# Patient Record
Sex: FEMALE | Race: WHITE | NOT HISPANIC OR LATINO | Employment: OTHER | URBAN - METROPOLITAN AREA
[De-identification: names, ages, dates, MRNs, and addresses within clinical notes are randomized per-mention and may not be internally consistent; named-entity substitution may affect disease eponyms.]

---

## 2017-04-09 PROBLEM — L50.9 URTICARIA: Status: ACTIVE | Noted: 2017-04-09

## 2017-04-11 PROBLEM — I48.92 ATRIAL FLUTTER WITH RAPID VENTRICULAR RESPONSE: Status: ACTIVE | Noted: 2017-04-11

## 2017-04-11 PROBLEM — I48.92 ATRIAL FLUTTER: Status: ACTIVE | Noted: 2017-04-11

## 2017-04-11 PROBLEM — T78.3XXA ANGIOEDEMA: Status: ACTIVE | Noted: 2017-04-11

## 2017-05-08 PROBLEM — I48.92 ATRIAL FLUTTER: Status: ACTIVE | Noted: 2017-05-08

## 2017-07-13 PROBLEM — I48.0 PAF (PAROXYSMAL ATRIAL FIBRILLATION): Status: ACTIVE | Noted: 2017-07-13

## 2017-11-06 PROBLEM — K92.2 ACUTE LOWER GI BLEEDING: Status: ACTIVE | Noted: 2017-11-06

## 2017-11-06 PROBLEM — D72.829 LEUKOCYTOSIS: Status: ACTIVE | Noted: 2017-11-06

## 2017-11-07 PROBLEM — K57.92 ACUTE DIVERTICULITIS: Status: ACTIVE | Noted: 2017-11-07

## 2018-05-06 PROBLEM — C95.90 LEUKEMIA NOT HAVING ACHIEVED REMISSION: Status: RESOLVED | Noted: 2018-05-06 | Resolved: 2018-05-06

## 2018-05-06 PROBLEM — G44.89 HEADACHE SYNDROME: Status: ACTIVE | Noted: 2018-05-06

## 2018-05-06 PROBLEM — C95.90 LEUKEMIA NOT HAVING ACHIEVED REMISSION: Status: ACTIVE | Noted: 2018-05-06

## 2018-05-06 PROBLEM — D72.829 LEUCOCYTOSIS: Status: ACTIVE | Noted: 2018-05-06

## 2018-05-06 PROBLEM — R07.9 CHEST PAIN: Status: ACTIVE | Noted: 2018-05-06

## 2018-05-07 PROBLEM — D63.8 ANEMIA, CHRONIC DISEASE: Status: ACTIVE | Noted: 2018-05-07

## 2018-05-08 PROBLEM — C95.90 LEUKEMIA NOT HAVING ACHIEVED REMISSION: Status: ACTIVE | Noted: 2018-05-08

## 2018-05-23 ENCOUNTER — TELEPHONE (OUTPATIENT)
Dept: PHARMACY | Facility: CLINIC | Age: 73
End: 2018-05-23

## 2018-05-23 ENCOUNTER — LAB VISIT (OUTPATIENT)
Dept: LAB | Facility: HOSPITAL | Age: 73
End: 2018-05-23
Attending: INTERNAL MEDICINE
Payer: MEDICARE

## 2018-05-23 DIAGNOSIS — C92.10 CHRONIC MYELOID LEUKEMIA, BCR/ABL-POSITIVE, NOT HAVING ACHIEVED REMISSION: ICD-10-CM

## 2018-05-23 LAB
ALBUMIN SERPL BCP-MCNC: 4.4 G/DL
ALP SERPL-CCNC: 68 U/L
ALT SERPL W/O P-5'-P-CCNC: 39 U/L
ANION GAP SERPL CALC-SCNC: 10 MMOL/L
ANISOCYTOSIS BLD QL SMEAR: SLIGHT
AST SERPL-CCNC: 44 U/L
BASOPHILS # BLD AUTO: ABNORMAL K/UL
BASOPHILS NFR BLD: 3 %
BILIRUB SERPL-MCNC: 0.4 MG/DL
BUN SERPL-MCNC: 16 MG/DL
CALCIUM SERPL-MCNC: 9.7 MG/DL
CHLORIDE SERPL-SCNC: 100 MMOL/L
CO2 SERPL-SCNC: 33 MMOL/L
CREAT SERPL-MCNC: 0.9 MG/DL
DIFFERENTIAL METHOD: ABNORMAL
EOSINOPHIL # BLD AUTO: ABNORMAL K/UL
EOSINOPHIL NFR BLD: 2 %
ERYTHROCYTE [DISTWIDTH] IN BLOOD BY AUTOMATED COUNT: 21.2 %
EST. GFR  (AFRICAN AMERICAN): >60 ML/MIN/1.73 M^2
EST. GFR  (NON AFRICAN AMERICAN): >60 ML/MIN/1.73 M^2
GIANT PLATELETS BLD QL SMEAR: PRESENT
GLUCOSE SERPL-MCNC: 115 MG/DL
HCT VFR BLD AUTO: 38.7 %
HGB BLD-MCNC: 12.1 G/DL
LYMPHOCYTES # BLD AUTO: ABNORMAL K/UL
LYMPHOCYTES NFR BLD: 11 %
MCH RBC QN AUTO: 26.7 PG
MCHC RBC AUTO-ENTMCNC: 31.3 G/DL
MCV RBC AUTO: 85 FL
METAMYELOCYTES NFR BLD MANUAL: 8 %
MONOCYTES # BLD AUTO: ABNORMAL K/UL
MONOCYTES NFR BLD: 2 %
MYELOCYTES NFR BLD MANUAL: 8 %
NEUTROPHILS # BLD AUTO: 63.2 K/UL
NEUTROPHILS NFR BLD: 51 %
NEUTS BAND NFR BLD MANUAL: 15 %
NRBC BLD-RTO: 1 /100 WBC
OVALOCYTES BLD QL SMEAR: ABNORMAL
PLATELET # BLD AUTO: 226 K/UL
PMV BLD AUTO: 10.4 FL
POIKILOCYTOSIS BLD QL SMEAR: SLIGHT
POLYCHROMASIA BLD QL SMEAR: ABNORMAL
POTASSIUM SERPL-SCNC: 4 MMOL/L
PROT SERPL-MCNC: 7.7 G/DL
RBC # BLD AUTO: 4.54 M/UL
SODIUM SERPL-SCNC: 143 MMOL/L
WBC # BLD AUTO: 95.82 K/UL

## 2018-05-23 PROCEDURE — 80053 COMPREHEN METABOLIC PANEL: CPT

## 2018-05-23 PROCEDURE — 81206 BCR/ABL1 GENE MAJOR BP: CPT

## 2018-05-23 PROCEDURE — 85007 BL SMEAR W/DIFF WBC COUNT: CPT

## 2018-05-23 PROCEDURE — 81207 BCR/ABL1 GENE MINOR BP: CPT

## 2018-05-23 PROCEDURE — 85007 BL SMEAR W/DIFF WBC COUNT: CPT | Mod: PN

## 2018-05-23 PROCEDURE — 85027 COMPLETE CBC AUTOMATED: CPT | Mod: PN

## 2018-05-23 PROCEDURE — 85027 COMPLETE CBC AUTOMATED: CPT

## 2018-05-23 PROCEDURE — 36415 COLL VENOUS BLD VENIPUNCTURE: CPT | Mod: PN

## 2018-05-23 PROCEDURE — 80053 COMPREHEN METABOLIC PANEL: CPT | Mod: PN

## 2018-05-29 LAB
BCR/ABL,P210 RESULT: NORMAL
PATH REPORT.FINAL DX SPEC: NORMAL
SPECIMEN TYPE: NORMAL

## 2018-05-31 LAB
BCR/ABL RESULT, P190, QUANT, BLD: NORMAL
PATH REPORT.FINAL DX SPEC: NORMAL
SPECIMEN TYPE, P190, QUANT, BLD: NORMAL

## 2018-06-05 ENCOUNTER — TELEPHONE (OUTPATIENT)
Dept: PHARMACY | Facility: CLINIC | Age: 73
End: 2018-06-05

## 2018-06-05 NOTE — TELEPHONE ENCOUNTER
Initial Tasigna consult completed on 2018 . Tasigna will be shipped on 2018 to arrive at patient's home on 2018 via [employer]. $7.84 copay. Patient plans to start Tasigna on 2018. Address confirmed, CC on file. Confirmed 2 patient identifiers - name and . Therapy Appropriate.     Patient was counseled on the administration directions:  -Take 2 capsules (300mg) by mouth twice daily on an empty stomach, take either 1 hour before or 2 hours after meals.    -Do not chew, crush, or break the tablets.   If possible, patient was instructed tip the tablets from the RX bottle to the cap, and take directly from the cap to the mouth.  Patient may handle the medication with their hands if they wear with a latex or nitrile glove and wash their hands before and after handling the tablets.    Patient was counseled on the following possible side effects, which include, but are not limited to:  Myelosuppression, rash, pruritus, headache, N/V/D, fatigue, alopecia, abdominal pain, myalgia, edema, dry skin.      DDIs:  Medication list reviewed, Tasigna may also increase the serum concentration of fentanyl and hydrocodone which could increase or prolong adverse events, including severe respiratory depression.  Tasigna may increase the serum concentration of atorvastatin - monitor for increased LFTs and myalgias.  Patient discontinued Tikosyn this past Saturday    Patient was given 2 patient education handouts on how to handle oral chemotherapy and specific recommendations- do's and don'ts. Instructed the patient that if they have any remaining oral chemotherapy, not to flush down the toilet or throw away in the trash; The patient or caregiver should return the unused oral chemotherapy to either the clinic or to myself in the Pharmacy where the oral chemotherapy can be disposed of properly.     Patient confirmed understanding. Patient did not have additional questions. Patient plans to start Tasigna on 2018.  Consultation included: indication; goals of treatment; administration; storage and handling; side effects; how to handle side effects; the importance of compliance; how to handle missed doses; the importance of laboratory monitoring; the importance of keeping all follow up appointments.  Patient understands to report any medication changes to [facility] and provider. All questions answered and addressed to patients satisfaction. I will f/u with patient in 1 week from start, [facility] to contact patient in 3 weeks for refills.

## 2018-06-12 PROBLEM — M67.441 DIGITAL MUCOUS CYST OF FINGER OF RIGHT HAND: Status: ACTIVE | Noted: 2018-06-12

## 2018-06-15 PROBLEM — M25.741: Status: ACTIVE | Noted: 2018-06-15

## 2018-06-15 PROBLEM — R22.31 FINGER MASS, RIGHT: Status: ACTIVE | Noted: 2018-06-15

## 2018-06-20 PROBLEM — R51.9 HEADACHE: Status: ACTIVE | Noted: 2018-06-20

## 2018-06-21 PROBLEM — I44.1 AV BLOCK, MOBITZ 1: Status: ACTIVE | Noted: 2018-06-21

## 2018-06-25 ENCOUNTER — TELEPHONE (OUTPATIENT)
Dept: PHARMACY | Facility: CLINIC | Age: 73
End: 2018-06-25

## 2018-06-25 NOTE — TELEPHONE ENCOUNTER
Patient called back and stated that she actually does have a rash on her upper chest and her face. No itching or pain. Discussed that rash is a common side effect with Tagrisso and patient was instructed to use hydrocortisone cream as needed. Advised patient to keep hydrocortisone cream away from eyes. If rash worsens, starts to spread, or is not able to go away with hydrocortisone cream, she was advised to reach out to provider. Patient also admits to a lot of fatigue. Discussed with patient that this is an unfortunate very common side effect with the medication and to stay hydrated, try to eat smaller meals/snacks throughout the day to keep up energy, and try to do small exercises to keep energy level up as much as possible. Will reach out to patient next week to see how she is doing with rash and fatigue.

## 2018-06-25 NOTE — TELEPHONE ENCOUNTER
LVM Tasigna follow up and to check on patient after recent admissions.  The last time [facility] spoke with her she was planning to start on Tasigna on/around 6/13/18.

## 2018-06-25 NOTE — TELEPHONE ENCOUNTER
Follow-up for Tasigna:  Patient returned call for Tasigna follow up.  Patient confirms a start date of 6/18/18.  Since beginning Tasigna she has been admitted for heart block, severe headache and nausea and vomiting.  She received IV fluids while inpatient and a few doses of toradol and other pain medications.  She confirms having her Tasigna administered to her while in the hospital as she remembered to bring it with her.  After discharge, she found herself back in the ER with a nosebleed that resolved without admission.  Her platelets were determined to be within normal range at this time, however, her WBC count is very high >70k and notes she is to follow up with her PCP soon.  She denies missing any doses, she has continued on the medication after discharge.  She denies any rash, diarrhea, abdominal pain, etc.  She has been using the Zofran prior to each Tasigna dose to keep nausea under control.  She has had a few issues with constipation that she attributes to pain medications given while in the hospital.  She used a few doses of metamucil and is now having regular BM.      Advised patient to reach out to Dr. Marvin's office today and schedule a follow-up appointment as she was due to see the MD on the day she was admitted.  Patient expressed understanding.  Due to her difficult and complicated start with Tasigna, [facility] will follow up with her in 2 weeks at her next refill to ensure she is still tolerating therapy with no further issues.  Patient was advised to call [facility] with any questions or concerns in the meantime.  All of her questions were answered to her satisfaction.

## 2018-06-26 ENCOUNTER — TELEPHONE (OUTPATIENT)
Dept: PHARMACY | Facility: CLINIC | Age: 73
End: 2018-06-26

## 2018-06-26 NOTE — TELEPHONE ENCOUNTER
Patient called to report that she had another nosebleed from left nare.  She reports volume of blood greater than last nosebleed but no clots expelled this time.  Eliquis was discontinued on 6/14 and she has a cystoscopy in the morning to further evaluate her recent hematuria.  She presented to ED for nosebleed on 6/24.  Advised that she present to ED for evaluation again if nosebleed reoccurs.  She ended call that her nose started to trickle again after applying Vaseline and rubbing her nose.  She will reach back out if nosebleed continues and verbalized understanding to present to ED for reevaluation.

## 2018-07-10 ENCOUNTER — TELEPHONE (OUTPATIENT)
Dept: PHARMACY | Facility: CLINIC | Age: 73
End: 2018-07-10

## 2018-07-10 RX ORDER — ZOLPIDEM TARTRATE 12.5 MG/1
12.5 TABLET, FILM COATED, EXTENDED RELEASE ORAL NIGHTLY
Refills: 3 | COMMUNITY
Start: 2018-07-02 | End: 2019-09-12 | Stop reason: SDUPTHER

## 2018-07-10 RX ORDER — LEVOTHYROXINE SODIUM 112 UG/1
75 TABLET ORAL DAILY
COMMUNITY
Start: 2018-07-07 | End: 2018-12-24 | Stop reason: DRUGHIGH

## 2018-08-03 ENCOUNTER — TELEPHONE (OUTPATIENT)
Dept: PHARMACY | Facility: CLINIC | Age: 73
End: 2018-08-03

## 2018-08-03 NOTE — TELEPHONE ENCOUNTER
Patient reports that her Ambien dose was increased.  Advised that no interaction was expected with Ambien and Tasigna so she should be able to continue both medication without any expected issues.  She was told by regular pharmacy that Ambien and Tasigna were moderate.  She states they told her nothing further.  Advised that no interactions are expected and that usually the severity of interaction is accompanied with what the interaction is.  She will call back if she finds out what they meant.      We again spoke about her Xarelto and advised her to report any new bleeding to provider or pharmacy.      She is also concerned about respiratory depression with higher Ambien dose with baclofen and fentanyl.  Advised that her medication were all within normal dosing and that since they can all depress respiration she would be at higher risk and to report any SOB or breathing episodes to her provider or seek urgent care if necessary.  Inquired if Ambien was necessary but she reports that she cant sleep because of her Bipap machine and her episodes are up to 59 from 6.  Advised that she keep track of her episodes and speak with her provider about her breathing machine and increasing episodes.    Patient called back and inquired about  Ambien dose from Tasigna.  Advised that no separation was necessary and she can continue to take her evening Tasigna at 1030p and her Ambien shortly thereafter.  Reminded her to report any muscle pains/weakness to provider as well as changes in bleeding, breathing, or heart rate.

## 2018-08-07 PROBLEM — M25.741: Status: RESOLVED | Noted: 2018-06-15 | Resolved: 2018-08-07

## 2018-08-07 PROBLEM — R22.31 FINGER MASS, RIGHT: Status: RESOLVED | Noted: 2018-06-15 | Resolved: 2018-08-07

## 2018-08-09 ENCOUNTER — DOCUMENTATION ONLY (OUTPATIENT)
Dept: INFUSION THERAPY | Facility: HOSPITAL | Age: 73
End: 2018-08-09

## 2018-08-09 NOTE — PROGRESS NOTES
Social Service: ADILSONW spoke with pt regarding her need for homemaker services. Explored avenues for help with this service. Pt has had trouble getting this service from [employer].  Pt requests that I send a letter to [employer] on her behalf. Pt will send details for this letter and I will send. Also informed pt about [employer] service and made referral for pt. Also encouraged pt to utilize her natural support system including family and friends as possible. Maggi Adkins LCSW

## 2018-08-31 ENCOUNTER — TELEPHONE (OUTPATIENT)
Dept: PHARMACY | Facility: CLINIC | Age: 73
End: 2018-08-31

## 2018-10-03 ENCOUNTER — PATIENT MESSAGE (OUTPATIENT)
Dept: PHARMACY | Facility: CLINIC | Age: 73
End: 2018-10-03

## 2018-10-03 ENCOUNTER — TELEPHONE (OUTPATIENT)
Dept: PHARMACY | Facility: CLINIC | Age: 73
End: 2018-10-03

## 2018-10-03 NOTE — TELEPHONE ENCOUNTER
Patient returned call in regards to Tasigna refill and clinical follow up. Will ship medication 10/4 for patient to receive 10/5. $0.00 copay, verified address. Patient has one-half a pack remaining (~5 day supply). Patient denies missing any doses, new allergies, and new health conditions.    Patient was able to successfully restart Lasix and K-Dur since previous refill encounter and seems to be tolerating medications well. Patient did not start megestrol for cachexia as prescribed since she discovered palpitations were a possible side effect. Informed patient it only has ~1-3% incidence of causing palpitations but patient stated due to her a. Fib that lasts approximately 7 hours (patient reported), she did not want to risk taking the medication. Patient asked about alternative medications to increase appetite but recommended patient contact provider as other commonly used medications have similar incidence (marinol, mirtazapine, etc). Patient stated her weight has been consistently around 165 lbs since she lost 20 pounds.    Patient stated headaches have improved over the past month. She complained of a rash that is on her arms, neck, shoulders, chest, and side of the face. Patient uses HC 1% cream daily to help with rash but the issue persists. Patient started using an unscented soap yesterday which she believes will help subside skin irritation. Patient will discuss with provider at next appt.    Patient's energy levels have been fluctuating. She understands to try to stay as active as possible and drink plenty of fluids.    All notes dictated by Lizbeth Henderson PharmD. Written by Alexandra oMreau PharmD  Directly reviewed by Alexandra Moreau PharmD

## 2018-10-29 ENCOUNTER — TELEPHONE (OUTPATIENT)
Dept: PHARMACY | Facility: CLINIC | Age: 73
End: 2018-10-29

## 2018-11-26 ENCOUNTER — TELEPHONE (OUTPATIENT)
Dept: PHARMACY | Facility: CLINIC | Age: 73
End: 2018-11-26

## 2018-11-26 NOTE — TELEPHONE ENCOUNTER
Called patient in regards to Tasigna refill and f/u. Patient has 1 pack and 1 tablet remaining (7 day supply). Will ship Tasigna 11/28 for patient to receive 11/29. Confirmed address and shipping specifications (3 liter box, 6 gel packs, styrofoam in cardboard box, med diagonal in box). $0.00 copay. No missed doses. No new medications, allergies, or UC/ED encounters. Patient has new dx of visual migraines.    Patient confirmed taking medication correctly - Tasigna 150mg 2 capsules BID on an empty stomach 2 hours before eating or one hour after. She uses proper PO chemo handling precautions (gloves) and stores medication at room temperature.    Patient had several complaints, notably several episodes of a. Fib. She had four episodes of a. Fib in November that has lasted up to 4 hours (up to 167 bpm). Reviewed ED s/sx CV event (sudden chest pain, change in vision, light headedness/dizziness, fast heartbeat, etc). Patient verbalized understanding and stated she has not experienced these so far. Patient monitors BP at home occasionally and the highest reported reading was 146/84mmHg which she stated is much higher than her normal BP reading.    She additionally has a new dx of visual migraines that began within the past month. They have gradually worsened to several per day that occur daily without exception. She described as having sharp pain with visual change in her left eye. Her right eye has only been affected twice. Ophthalmologist was consulted earlier this month. She has severe pain resultant from previous surgery. She currently uses Fentanyl but is requesting [place] through her PCP. DDIs have been reviewed with cardiologist and oncologist who approved patient to proceed with medications. Patient asked what OTC pain medications she can take. Discussed avoidance of NSAIDs due increased bleeding risk with Xarelto. Cautioned to use <3000mg APAP per day d/t increased risk of hepatotoxicity. Patient verbalized  understanding.    Patient has additionally had sweating throughout the day time. Discussed management strategies such as wearing lightweight breathable clothes, having fan around, etc. Patient denied swelling. Patient reported improvement in her energy levels.    Patient requested a message to be sent to Dr. Marvin to relay information regarding a. Fib and visual migraines. Patient aware to contact provider with any changes in condition. She is aware to reach out to [facility] with any questions/concerns.

## 2018-11-30 NOTE — TELEPHONE ENCOUNTER
Patient called [facility] to discuss management of missed Tasigna dose. She typically takes Tasigna prior to shower but forgot to take until after her shower. At this time, she took Ambien and gabapentin as scheduled but was unsure if she could take Tasigna at the same time. Therefore, patient waited about 30 minutes after gabapentin/Ambien then took her Tasigna. Advised patient to attempt to take medication about the same time every day but skip dose and resume at next scheduled dose if forgotten. Additionally informed her she may take Tasigna at the same time as gabapentin/Ambien (no DDIs or interference with absorption). Patient verbalized understanding.

## 2018-12-24 ENCOUNTER — TELEPHONE (OUTPATIENT)
Dept: PHARMACY | Facility: CLINIC | Age: 73
End: 2018-12-24

## 2018-12-24 RX ORDER — LEVOTHYROXINE SODIUM 75 UG/1
75 TABLET ORAL DAILY
COMMUNITY
Start: 2018-11-28 | End: 2020-07-14 | Stop reason: SDUPTHER

## 2018-12-24 RX ORDER — POTASSIUM CHLORIDE 1500 MG/1
20 TABLET, EXTENDED RELEASE ORAL DAILY PRN
COMMUNITY
Start: 2018-12-03 | End: 2019-12-26

## 2018-12-24 NOTE — TELEPHONE ENCOUNTER
Patient called to request refill as she has 1 week on hand and knew with holidays that shipping would be limited.  She reports tolerating without any new side effects.  Her dentist would like to extract a tooth and she was concerned about antibiotics.  Advised her to let us know once dentist prescribes antibiotics so we can confirm if any interactions exist.  Advised that she will need to report any s/sx of infection or fever > 100.5 to provider.  She stated that she will have blood work and possible BMBx after upcoming February appt so she plans to delay tooth extraction until after then.    Patient reports that she has been tolerating Tasigna well with no missed doses.  She takes medication daily without food and does understand long term goals of medication.    She has not sought urgent care in the last 4 weeks and reports no missed daily activities.  Overall her QoL is the same as baseline.  She stores medication at room temperature and is utilizing proper handling precautions.  She does have any additional questions at this time and is aware to call [facility] with any questions/concerns as well as to ask for pharmacist at refill call.    Patient confirms the need of a refill. Will ship on 2018 with patient consent. Copay $0.00 at 004. Patient has 7 days supply remaining. Patient has not started any new medications, 0 missed doses and denies new side effects. Patient taking medication as prescribed, no changes in direction. Patient did not have any concerns or questions at this time.  & address verified.

## 2019-01-17 ENCOUNTER — TELEPHONE (OUTPATIENT)
Dept: PHARMACY | Facility: CLINIC | Age: 74
End: 2019-01-17

## 2019-01-17 NOTE — TELEPHONE ENCOUNTER
Ms. Owen called [facility]. Her doctor would like to prescribe Belsomra for her instead of Ambien CR. Category D DDI with Tasigna and Belsomra - inc serum concentrations of Belsomra d/t CYP interaction, recommend starting at 5 mg dose of Belsomra while taking concomitantly. Advised patient of interactions and lower starting dose. She indicated that Ambien is workign well for her, but she would discuss with her prescriber. She reports having sever sleep apnea and is very senstive to medication ingredients (has to fill a specific brand of Fentanyl patches d/t adverse rxn to adhesive).    Advised Ms. Owen to call [facility] if any medication changes are made. She verbalized understanding.

## 2019-01-21 NOTE — TELEPHONE ENCOUNTER
"Patient contacted [facility] to report one Tasigna capsule that "exploded" in the blister pack after touching it. This occurred at an unknown date last week around 1/14. It is still contained in the blister pack - recommended patient bring capsule contained in blister pack to medication disposal bin at [facility] near her house at [address] [place] [address] in [place]. She stores Tasigna at room temperature in the styrofoam box (without ice packs). She requested we report to the : Lot #[id number], exp 1/2021. Patient had no additional questions. Will file grievance.  "

## 2019-02-13 ENCOUNTER — TELEPHONE (OUTPATIENT)
Dept: PHARMACY | Facility: CLINIC | Age: 74
End: 2019-02-13

## 2019-02-13 NOTE — TELEPHONE ENCOUNTER
Patient returned phone call back regard specialty medication refill for Tasigna 150mg (112/28) $7.84/004- Patient scheduled to have medication ship out on Mon 2/18 to arrive on Tues 2/19 address confirmed & [facility]. Patient informed no new medications, conditions or allergies since last talked to [facility]. Patient have about a week & 2 days remaining & no missed dose. Patient declined questions for the clinical pharmacist. Patient voiced understanding.     Patient informed she did took one morning dose of medication 1.5 hour earlier than her schedule time & her night dose at her regular time.     When over the shipment/delivery package with patient:   -3 liter box  -6 gel packs  -styrofoam in cardboard box  -med diagonal in box.

## 2019-02-16 NOTE — TELEPHONE ENCOUNTER
Patient reports that she has been tolerating Tasigna well with no missed doses.  She denies any new side effects at this time.  Her nosebleeds have started to subside.  She has started Xarelto in place of Eliquis and will report any new incidents of bleeding to provider.  
No

## 2019-03-06 PROBLEM — R07.9 CHEST PAIN: Status: RESOLVED | Noted: 2018-05-06 | Resolved: 2019-03-06

## 2019-03-08 ENCOUNTER — TELEPHONE (OUTPATIENT)
Dept: PHARMACY | Facility: CLINIC | Age: 74
End: 2019-03-08

## 2019-03-08 NOTE — TELEPHONE ENCOUNTER
Patient called to inquire about taking Tasigna on the same day as a MRI, Xray, and CT scan.  Advised that there should be no issue taking her medication like normal for imaging unless her provider tells her otherwise.  She stated she would remove her fentanyl patch that morning and take an alprazolam to calm herself for the MRI.  She was concerned about potential respiratory depression with hydrocodone.  Advised that with the low doses she is taking that it would be unlikely, but to be cautious taking together due to potential additive effect.

## 2019-03-18 ENCOUNTER — TELEPHONE (OUTPATIENT)
Dept: PHARMACY | Facility: CLINIC | Age: 74
End: 2019-03-18

## 2019-03-18 NOTE — TELEPHONE ENCOUNTER
Patient called [facility] and confirmed that she is due for a refill at this time. She states that she has 1 pack remaining (7 days). Will ship out medication on 3/20 for patient to receive on 3/21.  Medication list reviewed; no new allergies or health conditions. Patient will be starting on Valium only when she has MRI/CT scan, but discussed there is no DDI between Valium and Tasigna. Patient confirmed that she is taking Tasigna 150mg - 2 tablets BID. She denies any missed doses. She takes her pills at 9:30am and 9:30pm every day to make sure she is taking it on an empty stomach. Patient has been on medication for some time and is understanding of proper storage, administration and handling. Patient states that she has not experienced any significant side effect from Tasigna at this time. We discussed making sure she is continuing to monitor for any irregular HR or symptoms of atrial fibrillation (she monitors with loop recorder and follows up with cardiology) and she confirmed no s/sx of bleeding.     Her QOL is about the same. She does have problems as she has spinal fusion in her neck and that is now causing pain to radiate down her body to her hands. She will be completing MRI/CT scans to determine next step. This most recent problem has affected her pain level and general activities. Patient was not ready to complete PROMIS -10 (QOL) assessment at this time and will try to complete at next follow up.

## 2019-03-19 ENCOUNTER — TELEPHONE (OUTPATIENT)
Dept: PHARMACY | Facility: CLINIC | Age: 74
End: 2019-03-19

## 2019-03-19 NOTE — TELEPHONE ENCOUNTER
Ms. Owen called [facility] inquiring if she could take a Vicodin prior to her MRI today. She is having significant pain and is planning to take a Valium prior to her MRI. She is having someone drive her to her appt. Advised her ok to take Vicodin, just may make her sleepier with the Valium. She reports taking on 1/4 to 1/2 vicodin tabs - she is concerned about her liver enzymes. Advised her that keeping her max APAP intake at 2000 mg (including vicodin + OTC APAP) is safe for her liver.

## 2019-03-20 NOTE — TELEPHONE ENCOUNTER
The patient contacted [facility] to let us know she was prescribed clindamycin 150mg - 2 tabs q 6h. She asked how to administer doses every six hours if she has to sleep. Advised her to try to separate four doses per day as close to every six hours as possible. She also wanted to know if she needed to administer Tasigna separately - informed her this is not necessary. She verbalized understanding and had no further questions at this time.

## 2019-03-20 NOTE — TELEPHONE ENCOUNTER
The patient contacted [facility] from her dentist's office requesting an interaction check with an abx and Tasigna. She put me on the phone with the dentist who asked if there are any DDIs between clindamycin and Tasigna. Informed her no DDIs between these two medications. No further questions.

## 2019-04-15 ENCOUNTER — TELEPHONE (OUTPATIENT)
Dept: PHARMACY | Facility: CLINIC | Age: 74
End: 2019-04-15

## 2019-04-15 NOTE — TELEPHONE ENCOUNTER
"Spoke with Ms. Owen - transferred from routine refill call. Patient called and we scheduled a shipment of her Tasigna for 4-17. Patient has 8 days of medication on hand and has not missed any doses. Patient denied any new medications, allergies or conditions. Patients address was confirmed, she was informed of her $0 co pay and her shipment was scheduled for 4-17    She was concerned about drug interactions with "ostepenia drugs." Advised her [facility] can review any DDIs once she is prescribed something, but [facility] is unable to recommend what she should be prescribed w/o first being seen by a provider for a physical exam, diagnosis, etc.     She also inquired about having a ultrasound and having physical therapy - advised her both are ok and to keep up with any appts she has already made.     Advised if she is prescribed any new medication, [facility] is happy to review potential DDIs and make any necessary recommendations for adjustments if needed for significant contraindications. She verbalized understanding.  "

## 2019-05-08 ENCOUNTER — TELEPHONE (OUTPATIENT)
Dept: PHARMACY | Facility: CLINIC | Age: 74
End: 2019-05-08

## 2019-05-10 ENCOUNTER — DOCUMENTATION ONLY (OUTPATIENT)
Dept: FAMILY MEDICINE | Facility: CLINIC | Age: 74
End: 2019-05-10

## 2019-06-05 ENCOUNTER — TELEPHONE (OUTPATIENT)
Dept: PHARMACY | Facility: CLINIC | Age: 74
End: 2019-06-05

## 2019-06-05 NOTE — TELEPHONE ENCOUNTER
Refill call regarding Raheema from [facility]. Pt states that she has 2 weeks on hand, she missed 2 doses due to not feeling well for 1 and the other she remembered at the last minute. Pt spoke with MD at [facility] about it. Also notified [facility] and they will follow up. I also informed pt we will give her a call in a week to set up next refill

## 2019-06-13 ENCOUNTER — PATIENT OUTREACH (OUTPATIENT)
Dept: ADMINISTRATIVE | Facility: HOSPITAL | Age: 74
End: 2019-06-13

## 2019-06-13 NOTE — LETTER
June 21, 2019    Adry Owen  [address] [address] [address]             [facility]  [address]  [place] [address]  Phone: [phone] Dear Mrs. Owen:    [facility] is committed to your overall health.  To help you get the most out of each of your visits, we will review your information to make sure you are up to date on all of your recommended tests and/or procedures.       As a new patient to Dr. Nunes  , we may not have your complete medical records.  After reviewing your chart have found that you may be due for the following:     TETANUS VACCINE   Mammogram   DEXA SCAN   Pneumococcal Vaccine   Shingles Immunization     If you have had any of the above done at another facility, please bring the records or information with you so that your record at [facility] will be complete.       If you are currently taking medication, please bring it with you to your appointment for review.     Thank you for letting us care for you,            If you have any questions or concerns, please don't hesitate to call.    Sincerely,        Porsha Daley MA   [phone]

## 2019-06-13 NOTE — TELEPHONE ENCOUNTER
Patient confirms the need of a refill for Tasigna.  [facility] will ship overnight via [employer] on  with patient consent.  Copay $0 at 004. Patient reports 7 days supply remaining.  Next dose needed by: .  Patient has not started any new medications, reports 2 missed doses (previously discussed with richie and MD), and denies new side effects.   Patient is taking medication as prescribed with no changes in direction. Patient did not have any concerns or questions at this time.  & address verified.    Dosing, how takin caps at 10AM and 10PM, without food  Storage: room temp  Handling: wears gloves. Has had issues with the capsules bending a bit, difficult to get out of packaging. 1 cap previously exploded (she keeps in a separate plastic bag - to be disposed of at [facility] LVM for [facility] to discuss packaging issues - has occurred with other patients as weel  Side effects: none new  Recent infections: none  Pain: patient declined  Appetite: not great, but she does eat. She thinks the heat makes it worse - drinks 4 bottles of water/day  Energy, fatigue: stable  Health, mood, QOL: patient declined  ED/UC visits: none  Next clinical follow up: 3 months or sooner if clinically appropriate

## 2019-06-13 NOTE — PROGRESS NOTES
Health Maintenance Due   Topic Date Due    TETANUS VACCINE  01/02/1963    Mammogram  01/02/1985    DEXA SCAN  01/02/1985    Shingles Vaccine (1 of 2) 01/02/1995    Pneumococcal Vaccine (65+ High/Highest Risk) (1 of 2 - PCV13) 01/02/2010     Chart review completed 06/13/2019, Not in Links 06/13/2019   requested cscope  Future Appointments   Date Time Provider Department Center   6/17/2019  3:00 PM [facility] SP DEXA 1 LIMIT 350 LBS [facility] WP MAMM [facility] WP   7/1/2019  2:00 PM Ross Nunes MD [facility]   7/9/2019  2:30 PM Xavier Ahuja III, MD [facility] CARD [facility]   8/20/2019  3:30 PM Dominick Marvin, DO [facility] NOR ONC [facility]

## 2019-07-01 ENCOUNTER — OFFICE VISIT (OUTPATIENT)
Dept: FAMILY MEDICINE | Facility: CLINIC | Age: 74
End: 2019-07-01
Payer: MEDICARE

## 2019-07-01 VITALS
BODY MASS INDEX: 25.93 KG/M2 | WEIGHT: 155.63 LBS | DIASTOLIC BLOOD PRESSURE: 74 MMHG | OXYGEN SATURATION: 95 % | HEIGHT: 65 IN | SYSTOLIC BLOOD PRESSURE: 116 MMHG | HEART RATE: 69 BPM

## 2019-07-01 DIAGNOSIS — E03.8 HYPOTHYROIDISM DUE TO HASHIMOTO'S THYROIDITIS: ICD-10-CM

## 2019-07-01 DIAGNOSIS — I25.10 CORONARY ARTERY DISEASE DUE TO CALCIFIED CORONARY LESION: ICD-10-CM

## 2019-07-01 DIAGNOSIS — I25.84 CORONARY ARTERY DISEASE DUE TO CALCIFIED CORONARY LESION: ICD-10-CM

## 2019-07-01 DIAGNOSIS — M54.2 CHRONIC NECK PAIN: ICD-10-CM

## 2019-07-01 DIAGNOSIS — F32.4 MAJOR DEPRESSIVE DISORDER WITH SINGLE EPISODE, IN PARTIAL REMISSION: ICD-10-CM

## 2019-07-01 DIAGNOSIS — G89.29 CHRONIC NECK PAIN: ICD-10-CM

## 2019-07-01 DIAGNOSIS — I48.0 PAROXYSMAL ATRIAL FIBRILLATION: ICD-10-CM

## 2019-07-01 DIAGNOSIS — E06.3 HYPOTHYROIDISM DUE TO HASHIMOTO'S THYROIDITIS: ICD-10-CM

## 2019-07-01 DIAGNOSIS — M85.852 OSTEOPENIA OF BOTH HIPS: ICD-10-CM

## 2019-07-01 DIAGNOSIS — I10 ESSENTIAL HYPERTENSION: Primary | ICD-10-CM

## 2019-07-01 DIAGNOSIS — C92.10 CHRONIC MYELOID LEUKEMIA, BCR/ABL-POSITIVE, NOT HAVING ACHIEVED REMISSION: ICD-10-CM

## 2019-07-01 DIAGNOSIS — E78.00 HYPERCHOLESTEREMIA: ICD-10-CM

## 2019-07-01 DIAGNOSIS — Z12.39 BREAST CANCER SCREENING: ICD-10-CM

## 2019-07-01 DIAGNOSIS — M85.851 OSTEOPENIA OF BOTH HIPS: ICD-10-CM

## 2019-07-01 PROCEDURE — 1100F PTFALLS ASSESS-DOCD GE2>/YR: CPT | Mod: HCNC,CPTII,S$GLB, | Performed by: INTERNAL MEDICINE

## 2019-07-01 PROCEDURE — 3078F PR MOST RECENT DIASTOLIC BLOOD PRESSURE < 80 MM HG: ICD-10-PCS | Mod: HCNC,CPTII,S$GLB, | Performed by: INTERNAL MEDICINE

## 2019-07-01 PROCEDURE — 3074F SYST BP LT 130 MM HG: CPT | Mod: HCNC,CPTII,S$GLB, | Performed by: INTERNAL MEDICINE

## 2019-07-01 PROCEDURE — 99999 PR PBB SHADOW E&M-EST. PATIENT-LVL III: ICD-10-PCS | Mod: PBBFAC,HCNC,, | Performed by: INTERNAL MEDICINE

## 2019-07-01 PROCEDURE — 3074F PR MOST RECENT SYSTOLIC BLOOD PRESSURE < 130 MM HG: ICD-10-PCS | Mod: HCNC,CPTII,S$GLB, | Performed by: INTERNAL MEDICINE

## 2019-07-01 PROCEDURE — 1100F PR PT FALLS ASSESS DOC 2+ FALLS/FALL W/INJURY/YR: ICD-10-PCS | Mod: HCNC,CPTII,S$GLB, | Performed by: INTERNAL MEDICINE

## 2019-07-01 PROCEDURE — 99214 OFFICE O/P EST MOD 30 MIN: CPT | Mod: HCNC,S$GLB,, | Performed by: INTERNAL MEDICINE

## 2019-07-01 PROCEDURE — 3078F DIAST BP <80 MM HG: CPT | Mod: HCNC,CPTII,S$GLB, | Performed by: INTERNAL MEDICINE

## 2019-07-01 PROCEDURE — 99214 PR OFFICE/OUTPT VISIT, EST, LEVL IV, 30-39 MIN: ICD-10-PCS | Mod: HCNC,S$GLB,, | Performed by: INTERNAL MEDICINE

## 2019-07-01 PROCEDURE — 99999 PR PBB SHADOW E&M-EST. PATIENT-LVL III: CPT | Mod: PBBFAC,HCNC,, | Performed by: INTERNAL MEDICINE

## 2019-07-01 RX ORDER — CYCLOSPORINE 0.5 MG/ML
1 EMULSION OPHTHALMIC 2 TIMES DAILY
Qty: 1 VIAL | Refills: 5 | Status: SHIPPED | OUTPATIENT
Start: 2019-07-01 | End: 2022-03-24 | Stop reason: SDUPTHER

## 2019-07-01 RX ORDER — FENTANYL 25 UG/1
1 PATCH TRANSDERMAL
Qty: 15 PATCH | Refills: 0 | Status: SHIPPED | OUTPATIENT
Start: 2019-10-12 | End: 2019-07-03

## 2019-07-01 RX ORDER — FENTANYL 25 UG/1
1 PATCH TRANSDERMAL
Qty: 15 PATCH | Refills: 0 | Status: SHIPPED | OUTPATIENT
Start: 2019-09-12 | End: 2019-10-23

## 2019-07-01 RX ORDER — ALENDRONATE SODIUM 70 MG/1
70 TABLET ORAL
Qty: 12 TABLET | Refills: 3 | Status: SHIPPED | OUTPATIENT
Start: 2019-07-01 | End: 2024-01-11

## 2019-07-01 RX ORDER — FENTANYL 25 UG/1
1 PATCH TRANSDERMAL
Qty: 15 PATCH | Refills: 0 | Status: SHIPPED | OUTPATIENT
Start: 2019-08-12 | End: 2020-01-10 | Stop reason: SDUPTHER

## 2019-07-01 RX ORDER — FENTANYL 25 UG/1
1 PATCH TRANSDERMAL
Qty: 15 PATCH | Refills: 0 | Status: SHIPPED | OUTPATIENT
Start: 2019-11-12 | End: 2019-07-03

## 2019-07-01 NOTE — PROGRESS NOTES
Patient ID: Adry Owen     Chief Complaint:   Chief Complaint   Patient presents with    Review test results        HPI: Patient was previously an [facility] patient, but known to me.   Labs reviewed: Lipids controlled. No DIABETES MELLITUS. CBC and CMP from a few months ago normal.   DEXA= osteopenia in hips - will start Fosamax again. Vit D slightly elevated- will decrease supplementation.   Has been losing weight over last 5 months that she attributes to working more outside.     Review of Systems   Constitutional: Negative.    HENT: Negative.    Eyes: Negative.    Respiratory: Negative.    Cardiovascular: Negative.    Gastrointestinal: Negative.    Endocrine: Negative.    Genitourinary: Negative.    Musculoskeletal: Negative.    Skin: Negative.    Allergic/Immunologic: Negative.    Neurological: Negative.    Hematological: Negative.    Psychiatric/Behavioral: Negative.    All other systems reviewed and are negative.         Objective:      Physical Exam   Physical Exam   Constitutional: She is oriented to person, place, and time. She appears well-developed and well-nourished.   HENT:   Head: Normocephalic and atraumatic.   Nose: Nose normal.   Mouth/Throat: Oropharynx is clear and moist.   Eyes: Pupils are equal, round, and reactive to light. Conjunctivae and EOM are normal.   Neck: Normal range of motion. Neck supple.   Cardiovascular: Normal rate, regular rhythm, normal heart sounds and intact distal pulses.   Pulmonary/Chest: Effort normal and breath sounds normal.   Abdominal: Soft. Bowel sounds are normal.   Musculoskeletal: Normal range of motion.   Neurological: She is alert and oriented to person, place, and time.   Skin: Skin is warm and dry. Capillary refill takes less than 2 seconds.   Psychiatric: She has a normal mood and affect. Her behavior is normal. Judgment and thought content normal.   Nursing note and vitals reviewed.         Vitals:   Vitals:    07/01/19 1411   BP: 116/74   BP Location:  "Left arm   Patient Position: Sitting   BP Method: Small (Manual)   Pulse: 69   SpO2: 95%   Weight: 70.6 kg (155 lb 10.3 oz)   Height: 5' 5" (1.651 m)      Assessment:       Patient Active Problem List    Diagnosis Date Noted    Hypothyroidism due to Hashimoto's thyroiditis 07/01/2019    AV block, Mobitz 1 06/21/2018    Headache 06/20/2018    Digital mucous cyst of finger of right hand 06/12/2018    Chronic myeloid leukemia, BCR/ABL-positive, not having achieved remission 05/23/2018    Leukemia not having achieved remission 05/08/2018    Anemia, chronic disease 05/07/2018    Leucocytosis 05/06/2018    Headache syndrome 05/06/2018    Acute diverticulitis 11/07/2017    Acute lower GI bleeding 11/06/2017    Leukocytosis 11/06/2017    Atrial flutter 05/08/2017    Angioedema 04/11/2017    Urticaria 04/09/2017    Hashimoto encephalopathy 08/03/2016    GI bleeding 08/26/2013    Syncope 02/22/2013    Nuclear sclerosis 07/23/2012    Posterior vitreous detachment 07/23/2012    Essential hypertension 06/01/2012    Depression 06/01/2012    Coronary artery disease due to calcified coronary lesion 05/11/2012    Hypercholesteremia 05/11/2012    Paroxysmal atrial fibrillation 05/11/2012    Back pain 05/11/2012    Status post coronary artery stent placement 05/11/2012          Plan:        Adry was seen today for review test results.    Diagnoses and all orders for this visit:    Essential hypertension  CONTROLLED      Major depressive disorder with single episode, in partial remission  CONTROLLED      Coronary artery disease due to calcified coronary lesion  Per Leigha    Hypercholesteremia  CONTROLLED      Paroxysmal atrial fibrillation  HR ok and on Xarelto     Chronic myeloid leukemia, BCR/ABL-positive, not having achieved remission  Per Yon     Hypothyroidism due to Hashimoto's thyroiditis  Per Ana Maria    Breast cancer screening  -     Mammo Digital Screening Bilat w/ Ronal; Future    Osteopenia " of both hips  -     alendronate (FOSAMAX) 70 MG tablet; Take 1 tablet (70 mg total) by mouth every 7 days.    Other orders  -     cycloSPORINE (RESTASIS) 0.05 % ophthalmic emulsion; Place 0.4 mLs (1 drop total) into both eyes 2 (two) times daily.

## 2019-07-03 ENCOUNTER — TELEPHONE (OUTPATIENT)
Dept: FAMILY MEDICINE | Facility: CLINIC | Age: 74
End: 2019-07-03

## 2019-07-03 NOTE — TELEPHONE ENCOUNTER
----- Message from Rossana Blancas sent at 7/3/2019 11:43 AM CDT -----  Type:  Pharmacy Calling to Clarify an RX    Name of Caller:  Yamila Solomon  Pharmacy Name:    [facility] - [place] - [address] [facility] [address]  Phone: [phone] Fax: [phone]  Prescription Name: fentaNYL (DURAGESIC) 25 mcg/hr  What do they need to clarify?:  This is a schedule 2 medication per guidelines can not be dated more than 3 months out, she is requesting prescriptions be voided for 10/12/19 and 11/12/19  Best Call Back Number:  [phone]  Additional Information:

## 2019-07-12 ENCOUNTER — TELEPHONE (OUTPATIENT)
Dept: PHARMACY | Facility: CLINIC | Age: 74
End: 2019-07-12

## 2019-08-07 ENCOUNTER — TELEPHONE (OUTPATIENT)
Dept: PHARMACY | Facility: CLINIC | Age: 74
End: 2019-08-07

## 2019-08-07 NOTE — TELEPHONE ENCOUNTER
Patient called to refill Tasigna.  Confirmed patient has 10 doses left, the patient missed 3 doses she was transferred to [facility].  Ship 08/12 for 08/13 delivery.  Verified address.  Copay $0.00 in 004.  Patient confirmed no new medications, health conditions, or allergies. The patient was transferred to [facility] for counseling on missed doeses. - CAZ

## 2019-09-04 RX ORDER — GABAPENTIN 300 MG/1
1200 CAPSULE ORAL NIGHTLY
Qty: 90 CAPSULE | Refills: 1 | Status: SHIPPED | OUTPATIENT
Start: 2019-09-04 | End: 2019-10-22 | Stop reason: SDUPTHER

## 2019-09-04 RX ORDER — ATORVASTATIN CALCIUM 40 MG/1
40 TABLET, FILM COATED ORAL DAILY
Qty: 90 TABLET | Refills: 3 | Status: SHIPPED | OUTPATIENT
Start: 2019-09-04 | End: 2020-01-22 | Stop reason: SDUPTHER

## 2019-09-04 NOTE — TELEPHONE ENCOUNTER
----- Message from Lisa Chris sent at 9/4/2019  7:43 AM CDT -----  Contact: Esme with [facility]  Type:  Pharmacy Calling to Clarify an RX    Name of Caller:  Esme  Pharmacy Name:    [facility] - [facility] [address] [facility]  [address] [facility] [address]    Prescription Name:    1. gabapentin (NEURONTIN) 300 MG capsule  2. atorvastatin (LIPITOR) 40 MG tablet  What do they need to clarify?:  Refill request  Best Call Back Number:  [phone]  Fax number [phone]  Additional Information:

## 2019-09-05 ENCOUNTER — TELEPHONE (OUTPATIENT)
Dept: PHARMACY | Facility: CLINIC | Age: 74
End: 2019-09-05

## 2019-09-05 NOTE — TELEPHONE ENCOUNTER
The patient called [facility] back in regards to her Tasigna refill. She has ~2 weeks on hand. Informed her we need a refill for the Tasigna - she requested we contact her early next week to set up refill.    She also confirmed missing two doses in the past month due to changes in her routine. Discussed different adherence strategies such as alarm reminders, calendars, placing medication in plain sight, etc. She stated she takes so many medications that she cannot use any one technique to help and declined further discussion. She stated she has already addressed with provider.

## 2019-09-10 ENCOUNTER — TELEPHONE (OUTPATIENT)
Dept: PHARMACY | Facility: CLINIC | Age: 74
End: 2019-09-10

## 2019-09-12 ENCOUNTER — PATIENT MESSAGE (OUTPATIENT)
Dept: FAMILY MEDICINE | Facility: CLINIC | Age: 74
End: 2019-09-12

## 2019-09-12 NOTE — TELEPHONE ENCOUNTER
----- Message from Lisa Kaur sent at 9/12/2019 11:15 AM CDT -----  Contact: pt  Type:  RX Refill Request    Who Called:  Pt  Refill or New Rx Refill  RX Name and Strengthzolpidem (AMBIEN CR) 12.5 MG CR tablet  How is the patient currently taking it? (ex. 1XDay):1 xday  Is this a 30 day or 90 day RX: 3  Preferred Pharmacy with phone number [facility] [phone]  Local or Mail Order:  Ordering Provider Dr Mandel  Would the patient rather a call back or a response via [facility]?   Best Call Back [phone]  Additional Information

## 2019-09-13 ENCOUNTER — PATIENT MESSAGE (OUTPATIENT)
Dept: FAMILY MEDICINE | Facility: CLINIC | Age: 74
End: 2019-09-13

## 2019-09-13 RX ORDER — ZOLPIDEM TARTRATE 12.5 MG/1
12.5 TABLET, FILM COATED, EXTENDED RELEASE ORAL NIGHTLY
Qty: 30 TABLET | Refills: 5 | Status: SHIPPED | OUTPATIENT
Start: 2019-09-13 | End: 2020-01-10 | Stop reason: SDUPTHER

## 2019-09-13 NOTE — TELEPHONE ENCOUNTER
----- Message from Cheyenne Han sent at 9/13/2019 12:09 PM CDT -----  Contact: Pt  Pt needs a refill on zolpidem (AMBIEN CR) 12.5 MG CR tablet called into pharmacy at [facility]. Patient stated that it is urgent    Pt can be reached at [phone]

## 2019-09-13 NOTE — TELEPHONE ENCOUNTER
----- Message from Aminata Soares sent at 9/13/2019  4:00 PM CDT -----  Contact: Pt   Pt Calling in to get refill on prescription     zolpidem (AMBIEN CR) 12.5 MG CR tablet    Please contact her when this has been done at [phone]

## 2019-09-13 NOTE — TELEPHONE ENCOUNTER
----- Message from Aminata Soares sent at 9/13/2019  4:00 PM CDT -----  Contact: Pt   Pt Calling in to get refill on prescription     zolpidem (AMBIEN CR) 12.5 MG CR tablet    Please contact her when this has been done at [phone]

## 2019-10-01 ENCOUNTER — TELEPHONE (OUTPATIENT)
Dept: FAMILY MEDICINE | Facility: CLINIC | Age: 74
End: 2019-10-01

## 2019-10-01 NOTE — TELEPHONE ENCOUNTER
----- Message from All Holland sent at 10/1/2019 10:50 AM CDT -----  Contact: pt  Type:  Patient Returning Call    Who Called:  pt  Who Left Message for Patient:  Not sure  Does the patient know what this is regarding?:  Not sure  Best Call Back Number:  [phone]  Additional Information:  Pleas leave detailed message if pt is unable to answer.

## 2019-10-07 ENCOUNTER — OFFICE VISIT (OUTPATIENT)
Dept: FAMILY MEDICINE | Facility: CLINIC | Age: 74
End: 2019-10-07
Payer: MEDICARE

## 2019-10-07 VITALS
WEIGHT: 157.19 LBS | TEMPERATURE: 99 F | OXYGEN SATURATION: 95 % | SYSTOLIC BLOOD PRESSURE: 116 MMHG | HEIGHT: 65 IN | DIASTOLIC BLOOD PRESSURE: 70 MMHG | HEART RATE: 75 BPM | BODY MASS INDEX: 26.19 KG/M2

## 2019-10-07 DIAGNOSIS — M54.2 CHRONIC NECK PAIN: Primary | ICD-10-CM

## 2019-10-07 DIAGNOSIS — G89.29 CHRONIC NECK PAIN: Primary | ICD-10-CM

## 2019-10-07 PROCEDURE — 99213 OFFICE O/P EST LOW 20 MIN: CPT | Mod: HCNC,S$GLB,, | Performed by: INTERNAL MEDICINE

## 2019-10-07 PROCEDURE — 1101F PT FALLS ASSESS-DOCD LE1/YR: CPT | Mod: HCNC,CPTII,S$GLB, | Performed by: INTERNAL MEDICINE

## 2019-10-07 PROCEDURE — 99999 PR PBB SHADOW E&M-EST. PATIENT-LVL III: CPT | Mod: PBBFAC,HCNC,, | Performed by: INTERNAL MEDICINE

## 2019-10-07 PROCEDURE — 99499 RISK ADDL DX/OHS AUDIT: ICD-10-PCS | Mod: HCNC,S$GLB,, | Performed by: INTERNAL MEDICINE

## 2019-10-07 PROCEDURE — 3078F PR MOST RECENT DIASTOLIC BLOOD PRESSURE < 80 MM HG: ICD-10-PCS | Mod: HCNC,CPTII,S$GLB, | Performed by: INTERNAL MEDICINE

## 2019-10-07 PROCEDURE — 3074F SYST BP LT 130 MM HG: CPT | Mod: HCNC,CPTII,S$GLB, | Performed by: INTERNAL MEDICINE

## 2019-10-07 PROCEDURE — 3078F DIAST BP <80 MM HG: CPT | Mod: HCNC,CPTII,S$GLB, | Performed by: INTERNAL MEDICINE

## 2019-10-07 PROCEDURE — 99213 PR OFFICE/OUTPT VISIT, EST, LEVL III, 20-29 MIN: ICD-10-PCS | Mod: HCNC,S$GLB,, | Performed by: INTERNAL MEDICINE

## 2019-10-07 PROCEDURE — 99499 UNLISTED E&M SERVICE: CPT | Mod: HCNC,S$GLB,, | Performed by: INTERNAL MEDICINE

## 2019-10-07 PROCEDURE — 3074F PR MOST RECENT SYSTOLIC BLOOD PRESSURE < 130 MM HG: ICD-10-PCS | Mod: HCNC,CPTII,S$GLB, | Performed by: INTERNAL MEDICINE

## 2019-10-07 PROCEDURE — 1101F PR PT FALLS ASSESS DOC 0-1 FALLS W/OUT INJ PAST YR: ICD-10-PCS | Mod: HCNC,CPTII,S$GLB, | Performed by: INTERNAL MEDICINE

## 2019-10-07 PROCEDURE — 99999 PR PBB SHADOW E&M-EST. PATIENT-LVL III: ICD-10-PCS | Mod: PBBFAC,HCNC,, | Performed by: INTERNAL MEDICINE

## 2019-10-07 RX ORDER — FENTANYL 25 UG/1
1 PATCH TRANSDERMAL
Qty: 15 PATCH | Refills: 0 | Status: SHIPPED | OUTPATIENT
Start: 2019-11-12 | End: 2019-10-23 | Stop reason: SDUPTHER

## 2019-10-07 RX ORDER — FENTANYL 25 UG/1
1 PATCH TRANSDERMAL
Qty: 15 PATCH | Refills: 0 | Status: SHIPPED | OUTPATIENT
Start: 2019-12-12 | End: 2019-10-23

## 2019-10-07 RX ORDER — FENTANYL 25 UG/1
1 PATCH TRANSDERMAL
Qty: 15 PATCH | Refills: 0 | Status: SHIPPED | OUTPATIENT
Start: 2019-10-12 | End: 2019-10-23

## 2019-10-07 NOTE — PROGRESS NOTES
Patient ID: Adry Owen     Chief Complaint:   Chief Complaint   Patient presents with    4 month follow up        HPI: Follow up for chronic neck pain needs refill on Fentanyl 25 mcg patches sent to [facility] only with proper documentation. Weight loss has stabilized. I reviewed notes from Dr. Ahuja and Dr. Marvin. She's still getting episodes of Afib and she's in Molecular Remission for the AML.     Review of Systems   Constitutional: Negative.    HENT: Negative.    Eyes: Negative.    Respiratory: Negative.    Cardiovascular: Negative.    Gastrointestinal: Negative.    Endocrine: Negative.    Genitourinary: Negative.    Musculoskeletal: Positive for neck pain.   Skin: Negative.    Allergic/Immunologic: Negative.    Neurological: Negative.    Hematological: Negative.    Psychiatric/Behavioral: Negative.           Objective:      Physical Exam   Physical Exam   Constitutional: She is oriented to person, place, and time. She appears well-developed and well-nourished.   HENT:   Head: Normocephalic and atraumatic.   Nose: Nose normal.   Mouth/Throat: Oropharynx is clear and moist.   Eyes: Pupils are equal, round, and reactive to light. Conjunctivae and EOM are normal.   Neck: Normal range of motion. Neck supple.   Cardiovascular: Normal rate, regular rhythm, normal heart sounds and intact distal pulses.   Pulmonary/Chest: Effort normal and breath sounds normal.   Abdominal: Soft. Bowel sounds are normal.   Musculoskeletal: Normal range of motion.   Neurological: She is alert and oriented to person, place, and time.   Skin: Skin is warm and dry. Capillary refill takes less than 2 seconds.   Psychiatric: She has a normal mood and affect. Her behavior is normal. Judgment and thought content normal.   Nursing note and vitals reviewed.       Current Outpatient Medications:     albuterol-ipratropium (COMBIVENT)  mcg/actuation inhaler, Inhale 2 drops into the lungs every 4 (four) hours as needed  for Wheezing. , Disp: , Rfl:     alendronate (FOSAMAX) 70 MG tablet, Take 1 tablet (70 mg total) by mouth every 7 days., Disp: 12 tablet, Rfl: 3    ASPIRIN LOW DOSE 81 mg EC tablet, Take 81 mg by mouth once daily. , Disp: , Rfl:     atorvastatin (LIPITOR) 40 MG tablet, Take 1 tablet (40 mg total) by mouth once daily., Disp: 90 tablet, Rfl: 3    baclofen (LIORESAL) 20 MG tablet, Take 40 mg by mouth every evening. , Disp: , Rfl:     cycloSPORINE (RESTASIS) 0.05 % ophthalmic emulsion, Place 0.4 mLs (1 drop total) into both eyes 2 (two) times daily., Disp: 1 vial, Rfl: 5    fentaNYL (DURAGESIC) 25 mcg/hr, Place 1 patch onto the skin every 48 hours., Disp: 15 patch, Rfl: 0    fentaNYL (DURAGESIC) 25 mcg/hr, Place 1 patch onto the skin every 48 hours., Disp: 15 patch, Rfl: 0    fish oil-omega-3 fatty acids 300-1,000 mg capsule, Take 1 capsule by mouth once daily., Disp: , Rfl:     fluticasone (FLONASE) 50 mcg/actuation nasal spray, 1 spray by Nasal route once daily. , Disp: , Rfl:     furosemide (LASIX) 20 MG tablet, Take 20 mg by mouth daily as needed., Disp: , Rfl:     gabapentin (NEURONTIN) 300 MG capsule, Take 4 capsules (1,200 mg total) by mouth every evening. Takes all 4 capsules @ bedtime, Disp: 90 capsule, Rfl: 1    hydrocodone-acetaminophen 10-325mg (NORCO)  mg Tab, Take 1 tablet by mouth every 8 (eight) hours as needed. , Disp: , Rfl:     KLOR-CON M20 20 mEq tablet, Take 20 mEq by mouth once daily., Disp: , Rfl:     levocetirizine (XYZAL) 5 MG tablet, Take 5 mg by mouth every evening., Disp: , Rfl:     levothyroxine (SYNTHROID) 75 MCG tablet, Take 75 mcg by mouth once daily., Disp: , Rfl:     MISCELLANEOUS MEDICAL SUPPLY (COMPRESSION STOCKINGS MISC), , Disp: , Rfl:     nilotinib (TASIGNA) 150 mg capsule, Take 2 capsules (300 mg total) by mouth 2 (two) times daily., Disp: 112 capsule, Rfl: 6    nitroGLYCERIN (NITROSTAT) 0.4 MG SL tablet, Place 1 tablet (0.4 mg total) under the tongue every  "5 (five) minutes as needed., Disp: 20 tablet, Rfl: 3    rivaroxaban (XARELTO) 20 mg Tab, Take 1 tablet (20 mg total) by mouth daily with dinner or evening meal., Disp: 90 tablet, Rfl: 3    zolpidem (AMBIEN CR) 12.5 MG CR tablet, Take 1 tablet (12.5 mg total) by mouth every evening., Disp: 30 tablet, Rfl: 5    [START ON 10/12/2019] fentaNYL (DURAGESIC) 25 mcg/hr, Place 1 patch onto the skin every 48 hours., Disp: 15 patch, Rfl: 0    [START ON 11/12/2019] fentaNYL (DURAGESIC) 25 mcg/hr, Place 1 patch onto the skin every 48 hours., Disp: 15 patch, Rfl: 0    [START ON 12/12/2019] fentaNYL (DURAGESIC) 25 mcg/hr, Place 1 patch onto the skin every 48 hours., Disp: 15 patch, Rfl: 0    megestrol (MEGACE) 400 mg/10 mL (40 mg/mL) Susp, Take 10 mLs (400 mg total) by mouth once daily., Disp: 240 mL, Rfl: 2        Vitals:   Vitals:    10/07/19 1401   BP: 116/70   Pulse: 75   Temp: 98.5 °F (36.9 °C)   TempSrc: Temporal   SpO2: 95%   Weight: 71.3 kg (157 lb 3 oz)   Height: 5' 5" (1.651 m)      Assessment:       Patient Active Problem List    Diagnosis Date Noted    Hypothyroidism due to Hashimoto's thyroiditis 07/01/2019    AV block, Mobitz 1 06/21/2018    Headache 06/20/2018    Digital mucous cyst of finger of right hand 06/12/2018    Chronic myeloid leukemia, BCR/ABL-positive, not having achieved remission 05/23/2018    Leukemia not having achieved remission 05/08/2018    Anemia, chronic disease 05/07/2018    Leucocytosis 05/06/2018    Headache syndrome 05/06/2018    Acute diverticulitis 11/07/2017    Acute lower GI bleeding 11/06/2017    Leukocytosis 11/06/2017    Atrial flutter 05/08/2017    Angioedema 04/11/2017    Urticaria 04/09/2017    Hashimoto encephalopathy 08/03/2016    GI bleeding 08/26/2013    Syncope 02/22/2013    Nuclear sclerosis 07/23/2012    Posterior vitreous detachment 07/23/2012    Essential hypertension 06/01/2012    Depression 06/01/2012    Coronary artery disease due to " calcified coronary lesion 05/11/2012    Hypercholesteremia 05/11/2012    Paroxysmal atrial fibrillation 05/11/2012    Back pain 05/11/2012    Status post coronary artery stent placement 05/11/2012          Plan:       Adry was seen today for 4 month follow up.    Diagnoses and all orders for this visit:    Chronic neck pain  -     fentaNYL (DURAGESIC) 25 mcg/hr; Place 1 patch onto the skin every 48 hours.  -     fentaNYL (DURAGESIC) 25 mcg/hr; Place 1 patch onto the skin every 48 hours.  -     fentaNYL (DURAGESIC) 25 mcg/hr; Place 1 patch onto the skin every 48 hours.

## 2019-10-08 ENCOUNTER — TELEPHONE (OUTPATIENT)
Dept: PHARMACY | Facility: CLINIC | Age: 74
End: 2019-10-08

## 2019-10-08 NOTE — TELEPHONE ENCOUNTER
Pt confirmed shipping of Tasigna on 10/9 to arrive on 10/10. Address and  verified. $0 copay in 004. Confirmed medication is in stock. Pt reported 1 week on hand. Pt reported she missed 1 dose. Pt did not start any new medications. Pt had no further questions or concerns.

## 2019-10-17 ENCOUNTER — NURSE TRIAGE (OUTPATIENT)
Dept: ADMINISTRATIVE | Facility: CLINIC | Age: 74
End: 2019-10-17

## 2019-10-18 ENCOUNTER — TELEPHONE (OUTPATIENT)
Dept: FAMILY MEDICINE | Facility: CLINIC | Age: 74
End: 2019-10-18

## 2019-10-18 NOTE — TELEPHONE ENCOUNTER
"Pt states she has been having a HA for the past 2 days, she describes it as "crushing", her BP is 142/74 but she states it was higher earlier, she states the pain is a 10/10, advised her to see a provider within 4 hours, caller agrees    Reason for Disposition   [1] SEVERE headache (e.g., excruciating) AND [2] not improved after 2 hours of pain medicine    Additional Information   Negative: Difficult to awaken or acting confused (e.g., disoriented, slurred speech)   Negative: [1] Weakness of the face, arm or leg on one side of the body AND [2] new onset   Negative: [1] Numbness of the face, arm or leg on one side of the body AND [2] new onset   Negative: [1] Loss of speech or garbled speech AND [2] new onset   Negative: Passed out (i.e., lost consciousness, collapsed and was not responding)   Negative: Sounds like a life-threatening emergency to the triager   Negative: Followed a head injury within last 3 days   Negative: Pregnant   Negative: Traumatic Brain Injury (TBI) is suspected   Negative: Unable to walk, or can only walk with assistance (e.g., requires support)   Negative: Stiff neck (can't touch chin to chest)   Negative: Severe pain in one eye   Negative: [1] Other family members (or roommates) with headaches AND [2] possibility of carbon monoxide exposure   Negative: [1] SEVERE headache (e.g., excruciating) AND [2] "worst headache" of life   Negative: [1] SEVERE headache AND [2] sudden-onset (i.e., reaching maximum intensity within seconds)   Negative: [1] SEVERE headache AND [2] fever   Negative: Loss of vision or double vision (Exception: same as prior migraines)   Negative: [1] Fever > 100.0 F (37.8 C) AND [2] diabetes mellitus or weak immune system (e.g., HIV positive, cancer chemo, splenectomy, chronic steroids)   Negative: Patient sounds very sick or weak to the triager    Protocols used: HEADACHE-A-AH      "

## 2019-10-18 NOTE — TELEPHONE ENCOUNTER
----- Message from Sandra Garcia sent at 10/18/2019  4:55 PM CDT -----  Contact: Adry  Type:  Sooner Apoointment Request    Caller is requesting a sooner appointment.  Caller declined first available appointment listed below.  Caller will not accept being placed on the waitlist and is requesting a message be sent to doctor.    Name of Caller:  patient  When is the first available appointment?  11/29  Symptoms:  [facility] ED follow up  Best Call Back Number:  [phone] (home)--call after lunch  Additional Information:  Needs to be within the next 3 days--Please advise--thank you

## 2019-10-22 ENCOUNTER — TELEPHONE (OUTPATIENT)
Dept: FAMILY MEDICINE | Facility: CLINIC | Age: 74
End: 2019-10-22

## 2019-10-22 ENCOUNTER — OFFICE VISIT (OUTPATIENT)
Dept: FAMILY MEDICINE | Facility: CLINIC | Age: 74
End: 2019-10-22
Payer: MEDICARE

## 2019-10-22 VITALS
HEIGHT: 65 IN | OXYGEN SATURATION: 99 % | TEMPERATURE: 98 F | DIASTOLIC BLOOD PRESSURE: 86 MMHG | HEART RATE: 66 BPM | SYSTOLIC BLOOD PRESSURE: 174 MMHG | WEIGHT: 138.69 LBS | BODY MASS INDEX: 23.11 KG/M2

## 2019-10-22 DIAGNOSIS — G89.29 CHRONIC NECK PAIN: ICD-10-CM

## 2019-10-22 DIAGNOSIS — G44.89 OTHER HEADACHE SYNDROME: Primary | ICD-10-CM

## 2019-10-22 DIAGNOSIS — J30.1 NON-SEASONAL ALLERGIC RHINITIS DUE TO POLLEN: ICD-10-CM

## 2019-10-22 DIAGNOSIS — I44.2 THIRD DEGREE HEART BLOCK: ICD-10-CM

## 2019-10-22 DIAGNOSIS — M54.2 CHRONIC NECK PAIN: ICD-10-CM

## 2019-10-22 PROBLEM — G40.909 SEIZURE DISORDER: Status: ACTIVE | Noted: 2019-10-22

## 2019-10-22 PROCEDURE — 99213 PR OFFICE/OUTPT VISIT, EST, LEVL III, 20-29 MIN: ICD-10-PCS | Mod: HCNC,S$GLB,, | Performed by: INTERNAL MEDICINE

## 2019-10-22 PROCEDURE — 3079F PR MOST RECENT DIASTOLIC BLOOD PRESSURE 80-89 MM HG: ICD-10-PCS | Mod: HCNC,CPTII,S$GLB, | Performed by: INTERNAL MEDICINE

## 2019-10-22 PROCEDURE — 99999 PR PBB SHADOW E&M-EST. PATIENT-LVL III: ICD-10-PCS | Mod: PBBFAC,HCNC,, | Performed by: INTERNAL MEDICINE

## 2019-10-22 PROCEDURE — 3077F SYST BP >= 140 MM HG: CPT | Mod: HCNC,CPTII,S$GLB, | Performed by: INTERNAL MEDICINE

## 2019-10-22 PROCEDURE — 3077F PR MOST RECENT SYSTOLIC BLOOD PRESSURE >= 140 MM HG: ICD-10-PCS | Mod: HCNC,CPTII,S$GLB, | Performed by: INTERNAL MEDICINE

## 2019-10-22 PROCEDURE — 1101F PT FALLS ASSESS-DOCD LE1/YR: CPT | Mod: HCNC,CPTII,S$GLB, | Performed by: INTERNAL MEDICINE

## 2019-10-22 PROCEDURE — 99999 PR PBB SHADOW E&M-EST. PATIENT-LVL III: CPT | Mod: PBBFAC,HCNC,, | Performed by: INTERNAL MEDICINE

## 2019-10-22 PROCEDURE — 1101F PR PT FALLS ASSESS DOC 0-1 FALLS W/OUT INJ PAST YR: ICD-10-PCS | Mod: HCNC,CPTII,S$GLB, | Performed by: INTERNAL MEDICINE

## 2019-10-22 PROCEDURE — 99213 OFFICE O/P EST LOW 20 MIN: CPT | Mod: HCNC,S$GLB,, | Performed by: INTERNAL MEDICINE

## 2019-10-22 PROCEDURE — 3079F DIAST BP 80-89 MM HG: CPT | Mod: HCNC,CPTII,S$GLB, | Performed by: INTERNAL MEDICINE

## 2019-10-22 RX ORDER — LEVOCETIRIZINE DIHYDROCHLORIDE 5 MG/1
5 TABLET, FILM COATED ORAL NIGHTLY
Qty: 90 TABLET | Refills: 3 | Status: SHIPPED | OUTPATIENT
Start: 2019-10-22 | End: 2020-01-10 | Stop reason: SDUPTHER

## 2019-10-22 RX ORDER — KETOROLAC TROMETHAMINE 10 MG/1
10 TABLET, FILM COATED ORAL EVERY 6 HOURS PRN
Qty: 30 TABLET | Refills: 0 | Status: SHIPPED | OUTPATIENT
Start: 2019-10-22 | End: 2022-04-18 | Stop reason: CLARIF

## 2019-10-22 RX ORDER — GABAPENTIN 300 MG/1
1200 CAPSULE ORAL NIGHTLY
Qty: 360 CAPSULE | Refills: 3 | Status: SHIPPED | OUTPATIENT
Start: 2019-10-22 | End: 2020-01-10 | Stop reason: SDUPTHER

## 2019-10-22 RX ORDER — NAPROXEN 500 MG/1
500 TABLET ORAL 2 TIMES DAILY WITH MEALS
Qty: 60 TABLET | Refills: 1 | Status: SHIPPED | OUTPATIENT
Start: 2019-10-22 | End: 2022-04-18 | Stop reason: CLARIF

## 2019-10-22 NOTE — PROGRESS NOTES
Patient ID: Adry Owen     Chief Complaint:   Chief Complaint   Patient presents with    ER Follow up        HPI: Follow up from visit to [facility] ER for a severe migraine Headache that eventually resolved w/ Toradol and Fioricet. More significantly, she had a 3rd degree heart block while she was in the ER that was picked up by her Loop Recorder. She's tentaively scheduled for PPM on 11/4, but we are hoping that Dr. Ahuja can do it sooner. She has documentation from her loop recorder that shows episodes of Afib. Headache have resolved. I will give her a supply of Toradol to take when she is in a lot of pain.     Review of Systems   Constitutional: Negative.    HENT: Negative.    Eyes: Negative.    Respiratory: Negative.    Cardiovascular: Negative.    Gastrointestinal: Negative.    Endocrine: Negative.    Genitourinary: Negative.    Musculoskeletal: Positive for arthralgias.   Skin: Negative.    Allergic/Immunologic: Negative.    Neurological: Negative.    Hematological: Negative.    Psychiatric/Behavioral: Negative.           Objective:      Physical Exam   Physical Exam   Constitutional: She is oriented to person, place, and time. She appears well-developed and well-nourished.   HENT:   Head: Normocephalic and atraumatic.   Nose: Nose normal.   Mouth/Throat: Oropharynx is clear and moist.   Eyes: Pupils are equal, round, and reactive to light. Conjunctivae and EOM are normal.   Neck: Normal range of motion. Neck supple.   Cardiovascular: Normal rate, regular rhythm and intact distal pulses.   Murmur heard.  1/6 diastolic murmur heard at Left sternal border   Pulmonary/Chest: Effort normal and breath sounds normal.   Abdominal: Soft. Bowel sounds are normal.   Musculoskeletal: Normal range of motion.   Neurological: She is alert and oriented to person, place, and time.   Skin: Skin is warm and dry. Capillary refill takes less than 2 seconds.   Psychiatric: She has a normal mood and affect. Her behavior is normal.  Judgment and thought content normal.   Nursing note and vitals reviewed.      Current Outpatient Medications:     albuterol-ipratropium (COMBIVENT)  mcg/actuation inhaler, Inhale 2 drops into the lungs every 4 (four) hours as needed for Wheezing. , Disp: , Rfl:     alendronate (FOSAMAX) 70 MG tablet, Take 1 tablet (70 mg total) by mouth every 7 days., Disp: 12 tablet, Rfl: 3    ASPIRIN LOW DOSE 81 mg EC tablet, Take 81 mg by mouth once daily. , Disp: , Rfl:     atorvastatin (LIPITOR) 40 MG tablet, Take 1 tablet (40 mg total) by mouth once daily., Disp: 90 tablet, Rfl: 3    baclofen (LIORESAL) 20 MG tablet, Take 40 mg by mouth every evening. , Disp: , Rfl:     butalbital-acetaminophen-caffeine -40 mg (FIORICET, ESGIC) -40 mg per tablet, Take 1 tablet by mouth every 4 (four) hours as needed for Pain., Disp: 10 tablet, Rfl: 0    cycloSPORINE (RESTASIS) 0.05 % ophthalmic emulsion, Place 0.4 mLs (1 drop total) into both eyes 2 (two) times daily., Disp: 1 vial, Rfl: 5    fentaNYL (DURAGESIC) 25 mcg/hr, Place 1 patch onto the skin every 48 hours., Disp: 15 patch, Rfl: 0    fentaNYL (DURAGESIC) 25 mcg/hr, Place 1 patch onto the skin every 48 hours., Disp: 15 patch, Rfl: 0    fentaNYL (DURAGESIC) 25 mcg/hr, Place 1 patch onto the skin every 48 hours., Disp: 15 patch, Rfl: 0    [START ON 11/12/2019] fentaNYL (DURAGESIC) 25 mcg/hr, Place 1 patch onto the skin every 48 hours., Disp: 15 patch, Rfl: 0    [START ON 12/12/2019] fentaNYL (DURAGESIC) 25 mcg/hr, Place 1 patch onto the skin every 48 hours., Disp: 15 patch, Rfl: 0    fish oil-omega-3 fatty acids 300-1,000 mg capsule, Take 1 capsule by mouth once daily., Disp: , Rfl:     fluticasone (FLONASE) 50 mcg/actuation nasal spray, 1 spray by Nasal route once daily. , Disp: , Rfl:     furosemide (LASIX) 20 MG tablet, Take 20 mg by mouth daily as needed., Disp: , Rfl:     gabapentin (NEURONTIN) 300 MG capsule, Take 4 capsules (1,200 mg total) by  "mouth every evening. Takes all 4 capsules @ bedtime, Disp: 360 capsule, Rfl: 3    hydrocodone-acetaminophen 10-325mg (NORCO)  mg Tab, Take 1 tablet by mouth every 8 (eight) hours as needed. , Disp: , Rfl:     KLOR-CON M20 20 mEq tablet, Take 20 mEq by mouth once daily., Disp: , Rfl:     levocetirizine (XYZAL) 5 MG tablet, Take 1 tablet (5 mg total) by mouth every evening., Disp: 90 tablet, Rfl: 3    levothyroxine (SYNTHROID) 75 MCG tablet, Take 75 mcg by mouth once daily., Disp: , Rfl:     MISCELLANEOUS MEDICAL SUPPLY (COMPRESSION STOCKINGS MISC), , Disp: , Rfl:     naproxen (NAPROSYN) 500 MG tablet, Take 1 tablet (500 mg total) by mouth 2 (two) times daily with meals., Disp: 10 tablet, Rfl: 0    nilotinib (TASIGNA) 150 mg capsule, Take 2 capsules (300 mg total) by mouth 2 (two) times daily., Disp: 112 capsule, Rfl: 6    nitroGLYCERIN (NITROSTAT) 0.4 MG SL tablet, Place 1 tablet (0.4 mg total) under the tongue every 5 (five) minutes as needed., Disp: 20 tablet, Rfl: 3    ondansetron (ZOFRAN) 4 MG tablet, Take 1 tablet (4 mg total) by mouth every 6 (six) hours., Disp: 9 tablet, Rfl: 0    rivaroxaban (XARELTO) 20 mg Tab, Take 1 tablet (20 mg total) by mouth daily with dinner or evening meal., Disp: 90 tablet, Rfl: 3    zolpidem (AMBIEN CR) 12.5 MG CR tablet, Take 1 tablet (12.5 mg total) by mouth every evening., Disp: 30 tablet, Rfl: 5    ketorolac (TORADOL) 10 mg tablet, Take 1 tablet (10 mg total) by mouth every 6 (six) hours as needed for Pain., Disp: 30 tablet, Rfl: 0    megestrol (MEGACE) 400 mg/10 mL (40 mg/mL) Susp, Take 10 mLs (400 mg total) by mouth once daily., Disp: 240 mL, Rfl: 2         Vitals:   Vitals:    10/22/19 1145   BP: (!) 174/86   Pulse: 66   Temp: 98.4 °F (36.9 °C)   TempSrc: Temporal   SpO2: 99%   Weight: 62.9 kg (138 lb 10.7 oz)   Height: 5' 5" (1.651 m)      Assessment:       Patient Active Problem List    Diagnosis Date Noted    Seizure disorder 10/22/2019    Third " degree heart block 10/22/2019    Chronic neck pain 10/22/2019    Non-seasonal allergic rhinitis due to pollen 10/22/2019    Hypothyroidism due to Hashimoto's thyroiditis 07/01/2019    AV block, Mobitz 1 06/21/2018    Headache 06/20/2018    Digital mucous cyst of finger of right hand 06/12/2018    Chronic myeloid leukemia, BCR/ABL-positive, not having achieved remission 05/23/2018    Leukemia not having achieved remission 05/08/2018    Anemia, chronic disease 05/07/2018    Leucocytosis 05/06/2018    Other headache syndrome 05/06/2018    Acute diverticulitis 11/07/2017    Acute lower GI bleeding 11/06/2017    Leukocytosis 11/06/2017    Atrial flutter 05/08/2017    Angioedema 04/11/2017    Urticaria 04/09/2017    Hashimoto encephalopathy 08/03/2016    GI bleeding 08/26/2013    Syncope 02/22/2013    Nuclear sclerosis 07/23/2012    Posterior vitreous detachment 07/23/2012    Essential hypertension 06/01/2012    Depression 06/01/2012    Coronary artery disease due to calcified coronary lesion 05/11/2012    Hypercholesteremia 05/11/2012    Paroxysmal atrial fibrillation 05/11/2012    Back pain 05/11/2012    Status post coronary artery stent placement 05/11/2012          Plan:       Adry was seen today for er follow up.    Diagnoses and all orders for this visit:    Other headache syndrome  So far Controlled - Rx for Toradol and she has Fioricet already. Don't take Aspirin if you take Toradol.    Non-seasonal allergic rhinitis due to pollen  -     levocetirizine (XYZAL) 5 MG tablet; Take 1 tablet (5 mg total) by mouth every evening.    Chronic neck pain  -     gabapentin (NEURONTIN) 300 MG capsule; Take 4 capsules (1,200 mg total) by mouth every evening. Takes all 4 capsules @ bedtime  -     ketorolac (TORADOL) 10 mg tablet; Take 1 tablet (10 mg total) by mouth every 6 (six) hours as needed for Pain.    Third degree heart block  PPM soon by Dr. Ahuja

## 2019-10-22 NOTE — TELEPHONE ENCOUNTER
----- Message from Sharona Infante sent at 10/22/2019  2:48 PM CDT -----  Contact: Self  Patient saw the doctor today and he was suppose to send over a new script for naproxen (NAPROSYN) 500 MG tablet 2XDay 30 day supply and send it over to     [facility] - [place] [address] [address] [address]  Phone: [phone] Fax: [phone]

## 2019-10-23 NOTE — TELEPHONE ENCOUNTER

 

 

 

 Mahesh Ibarra MD on March 23, 2018 at 20:15           

Board Certified Radiologist.

 This report was verified electronically. RX sent

## 2019-11-05 ENCOUNTER — TELEPHONE (OUTPATIENT)
Dept: PHARMACY | Facility: CLINIC | Age: 74
End: 2019-11-05

## 2019-11-05 NOTE — TELEPHONE ENCOUNTER
Patient confirmed that she is in need of a refill for her Tasigna. She may be having a procedure on Friday for a tooth extraction. She should not miss any doses of her Tasigna. She will have 1 dose of Versed. There is a minor DDI (Cat C) where the Tasigna may decrease the efficacy of Versed. Not clinically significant. Discussed with patient. Patient reports having 8 doses of medication on hand. Tasigna will be shipped on 11/6 to receive on 11/7. Address confirmed. $0.00. Went over packaging information again today. Medication list reviewed. No new allergies or health conditions. Denies any missed doses.

## 2019-11-27 PROBLEM — I44.2 COMPLETE HEART BLOCK: Status: ACTIVE | Noted: 2019-11-27

## 2019-12-05 ENCOUNTER — TELEPHONE (OUTPATIENT)
Dept: PHARMACY | Facility: CLINIC | Age: 74
End: 2019-12-05

## 2019-12-05 NOTE — TELEPHONE ENCOUNTER
The patient returned call in regards to Tasigna refill/follow up. She stated she had to hold the medication due to having surgery therefore has several weeks on hand. She requested a call back on 12/17 - will call patient at that time.

## 2019-12-17 NOTE — TELEPHONE ENCOUNTER
The patient returned call in regards to Tasigna refill and clinical f/u. She declined to obtain an on-hand count but estimated she has less than a week on hand. Will ship 12/18 for her to receive 12/19. $0 copay, address confirmed. No changes in other medications, allergies, health conditions or missed doses. She was frustrated she had to speak with a pharmacist and declined follow up. Informed her we will offer a f/u in three months again but she can reach out with any questions/concerns before then. She verbalized understanding.

## 2019-12-26 RX ORDER — FLUTICASONE PROPIONATE 50 MCG
SPRAY, SUSPENSION (ML) NASAL
Qty: 32 G | Refills: 3 | Status: SHIPPED | OUTPATIENT
Start: 2019-12-26 | End: 2020-10-12

## 2019-12-26 RX ORDER — POTASSIUM CHLORIDE 20 MEQ/1
TABLET, EXTENDED RELEASE ORAL
Qty: 90 TABLET | Refills: 3 | Status: SHIPPED | OUTPATIENT
Start: 2019-12-26 | End: 2020-10-12

## 2019-12-26 RX ORDER — FUROSEMIDE 20 MG/1
TABLET ORAL
Qty: 90 TABLET | Refills: 3 | Status: SHIPPED | OUTPATIENT
Start: 2019-12-26 | End: 2020-10-12

## 2020-01-10 ENCOUNTER — OFFICE VISIT (OUTPATIENT)
Dept: FAMILY MEDICINE | Facility: CLINIC | Age: 75
End: 2020-01-10
Payer: MEDICARE

## 2020-01-10 VITALS
HEART RATE: 60 BPM | BODY MASS INDEX: 25.93 KG/M2 | WEIGHT: 155.63 LBS | HEIGHT: 65 IN | DIASTOLIC BLOOD PRESSURE: 68 MMHG | OXYGEN SATURATION: 96 % | TEMPERATURE: 99 F | SYSTOLIC BLOOD PRESSURE: 130 MMHG

## 2020-01-10 DIAGNOSIS — F32.4 MAJOR DEPRESSIVE DISORDER WITH SINGLE EPISODE, IN PARTIAL REMISSION: ICD-10-CM

## 2020-01-10 DIAGNOSIS — I44.2 ATRIOVENTRICULAR BLOCK, COMPLETE: ICD-10-CM

## 2020-01-10 DIAGNOSIS — I10 ESSENTIAL HYPERTENSION: ICD-10-CM

## 2020-01-10 DIAGNOSIS — C92.10 CHRONIC MYELOID LEUKEMIA, BCR/ABL-POSITIVE, NOT HAVING ACHIEVED REMISSION: ICD-10-CM

## 2020-01-10 DIAGNOSIS — G89.29 CHRONIC NECK PAIN: ICD-10-CM

## 2020-01-10 DIAGNOSIS — M54.2 CHRONIC NECK PAIN: ICD-10-CM

## 2020-01-10 DIAGNOSIS — E03.8 HYPOTHYROIDISM DUE TO HASHIMOTO'S THYROIDITIS: ICD-10-CM

## 2020-01-10 DIAGNOSIS — E06.3 HYPOTHYROIDISM DUE TO HASHIMOTO'S THYROIDITIS: ICD-10-CM

## 2020-01-10 DIAGNOSIS — J30.1 NON-SEASONAL ALLERGIC RHINITIS DUE TO POLLEN: ICD-10-CM

## 2020-01-10 DIAGNOSIS — G47.01 INSOMNIA DUE TO MEDICAL CONDITION: Primary | ICD-10-CM

## 2020-01-10 PROCEDURE — 1101F PT FALLS ASSESS-DOCD LE1/YR: CPT | Mod: HCNC,CPTII,S$GLB, | Performed by: INTERNAL MEDICINE

## 2020-01-10 PROCEDURE — 1101F PR PT FALLS ASSESS DOC 0-1 FALLS W/OUT INJ PAST YR: ICD-10-PCS | Mod: HCNC,CPTII,S$GLB, | Performed by: INTERNAL MEDICINE

## 2020-01-10 PROCEDURE — 3078F PR MOST RECENT DIASTOLIC BLOOD PRESSURE < 80 MM HG: ICD-10-PCS | Mod: HCNC,CPTII,S$GLB, | Performed by: INTERNAL MEDICINE

## 2020-01-10 PROCEDURE — 1159F MED LIST DOCD IN RCRD: CPT | Mod: HCNC,S$GLB,, | Performed by: INTERNAL MEDICINE

## 2020-01-10 PROCEDURE — 99499 RISK ADDL DX/OHS AUDIT: ICD-10-PCS | Mod: HCNC,S$GLB,, | Performed by: INTERNAL MEDICINE

## 2020-01-10 PROCEDURE — 99213 PR OFFICE/OUTPT VISIT, EST, LEVL III, 20-29 MIN: ICD-10-PCS | Mod: HCNC,S$GLB,, | Performed by: INTERNAL MEDICINE

## 2020-01-10 PROCEDURE — 99213 OFFICE O/P EST LOW 20 MIN: CPT | Mod: HCNC,S$GLB,, | Performed by: INTERNAL MEDICINE

## 2020-01-10 PROCEDURE — 1159F PR MEDICATION LIST DOCUMENTED IN MEDICAL RECORD: ICD-10-PCS | Mod: HCNC,S$GLB,, | Performed by: INTERNAL MEDICINE

## 2020-01-10 PROCEDURE — 3075F SYST BP GE 130 - 139MM HG: CPT | Mod: HCNC,CPTII,S$GLB, | Performed by: INTERNAL MEDICINE

## 2020-01-10 PROCEDURE — 3075F PR MOST RECENT SYSTOLIC BLOOD PRESS GE 130-139MM HG: ICD-10-PCS | Mod: HCNC,CPTII,S$GLB, | Performed by: INTERNAL MEDICINE

## 2020-01-10 PROCEDURE — 99999 PR PBB SHADOW E&M-EST. PATIENT-LVL III: ICD-10-PCS | Mod: PBBFAC,HCNC,, | Performed by: INTERNAL MEDICINE

## 2020-01-10 PROCEDURE — 3078F DIAST BP <80 MM HG: CPT | Mod: HCNC,CPTII,S$GLB, | Performed by: INTERNAL MEDICINE

## 2020-01-10 PROCEDURE — 99999 PR PBB SHADOW E&M-EST. PATIENT-LVL III: CPT | Mod: PBBFAC,HCNC,, | Performed by: INTERNAL MEDICINE

## 2020-01-10 PROCEDURE — 99499 UNLISTED E&M SERVICE: CPT | Mod: HCNC,S$GLB,, | Performed by: INTERNAL MEDICINE

## 2020-01-10 RX ORDER — FENTANYL 25 UG/1
1 PATCH TRANSDERMAL
Qty: 15 PATCH | Refills: 0 | Status: SHIPPED | OUTPATIENT
Start: 2020-01-10 | End: 2020-02-10

## 2020-01-10 RX ORDER — LEVOCETIRIZINE DIHYDROCHLORIDE 5 MG/1
5 TABLET, FILM COATED ORAL NIGHTLY
Qty: 90 TABLET | Refills: 3 | Status: SHIPPED | OUTPATIENT
Start: 2020-01-10 | End: 2020-10-06 | Stop reason: SDUPTHER

## 2020-01-10 RX ORDER — ZOLPIDEM TARTRATE 12.5 MG/1
12.5 TABLET, FILM COATED, EXTENDED RELEASE ORAL NIGHTLY
Qty: 30 TABLET | Refills: 5 | Status: SHIPPED | OUTPATIENT
Start: 2020-01-10 | End: 2020-02-10

## 2020-01-10 RX ORDER — GABAPENTIN 300 MG/1
1200 CAPSULE ORAL NIGHTLY
Qty: 360 CAPSULE | Refills: 3 | Status: SHIPPED | OUTPATIENT
Start: 2020-01-10 | End: 2021-01-04 | Stop reason: SDUPTHER

## 2020-01-10 NOTE — PROGRESS NOTES
Patient ID: Adry Owen     Chief Complaint:   Chief Complaint   Patient presents with    3 month follow up        HPI: Follow up for chronic cervicalgia that is controlled w/ Fentanyl. Needs refills on it and Ambien that controls her insomnia. I refilled Gabapentin and Xyzal too. She is s/p PPM for Third Degree heart Block by Leigha. Doing well. She is cleared to get the pneumonia shots by Julian but we'll wait until next office visit. Notes from Dr. Marvin reviewed- may need bone marrow biopsy. Having TMJ pain on Right side after dental work that was needed before PPM placed.     Review of Systems   Constitutional: Negative.    HENT: Negative.    Eyes: Negative.    Respiratory: Negative.    Cardiovascular: Negative.    Gastrointestinal: Negative.    Endocrine: Negative.    Genitourinary: Negative.    Musculoskeletal: Negative.    Skin: Negative.    Allergic/Immunologic: Negative.    Neurological: Negative.    Hematological: Negative.    Psychiatric/Behavioral: Negative.           Objective:      Physical Exam   Physical Exam   Constitutional: She is oriented to person, place, and time. She appears well-developed and well-nourished.   HENT:   Head: Normocephalic and atraumatic.   Nose: Nose normal.   Mouth/Throat: Oropharynx is clear and moist.   Eyes: Pupils are equal, round, and reactive to light. Conjunctivae and EOM are normal.   Neck: Neck supple.   Decreased Range of Motion in neck due to previous injury   Cardiovascular: Normal rate, regular rhythm, normal heart sounds and intact distal pulses.   Pulmonary/Chest: Effort normal and breath sounds normal.   Abdominal: Soft. Bowel sounds are normal.   Musculoskeletal: Normal range of motion.   Neurological: She is alert and oriented to person, place, and time.   Skin: Skin is warm and dry. Capillary refill takes less than 2 seconds.   Psychiatric: She has a normal mood and affect. Her behavior is normal. Judgment and thought content normal.   Nursing  note and vitals reviewed.      Current Outpatient Medications:     acetaminophen (TYLENOL) 500 MG tablet, Take 1,000 mg by mouth every 6 (six) hours as needed for Pain or Temperature greater than., Disp: , Rfl:     albuterol-ipratropium (COMBIVENT)  mcg/actuation inhaler, Inhale 2 drops into the lungs every 4 (four) hours as needed for Wheezing. , Disp: , Rfl:     alendronate (FOSAMAX) 70 MG tablet, Take 1 tablet (70 mg total) by mouth every 7 days. (Patient taking differently: Take 70 mg by mouth every Saturday. ), Disp: 12 tablet, Rfl: 3    ASPIRIN LOW DOSE 81 mg EC tablet, Take 81 mg by mouth once daily. , Disp: , Rfl:     atorvastatin (LIPITOR) 40 MG tablet, Take 1 tablet (40 mg total) by mouth once daily., Disp: 90 tablet, Rfl: 3    cycloSPORINE (RESTASIS) 0.05 % ophthalmic emulsion, Place 0.4 mLs (1 drop total) into both eyes 2 (two) times daily., Disp: 1 vial, Rfl: 5    fentaNYL (DURAGESIC) 25 mcg/hr, Place 1 patch onto the skin every 48 hours., Disp: 15 patch, Rfl: 0    fish oil-omega-3 fatty acids 300-1,000 mg capsule, Take 1 capsule by mouth once daily., Disp: , Rfl:     fluticasone propionate (FLONASE) 50 mcg/actuation nasal spray, USE 1 SPRAY IN EACH NOSTRIL EVERY DAY, Disp: 32 g, Rfl: 3    furosemide (LASIX) 20 MG tablet, TAKE 1 TABLET EVERY DAY, Disp: 90 tablet, Rfl: 3    gabapentin (NEURONTIN) 300 MG capsule, Take 4 capsules (1,200 mg total) by mouth every evening. Takes all 4 capsules @ bedtime, Disp: 360 capsule, Rfl: 3    hydrocodone-acetaminophen 10-325mg (NORCO)  mg Tab, Take 1 tablet by mouth every 8 (eight) hours as needed. , Disp: , Rfl:     ketorolac (TORADOL) 10 mg tablet, Take 1 tablet (10 mg total) by mouth every 6 (six) hours as needed for Pain., Disp: 30 tablet, Rfl: 0    levocetirizine (XYZAL) 5 MG tablet, Take 1 tablet (5 mg total) by mouth every evening., Disp: 90 tablet, Rfl: 3    levothyroxine (SYNTHROID) 75 MCG tablet, Take 75 mcg by mouth once daily.,  "Disp: , Rfl:     MISCELLANEOUS MEDICAL SUPPLY (COMPRESSION STOCKINGS MISC), , Disp: , Rfl:     naproxen (NAPROSYN) 500 MG tablet, Take 1 tablet (500 mg total) by mouth 2 (two) times daily with meals., Disp: 60 tablet, Rfl: 1    nilotinib (TASIGNA) 150 mg capsule, Take 2 capsules (300 mg total) by mouth 2 (two) times daily., Disp: 112 capsule, Rfl: 6    nitroGLYCERIN (NITROSTAT) 0.4 MG SL tablet, Place 1 tablet (0.4 mg total) under the tongue every 5 (five) minutes as needed., Disp: 20 tablet, Rfl: 3    ondansetron (ZOFRAN) 4 MG tablet, Take 1 tablet (4 mg total) by mouth every 6 (six) hours. (Patient taking differently: Take 4 mg by mouth every 6 (six) hours as needed. ), Disp: 9 tablet, Rfl: 0    potassium chloride SA (K-DUR,KLOR-CON) 20 MEQ tablet, TAKE 1 TABLET EVERY DAY, Disp: 90 tablet, Rfl: 3    XARELTO 20 mg Tab, TAKE 1 TABLET DAILY WITH DINNER OR EVENING MEAL, Disp: 90 tablet, Rfl: 3    zolpidem (AMBIEN CR) 12.5 MG CR tablet, Take 1 tablet (12.5 mg total) by mouth every evening., Disp: 30 tablet, Rfl: 5         Vitals:   Vitals:    01/10/20 1518   BP: 130/68   Pulse: 60   Temp: 98.6 °F (37 °C)   TempSrc: Temporal   SpO2: 96%   Weight: 70.6 kg (155 lb 10.3 oz)   Height: 5' 5" (1.651 m)      Assessment:       Patient Active Problem List    Diagnosis Date Noted    Major depressive disorder with single episode, in partial remission 01/10/2020    Insomnia due to medical condition 01/10/2020    Atrioventricular block, complete 11/27/2019    Seizure disorder 10/22/2019    Third degree heart block 10/22/2019    Chronic neck pain 10/22/2019    Non-seasonal allergic rhinitis due to pollen 10/22/2019    Hypothyroidism due to Hashimoto's thyroiditis 07/01/2019    AV block, Mobitz 1 06/21/2018    Headache 06/20/2018    Digital mucous cyst of finger of right hand 06/12/2018    Chronic myeloid leukemia, BCR/ABL-positive, not having achieved remission 05/23/2018    Leukemia not having achieved remission " 05/08/2018    Anemia, chronic disease 05/07/2018    Leucocytosis 05/06/2018    Other headache syndrome 05/06/2018    Acute diverticulitis 11/07/2017    Acute lower GI bleeding 11/06/2017    Leukocytosis 11/06/2017    Atrial flutter 05/08/2017    Angioedema 04/11/2017    Urticaria 04/09/2017    Hashimoto encephalopathy 08/03/2016    GI bleeding 08/26/2013    Syncope 02/22/2013    Nuclear sclerosis 07/23/2012    Posterior vitreous detachment 07/23/2012    Essential hypertension 06/01/2012    Depression 06/01/2012    Coronary artery disease due to calcified coronary lesion 05/11/2012    Hypercholesteremia 05/11/2012    Paroxysmal atrial fibrillation 05/11/2012    Back pain 05/11/2012    Status post coronary artery stent placement 05/11/2012          Plan:       Adry was seen today for 3 month follow up.    Diagnoses and all orders for this visit:    Insomnia due to medical condition  -     zolpidem (AMBIEN CR) 12.5 MG CR tablet; Take 1 tablet (12.5 mg total) by mouth every evening.    Chronic neck pain  -     gabapentin (NEURONTIN) 300 MG capsule; Take 4 capsules (1,200 mg total) by mouth every evening. Takes all 4 capsules @ bedtime  -     fentaNYL (DURAGESIC) 25 mcg/hr; Place 1 patch onto the skin every 48 hours.    Non-seasonal allergic rhinitis due to pollen  -     levocetirizine (XYZAL) 5 MG tablet; Take 1 tablet (5 mg total) by mouth every evening.    Major depressive disorder with single episode, in partial remission  Controlled     Atrioventricular block, complete  S/p PPM by Stachristiano     Essential hypertension  Controlled     Chronic myeloid leukemia, BCR/ABL-positive, not having achieved remission  In remission- Per Dr. Marvin     Hypothyroidism due to Hashimoto's thyroiditis  Per Dr. Rodgers

## 2020-01-16 ENCOUNTER — TELEPHONE (OUTPATIENT)
Dept: PHARMACY | Facility: CLINIC | Age: 75
End: 2020-01-16

## 2020-01-16 NOTE — TELEPHONE ENCOUNTER
The patient contacted [facility] in regards to Tasigna refill. She has 4 days on hand - refill needed by 1/20. Will ship 1/16 for her to receive 1/17. $8.40 copay, CC obtained. Address confirmed. No changes in other medications, allergies, health conditions and one missed dose (declined discussion). Confirmed specific packing instructions.

## 2020-01-22 ENCOUNTER — TELEPHONE (OUTPATIENT)
Dept: FAMILY MEDICINE | Facility: CLINIC | Age: 75
End: 2020-01-22

## 2020-01-22 NOTE — TELEPHONE ENCOUNTER
----- Message from Meliza Wheatley sent at 1/22/2020  1:01 PM CST -----   Patient dropped off paperwork at .

## 2020-02-06 ENCOUNTER — TELEPHONE (OUTPATIENT)
Dept: FAMILY MEDICINE | Facility: CLINIC | Age: 75
End: 2020-02-06

## 2020-02-06 NOTE — TELEPHONE ENCOUNTER
----- Message from Michelle Fermin sent at 2/6/2020  4:42 PM CST -----  Contact: patient  Type:  Patient Returning Call    Who Called:  Patient  Who Left Message for Patient:  Cady  Does the patient know what this is regarding?:  An appeal with medication zolpidem close to refill  Best Call Back Number:    Additional Information:  Please advise-thank you

## 2020-02-07 NOTE — TELEPHONE ENCOUNTER
Left message on machine--ambien appeal still in process, will caontact her with updated as received

## 2020-02-07 NOTE — TELEPHONE ENCOUNTER
----- Message from Nish Yang sent at 2/7/2020  8:59 AM CST -----  Contact: Ptnt   [phone]  Type:  RX Refill Request    Who Called:  Ptnt   [phone]    Refill or New Rx:  Refill    RX Name and Strength: zolpidem (AMBIEN CR) 12.5 MG CR tablet 30 tablet 5 1/10/2020    Sig - Route: Take 1 tablet (12.5 mg total) by mouth every evening. - Oral   Sent to pharmacy as: zolpidem (AMBIEN CR) 12.5 MG CR tablet     How is the patient currently taking it? (ex. 1XDay):  See above    Is this a 30 day or 90 day RX:  30 day    Preferred Pharmacy with phone number:      [facility] [address] [place] [address] [facility] [address]  Phone: [phone] Fax: [phone]    Local or Mail Order: Local    Ordering Provider:  Dr Des Hicks Call Back Number:  [phone]    Additional Information:     Advised returning a call to Cady about an appeal for this medication. Please call and leave information on when information was sent to insurance on V-mail so as not to miss the information.  Please F/U with insurance company [email or web address].

## 2020-02-10 ENCOUNTER — TELEPHONE (OUTPATIENT)
Dept: FAMILY MEDICINE | Facility: CLINIC | Age: 75
End: 2020-02-10

## 2020-02-10 ENCOUNTER — TELEPHONE (OUTPATIENT)
Dept: PHARMACY | Facility: CLINIC | Age: 75
End: 2020-02-10

## 2020-02-10 DIAGNOSIS — G47.01 INSOMNIA DUE TO MEDICAL CONDITION: ICD-10-CM

## 2020-02-10 DIAGNOSIS — G89.29 CHRONIC NECK PAIN: ICD-10-CM

## 2020-02-10 DIAGNOSIS — M54.2 CHRONIC NECK PAIN: ICD-10-CM

## 2020-02-10 RX ORDER — FENTANYL 25 UG/1
PATCH TRANSDERMAL
Qty: 15 PATCH | Refills: 0 | Status: SHIPPED | OUTPATIENT
Start: 2020-02-10 | End: 2020-03-11

## 2020-02-10 RX ORDER — ZOLPIDEM TARTRATE 12.5 MG/1
12.5 TABLET, FILM COATED, EXTENDED RELEASE ORAL NIGHTLY
Qty: 30 TABLET | Refills: 5 | Status: SHIPPED | OUTPATIENT
Start: 2020-02-10 | End: 2020-07-14 | Stop reason: SDUPTHER

## 2020-02-10 NOTE — TELEPHONE ENCOUNTER
----- Message from Michelle Fermin sent at 2/10/2020 12:40 PM CST -----  Contact: Lawson  Type: Needs Medical Advice    Who Called:  [facility]   Pharmacy name and phone #:    [facility] - [place] [id number] [address]  Phone: [phone] Fax: [phone]  Best Call Back Number: [phone]  Additional Information: Pharmacy calling to follow up with call from 2/7 regarding PA-please advise-thank you

## 2020-02-10 NOTE — TELEPHONE ENCOUNTER
"----- Message from Nish Navarreted sent at 2/10/2020  9:13 AM CST -----  Contact: Ptnt  [phone]  Type:  RX Refill Request    Who Called:  Ptnt  [phone]    Refill or New Rx:    NEWl (MUST SHOW ONE FOR 02-10-20 AND FOR 03-10-20 AND FOR 04-10-20)     RX Name and Strength: fentaNYL (DURAGESIC) 25 mcg/hr 15 patch 0 1/10/2020    Sig - Route: Place 1 patch onto the skin every 48 hours. - Transdermal   Sent to pharmacy as: fentaNYL (DURAGESIC) 25 mcg/hr   Earliest Fill Date: 1/10/2020   Notes to Pharmacy: Quantity prescribed more than 7 day supply? Yes, quantity medically necessary     How is the patient currently taking it? (ex. 1XDay):  See above    Is this a 30 day or 90 day RX:  30 day    Preferred Pharmacy with phone number:      [facility] [address] [facility]  [address] [facility] [address]  Phone: [phone] Fax: [phone]    Local or Mail Order:  Local    Ordering Provider:  Dr Des Hicks Call Back Number:  Ptnt  [phone]    Additional Information:  Advised she is in short supply at this time. Advised needs these to be prescribed one for 02-10-20 and one for 03-10-20 and one for 04-10-20. Please be sure to prescribe "APOTEX " brand for these Rxs.   Please send as soon as possible and call to confirm when sent.       " Pt needs a referral for Adelso Roque, she is going to schedule apt with him

## 2020-03-09 ENCOUNTER — TELEPHONE (OUTPATIENT)
Dept: PHARMACY | Facility: CLINIC | Age: 75
End: 2020-03-09

## 2020-03-09 NOTE — TELEPHONE ENCOUNTER
Contacted patient for Tasigna refill and clinical f/u. Patient declined f/u at this time. Informed her that we will reach out in a couple of months (3 months) to offer another clinical f/u. Patient verbalized understanding. She confirmed the need for a refill. She has a 1 week of Tasigna on hand. Reports missing one dose as she was not feeling well.  She confirmed her dose and directions. Will ship medication on 3/11 for the patient to receive on 3/12. $0.00 copay. Address confirmed. No changes in medications, allergies, or health conditions. She has no questions or concerns at this time. She is aware to contact [facility] if she needs anything.

## 2020-03-10 DIAGNOSIS — M54.2 CHRONIC NECK PAIN: ICD-10-CM

## 2020-03-10 DIAGNOSIS — G89.29 CHRONIC NECK PAIN: ICD-10-CM

## 2020-03-10 NOTE — TELEPHONE ENCOUNTER
----- Message from Nish Yang sent at 3/10/2020  1:57 PM CDT -----  Contact: Mihaela   [phone]  Type: Needs Medical Advice    Who Called:  Mihaela   [phone]    Additional Information: See previous message for this ptht REFILL request.    Advised needs this medication sent to pharmacy today would like to  at pharmacy this afternoon. Ptnt is in [place] this afternoon and wants to  her medication.    Medication: fentaNYL (DURAGESIC) 25 mcg/hr 15 patch 0 2/10/2020    Sig: PLACE 1 PATCH ONTO THE SKIN EVERY 48 HOURS. MORE THAN A 7 DAY SUPPLY MEDICALLY NECESSARY.   Sent to pharmacy as: fentaNYL (DURAGESIC) 25 mcg/hr     Pharmacy:     [address] [address]  Phone: [phone] Fax: [phone]

## 2020-03-10 NOTE — TELEPHONE ENCOUNTER
----- Message from Nihs Yang sent at 3/10/2020  1:47 PM CDT -----  Contact: Ptnt   Type:  RX Refill Request    Who Called:  [phone]    Refill or New Rx:  Refill    RX Name and Strength:  fentaNYL (DURAGESIC) 25 mcg/hr 15 patch 0 2/10/2020    Sig: PLACE 1 PATCH ONTO THE SKIN EVERY 48 HOURS. MORE THAN A 7 DAY SUPPLY MEDICALLY NECESSARY.   Sent to pharmacy as: fentaNYL (DURAGESIC) 25 mcg/hr       How is the patient currently taking it? (ex. 1XDay):  See above    Is this a 30 day or 90 day RX:  30 day    Preferred Pharmacy with phone number:      [facility] [address]  [address] [address]  Phone: [phone] Fax: [phone]    Local or Mail Order:  Local    Ordering Provider:  Dr Denis Hicks Call Back Number:  [phone]    Additional Information:  Advised she is completely out of thee medication at this time. Please send to pharmacy today as soon as possible.

## 2020-03-10 NOTE — TELEPHONE ENCOUNTER
----- Message from Anya Franks sent at 3/10/2020 11:47 AM CDT -----  Contact: patient  Type:  RX Refill Request    Who Called:  patient  Refill or New Rx:  Refill  RX Name and Strength:  fentaNYL (DURAGESIC) 25 mcg/hr  How is the patient currently taking it? (ex. 1XDay):    Is this a 30 day or 90 day RX:  30  Preferred Pharmacy with phone number:    [facility] [address]  [address] [address] [address]  Phone: [phone] Fax: [phone]      Local or Mail Order:  local  Ordering Provider:  Des  Best Call Back Number:  [phone]  Additional Information:  Patient states that Dr. Nunes was supposed to fax pharmacy 3 individual prescriptions for the medication for January, February and march.  She went to  the refill for March and the pharmacy told her that the original order was only 2 prescriptions, not 3.  Patient needs this medication and would like to pick it up today.  Her appt is not until 04/13/2020 but she cannot wait this long.

## 2020-03-11 RX ORDER — FENTANYL 25 UG/1
PATCH TRANSDERMAL
Qty: 15 PATCH | Refills: 0 | Status: SHIPPED | OUTPATIENT
Start: 2020-03-11 | End: 2020-04-13 | Stop reason: SDUPTHER

## 2020-03-16 ENCOUNTER — PATIENT MESSAGE (OUTPATIENT)
Dept: FAMILY MEDICINE | Facility: CLINIC | Age: 75
End: 2020-03-16

## 2020-03-16 NOTE — TELEPHONE ENCOUNTER
----- Message from Any Ward sent at 3/16/2020 10:02 AM CDT -----  Type:  RX Refill Request    Who Called:  Adry  Refill or New Rx:  refill  RX Name and Strength:  albuterol-ipratropium (COMBIVENT)  mcg/actuation inhaler  How is the patient currently taking it? (ex. 1XDay):  Inhale 2 drops every 4 hours as needed for wheezing  Is this a 30 day or 90 day RX:  90  Preferred Pharmacy with phone number:    [facility] - [place], [place] - [address]  [address] [facility] [address]  Phone: [phone] Fax: [phone]  Local or Mail Order:  mail  Ordering Provider:  Dr Des Hicks Call Back Number:  [phone]  Additional Information:  Thank you!

## 2020-03-24 ENCOUNTER — PATIENT MESSAGE (OUTPATIENT)
Dept: FAMILY MEDICINE | Facility: CLINIC | Age: 75
End: 2020-03-24

## 2020-03-30 NOTE — TELEPHONE ENCOUNTER
Patient needs the rx sent to [facility]. Patient will get a pulse ox from the local pharmacy.

## 2020-03-30 NOTE — TELEPHONE ENCOUNTER
----- Message from Rossana Blancas sent at 3/30/2020  1:52 PM CDT -----  Type: Needs Medical Advice    Who Called:  patient  Pharmacy name and phone #:    [facility] - [place], [place] - [address]  [address] [address]  Phone: [phone] Fax: [phone]  Best Call Back Number: [phone]  Additional Information: patient requesting her prescription for ipratropium-albuteroL (COMBIVENT)  mcg/actuation inhaler sent to [facility], the prescription was sent to [facility] in error (they are only able to fill for a 30 day supply) patient is requesting prescription for a 90 day supply to be sent to [facility].     Patient is also requesting an Oxygen Monitor for her home. Please contact to advise and to advise when her prescription is correct.

## 2020-03-30 NOTE — TELEPHONE ENCOUNTER
What diagnosis can I put to substantiate the need for the Combivent inhaler?  I do not have any lung related issues on my problem list.

## 2020-04-01 ENCOUNTER — PATIENT MESSAGE (OUTPATIENT)
Dept: FAMILY MEDICINE | Facility: CLINIC | Age: 75
End: 2020-04-01

## 2020-04-03 ENCOUNTER — PATIENT MESSAGE (OUTPATIENT)
Dept: ADMINISTRATIVE | Facility: OTHER | Age: 75
End: 2020-04-03

## 2020-04-03 ENCOUNTER — TELEPHONE (OUTPATIENT)
Dept: FAMILY MEDICINE | Facility: CLINIC | Age: 75
End: 2020-04-03

## 2020-04-07 ENCOUNTER — PATIENT MESSAGE (OUTPATIENT)
Dept: FAMILY MEDICINE | Facility: CLINIC | Age: 75
End: 2020-04-07

## 2020-04-07 DIAGNOSIS — G47.33 OSA ON CPAP: ICD-10-CM

## 2020-04-08 PROBLEM — G47.33 OSA ON CPAP: Status: ACTIVE | Noted: 2020-04-08

## 2020-04-13 ENCOUNTER — OFFICE VISIT (OUTPATIENT)
Dept: FAMILY MEDICINE | Facility: CLINIC | Age: 75
End: 2020-04-13
Payer: MEDICARE

## 2020-04-13 ENCOUNTER — TELEPHONE (OUTPATIENT)
Dept: FAMILY MEDICINE | Facility: CLINIC | Age: 75
End: 2020-04-13

## 2020-04-13 DIAGNOSIS — M54.2 CHRONIC NECK PAIN: Primary | ICD-10-CM

## 2020-04-13 DIAGNOSIS — G89.29 CHRONIC NECK PAIN: Primary | ICD-10-CM

## 2020-04-13 DIAGNOSIS — I48.0 PAROXYSMAL ATRIAL FIBRILLATION: ICD-10-CM

## 2020-04-13 DIAGNOSIS — I10 ESSENTIAL HYPERTENSION: ICD-10-CM

## 2020-04-13 PROCEDURE — 99499 UNLISTED E&M SERVICE: CPT | Mod: 95,,, | Performed by: INTERNAL MEDICINE

## 2020-04-13 PROCEDURE — 99213 OFFICE O/P EST LOW 20 MIN: CPT | Mod: HCNC,95,, | Performed by: INTERNAL MEDICINE

## 2020-04-13 PROCEDURE — 99213 PR OFFICE/OUTPT VISIT, EST, LEVL III, 20-29 MIN: ICD-10-PCS | Mod: HCNC,95,, | Performed by: INTERNAL MEDICINE

## 2020-04-13 PROCEDURE — 1159F PR MEDICATION LIST DOCUMENTED IN MEDICAL RECORD: ICD-10-PCS | Mod: HCNC,95,, | Performed by: INTERNAL MEDICINE

## 2020-04-13 PROCEDURE — 99499 RISK ADDL DX/OHS AUDIT: ICD-10-PCS | Mod: 95,,, | Performed by: INTERNAL MEDICINE

## 2020-04-13 PROCEDURE — 1101F PR PT FALLS ASSESS DOC 0-1 FALLS W/OUT INJ PAST YR: ICD-10-PCS | Mod: HCNC,CPTII,95, | Performed by: INTERNAL MEDICINE

## 2020-04-13 PROCEDURE — 1159F MED LIST DOCD IN RCRD: CPT | Mod: HCNC,95,, | Performed by: INTERNAL MEDICINE

## 2020-04-13 PROCEDURE — 1101F PT FALLS ASSESS-DOCD LE1/YR: CPT | Mod: HCNC,CPTII,95, | Performed by: INTERNAL MEDICINE

## 2020-04-13 RX ORDER — FENTANYL 25 UG/1
1 PATCH TRANSDERMAL
Qty: 15 PATCH | Refills: 0 | Status: SHIPPED | OUTPATIENT
Start: 2020-04-13 | End: 2020-07-14 | Stop reason: SDUPTHER

## 2020-04-13 RX ORDER — FENTANYL 25 UG/1
1 PATCH TRANSDERMAL
Qty: 15 PATCH | Refills: 0 | Status: SHIPPED | OUTPATIENT
Start: 2020-05-13 | End: 2020-07-14 | Stop reason: SDUPTHER

## 2020-04-13 RX ORDER — FENTANYL 25 UG/1
1 PATCH TRANSDERMAL
Qty: 15 PATCH | Refills: 0 | Status: SHIPPED | OUTPATIENT
Start: 2020-06-13 | End: 2020-07-14 | Stop reason: SDUPTHER

## 2020-04-13 NOTE — PROGRESS NOTES
Patient ID: Adry Owen     Chief Complaint: Follow up for med refills     The patient location is: [place]   The chief complaint leading to consultation is: Follow up for med refills  Visit type: Virtual visit with synchronous audio and video  Total time spent with patient: 15 minutes     Each patient to whom he or she provides medical services by telemedicine is:  (1) informed of the relationship between the physician and patient and the respective role of any other health care provider with respect to management of the patient; and (2) notified that he or she may decline to receive medical services by telemedicine and may withdraw from such care at any time.    HPI: Follow up for med refills of Fentanyl patches for chronic neck pain. She also needs confirmation that I sent the prescription in to [facility] for her CPAP mask. I did last week, but she says they haven't received it. She's have a few days where she was very fatigued. Check in w/ cards monitoring (PPM) shows that her Blood Pressure was high at 170's and her Heart rate was in the 40's. I will inform Dr. Ahuja. She was scheduled for a bone marrow biopsy, but she declines it for now due to COVID-19.     Review of Systems   Constitutional: Negative.    HENT: Negative.    Eyes: Negative.    Respiratory: Negative.    Gastrointestinal: Negative.    Endocrine: Negative.    Genitourinary: Negative.    Musculoskeletal: Positive for back pain.   Skin: Negative.    Allergic/Immunologic: Negative.    Neurological: Positive for numbness and headaches.   Hematological: Negative.    Psychiatric/Behavioral: Negative.           Objective:      Physical Exam   Physical Exam   Constitutional: She is oriented to person, place, and time. She appears well-developed and well-nourished.   HENT:   Head: Normocephalic and atraumatic.   Eyes: Conjunctivae and EOM are normal.   Neck: Normal range of motion.   Pulmonary/Chest: Effort normal.   Musculoskeletal: Normal range of  motion.   Neurological: She is alert and oriented to person, place, and time.   Psychiatric: She has a normal mood and affect. Her behavior is normal. Judgment and thought content normal.       Current Outpatient Medications:     acetaminophen (TYLENOL) 500 MG tablet, Take 1,000 mg by mouth every 6 (six) hours as needed for Pain or Temperature greater than., Disp: , Rfl:     alendronate (FOSAMAX) 70 MG tablet, Take 1 tablet (70 mg total) by mouth every 7 days., Disp: 12 tablet, Rfl: 3    ASPIRIN LOW DOSE 81 mg EC tablet, Take 81 mg by mouth once daily. , Disp: , Rfl:     atorvastatin (LIPITOR) 40 MG tablet, Take 1 tablet (40 mg total) by mouth once daily., Disp: 90 tablet, Rfl: 3    cycloSPORINE (RESTASIS) 0.05 % ophthalmic emulsion, Place 0.4 mLs (1 drop total) into both eyes 2 (two) times daily., Disp: 1 vial, Rfl: 5    fentaNYL (DURAGESIC) 25 mcg/hr, Place 1 patch onto the skin every 48 hours., Disp: 15 patch, Rfl: 0    [START ON 5/13/2020] fentaNYL (DURAGESIC) 25 mcg/hr, Place 1 patch onto the skin every 48 hours., Disp: 15 patch, Rfl: 0    [START ON 6/13/2020] fentaNYL (DURAGESIC) 25 mcg/hr, Place 1 patch onto the skin every 48 hours., Disp: 15 patch, Rfl: 0    fish oil-omega-3 fatty acids 300-1,000 mg capsule, Take 1 capsule by mouth once daily., Disp: , Rfl:     fluticasone propionate (FLONASE) 50 mcg/actuation nasal spray, USE 1 SPRAY IN EACH NOSTRIL EVERY DAY, Disp: 32 g, Rfl: 3    furosemide (LASIX) 20 MG tablet, TAKE 1 TABLET EVERY DAY, Disp: 90 tablet, Rfl: 3    gabapentin (NEURONTIN) 300 MG capsule, Take 4 capsules (1,200 mg total) by mouth every evening. Takes all 4 capsules @ bedtime, Disp: 360 capsule, Rfl: 3    hydrocodone-acetaminophen 10-325mg (NORCO)  mg Tab, Take 1 tablet by mouth every 8 (eight) hours as needed. , Disp: , Rfl:     ipratropium-albuteroL (COMBIVENT)  mcg/actuation inhaler, Inhale 1 puff into the lungs every 6 (six) hours as needed for Wheezing or  Shortness of Breath. Rescue, Disp: 3 Package, Rfl: 3    ketorolac (TORADOL) 10 mg tablet, Take 1 tablet (10 mg total) by mouth every 6 (six) hours as needed for Pain., Disp: 30 tablet, Rfl: 0    levocetirizine (XYZAL) 5 MG tablet, Take 1 tablet (5 mg total) by mouth every evening., Disp: 90 tablet, Rfl: 3    levothyroxine (SYNTHROID) 75 MCG tablet, Take 75 mcg by mouth once daily., Disp: , Rfl:     MISCELLANEOUS MEDICAL SUPPLY (COMPRESSION STOCKINGS MISC), , Disp: , Rfl:     naproxen (NAPROSYN) 500 MG tablet, Take 1 tablet (500 mg total) by mouth 2 (two) times daily with meals., Disp: 60 tablet, Rfl: 1    nilotinib (TASIGNA) 150 mg capsule, Take 2 capsules (300 mg total) by mouth 2 (two) times daily., Disp: 112 capsule, Rfl: 6    nitroGLYCERIN (NITROSTAT) 0.4 MG SL tablet, Place 1 tablet (0.4 mg total) under the tongue every 5 (five) minutes as needed., Disp: 20 tablet, Rfl: 3    ondansetron (ZOFRAN) 4 MG tablet, Take 1 tablet (4 mg total) by mouth every 6 (six) hours., Disp: 9 tablet, Rfl: 0    potassium chloride SA (K-DUR,KLOR-CON) 20 MEQ tablet, TAKE 1 TABLET EVERY DAY, Disp: 90 tablet, Rfl: 3    XARELTO 20 mg Tab, TAKE 1 TABLET DAILY WITH DINNER OR EVENING MEAL, Disp: 90 tablet, Rfl: 3    zolpidem (AMBIEN CR) 12.5 MG CR tablet, TAKE 1 TABLET (12.5 MG TOTAL) BY MOUTH EVERY EVENING., Disp: 30 tablet, Rfl: 5         Vitals: There were no vitals filed for this visit.   Assessment:       Patient Active Problem List    Diagnosis Date Noted    JAYLIN on CPAP 04/08/2020    Major depressive disorder with single episode, in partial remission 01/10/2020    Insomnia due to medical condition 01/10/2020    Atrioventricular block, complete 11/27/2019    Seizure disorder 10/22/2019    Third degree heart block 10/22/2019    Chronic neck pain 10/22/2019    Non-seasonal allergic rhinitis due to pollen 10/22/2019    Hypothyroidism due to Hashimoto's thyroiditis 07/01/2019    AV block, Mobitz 1 06/21/2018     Headache 06/20/2018    Digital mucous cyst of finger of right hand 06/12/2018    Chronic myeloid leukemia, BCR/ABL-positive, not having achieved remission 05/23/2018    Leukemia not having achieved remission 05/08/2018    Anemia, chronic disease 05/07/2018    Leucocytosis 05/06/2018    Other headache syndrome 05/06/2018    Acute diverticulitis 11/07/2017    Acute lower GI bleeding 11/06/2017    Leukocytosis 11/06/2017    Atrial flutter 05/08/2017    Angioedema 04/11/2017    Urticaria 04/09/2017    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    Diagnoses and all orders for this visit:    Diagnoses and all orders for this visit:    Chronic neck pain  -     fentaNYL (DURAGESIC) 25 mcg/hr; Place 1 patch onto the skin every 48 hours.  -     fentaNYL (DURAGESIC) 25 mcg/hr; Place 1 patch onto the skin every 48 hours.  -     fentaNYL (DURAGESIC) 25 mcg/hr; Place 1 patch onto the skin every 48 hours.    Essential hypertension  Seems controlled now     Paroxysmal atrial fibrillation  I messaged Dr. Ahuja regarding her bradycardia.          Answers for HPI/ROS submitted by the patient on 4/3/2020   Back pain  Chronicity: chronic  Onset: more than 1 year ago  Frequency: constantly  Progression since onset: waxing and waning  Pain location: sacro-iliac, thoracic spine, costovertebral angle  Pain quality: aching  Pain - numeric: 10/10  Pain is: the same all the time  Aggravated by: bending, position, lying down,  sitting  Stiffness is present: at night, all day  weight loss: Yes  Risk factors: history of cancer, history of osteoporosis  Pain severity: severe  Treatments tried: analgesics, acupuncture  Improvement on treatment: mild

## 2020-04-16 ENCOUNTER — PATIENT MESSAGE (OUTPATIENT)
Dept: FAMILY MEDICINE | Facility: CLINIC | Age: 75
End: 2020-04-16

## 2020-04-16 DIAGNOSIS — G47.33 OSA ON CPAP: Primary | ICD-10-CM

## 2020-04-16 NOTE — TELEPHONE ENCOUNTER
Need to fax to [facility] when order is approved.   [phone]

## 2020-04-17 ENCOUNTER — PATIENT MESSAGE (OUTPATIENT)
Dept: FAMILY MEDICINE | Facility: CLINIC | Age: 75
End: 2020-04-17

## 2020-04-17 NOTE — TELEPHONE ENCOUNTER
I filled out a the same form...again. The only thing I can see that's missing is her pressure settings. What are they?

## 2020-04-17 NOTE — TELEPHONE ENCOUNTER
I'm aware of what she needs, but the order from [facility] is quite extensive. In situations such as this, it's better to just give them what they want. So what are her settings?

## 2020-04-20 ENCOUNTER — PATIENT MESSAGE (OUTPATIENT)
Dept: FAMILY MEDICINE | Facility: CLINIC | Age: 75
End: 2020-04-20

## 2020-05-04 ENCOUNTER — TELEPHONE (OUTPATIENT)
Dept: PHARMACY | Facility: CLINIC | Age: 75
End: 2020-05-04

## 2020-05-04 NOTE — TELEPHONE ENCOUNTER
Call Attempt 1: LVM for [facility] refill. Only one phone # on file. [facility] message sent. $0.00 copay.

## 2020-05-05 ENCOUNTER — PATIENT MESSAGE (OUTPATIENT)
Dept: ADMINISTRATIVE | Facility: HOSPITAL | Age: 75
End: 2020-05-05

## 2020-05-14 ENCOUNTER — TELEPHONE (OUTPATIENT)
Dept: ADMINISTRATIVE | Facility: HOSPITAL | Age: 75
End: 2020-05-14

## 2020-05-14 NOTE — TELEPHONE ENCOUNTER
The patient responded to hypertension My Chart message.    The patient called and stated that her blood pressure reading was 169/88 with headache and some blurred vision yesterday.    No headache or blurred vision today, blood pressure reading 142/71.

## 2020-05-15 ENCOUNTER — OFFICE VISIT (OUTPATIENT)
Dept: FAMILY MEDICINE | Facility: CLINIC | Age: 75
End: 2020-05-15
Payer: MEDICARE

## 2020-05-15 DIAGNOSIS — G44.219 EPISODIC TENSION-TYPE HEADACHE, NOT INTRACTABLE: ICD-10-CM

## 2020-05-15 DIAGNOSIS — I10 ESSENTIAL HYPERTENSION: Primary | ICD-10-CM

## 2020-05-15 DIAGNOSIS — E55.9 VITAMIN D DEFICIENCY: ICD-10-CM

## 2020-05-15 DIAGNOSIS — L21.9 SEBORRHEIC DERMATITIS: ICD-10-CM

## 2020-05-15 PROBLEM — C95.00 ACUTE LEUKEMIA NOT HAVING ACHIEVED REMISSION: Status: ACTIVE | Noted: 2020-05-15

## 2020-05-15 PROBLEM — R03.0 ELEVATED BLOOD PRESSURE READING WITHOUT DIAGNOSIS OF HYPERTENSION: Status: ACTIVE | Noted: 2020-05-15

## 2020-05-15 PROCEDURE — 1101F PR PT FALLS ASSESS DOC 0-1 FALLS W/OUT INJ PAST YR: ICD-10-PCS | Mod: CPTII,95,, | Performed by: INTERNAL MEDICINE

## 2020-05-15 PROCEDURE — 1159F PR MEDICATION LIST DOCUMENTED IN MEDICAL RECORD: ICD-10-PCS | Mod: 95,,, | Performed by: INTERNAL MEDICINE

## 2020-05-15 PROCEDURE — 1159F MED LIST DOCD IN RCRD: CPT | Mod: 95,,, | Performed by: INTERNAL MEDICINE

## 2020-05-15 PROCEDURE — 1101F PT FALLS ASSESS-DOCD LE1/YR: CPT | Mod: CPTII,95,, | Performed by: INTERNAL MEDICINE

## 2020-05-15 PROCEDURE — 99213 PR OFFICE/OUTPT VISIT, EST, LEVL III, 20-29 MIN: ICD-10-PCS | Mod: 95,,, | Performed by: INTERNAL MEDICINE

## 2020-05-15 PROCEDURE — 99499 RISK ADDL DX/OHS AUDIT: ICD-10-PCS | Mod: 95,,, | Performed by: INTERNAL MEDICINE

## 2020-05-15 PROCEDURE — 99213 OFFICE O/P EST LOW 20 MIN: CPT | Mod: 95,,, | Performed by: INTERNAL MEDICINE

## 2020-05-15 PROCEDURE — 99499 UNLISTED E&M SERVICE: CPT | Mod: 95,,, | Performed by: INTERNAL MEDICINE

## 2020-05-15 RX ORDER — ERGOCALCIFEROL 1.25 MG/1
50000 CAPSULE ORAL
Qty: 24 CAPSULE | Refills: 3 | Status: SHIPPED | OUTPATIENT
Start: 2020-05-15 | End: 2021-01-04 | Stop reason: SDUPTHER

## 2020-05-15 RX ORDER — KETOCONAZOLE 20 MG/ML
SHAMPOO, SUSPENSION TOPICAL
Qty: 120 ML | Refills: 0 | Status: SHIPPED | OUTPATIENT
Start: 2020-05-18 | End: 2022-04-18 | Stop reason: CLARIF

## 2020-05-15 NOTE — PROGRESS NOTES
Patient ID: Adry Oewn     Chief Complaint: Headaches and elevated Blood Pressure     The patient location is: [place]   The chief complaint leading to consultation is: Headaches elevated Blood Pressure   Visit type: audiovisual  Total time spent with patient: 15 minutes     Each patient to whom he or she provides medical services by telemedicine is:  (1) informed of the relationship between the physician and patient and the respective role of any other health care provider with respect to management of the patient; and (2) notified that he or she may decline to receive medical services by telemedicine and may withdraw from such care at any time.    HPI: Follow up for elevated Blood Pressure that she had last week when she had a frontal Headache w/ associated blurry vision. SBP was 170, but has been in the 130's once Headache resolved. She thinks the elevated Blood Pressure caused the Headache, but I think her Headache caused the elevated Blood Pressure. She's never been diagnosed w/ Migraines though her daughter has Typical Migraines. Thankfully, she doesn't have them too often- last was in October, lasted 2 days and required IM Toradol to break. Her fear is another GI bleed due to Xarelto and Aspirin. She has a history of Coronary Artery Disease, so Triptans are out. I doubt Fioricet would work due to her chronic narcotic use. Prednisone and Nilotinib have an interaction. I think she needs to take a 1/2 of her Hydrocortisone that she takes as needed. She also Complains of dry plaques/ patches on scalp that flake off- may be Seborhheic Dermatitis - prescription for Ketoconazole twice weekly and she can continue her Selsium Blue- like shampoo.     Review of Systems   Constitutional: Negative.    HENT: Negative.    Eyes: Negative.    Respiratory: Negative.  Negative for shortness of breath.    Cardiovascular: Negative.  Negative for chest pain and palpitations.   Gastrointestinal: Negative.    Endocrine:  Negative.    Genitourinary: Negative.    Musculoskeletal: Positive for neck pain.   Skin: Positive for rash.   Allergic/Immunologic: Negative.    Neurological: Positive for headaches.   Hematological: Negative.    Psychiatric/Behavioral: Negative.           Objective:      Physical Exam   Physical Exam   Constitutional: She is oriented to person, place, and time. She appears well-developed and well-nourished.   HENT:   Head: Normocephalic and atraumatic.   Eyes: Conjunctivae and EOM are normal.   Neck: Normal range of motion.   Pulmonary/Chest: Effort normal.   Musculoskeletal: Normal range of motion.   Neurological: She is alert and oriented to person, place, and time.   Skin: Rash noted.   Psychiatric: She has a normal mood and affect. Her behavior is normal. Judgment and thought content normal.       Current Outpatient Medications:     acetaminophen (TYLENOL) 500 MG tablet, Take 1,000 mg by mouth every 6 (six) hours as needed for Pain or Temperature greater than., Disp: , Rfl:     alendronate (FOSAMAX) 70 MG tablet, Take 1 tablet (70 mg total) by mouth every 7 days., Disp: 12 tablet, Rfl: 3    ASPIRIN LOW DOSE 81 mg EC tablet, Take 81 mg by mouth once daily. , Disp: , Rfl:     atorvastatin (LIPITOR) 40 MG tablet, Take 1 tablet (40 mg total) by mouth once daily., Disp: 90 tablet, Rfl: 3    cycloSPORINE (RESTASIS) 0.05 % ophthalmic emulsion, Place 0.4 mLs (1 drop total) into both eyes 2 (two) times daily., Disp: 1 vial, Rfl: 5    ergocalciferol (ERGOCALCIFEROL) 50,000 unit Cap, Take 1 capsule (50,000 Units total) by mouth every 7 days., Disp: 24 capsule, Rfl: 3    fentaNYL (DURAGESIC) 25 mcg/hr, Place 1 patch onto the skin every 48 hours., Disp: 15 patch, Rfl: 0    fentaNYL (DURAGESIC) 25 mcg/hr, Place 1 patch onto the skin every 48 hours., Disp: 15 patch, Rfl: 0    [START ON 6/13/2020] fentaNYL (DURAGESIC) 25 mcg/hr, Place 1 patch onto the skin every 48 hours., Disp: 15 patch, Rfl: 0    fish oil-omega-3  fatty acids 300-1,000 mg capsule, Take 1 capsule by mouth once daily., Disp: , Rfl:     fluticasone propionate (FLONASE) 50 mcg/actuation nasal spray, USE 1 SPRAY IN EACH NOSTRIL EVERY DAY, Disp: 32 g, Rfl: 3    furosemide (LASIX) 20 MG tablet, TAKE 1 TABLET EVERY DAY, Disp: 90 tablet, Rfl: 3    gabapentin (NEURONTIN) 300 MG capsule, Take 4 capsules (1,200 mg total) by mouth every evening. Takes all 4 capsules @ bedtime, Disp: 360 capsule, Rfl: 3    hydrocodone-acetaminophen 10-325mg (NORCO)  mg Tab, Take 1 tablet by mouth every 8 (eight) hours as needed. , Disp: , Rfl:     ipratropium-albuteroL (COMBIVENT)  mcg/actuation inhaler, Inhale 1 puff into the lungs every 6 (six) hours as needed for Wheezing or Shortness of Breath. Rescue, Disp: 3 Package, Rfl: 3    [START ON 5/18/2020] ketoconazole (NIZORAL) 2 % shampoo, Apply topically twice a week., Disp: 120 mL, Rfl: 0    ketorolac (TORADOL) 10 mg tablet, Take 1 tablet (10 mg total) by mouth every 6 (six) hours as needed for Pain., Disp: 30 tablet, Rfl: 0    levocetirizine (XYZAL) 5 MG tablet, Take 1 tablet (5 mg total) by mouth every evening., Disp: 90 tablet, Rfl: 3    levothyroxine (SYNTHROID) 75 MCG tablet, Take 75 mcg by mouth once daily., Disp: , Rfl:     MISCELLANEOUS MEDICAL SUPPLY (COMPRESSION STOCKINGS MISC), , Disp: , Rfl:     naproxen (NAPROSYN) 500 MG tablet, Take 1 tablet (500 mg total) by mouth 2 (two) times daily with meals., Disp: 60 tablet, Rfl: 1    nilotinib (TASIGNA) 150 mg capsule, Take 2 capsules (300 mg total) by mouth 2 (two) times daily., Disp: 112 capsule, Rfl: 6    nitroGLYCERIN (NITROSTAT) 0.4 MG SL tablet, Place 1 tablet (0.4 mg total) under the tongue every 5 (five) minutes as needed., Disp: 20 tablet, Rfl: 3    ondansetron (ZOFRAN) 4 MG tablet, Take 1 tablet (4 mg total) by mouth every 6 (six) hours., Disp: 9 tablet, Rfl: 0    potassium chloride SA (K-DUR,KLOR-CON) 20 MEQ tablet, TAKE 1 TABLET EVERY DAY, Disp:  90 tablet, Rfl: 3    XARELTO 20 mg Tab, TAKE 1 TABLET DAILY WITH DINNER OR EVENING MEAL, Disp: 90 tablet, Rfl: 3    zolpidem (AMBIEN CR) 12.5 MG CR tablet, TAKE 1 TABLET (12.5 MG TOTAL) BY MOUTH EVERY EVENING., Disp: 30 tablet, Rfl: 5         Vitals: There were no vitals filed for this visit.   Assessment:       Patient Active Problem List    Diagnosis Date Noted    Acute leukemia not having achieved remission 05/15/2020    Vitamin D deficiency 05/15/2020    Elevated blood pressure reading without diagnosis of hypertension 05/15/2020    JAYLIN on CPAP 04/08/2020    Major depressive disorder with single episode, in partial remission 01/10/2020    Insomnia due to medical condition 01/10/2020    Atrioventricular block, complete 11/27/2019    Seizure disorder 10/22/2019    Third degree heart block 10/22/2019    Chronic neck pain 10/22/2019    Non-seasonal allergic rhinitis due to pollen 10/22/2019    Hypothyroidism due to Hashimoto's thyroiditis 07/01/2019    AV block, Mobitz 1 06/21/2018    Headache 06/20/2018    Digital mucous cyst of finger of right hand 06/12/2018    Chronic myeloid leukemia, BCR/ABL-positive, not having achieved remission 05/23/2018    Leukemia not having achieved remission 05/08/2018    Anemia, chronic disease 05/07/2018    Leucocytosis 05/06/2018    Other headache syndrome 05/06/2018    Acute diverticulitis 11/07/2017    Acute lower GI bleeding 11/06/2017    Leukocytosis 11/06/2017    Atrial flutter 05/08/2017    Angioedema 04/11/2017    Urticaria 04/09/2017    Hashimoto encephalopathy 08/03/2016    GI bleeding 08/26/2013    Syncope 02/22/2013    Nuclear sclerosis 07/23/2012    Posterior vitreous detachment 07/23/2012    Essential hypertension 06/01/2012    Depression 06/01/2012    Coronary artery disease due to calcified coronary lesion 05/11/2012    Hypercholesteremia 05/11/2012    Paroxysmal atrial fibrillation 05/11/2012    Back pain 05/11/2012    Status  post coronary artery stent placement 05/11/2012          Plan:       Adry Owen  was seen today for follow-up and may need lab work.    Diagnoses and all orders for this visit:    Diagnoses and all orders for this visit:    Elevated blood pressure reading without diagnosis of hypertension  On no specific meds- Monitor for now and I will discuss w/ Dr. Ahuja if SBP is consisitently > 140.     Vitamin D deficiency  -     ergocalciferol (ERGOCALCIFEROL) 50,000 unit Cap; Take 1 capsule (50,000 Units total) by mouth every 7 days.    Seborrheic dermatitis  -     ketoconazole (NIZORAL) 2 % shampoo; Apply topically twice a week.    Episodic tension-type headache, not intractable  Ok to take 1/2 Hydrocodone when it occurs            Answers for HPI/ROS submitted by the patient on 5/15/2020   Hypertension  Chronicity: recurrent  Onset: more than 1 year ago  Progression since onset: gradually worsening  Condition status: resistant  anxiety: No  blurred vision: Yes  malaise/fatigue: Yes  orthopnea: No  peripheral edema: No  PND: Yes  sweats: Yes  Agents associated with hypertension: thyroid hormones  CAD risks: dyslipidemia, family history  Compliance problems: no compliance problems  Past treatments: calcium channel blockers, diuretics

## 2020-05-29 ENCOUNTER — TELEPHONE (OUTPATIENT)
Dept: PHARMACY | Facility: CLINIC | Age: 75
End: 2020-05-29

## 2020-06-05 NOTE — TELEPHONE ENCOUNTER
"Tasigna follow-up:  Patient reached for follow-up of ongoing Tasigna.  Ms. Rider denies any major issues or side effects at this time.  She has declined the last two follow-ups, but was willing to provide some information today.  She has had some issues with fluctuating BP lately, but is attempting to get fully set up with [employer] DM program for monitoring.  At the time of the call, the following was discussed:    Dosing, how taking Tasigna: Take 2 - 150mg tablets by mouth twice daily.  Taking on empty stomach.  Missed one dose - out of routine/got distracted. Patient declined to review adherence strategies.  Storage: Keeping in the boxes at room temperature.  Handling: Using gloves to administer and aware of appropriate handling.  Side effects: HA - due to BP being high - it is better today.  No mention of rash, N/V, diarrhea, etc.  Patient is followed closely by cardiology due to afib.    Recent infections: No recent infections, fevers, visits to the urgent care/ER. No unusual bleeding. Did have one bruise that she noticed yesterday, but does not recall bumping into anything.  Advised to monitor.  Pain: Denies any pain today other than mild HA.  Appetite: Ok - about two meals/day.  Denies any weight loss - continues to fluctuate within about 5 pounds.  Stays well hydrated.  Energy, fatigue: A little tired today, but went blueberry picking yesterday and feels that exhausted her.  Denies any issues completing ADLs or other daily tasks.  Health, mood, QOL: Feels "ok" other than headache, but declined to rate.  Checks BP daily and sometimes 2x/day if running high.  She is having issues getting readings uploaded to her chart via [facility] program.  Provided number to  for her to follow up.  Next clinical follow up: 3 months.  Medication list reviewed. No new allergies or health conditions. Medication list not reviewed in detail but patient confirmed no medication changes.    Labs reviewed from: " 1/22/2020:  WBC, ANC, platelets all normal.  CMP unremarkable.  Last EKG from 1/22/2020 reviewed - QTc - 437 msec.  Patient due for BmBx, but was cancelled with COVID precautions.  Will see provider in June to reassess.  Therapy remains appropriate at this time.

## 2020-06-18 ENCOUNTER — PATIENT OUTREACH (OUTPATIENT)
Dept: OTHER | Facility: OTHER | Age: 75
End: 2020-06-18

## 2020-06-18 NOTE — LETTER
June 19, 2020     Adry Owen  [id number] [address] [address]       Dear Adry,    [facility] Our goal is to make care effective, proactive and convenient by using data you send us from home to better treat your chronic conditions.              My name is Donaldo Rees, and I am your dedicated Digital Medicine clinician. As an expert in medication management, I will help ensure that the medications you are taking continue to provide the intended benefits and help you reach your goals. You can reach me directly at [phone] or by sending me a message directly through your [facility] account.      I am Stefanie Fermin and I will be your health . My job is to help you identify lifestyle changes to improve your disease control. We will talk about nutrition, exercise, and other ways you may be able to adjust your current habits to better your health. Additionally, we will help ensure you are completing the tests and screenings that are necessary to help manage your conditions. You can reach me directly at [phone] or by sending me a message directly through your [facility] account.    Most importantly, YOU are at the center of this team. Together, we will work to improve your overall health and encourage you to meet your goals for a healthier lifestyle.     What we expect from YOU:  · Please take frequent home blood pressure measurements. We ask that you take at least 1 blood pressure reading per week, but more information will better help us get you know you. Be sure you rest for a few minutes before taking the reading in a quiet, comfortable place.     Be available to receive phone calls or [employer] messages, when appropriate, from your care team. Please let us know if there are any specific days or times that work best for us to reach you via phone.     Complete routine tests and screenings. Dont worry, we will help keep you on track!           What you should expect from  your Digital Medicine Care Team:   We will work with you to create a personalized plan of care and provide you with encouragement and education, including regarding lifestyle changes, that could help you manage your disease states.     We will adjust your current medications, if needed, and continue to monitor your long-term progress.     We will provide you and your physician with monthly progress reports after you have been in the program for more than 30 days.     We will send you reminders through [employer] and text messages to help ensure you do not miss any testing deadlines to help manage your disease states.    You will be able to reach us by phone or through your [employer] account by clicking our names under Care Team on the right side of the home screen.    I look forward to working with you to achieve your blood pressure goals!    We look forward to working with you to help manage your health,    Sincerely,    Your Digital Medicine Team    Please visit our websites to learn more:   · Hypertension: [email or web address]      Remember, we are not available for emergencies. If you have an emergency, please contact your doctors office directly or call [facility] on[facility]call ([phone] or [phone]) or 911.

## 2020-06-19 NOTE — PROGRESS NOTES
Digital Medicine: Health  Introduction    Introduced Adry Owen to Digital Medicine. Discussed health  role and recommended lifestyle modifications.    Patient reports that she is on chemo right now for leukemia.     The history is provided by the patient. No  was used.     HYPERTENSION  Our goal is to get BP to consistently below 130/80mmHg and make the process convenient so patient can avoid extra trips to the office. Getting your blood pressure below 130/80mmHg (definition of control) will reduce your risk for heart attack, kidney failure, stroke and death (as well as kidney failure, eye disease, & dementia)      Reviewed that the Digital Medicine care team - consisting of a clinician and a health  - will follow the most current evidence-based national guidelines for treating your condition.  The health  will focus on lifestyle modifications and motivation while the clinician will focus on medication therapy.  The care team will review all data on a regular basis and reach out as needed.      Explained that one of the key parts of the program is communication with the care team.  Asked patient to respond to outreach attempts and complete questionnaires.  Stressed importance of medication adherence.    Reviewed non-pharmacologic therapies and impact on BP.      Explained that we expect patient to obtain several blood pressures per week at random times of day.  Instructed patient not to allow anyone else to use phone and monitoring device.  Confirmed appropriate BP monitoring technique.      Explained to patient that the digital medicine team is not available for emergencies.  Patient will call [facility] on-call ([phone] or [phone]) or [phone] if needed.      Patient's BP goal is 130/80.Patient's BP average is 137/81 mmHg, which is above goal, per 2017 ACC/AHA Hypertension Guidelines.          Last 5 Patient Entered Readings                                       Current 30 Day Average: 137/81     Recent Readings 6/17/2020 6/16/2020 6/9/2020 6/7/2020 5/28/2020    SBP (mmHg) 146 120 151 132 118    DBP (mmHg) 70 80 82 82 75    Pulse 59 80 65 93 66            INTERVENTION(S)  reviewed appropriate dose schedule, recommended diet modifications, recommend physical activity, reviewed monitoring technique and encouragement/support    PLAN  patient verbalizes understanding and continue monitoring    Will f/u in 4 weeks, sooner if concerns.       There are no preventive care reminders to display for this patient.    Reviewed the importance of self-monitoring, medication adherence, and that the health  can be used as a resource for lifestyle modifications to help reduce or maintain a healthy lifestyle.    Sent link to [facility] webpages and my contact information via [employer] for future questions. Follow up scheduled.             Diet Screening       Encouraged to decrease sodium intake to <2,000 mg per day and recommend DASH diet. Patient reports that she doesn't use salt and doesn't eat processed foods.     Physical Activity Screening       Encouraged to increase exercise to at least 150 minutes per week.        SDOH

## 2020-06-26 ENCOUNTER — TELEPHONE (OUTPATIENT)
Dept: PHARMACY | Facility: CLINIC | Age: 75
End: 2020-06-26

## 2020-06-26 NOTE — TELEPHONE ENCOUNTER
Contacted patient to see if she was ready to refill Tasigna 150 mg Capsule #112/28, she takes 2 capsules QAM & 2 capsules QPM, she has 1 week on hand. She took her morning dose.   Ship 06/30 for 07/01 delivery.  Verified address.  Copay $0.00 in 004.  Patient has not started any new medications including OTC drugs. Patient has not had any medication/ dose or instruction changes. No new allergies or side effects reported with this shipment. Medication is being taken as prescribed by physician and properly stored.  Declined [facility] . -PONCE

## 2020-07-06 NOTE — TELEPHONE ENCOUNTER
----- Message from Esme Mitchell sent at 7/6/2020  1:22 PM CDT -----  Regarding: RX refIll  Type:  RX Refill Request    Who Called:  HERNANDEZ BEJARANO [[id number]]  Refill or New Rx:  New RX  RX Name and Strength:  nitroGLYCERIN (NITROSTAT) 0.4 MG SL tablet  How is the patient currently taking it? (ex. 1XDay): 1 tab a day as needed  Is this a 30 day or 90 day RX:  90 day  Preferred Pharmacy with phone number:  [facility] at [phone]  Local or Mail Order:  Local  Ordering Provider:  Dr. Nunes  Best Call Back Number:  [phone]  Additional Information:  Patient would like to be notified once the rx has been sent. This is the first time that Dr. Nunes has filled this medication.

## 2020-07-07 NOTE — PROGRESS NOTES
Refill Routing Note   Medication(s) are not appropriate for processing by [facility]:       - Medication not previously prescribed by PCP        Medication Therapy Plan: Last prescribed(9/13/13) by David Jordan MD; Not previously prescribed by you; FOVS; Defer to you  Medication reconciliation completed: No      Automatic Epic Generated Protocol Data:    Requested Prescriptions   Pending Prescriptions Disp Refills    nitroGLYCERIN (NITROSTAT) 0.4 MG SL tablet 25 tablet 2     Sig: Place 1 tablet (0.4 mg total) under the tongue every 5 (five) minutes as needed.       Cardiovascular: Nitrates - nitroglycerin Passed - 7/6/2020  1:28 PM        Passed - Patient is at least 18 years old        Passed - Last BP in normal range within 360 days.     BP Readings from Last 3 Encounters:   02/27/20 120/77   01/22/20 124/62   01/10/20 130/68              Passed - Last Heart Rate in normal range within 360 days.     Pulse Readings from Last 3 Encounters:   02/27/20 88   01/22/20 60   01/10/20 60             Passed - Office visit in past 12 months or future 90 days.     Recent Outpatient Visits            1 month ago Essential hypertension    [facility] Ross Nunes MD    2 months ago Chronic neck pain    [facility] Ross Nunes MD    4 months ago Chronic myeloid leukemia, BCR/ABL-positive, not having achieved remission    [facility] Dominick Marvin DO    5 months ago PAF (paroxysmal atrial fibrillation)    [facility] - Cardiology Xavier Ahuja III, MD    5 months ago Insomnia due to medical condition    [facility] Ross Nunes MD          Future Appointments              Tomorrow LAB, [facility] - Laboratory, [facility]    In 1 week Ross Nunes MD [facility]    In 1 week Dominick Marvin DO [facility], [facility]    In 2 weeks Xavier Ahuja III, MD  [facility] - Cardiology, [facility]    In 6 months PACEMAKER, [facility] - Cardiology Procedures, [facility]                      Appointments  past 12m or future 3m with PCP    Date Provider   Last Visit   5/15/2020 Ross Nunes MD   Next Visit   7/14/2020 Ross Nunes MD   ED visits in past 90 days: 0     Note composed:2:54 PM 07/07/2020

## 2020-07-08 ENCOUNTER — LAB VISIT (OUTPATIENT)
Dept: LAB | Facility: HOSPITAL | Age: 75
End: 2020-07-08
Attending: INTERNAL MEDICINE
Payer: MEDICARE

## 2020-07-08 DIAGNOSIS — C92.10 CHRONIC MYELOID LEUKEMIA, BCR/ABL-POSITIVE, NOT HAVING ACHIEVED REMISSION: ICD-10-CM

## 2020-07-08 LAB
ALBUMIN SERPL BCP-MCNC: 4.4 G/DL (ref 3.5–5.2)
ALP SERPL-CCNC: 52 U/L (ref 38–145)
ALT SERPL W/O P-5'-P-CCNC: 30 U/L (ref 0–35)
ANION GAP SERPL CALC-SCNC: 6 MMOL/L (ref 8–16)
AST SERPL-CCNC: 40 U/L (ref 14–36)
BASOPHILS # BLD AUTO: 0.05 K/UL (ref 0–0.2)
BASOPHILS NFR BLD: 0.7 % (ref 0–1.9)
BILIRUB SERPL-MCNC: 1.1 MG/DL (ref 0.2–1.3)
BUN SERPL-MCNC: 13 MG/DL (ref 7–18)
CALCIUM SERPL-MCNC: 10 MG/DL (ref 8.4–10.2)
CHLORIDE SERPL-SCNC: 104 MMOL/L (ref 95–110)
CO2 SERPL-SCNC: 31 MMOL/L (ref 22–31)
CREAT SERPL-MCNC: 0.71 MG/DL (ref 0.5–1.4)
DIFFERENTIAL METHOD: ABNORMAL
EOSINOPHIL # BLD AUTO: 0.1 K/UL (ref 0–0.5)
EOSINOPHIL NFR BLD: 1.3 % (ref 0–8)
ERYTHROCYTE [DISTWIDTH] IN BLOOD BY AUTOMATED COUNT: 14.4 % (ref 11.5–14.5)
EST. GFR  (AFRICAN AMERICAN): >60 ML/MIN/1.73 M^2
EST. GFR  (NON AFRICAN AMERICAN): >60 ML/MIN/1.73 M^2
GLUCOSE SERPL-MCNC: 112 MG/DL (ref 70–110)
HCT VFR BLD AUTO: 42 % (ref 37–48.5)
HGB BLD-MCNC: 13.1 G/DL (ref 12–16)
IMM GRANULOCYTES # BLD AUTO: 0.02 K/UL (ref 0–0.04)
IMM GRANULOCYTES NFR BLD AUTO: 0.3 % (ref 0–0.5)
LYMPHOCYTES # BLD AUTO: 2.3 K/UL (ref 1–4.8)
LYMPHOCYTES NFR BLD: 32.1 % (ref 18–48)
MCH RBC QN AUTO: 27.8 PG (ref 27–31)
MCHC RBC AUTO-ENTMCNC: 31.2 G/DL (ref 32–36)
MCV RBC AUTO: 89 FL (ref 82–98)
MONOCYTES # BLD AUTO: 0.6 K/UL (ref 0.3–1)
MONOCYTES NFR BLD: 8.4 % (ref 4–15)
NEUTROPHILS # BLD AUTO: 4.1 K/UL (ref 1.8–7.7)
NEUTROPHILS NFR BLD: 57.2 % (ref 38–73)
NRBC BLD-RTO: 0 /100 WBC
PLATELET # BLD AUTO: 200 K/UL (ref 150–350)
PMV BLD AUTO: 11 FL (ref 9.2–12.9)
POTASSIUM SERPL-SCNC: 3.9 MMOL/L (ref 3.5–5.1)
PROT SERPL-MCNC: 7.9 G/DL (ref 6–8.4)
RBC # BLD AUTO: 4.72 M/UL (ref 4–5.4)
SODIUM SERPL-SCNC: 141 MMOL/L (ref 136–145)
WBC # BLD AUTO: 7.11 K/UL (ref 3.9–12.7)

## 2020-07-08 PROCEDURE — 80053 COMPREHEN METABOLIC PANEL: CPT

## 2020-07-08 PROCEDURE — 81206 BCR/ABL1 GENE MAJOR BP: CPT | Mod: HCNC

## 2020-07-08 PROCEDURE — 36415 COLL VENOUS BLD VENIPUNCTURE: CPT | Mod: HCNC,PN

## 2020-07-08 PROCEDURE — 81207 BCR/ABL1 GENE MINOR BP: CPT | Mod: HCNC

## 2020-07-08 PROCEDURE — 85025 COMPLETE CBC W/AUTO DIFF WBC: CPT | Mod: PN

## 2020-07-08 PROCEDURE — 85025 COMPLETE CBC W/AUTO DIFF WBC: CPT

## 2020-07-08 PROCEDURE — 80053 COMPREHEN METABOLIC PANEL: CPT | Mod: PN

## 2020-07-08 RX ORDER — NITROGLYCERIN 0.4 MG/1
0.4 TABLET SUBLINGUAL EVERY 5 MIN PRN
Qty: 25 TABLET | Refills: 2 | Status: SHIPPED | OUTPATIENT
Start: 2020-07-08 | End: 2021-10-28 | Stop reason: SDUPTHER

## 2020-07-13 ENCOUNTER — NURSE TRIAGE (OUTPATIENT)
Dept: ADMINISTRATIVE | Facility: CLINIC | Age: 75
End: 2020-07-13

## 2020-07-13 LAB
BCR/ABL RESULT, P190, QUANT, BLD: NORMAL
BCR/ABL,P210 RESULT: NORMAL
PATH REPORT.FINAL DX SPEC: NORMAL
PATH REPORT.FINAL DX SPEC: NORMAL
SPECIMEN TYPE, P190, QUANT, BLD: NORMAL
SPECIMEN TYPE: NORMAL

## 2020-07-14 ENCOUNTER — OFFICE VISIT (OUTPATIENT)
Dept: FAMILY MEDICINE | Facility: CLINIC | Age: 75
End: 2020-07-14
Payer: MEDICARE

## 2020-07-14 DIAGNOSIS — M54.2 CHRONIC NECK PAIN: ICD-10-CM

## 2020-07-14 DIAGNOSIS — G89.29 CHRONIC NECK PAIN: ICD-10-CM

## 2020-07-14 DIAGNOSIS — E06.3 HYPOTHYROIDISM DUE TO HASHIMOTO'S THYROIDITIS: ICD-10-CM

## 2020-07-14 DIAGNOSIS — G47.01 INSOMNIA DUE TO MEDICAL CONDITION: ICD-10-CM

## 2020-07-14 DIAGNOSIS — E03.8 HYPOTHYROIDISM DUE TO HASHIMOTO'S THYROIDITIS: ICD-10-CM

## 2020-07-14 DIAGNOSIS — R31.0 GROSS HEMATURIA: Primary | ICD-10-CM

## 2020-07-14 PROCEDURE — 1159F MED LIST DOCD IN RCRD: CPT | Mod: 95,,, | Performed by: INTERNAL MEDICINE

## 2020-07-14 PROCEDURE — 99214 OFFICE O/P EST MOD 30 MIN: CPT | Mod: 95,,, | Performed by: INTERNAL MEDICINE

## 2020-07-14 PROCEDURE — 1101F PR PT FALLS ASSESS DOC 0-1 FALLS W/OUT INJ PAST YR: ICD-10-PCS | Mod: CPTII,95,, | Performed by: INTERNAL MEDICINE

## 2020-07-14 PROCEDURE — 1101F PT FALLS ASSESS-DOCD LE1/YR: CPT | Mod: CPTII,95,, | Performed by: INTERNAL MEDICINE

## 2020-07-14 PROCEDURE — 1159F PR MEDICATION LIST DOCUMENTED IN MEDICAL RECORD: ICD-10-PCS | Mod: 95,,, | Performed by: INTERNAL MEDICINE

## 2020-07-14 PROCEDURE — 99214 PR OFFICE/OUTPT VISIT, EST, LEVL IV, 30-39 MIN: ICD-10-PCS | Mod: 95,,, | Performed by: INTERNAL MEDICINE

## 2020-07-14 PROCEDURE — 99499 UNLISTED E&M SERVICE: CPT | Mod: 95,,, | Performed by: INTERNAL MEDICINE

## 2020-07-14 PROCEDURE — 99499 RISK ADDL DX/OHS AUDIT: ICD-10-PCS | Mod: 95,,, | Performed by: INTERNAL MEDICINE

## 2020-07-14 RX ORDER — LEVOTHYROXINE SODIUM 75 UG/1
75 TABLET ORAL DAILY
Qty: 90 TABLET | Refills: 3 | Status: SHIPPED | OUTPATIENT
Start: 2020-07-14 | End: 2022-09-29 | Stop reason: SDUPTHER

## 2020-07-14 RX ORDER — FENTANYL 25 UG/1
1 PATCH TRANSDERMAL
Qty: 15 PATCH | Refills: 0 | Status: SHIPPED | OUTPATIENT
Start: 2020-08-14 | End: 2020-10-06 | Stop reason: SDUPTHER

## 2020-07-14 RX ORDER — ZOLPIDEM TARTRATE 12.5 MG/1
12.5 TABLET, FILM COATED, EXTENDED RELEASE ORAL NIGHTLY
Qty: 30 TABLET | Refills: 5 | OUTPATIENT
Start: 2020-07-14

## 2020-07-14 RX ORDER — FENTANYL 25 UG/1
1 PATCH TRANSDERMAL
Qty: 15 PATCH | Refills: 0 | Status: SHIPPED | OUTPATIENT
Start: 2020-09-14 | End: 2020-10-06 | Stop reason: SDUPTHER

## 2020-07-14 RX ORDER — FENTANYL 25 UG/1
1 PATCH TRANSDERMAL
Qty: 15 PATCH | Refills: 0 | Status: SHIPPED | OUTPATIENT
Start: 2020-07-14 | End: 2020-10-06 | Stop reason: SDUPTHER

## 2020-07-14 RX ORDER — ZOLPIDEM TARTRATE 12.5 MG/1
12.5 TABLET, FILM COATED, EXTENDED RELEASE ORAL NIGHTLY
Qty: 30 TABLET | Refills: 5 | Status: SHIPPED | OUTPATIENT
Start: 2020-07-14 | End: 2020-12-23 | Stop reason: SDUPTHER

## 2020-07-14 NOTE — TELEPHONE ENCOUNTER
Spoke with Dr Soriano, report given per vaginal bleeding, on XARELTO, MR feliciano, he reviewed chart with H and H,  Discussed options for patient, will advise patient per his advise either go in to ED to get checked, or watch during the night, advised to call 911, if any heavy bleeding, SOB or weakness occurs, Will call patient and let her know.    Patient advised re report given to Dr. Soriano, his advice re options of watching bleeding overnight or going to the ED to get it checked tonight, advised re 911 precautions, heavy bleeding, SOB or weakness, patient and I discussed, no further blood noted on ryan pad, will monitor now, lives alone, I will call patient back at 10:15, advised to call back or any change in status or concerns, verb understanding,

## 2020-07-14 NOTE — TELEPHONE ENCOUNTER
Called back, states she is doing much better, bleeding has diminished, last voided at 9:45, states there was no blood noted, again instructed to call [facility] on Call back for any change in status or questions or concerns, verb understanding,

## 2020-07-14 NOTE — PROGRESS NOTES
Patient ID: Adry Owen     Chief Complaint: Hematuria    The patient location is: [place]   The chief complaint leading to consultation is: Hematuria     Visit type: audiovisual    Face to Face time with patient: 15 minutes   15 minutes of total time spent on the encounter, which includes face to face time and non-face to face time preparing to see the patient (eg, review of tests), Obtaining and/or reviewing separately obtained history, Documenting clinical information in the electronic or other health record, Independently interpreting results (not separately reported) and communicating results to the patient/family/caregiver, or Care coordination (not separately reported).         Each patient to whom he or she provides medical services by telemedicine is:  (1) informed of the relationship between the physician and patient and the respective role of any other health care provider with respect to management of the patient; and (2) notified that he or she may decline to receive medical services by telemedicine and may withdraw from such care at any time.    HPI: Complains of hematuria x 1 yesterday. It was reported to on call MD that it was vaginal bleeding, but upon further review, it's gross hematuria that has lessened. I advised her to continuie Xarelto since it's improving and I want to check a urinalysis and urine culture. She denies any fevers, chills, nausea, vomiting, dysuria.     Review of Systems   Constitutional: Negative.    HENT: Negative.    Eyes: Negative.    Respiratory: Negative.    Cardiovascular: Negative.    Gastrointestinal: Negative.    Endocrine: Negative.    Genitourinary: Positive for hematuria.   Musculoskeletal: Negative.    Skin: Negative.    Allergic/Immunologic: Negative.    Neurological: Negative.    Hematological: Negative.    Psychiatric/Behavioral: Negative.           Objective:      Physical Exam   Physical Exam  Constitutional:       Appearance: Normal appearance.   HENT:       Head: Normocephalic and atraumatic.      Mouth/Throat:      Mouth: Mucous membranes are moist.   Eyes:      Extraocular Movements: Extraocular movements intact.      Conjunctiva/sclera: Conjunctivae normal.   Neck:      Musculoskeletal: Normal range of motion.   Pulmonary:      Effort: Pulmonary effort is normal.   Musculoskeletal: Normal range of motion.   Skin:     General: Skin is dry.   Neurological:      General: No focal deficit present.      Mental Status: She is alert and oriented to person, place, and time.   Psychiatric:         Mood and Affect: Mood normal.         Behavior: Behavior normal.         Thought Content: Thought content normal.         Judgment: Judgment normal.       Current Outpatient Medications:     acetaminophen (TYLENOL) 500 MG tablet, Take 1,000 mg by mouth every 6 (six) hours as needed for Pain or Temperature greater than., Disp: , Rfl:     alendronate (FOSAMAX) 70 MG tablet, Take 1 tablet (70 mg total) by mouth every 7 days., Disp: 12 tablet, Rfl: 3    ASPIRIN LOW DOSE 81 mg EC tablet, Take 81 mg by mouth once daily. , Disp: , Rfl:     atorvastatin (LIPITOR) 40 MG tablet, Take 1 tablet (40 mg total) by mouth once daily., Disp: 90 tablet, Rfl: 3    cycloSPORINE (RESTASIS) 0.05 % ophthalmic emulsion, Place 0.4 mLs (1 drop total) into both eyes 2 (two) times daily., Disp: 1 vial, Rfl: 5    ergocalciferol (ERGOCALCIFEROL) 50,000 unit Cap, Take 1 capsule (50,000 Units total) by mouth every 7 days., Disp: 24 capsule, Rfl: 3    fentaNYL (DURAGESIC) 25 mcg/hr, Place 1 patch onto the skin every 48 hours., Disp: 15 patch, Rfl: 0    fentaNYL (DURAGESIC) 25 mcg/hr, Place 1 patch onto the skin every 48 hours., Disp: 15 patch, Rfl: 0    fentaNYL (DURAGESIC) 25 mcg/hr, Place 1 patch onto the skin every 48 hours., Disp: 15 patch, Rfl: 0    fish oil-omega-3 fatty acids 300-1,000 mg capsule, Take 1 capsule by mouth once daily., Disp: , Rfl:     fluticasone propionate (FLONASE) 50  mcg/actuation nasal spray, USE 1 SPRAY IN EACH NOSTRIL EVERY DAY, Disp: 32 g, Rfl: 3    furosemide (LASIX) 20 MG tablet, TAKE 1 TABLET EVERY DAY, Disp: 90 tablet, Rfl: 3    gabapentin (NEURONTIN) 300 MG capsule, Take 4 capsules (1,200 mg total) by mouth every evening. Takes all 4 capsules @ bedtime, Disp: 360 capsule, Rfl: 3    hydrocodone-acetaminophen 10-325mg (NORCO)  mg Tab, Take 1 tablet by mouth every 8 (eight) hours as needed. , Disp: , Rfl:     ipratropium-albuteroL (COMBIVENT)  mcg/actuation inhaler, Inhale 1 puff into the lungs every 6 (six) hours as needed for Wheezing or Shortness of Breath. Rescue, Disp: 3 Package, Rfl: 3    ketoconazole (NIZORAL) 2 % shampoo, Apply topically twice a week., Disp: 120 mL, Rfl: 0    ketorolac (TORADOL) 10 mg tablet, Take 1 tablet (10 mg total) by mouth every 6 (six) hours as needed for Pain., Disp: 30 tablet, Rfl: 0    levocetirizine (XYZAL) 5 MG tablet, Take 1 tablet (5 mg total) by mouth every evening., Disp: 90 tablet, Rfl: 3    levothyroxine (SYNTHROID) 75 MCG tablet, Take 75 mcg by mouth once daily., Disp: , Rfl:     MISCELLANEOUS MEDICAL SUPPLY (COMPRESSION STOCKINGS MISC), , Disp: , Rfl:     naproxen (NAPROSYN) 500 MG tablet, Take 1 tablet (500 mg total) by mouth 2 (two) times daily with meals., Disp: 60 tablet, Rfl: 1    nilotinib (TASIGNA) 150 mg capsule, Take 2 capsules (300 mg total) by mouth 2 (two) times daily., Disp: 112 capsule, Rfl: 6    nitroGLYCERIN (NITROSTAT) 0.4 MG SL tablet, Place 1 tablet (0.4 mg total) under the tongue every 5 (five) minutes as needed., Disp: 25 tablet, Rfl: 2    ondansetron (ZOFRAN) 4 MG tablet, Take 1 tablet (4 mg total) by mouth every 6 (six) hours., Disp: 9 tablet, Rfl: 0    potassium chloride SA (K-DUR,KLOR-CON) 20 MEQ tablet, TAKE 1 TABLET EVERY DAY, Disp: 90 tablet, Rfl: 3    XARELTO 20 mg Tab, TAKE 1 TABLET DAILY WITH DINNER OR EVENING MEAL, Disp: 90 tablet, Rfl: 3    zolpidem (AMBIEN CR) 12.5 MG  CR tablet, TAKE 1 TABLET (12.5 MG TOTAL) BY MOUTH EVERY EVENING., Disp: 30 tablet, Rfl: 5         Vitals: There were no vitals filed for this visit.   Assessment:       Patient Active Problem List    Diagnosis Date Noted    Acute leukemia not having achieved remission 05/15/2020    Vitamin D deficiency 05/15/2020    Elevated blood pressure reading without diagnosis of hypertension 05/15/2020    JAYLIN on CPAP 04/08/2020    Major depressive disorder with single episode, in partial remission 01/10/2020    Insomnia due to medical condition 01/10/2020    Atrioventricular block, complete 11/27/2019    Seizure disorder 10/22/2019    Third degree heart block 10/22/2019    Chronic neck pain 10/22/2019    Non-seasonal allergic rhinitis due to pollen 10/22/2019    Hypothyroidism due to Hashimoto's thyroiditis 07/01/2019    AV block, Mobitz 1 06/21/2018    Headache 06/20/2018    Digital mucous cyst of finger of right hand 06/12/2018    Chronic myeloid leukemia, BCR/ABL-positive, not having achieved remission 05/23/2018    Leukemia not having achieved remission 05/08/2018    Anemia, chronic disease 05/07/2018    Leucocytosis 05/06/2018    Other headache syndrome 05/06/2018    Acute diverticulitis 11/07/2017    Acute lower GI bleeding 11/06/2017    Leukocytosis 11/06/2017    Atrial flutter 05/08/2017    Angioedema 04/11/2017    Urticaria 04/09/2017    Hashimoto encephalopathy 08/03/2016    GI bleeding 08/26/2013    Syncope 02/22/2013    Nuclear sclerosis 07/23/2012    Posterior vitreous detachment 07/23/2012    Essential hypertension 06/01/2012    Depression 06/01/2012    Coronary artery disease due to calcified coronary lesion 05/11/2012    Hypercholesteremia 05/11/2012    Paroxysmal atrial fibrillation 05/11/2012    Back pain 05/11/2012    Status post coronary artery stent placement 05/11/2012          Plan:       Adry Owen  was seen today for follow-up and may need lab  work.    Diagnoses and all orders for this visit:    Diagnoses and all orders for this visit:    Gross hematuria  -     Urinalysis; Future  -     Urine culture; Future

## 2020-07-14 NOTE — TELEPHONE ENCOUNTER
Reason for Disposition   Taking Coumadin (warfarin) or other strong blood thinner, or known bleeding disorder (e.g., thrombocytopenia)    Additional Information   Negative: Shock suspected (e.g., cold/pale/clammy skin, too weak to stand, low BP, rapid pulse)   Negative: Difficult to awaken or acting confused  (e.g., disoriented, slurred speech)   Negative: Passed out (i.e., lost consciousness, collapsed and was not responding)   Negative: Sounds like a life-threatening emergency to the triager   Negative: Followed a genital area injury   Negative: [1] Vaginal discharge is main symptom AND [2] small amount of blood   Negative: SEVERE abdominal pain   Negative: SEVERE dizziness (e.g., unable to stand, requires support to walk, feels like passing out now)   Negative: Sexual assault or rape   Negative: SEVERE vaginal bleeding (i.e., soaking 2 pads or tampons per hour and present 2 or more hours)   Negative: Patient sounds very sick or weak to the triager   Negative: MODERATE vaginal bleeding (i.e., soaking 1 pad or tampon per hour and present > 6 hours)   Negative: Pale skin (pallor) of new onset or worsening   Negative: [1] Constant abdominal pain AND [2] present > 2 hours    Protocols used: ST VAGINAL BLEEDING - [id number]

## 2020-07-14 NOTE — TELEPHONE ENCOUNTER
Thinks her leukemia test is negative, takes Xarelto, noticed bright red blood on tissue when she urinated,  needed to put ryan pad, bright red blood noted on tp, size of 50 cent piece, about 15 mins ago,  laurel size spot of blood noted on pad, very anxious regarding chance of infection by going to ED, asked to go back to the restroom and void and check to see if she if is  bleeding any more, urinating now, less than a size of a laurel, looking with a mirror, sees more blood, advised patient I needed to call her doctor, blood on pad is like a pencil callie

## 2020-07-14 NOTE — PROGRESS NOTES
On call note -  spoke with registered nurse on call about patient. Apparently patient with history of leukemia and currently on Xarelto complains of vaginal bleeding. Duration one day. Denies any shortness of breath, presyncopal symptoms. She reported she is only spotting. No prior similar episode noted.    Assessment - vaginal bleeding, currently on anticoagulant     Plan-recommended patient seek out care tonight for evaluati on to prevent symptomatic anemia from vaginal bleeding. If patient first, recommended patient continue monitoring her bleeding. Emergency room precautions given. Recent hemoglobin within normal limits.

## 2020-07-15 ENCOUNTER — LAB VISIT (OUTPATIENT)
Dept: LAB | Facility: HOSPITAL | Age: 75
End: 2020-07-15
Attending: INTERNAL MEDICINE
Payer: MEDICARE

## 2020-07-15 ENCOUNTER — TELEPHONE (OUTPATIENT)
Dept: FAMILY MEDICINE | Facility: CLINIC | Age: 75
End: 2020-07-15

## 2020-07-15 DIAGNOSIS — R31.0 GROSS HEMATURIA: ICD-10-CM

## 2020-07-15 LAB
BACTERIA #/AREA URNS HPF: ABNORMAL /HPF
BILIRUB UR QL STRIP: NEGATIVE
CLARITY UR: CLEAR
COLOR UR: YELLOW
GLUCOSE UR QL STRIP: NEGATIVE
HGB UR QL STRIP: ABNORMAL
HYALINE CASTS #/AREA URNS LPF: 2 /LPF (ref 0–1)
KETONES UR QL STRIP: NEGATIVE
LEUKOCYTE ESTERASE UR QL STRIP: ABNORMAL
NITRITE UR QL STRIP: NEGATIVE
PH UR STRIP: 5 [PH] (ref 5–8)
PROT UR QL STRIP: NEGATIVE
RBC #/AREA URNS HPF: 5 /HPF (ref 0–4)
SP GR UR STRIP: 1.02 (ref 1–1.03)
SQUAMOUS #/AREA URNS HPF: 3 /HPF
URN SPEC COLLECT METH UR: ABNORMAL
UROBILINOGEN UR STRIP-ACNC: 1 EU/DL
WBC #/AREA URNS HPF: 7 /HPF (ref 0–5)

## 2020-07-15 PROCEDURE — 87086 URINE CULTURE/COLONY COUNT: CPT

## 2020-07-15 PROCEDURE — 81001 URINALYSIS AUTO W/SCOPE: CPT

## 2020-07-15 PROCEDURE — 87077 CULTURE AEROBIC IDENTIFY: CPT | Mod: PN

## 2020-07-15 PROCEDURE — 87088 URINE BACTERIA CULTURE: CPT

## 2020-07-15 PROCEDURE — 87088 URINE BACTERIA CULTURE: CPT | Mod: PN

## 2020-07-15 PROCEDURE — 87086 URINE CULTURE/COLONY COUNT: CPT | Mod: PN

## 2020-07-15 PROCEDURE — 87077 CULTURE AEROBIC IDENTIFY: CPT

## 2020-07-15 PROCEDURE — 81001 URINALYSIS AUTO W/SCOPE: CPT | Mod: PN

## 2020-07-15 PROCEDURE — 87186 SC STD MICRODIL/AGAR DIL: CPT

## 2020-07-15 PROCEDURE — 87186 SC STD MICRODIL/AGAR DIL: CPT | Mod: PN

## 2020-07-15 NOTE — TELEPHONE ENCOUNTER
----- Message from Pili  sent at 7/15/2020 10:05 AM CDT -----  Regarding: refill  Contact: pt  Type: Needs Medical Advice    Who Called:      Best Call Back Number:     Additional Information: Requesting a call back from Nurse, regarding pharmacy still have not received a refill request for Rx zolpidem (AMBIEN CR) 12.5 MG CR tablet 30 tablet be called back in ,please call pt back

## 2020-07-15 NOTE — TELEPHONE ENCOUNTER
----- Message from Emili Mock sent at 7/14/2020  5:59 PM CDT -----  Type:  Patient Returning Call    Who Called:pt   Who Left Message for Patient:pt  Does the patient know what this is regarding?: pt need a call back   Would the patient rather a call back or a response via [employer]?  call   Best Call Back Number:[phone]  Additional Information:  call back

## 2020-07-16 ENCOUNTER — PATIENT MESSAGE (OUTPATIENT)
Dept: FAMILY MEDICINE | Facility: CLINIC | Age: 75
End: 2020-07-16

## 2020-07-16 DIAGNOSIS — N30.01 ACUTE CYSTITIS WITH HEMATURIA: Primary | ICD-10-CM

## 2020-07-16 RX ORDER — CIPROFLOXACIN 500 MG/1
500 TABLET ORAL 2 TIMES DAILY
Qty: 14 TABLET | Refills: 0 | Status: SHIPPED | OUTPATIENT
Start: 2020-07-16 | End: 2020-07-23

## 2020-07-17 ENCOUNTER — PATIENT OUTREACH (OUTPATIENT)
Dept: OTHER | Facility: OTHER | Age: 75
End: 2020-07-17

## 2020-07-17 LAB — BACTERIA UR CULT: ABNORMAL

## 2020-07-17 NOTE — TELEPHONE ENCOUNTER
Your urinalysis indicates that you have a Urinary tract infection. I'll send in Cipro and Follow up the urine culture.

## 2020-07-22 ENCOUNTER — TELEPHONE (OUTPATIENT)
Dept: FAMILY MEDICINE | Facility: CLINIC | Age: 75
End: 2020-07-22

## 2020-07-22 DIAGNOSIS — N39.0 URINARY TRACT INFECTION WITHOUT HEMATURIA, SITE UNSPECIFIED: Primary | ICD-10-CM

## 2020-07-22 NOTE — TELEPHONE ENCOUNTER
Spoke with patient and explained to her that no urine test would be done after antibiotics unless she was having issues. Patient then states she didn't have any problems other then blood in urine this time. Told patient if she has any problems or concerns after antibiotics to call office.     She then wants to know if it is alright to take her Lasix and potassium with the cipro.

## 2020-07-23 ENCOUNTER — TELEPHONE (OUTPATIENT)
Dept: FAMILY MEDICINE | Facility: CLINIC | Age: 75
End: 2020-07-23

## 2020-07-23 NOTE — TELEPHONE ENCOUNTER
Spoke with patient and told her that it was ok to take lasix and potassium with her cipro per Dr. Nunes.

## 2020-07-24 ENCOUNTER — TELEPHONE (OUTPATIENT)
Dept: PHARMACY | Facility: CLINIC | Age: 75
End: 2020-07-24

## 2020-07-27 ENCOUNTER — PATIENT OUTREACH (OUTPATIENT)
Dept: OTHER | Facility: OTHER | Age: 75
End: 2020-07-27

## 2020-07-27 DIAGNOSIS — I10 ESSENTIAL HYPERTENSION: Primary | ICD-10-CM

## 2020-07-27 NOTE — PROGRESS NOTES
Digital Medicine: Clinician Introduction    Adry Owen is a 75 y.o. female who is newly enrolled in the [facility].    Pt reports doing well without major concern.  Recent increase in Afib episodes is an issue that is affecting her.    The history is provided by the patient.      Review of patient's allergies indicates:   -- Alcohol prep pads (alcohol swabs) -- Hives and Rash   -- Diazolidinyl urea -- Anaphylaxis   -- Influenza virus vaccines -- Hives and Swelling   -- Iodine -- Anaphylaxis    --  Other reaction(s): Unknown   -- Preservative -- Anaphylaxis    --  Many different preservatives per pt report   -- Thimerosal -- Anaphylaxis   -- Cephalosporins -- Rash   -- Lisinopril -- Palpitations   -- Penicillins -- Rash    --  Other reaction(s): Unknown   -- Sotalol -- Palpitations  Completed Medication Reconciliation  Verified pharmacy information.    HYPERTENSION    Explained non-pharmacologic therapies like low salt diet and physical activity can reduce blood pressure.       Explained that we expect patient to submit several blood pressure readings per week at random times of the day, but at least 30 minutes after taking blood pressure medications. Instructed patient not to allow anyone else to use their blood pressure monitor and phone as data submitted is directly entered into medical record. Reviewed and confirmed appropriate blood pressure monitoring technique.         Reviewed signs/symptoms of hypotension (lightheaded, dizziness, weakness)     Patient's BP goal is less than or equal to 130/80. Patients BP average is 127/86 mmHg, which is above goal, per 2017 ACC/AHA Hypertension Guidelines.     -Repots Afib is severely limiting her activity levels.      Last 5 Patient Entered Readings                                      Current 30 Day Average: 127/86     Recent Readings 7/22/2020 7/18/2020 7/16/2020 7/16/2020 7/9/2020    SBP (mmHg) 148 121 136 148 114    DBP (mmHg) 89 83 94 97 80    Pulse  105 97 113 104 92              Depression Screening  Adry Owen screened negative on the depression screening.     Sleep Apnea Screening  Patient previously diagnosed with JAYLIN     She reports she is currently using CPAP JAYLIN is managed effectively with CPAP use.      Medication Affordability Screening  Patient did not answer the medication affordability questionnaires.     Medication Adherence-Medication adherence was assessed.  Patient continue taking medication as prescribed.          Does acknowledge having to go through a lot of trouble to get the timing of some medications downs, specifically Xarelto and Tasigna.      ASSESSMENT(S)  Patients BP average is 127/86 mmHg, which is above goal. Patient's BP goal is less than or equal to 130/80 per 2017 ACC/AHA Hypertension Guidelines.       PLAN  Continue current therapy: Defer changes until more readings.  Await MD intervention: Will message Dr. Ahuja about being responsible provider.    Patient verbalizes understanding. Patient did not express questions or concerns and patient has contact information if needed.    Explained the importance of self-monitoring and medication adherence. Encouraged the patient to communicate with their health  for lifestyle modifications to help improve or maintain a healthy lifestyle.      Sent link to [facility] webpages and my contact information via [employer] for future questions.      Explained to the patient that the Digital Medicine team is not available for emergencies. Advised patient call [facility] ([phone] or [phone]) or [phone] if needed.         There are no preventive care reminders to display for this patient.    Current Medication Regimen:  Hypertension Medications             furosemide (LASIX) 20 MG tablet TAKE 1 TABLET EVERY DAY    nitroGLYCERIN (NITROSTAT) 0.4 MG SL tablet Place 1 tablet (0.4 mg total) under the tongue every 5 (five) minutes as needed.

## 2020-07-31 PROCEDURE — 99457 PR MONITORING, PHYSIOL PARAM, REMOTE, 1ST 20 MINS, PER MONTH: ICD-10-PCS | Mod: S$GLB,,, | Performed by: INTERNAL MEDICINE

## 2020-07-31 PROCEDURE — 99457 RPM TX MGMT 1ST 20 MIN: CPT | Mod: S$GLB,,, | Performed by: INTERNAL MEDICINE

## 2020-08-21 ENCOUNTER — TELEPHONE (OUTPATIENT)
Dept: PHARMACY | Facility: CLINIC | Age: 75
End: 2020-08-21
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
## 2020-08-27 NOTE — TELEPHONE ENCOUNTER
Call attempt #2: Left a voice message for follow up consultation for nilotinib. Will follow up.

## 2020-08-27 NOTE — TELEPHONE ENCOUNTER
Call attempt #1: Left a voice message for follow up consultation for nilotinib. Will follow up.

## 2020-09-03 ENCOUNTER — TELEPHONE (OUTPATIENT)
Dept: PHARMACY | Facility: CLINIC | Age: 75
End: 2020-09-03

## 2020-09-03 NOTE — TELEPHONE ENCOUNTER
"Tasigna follow-up:  Patient returned call.  Requested calls ONLY in late afternoon moving forward as she has difficulty sleeping and was "interrupted" several times over the past few weeks.  Ms. Owen reports "everything is fine" and repeated nothing has changed.  It was difficult to get any information from her about her Tasigna, but was able to confirm that most of her issues and concerns are related to spinal pain and cardiology issues.  She reports tolerating Tasigna well.  I was able to assess information below at the time of the call.    Dosing, how taking Tasigna: 2 - 150mg twice daily.  Taking on empty stomach.    Storage: Room temperature.  Handling: Uses gloves.  Side effects: No new side effects.  Patient assumed to still have headaches, but did not wish to discuss at the time.  Declined to review list of potential side effects.  Recent infections: None evident by patient chart.  Did not wish to discuss any infections or bleeding.  Did report staying home and abides by current recommendations regarding COVID.    Pain: Has significant back pain even while on fentanyl patch.  States "There is nothing else anyone can do about it after two surgeries, multiple procedures and medications. Its something I have to deal with."  Unable to assess on pain scale.  Discussed in June she reported "no pain".  Patient clarified stating "I'm always in pain, but when I say I'm having NO pain, its just I can deal with it that day".  Appetite: Eating well, drinking fluids daily.  Energy, fatigue: Unable to assess, but seems limited in mobility.  Health, mood, QOL: BP - checking daily.  She is currently enrolled in [facility].  BPs ranging from 114/80 mmHg to 148/89 mmHg. Sees cardiology regularly and patient states "he doesn't want to do anything different right now".  She has had a pacemaker for almost a year now.  She weighs self daily to monitor fluid accumulation. Compliant with cardiology and PCP " visits.  Has good support system, but lives alone.  Grand-daughter is an EMT and she relies on her  medical advice.   ED/UC visits: None  Next clinical follow up: 3 months.  Patient declined to review full medication list.  Did inquire if she could take simethicone as needed for gas.  Advised this is fine and does not interfere with Tasigna.    Labs reviewed from: 7/8/2020 - CBC w/diff unremarkable. AST slightly elevated.  All other liver parameters stable.  Renal function normal.  Therapy remains appropriate.  She hasn't been seen by provider since February due to COVID concerns - currently scheduled to see Dr. Marvin in November    Will attempt a more in-depth follow-up in 3 months.  Patient is aware to call [facility] for any questions or concerns.  Reviewed on-call services with patient again should questions arise after hours.  She often consults [facility] if new medications are started.  No other questions or concerns.

## 2020-09-08 ENCOUNTER — PATIENT OUTREACH (OUTPATIENT)
Dept: OTHER | Facility: OTHER | Age: 75
End: 2020-09-08

## 2020-09-08 NOTE — PROGRESS NOTES
Digital Medicine: Clinician Follow-Up    Patient reports doing well without concern.  Dealing with some insurances issues that has her frustrated lately.      The history is provided by the patient.   Follow-up reason(s): routine follow up.     Hypertension    Readings are trending down   Patient is not experiencing signs/symptoms of hypotension.          Last 5 Patient Entered Readings                                      Current 30 Day Average: 122/78     Recent Readings 9/2/2020 8/29/2020 8/28/2020 8/27/2020 8/25/2020    SBP (mmHg) 105 113 121 104 121    DBP (mmHg) 71 71 76 64 86    Pulse 95 89 75 101 95             Screenings    ASSESSMENT(S)  Patients BP average is 122/78 mmHg, which is at goal. Patient's BP goal is less than or equal to 130/80 per 2017 ACC/AHA Hypertension Guidelines.      Hypertension Plan  Continue current therapy. At goal, not on antihypetensives.  Not indicated.       Addressed any questions or concerns and patient has my contact information if needed prior to next outreach. Patient verbalizes understanding.            There are no preventive care reminders to display for this patient.      Hypertension Medications             furosemide (LASIX) 20 MG tablet TAKE 1 TABLET EVERY DAY    nitroGLYCERIN (NITROSTAT) 0.4 MG SL tablet Place 1 tablet (0.4 mg total) under the tongue every 5 (five) minutes as needed.

## 2020-09-14 ENCOUNTER — OFFICE VISIT (OUTPATIENT)
Dept: FAMILY MEDICINE | Facility: CLINIC | Age: 75
End: 2020-09-14
Payer: MEDICARE

## 2020-09-14 ENCOUNTER — TELEPHONE (OUTPATIENT)
Dept: FAMILY MEDICINE | Facility: CLINIC | Age: 75
End: 2020-09-14

## 2020-09-14 ENCOUNTER — LAB VISIT (OUTPATIENT)
Dept: LAB | Facility: HOSPITAL | Age: 75
End: 2020-09-14
Attending: INTERNAL MEDICINE
Payer: MEDICARE

## 2020-09-14 ENCOUNTER — TELEPHONE (OUTPATIENT)
Dept: INFUSION THERAPY | Facility: HOSPITAL | Age: 75
End: 2020-09-14

## 2020-09-14 DIAGNOSIS — R31.9 HEMATURIA, UNSPECIFIED TYPE: ICD-10-CM

## 2020-09-14 DIAGNOSIS — R31.9 HEMATURIA, UNSPECIFIED TYPE: Primary | ICD-10-CM

## 2020-09-14 DIAGNOSIS — N39.0 URINARY TRACT INFECTION WITHOUT HEMATURIA, SITE UNSPECIFIED: Primary | ICD-10-CM

## 2020-09-14 LAB
BACTERIA #/AREA URNS HPF: ABNORMAL /HPF
BILIRUB UR QL STRIP: ABNORMAL
CLARITY UR: ABNORMAL
COLOR UR: ABNORMAL
GLUCOSE UR QL STRIP: ABNORMAL
HGB UR QL STRIP: ABNORMAL
HYALINE CASTS #/AREA URNS LPF: 0 /LPF
KETONES UR QL STRIP: ABNORMAL
LEUKOCYTE ESTERASE UR QL STRIP: ABNORMAL
MICROSCOPIC COMMENT: ABNORMAL
NITRITE UR QL STRIP: POSITIVE
PH UR STRIP: 7 [PH] (ref 5–8)
PROT UR QL STRIP: ABNORMAL
RBC #/AREA URNS HPF: ABNORMAL /HPF (ref 0–4)
SP GR UR STRIP: >=1.03 (ref 1–1.03)
URN SPEC COLLECT METH UR: ABNORMAL
WBC #/AREA URNS HPF: 2 /HPF (ref 0–5)

## 2020-09-14 PROCEDURE — 81000 URINALYSIS NONAUTO W/SCOPE: CPT | Mod: HCNC,PO

## 2020-09-14 PROCEDURE — 87186 SC STD MICRODIL/AGAR DIL: CPT | Mod: HCNC

## 2020-09-14 PROCEDURE — 99213 PR OFFICE/OUTPT VISIT, EST, LEVL III, 20-29 MIN: ICD-10-PCS | Mod: 95,,, | Performed by: NURSE PRACTITIONER

## 2020-09-14 PROCEDURE — 87088 URINE BACTERIA CULTURE: CPT | Mod: HCNC

## 2020-09-14 PROCEDURE — 87086 URINE CULTURE/COLONY COUNT: CPT | Mod: HCNC

## 2020-09-14 PROCEDURE — 87077 CULTURE AEROBIC IDENTIFY: CPT | Mod: HCNC

## 2020-09-14 PROCEDURE — 1101F PT FALLS ASSESS-DOCD LE1/YR: CPT | Mod: CPTII,95,, | Performed by: NURSE PRACTITIONER

## 2020-09-14 PROCEDURE — 99213 OFFICE O/P EST LOW 20 MIN: CPT | Mod: 95,,, | Performed by: NURSE PRACTITIONER

## 2020-09-14 PROCEDURE — 1159F PR MEDICATION LIST DOCUMENTED IN MEDICAL RECORD: ICD-10-PCS | Mod: 95,,, | Performed by: NURSE PRACTITIONER

## 2020-09-14 PROCEDURE — 1159F MED LIST DOCD IN RCRD: CPT | Mod: 95,,, | Performed by: NURSE PRACTITIONER

## 2020-09-14 PROCEDURE — 1101F PR PT FALLS ASSESS DOC 0-1 FALLS W/OUT INJ PAST YR: ICD-10-PCS | Mod: CPTII,95,, | Performed by: NURSE PRACTITIONER

## 2020-09-14 RX ORDER — DOXYCYCLINE 100 MG/1
100 CAPSULE ORAL 2 TIMES DAILY
Qty: 20 CAPSULE | Refills: 0 | Status: SHIPPED | OUTPATIENT
Start: 2020-09-14 | End: 2022-03-24 | Stop reason: ALTCHOICE

## 2020-09-14 NOTE — PROGRESS NOTES
Answers for HPI/ROS submitted by the patient on 9/14/2020   Dysuria  Chronicity: recurrent  Onset: gradual onset  Pain quality: aching  Pain - numeric: 3/10  Sexually active?: No  History of pyelonephritis?: No  frequency: Yes  hematuria: Yes  hesitancy: No  nausea: No  possible pregnancy: No  sweats: No  vomiting: No  constipation: No  rash: No  weight loss: No  withholding: No  behavior changes: No  Treatments tried: home medications  Improvement on treatment: moderate  Pain severity: mild  hypertension: Yes  recurrent UTIs: Yes

## 2020-09-14 NOTE — PROGRESS NOTES
Subjective:       Patient ID: Adry Owen is a 75 y.o. female.    Chief Complaint: No chief complaint on file.    The patient location is: [place], [place]  The chief complaint leading to consultation is: hematuria    Visit type: audiovisual    Face to Face time with patient: 15  15 minutes of total time spent on the encounter, which includes face to face time and non-face to face time preparing to see the patient (eg, review of tests), Obtaining and/or reviewing separately obtained history, Documenting clinical information in the electronic or other health record, Independently interpreting results (not separately reported) and communicating results to the patient/family/caregiver, or Care coordination (not separately reported).         Each patient to whom he or she provides medical services by telemedicine is:  (1) informed of the relationship between the physician and patient and the respective role of any other health care provider with respect to management of the patient; and (2) notified that he or she may decline to receive medical services by telemedicine and may withdraw from such care at any time.    Notes:         Patient was treated with Cipro for UTI with gross hematuria in July. She says she does not feel like it completely cleared. Started to have gross hematuria again in the last 24 hours. Some increased frequency, no other abnormal symptoms, has taken nothing for symptoms, she is on eliquis.      Past Medical History:  No date: *Atrial fibrillation  No date: *Atrial flutter  No date: Anticoagulant long-term use      Comment:  xarelto  No date: Arthritis      Comment:  back and hip pain  No date: Asthma  No date: Atrial fibrillation or flutter  No date: Cancer      Comment:  leukemia CML  No date: Cataract  No date: Chronic neck pain  2004: Coronary artery disease      Comment:  1 stent  No date: Encounter for blood transfusion  No date: Fibromyalgia  No date: Hashimoto's disease  No date: Heart  block  No date: History of diverticulitis  No date: History of GI bleed  No date: Hyperlipidemia  No date: Hypertension  No date: MI (myocardial infarction)  No date: Thyroid disease    Past Surgical History:  No date: ABLATION OF DYSRHYTHMIC FOCUS  No date: APPENDECTOMY  2015: BACK SURGERY      Comment:  cervical fusion  2004: CARDIAC CATHETERIZATION      Comment:  1 stent  05/2018: CARDIOVERSION  2015: CATARACT EXTRACTION W/  INTRAOCULAR LENS IMPLANT; Bilateral      Comment:  now has scarring needs laser sgy  No date: CERVICAL FUSION      Comment:  three surgeries  6/12/2018: EXCISION OF MASS OF HAND; Right      Comment:  Procedure: EXCISION, MASS, HAND/ Long finger with                osteophyte removal;  Surgeon: ORION Mendoza MD;                 Location: [facility];  Service: General;  Laterality:                Right;  No date: HAND TENDON SURGERY; Right      Comment:   x 2 1986 & 1996 2001: HYSTERECTOMY      Comment:  bso  11/27/2019: INSERTION OF PACEMAKER; N/A      Comment:  Procedure: INSERTION, PACEMAKER, DUAL CHAMBER-BIOTRONIK;               Surgeon: Xavier Ahuja III, MD;  Location: [facility] CATH;                 Service: Cardiology;  Laterality: N/A;  No date: loop recorder insertion  11/27/2019: REMOVAL OF IMPLANTABLE LOOP RECORDER; Left      Comment:  Procedure: REMOVAL, IMPLANTABLE LOOP RECORDER;  Surgeon:               Xavier Ahuja III, MD;  Location: [facility] CATH;  Service:                Cardiology;  Laterality: Left;  1992: SHOULDER ARTHROSCOPY W/ ROTATOR CUFF REPAIR; Right  No date: TONSILLECTOMY    Review of patient's family history indicates:  Problem: Cancer      Relation: Mother          Age of Onset: 73          Comment: leukemia  Problem: Hypertension      Relation: Mother          Age of Onset: (Not Specified)  Problem: Glaucoma      Relation: Father          Age of Onset: (Not Specified)  Problem: Heart disease      Relation: Father          Age of Onset: (Not Specified)  Problem:  Cancer      Relation: Father          Age of Onset: 76          Comment: testicular  Problem: Diabetes      Relation: Father          Age of Onset: (Not Specified)  Problem: Heart disease      Relation: Brother          Age of Onset: (Not Specified)  Problem: Amblyopia      Relation: Neg Hx          Age of Onset: (Not Specified)  Problem: Blindness      Relation: Neg Hx          Age of Onset: (Not Specified)  Problem: Cataracts      Relation: Neg Hx          Age of Onset: (Not Specified)  Problem: Macular degeneration      Relation: Neg Hx          Age of Onset: (Not Specified)  Problem: Retinal detachment      Relation: Neg Hx          Age of Onset: (Not Specified)  Problem: Strabismus      Relation: Neg Hx          Age of Onset: (Not Specified)  Problem: Stroke      Relation: Neg Hx          Age of Onset: (Not Specified)  Problem: Thyroid disease      Relation: Neg Hx          Age of Onset: (Not Specified)      Social History    Socioeconomic History      Marital status: Legally       Spouse name: Not on file      Number of children: Not on file      Years of education: Not on file      Highest education level: Not on file    Occupational History      Not on file    Social Needs      Financial resource strain: Somewhat hard      Food insecurity        Worry: Never true        Inability: Never true      Transportation needs        Medical: No        Non-medical: No    Tobacco Use      Smoking status: Never Smoker      Smokeless tobacco: Never Used    Substance and Sexual Activity      Alcohol use: No        Frequency: Never        Binge frequency: Never      Drug use: No      Sexual activity: Not Currently    Lifestyle      Physical activity        Days per week: 3 days        Minutes per session: 130 min      Stress: Not at all    Relationships      Social connections        Talks on phone: Three times a week        Gets together: Patient refused        Attends [employer] service: Never        Active  member of club or organization: Yes        Attends meetings of clubs or organizations: Patient refused        Relationship status:     Other Topics      Concerns:        Not on file    Social History Narrative      Not on file      Current Outpatient Medications:  acetaminophen (TYLENOL) 500 MG tablet, Take 1,000 mg by mouth every 6 (six) hours as needed for Pain or Temperature greater than., Disp: , Rfl:   alendronate (FOSAMAX) 70 MG tablet, Take 1 tablet (70 mg total) by mouth every 7 days., Disp: 12 tablet, Rfl: 3  ASPIRIN LOW DOSE 81 mg EC tablet, Take 81 mg by mouth once daily. , Disp: , Rfl:   atorvastatin (LIPITOR) 40 MG tablet, Take 1 tablet (40 mg total) by mouth once daily., Disp: 90 tablet, Rfl: 3  ciprofloxacin HCl (CIPRO) 500 MG tablet, Take 500 mg by mouth 2 (two) times daily. x7 days, Disp: , Rfl:   cycloSPORINE (RESTASIS) 0.05 % ophthalmic emulsion, Place 0.4 mLs (1 drop total) into both eyes 2 (two) times daily., Disp: 1 vial, Rfl: 5  ergocalciferol (ERGOCALCIFEROL) 50,000 unit Cap, Take 1 capsule (50,000 Units total) by mouth every 7 days., Disp: 24 capsule, Rfl: 3  fentaNYL (DURAGESIC) 25 mcg/hr, Place 1 patch onto the skin every 48 hours., Disp: 15 patch, Rfl: 0  fentaNYL (DURAGESIC) 25 mcg/hr, Place 1 patch onto the skin every 48 hours., Disp: 15 patch, Rfl: 0  fentaNYL (DURAGESIC) 25 mcg/hr, Place 1 patch onto the skin every 48 hours., Disp: 15 patch, Rfl: 0  fish oil-omega-3 fatty acids 300-1,000 mg capsule, Take 1 capsule by mouth once daily., Disp: , Rfl:   fluticasone propionate (FLONASE) 50 mcg/actuation nasal spray, USE 1 SPRAY IN EACH NOSTRIL EVERY DAY, Disp: 32 g, Rfl: 3  furosemide (LASIX) 20 MG tablet, TAKE 1 TABLET EVERY DAY, Disp: 90 tablet, Rfl: 3  gabapentin (NEURONTIN) 300 MG capsule, Take 4 capsules (1,200 mg total) by mouth every evening. Takes all 4 capsules @ bedtime, Disp: 360 capsule, Rfl: 3  hydrocodone-acetaminophen 10-325mg (NORCO)  mg Tab, Take 1 tablet by  mouth every 8 (eight) hours as needed. , Disp: , Rfl:   ipratropium-albuteroL (COMBIVENT)  mcg/actuation inhaler, Inhale 1 puff into the lungs every 6 (six) hours as needed for Wheezing or Shortness of Breath. Rescue, Disp: 3 Package, Rfl: 3  ketoconazole (NIZORAL) 2 % shampoo, Apply topically twice a week., Disp: 120 mL, Rfl: 0  ketorolac (TORADOL) 10 mg tablet, Take 1 tablet (10 mg total) by mouth every 6 (six) hours as needed for Pain., Disp: 30 tablet, Rfl: 0  levocetirizine (XYZAL) 5 MG tablet, Take 1 tablet (5 mg total) by mouth every evening., Disp: 90 tablet, Rfl: 3  levothyroxine (SYNTHROID) 75 MCG tablet, Take 1 tablet (75 mcg total) by mouth once daily., Disp: 90 tablet, Rfl: 3  MISCELLANEOUS MEDICAL SUPPLY (COMPRESSION STOCKINGS MISC), , Disp: , Rfl:   naproxen (NAPROSYN) 500 MG tablet, Take 1 tablet (500 mg total) by mouth 2 (two) times daily with meals., Disp: 60 tablet, Rfl: 1  nilotinib (TASIGNA) 150 mg capsule, Take 2 capsules (300 mg total) by mouth 2 (two) times daily., Disp: 112 capsule, Rfl: 6  nitroGLYCERIN (NITROSTAT) 0.4 MG SL tablet, Place 1 tablet (0.4 mg total) under the tongue every 5 (five) minutes as needed., Disp: 25 tablet, Rfl: 2  ondansetron (ZOFRAN) 4 MG tablet, Take 1 tablet (4 mg total) by mouth every 6 (six) hours., Disp: 9 tablet, Rfl: 0  potassium chloride SA (K-DUR,KLOR-CON) 20 MEQ tablet, TAKE 1 TABLET EVERY DAY, Disp: 90 tablet, Rfl: 3  XARELTO 20 mg Tab, TAKE 1 TABLET DAILY WITH DINNER OR EVENING MEAL, Disp: 90 tablet, Rfl: 3  zolpidem (AMBIEN CR) 12.5 MG CR tablet, Take 1 tablet (12.5 mg total) by mouth every evening., Disp: 30 tablet, Rfl: 5    No current facility-administered medications for this visit.       Review of patient's allergies indicates:   -- Alcohol prep pads (alcohol swabs) -- Hives and Rash   -- Diazolidinyl urea -- Anaphylaxis   -- Influenza virus vaccines -- Hives and Swelling   -- Iodine -- Anaphylaxis    --  Other reaction(s): Unknown   --  Preservative -- Anaphylaxis    --  Many different preservatives per pt report   -- Thimerosal -- Anaphylaxis   -- Cephalosporins -- Rash   -- Lisinopril -- Palpitations   -- Penicillins -- Rash    --  Other reaction(s): Unknown   -- Sotalol -- Palpitations    Dysuria   This is a recurrent problem. The current episode started gradual onset. The quality of the pain is described as aching. The pain is at a severity of 3/10. The pain is mild. She is not sexually active. There is no history of pyelonephritis. Associated symptoms include frequency and hematuria. Pertinent negatives include no behavior changes, chills, hesitancy, nausea, possible pregnancy, sweats, vomiting, weight loss, constipation, rash or withholding. She has tried home medications for the symptoms. The treatment provided moderate relief. Her past medical history is significant for hypertension and recurrent UTIs.     Review of Systems   Constitutional: Negative for chills, fever and weight loss.   Gastrointestinal: Negative for constipation, nausea and vomiting.   Genitourinary: Positive for dysuria, frequency and hematuria. Negative for hesitancy.   Integumentary:  Negative for rash.         Objective:      Physical Exam  Constitutional:       Appearance: Normal appearance.   Pulmonary:      Effort: Pulmonary effort is normal. No respiratory distress.   Abdominal:      Tenderness: There is no abdominal tenderness.   Neurological:      Mental Status: She is alert.   Psychiatric:         Mood and Affect: Mood normal.         Behavior: Behavior normal.         Assessment:       1. Hematuria, unspecified type        Plan:       1. Hematuria, unspecified type  Son will come and bring sample in for her shortly, will send medication based on results of testing, may need referral to urology if not improving or for recurrence.   - Ambulatory referral/consult to Urology; Future  - URINALYSIS; Future  - Urine culture; Future

## 2020-09-14 NOTE — TELEPHONE ENCOUNTER
----- Message from Ale Franz sent at 9/14/2020  7:41 AM CDT -----  Regarding: Urinary bleed  Type:  Needs Medical Advice    Who Called:  patient  Symptoms (please be specific):  urinary bleed    How long has patient had these symptoms:   last night  Pharmacy name and phone #:  n/a  Would the patient rather a call back or a response via [facility]?  Call back  Best Call Back Number:  [phone]  Additional Information:  wants to bring the specimen in to your office today so please call back this morning; she says to rush the call back please

## 2020-09-14 NOTE — TELEPHONE ENCOUNTER
Spoke with patient made lab appointment for Dr lyn she states she needs it on 09/28/20 that will be 3 days off medicine

## 2020-09-16 ENCOUNTER — TELEPHONE (OUTPATIENT)
Dept: FAMILY MEDICINE | Facility: CLINIC | Age: 75
End: 2020-09-16

## 2020-09-16 LAB — BACTERIA UR CULT: ABNORMAL

## 2020-09-16 NOTE — TELEPHONE ENCOUNTER
----- Message from Rossana Blancas sent at 9/16/2020  1:40 PM CDT -----  Contact: self  Type:  Patient Returning Call    Who Called:  Patient   Who Left Message for Patient:  Becki  Does the patient know what this is regarding?:  yes  Best Call Back Number:  [phone]  Additional Information:  requesting to discuss urology referral

## 2020-09-17 ENCOUNTER — OFFICE VISIT (OUTPATIENT)
Dept: FAMILY MEDICINE | Facility: CLINIC | Age: 75
End: 2020-09-17
Payer: MEDICARE

## 2020-09-17 ENCOUNTER — PATIENT MESSAGE (OUTPATIENT)
Dept: FAMILY MEDICINE | Facility: CLINIC | Age: 75
End: 2020-09-17

## 2020-09-17 DIAGNOSIS — N39.0 URINARY TRACT INFECTION WITHOUT HEMATURIA, SITE UNSPECIFIED: Primary | ICD-10-CM

## 2020-09-17 DIAGNOSIS — M54.17 LUMBOSACRAL RADICULOPATHY: ICD-10-CM

## 2020-09-17 PROCEDURE — 99213 PR OFFICE/OUTPT VISIT, EST, LEVL III, 20-29 MIN: ICD-10-PCS | Mod: HCNC,95,, | Performed by: NURSE PRACTITIONER

## 2020-09-17 PROCEDURE — 1101F PR PT FALLS ASSESS DOC 0-1 FALLS W/OUT INJ PAST YR: ICD-10-PCS | Mod: HCNC,CPTII,95, | Performed by: NURSE PRACTITIONER

## 2020-09-17 PROCEDURE — 1159F PR MEDICATION LIST DOCUMENTED IN MEDICAL RECORD: ICD-10-PCS | Mod: HCNC,95,, | Performed by: NURSE PRACTITIONER

## 2020-09-17 PROCEDURE — 1159F MED LIST DOCD IN RCRD: CPT | Mod: HCNC,95,, | Performed by: NURSE PRACTITIONER

## 2020-09-17 PROCEDURE — 1101F PT FALLS ASSESS-DOCD LE1/YR: CPT | Mod: HCNC,CPTII,95, | Performed by: NURSE PRACTITIONER

## 2020-09-17 PROCEDURE — 99213 OFFICE O/P EST LOW 20 MIN: CPT | Mod: HCNC,95,, | Performed by: NURSE PRACTITIONER

## 2020-09-17 NOTE — PROGRESS NOTES
Subjective:       Patient ID: Adry Owen is a 75 y.o. female.    Chief Complaint: No chief complaint on file.    The patient location is: [place], [place]  The chief complaint leading to consultation is: pain    Visit type: audiovisual    Face to Face time with patient: 20  20 minutes of total time spent on the encounter, which includes face to face time and non-face to face time preparing to see the patient (eg, review of tests), Obtaining and/or reviewing separately obtained history, Documenting clinical information in the electronic or other health record, Independently interpreting results (not separately reported) and communicating results to the patient/family/caregiver, or Care coordination (not separately reported).         Each patient to whom he or she provides medical services by telemedicine is:  (1) informed of the relationship between the physician and patient and the respective role of any other health care provider with respect to management of the patient; and (2) notified that he or she may decline to receive medical services by telemedicine and may withdraw from such care at any time.    Notes:     Patient in being treated for a UTI since 9/14/2020 video visit. Culture resulted today, shows bacteria is sensitive to the current antibiotic. Patient has developed pain low in her hip/back. She is concerned it may be related to the infection. No hematuria since starting the antibiotic. The pain occurs when she stands or tries to walk. No pain at rest. Has a fentanyl patch and took an oral pain medication with no relief.    Past Medical History:  No date: *Atrial fibrillation  No date: *Atrial flutter  No date: Anticoagulant long-term use      Comment:  xarelto  No date: Arthritis      Comment:  back and hip pain  No date: Asthma  No date: Atrial fibrillation or flutter  No date: Cancer      Comment:  leukemia CML  No date: Cataract  No date: Chronic neck pain  2004: Coronary artery disease      Comment:   1 stent  No date: Encounter for blood transfusion  No date: Fibromyalgia  No date: Hashimoto's disease  No date: Heart block  No date: History of diverticulitis  No date: History of GI bleed  No date: Hyperlipidemia  No date: Hypertension  No date: MI (myocardial infarction)  No date: Thyroid disease    Past Surgical History:  No date: ABLATION OF DYSRHYTHMIC FOCUS  No date: APPENDECTOMY  2015: BACK SURGERY      Comment:  cervical fusion  2004: CARDIAC CATHETERIZATION      Comment:  1 stent  05/2018: CARDIOVERSION  2015: CATARACT EXTRACTION W/  INTRAOCULAR LENS IMPLANT; Bilateral      Comment:  now has scarring needs laser sgy  No date: CERVICAL FUSION      Comment:  three surgeries  6/12/2018: EXCISION OF MASS OF HAND; Right      Comment:  Procedure: EXCISION, MASS, HAND/ Long finger with                osteophyte removal;  Surgeon: ORION Mendoza MD;                 Location: [facility];  Service: General;  Laterality:                Right;  No date: HAND TENDON SURGERY; Right      Comment:   x 2 1986 & 1996 2001: HYSTERECTOMY      Comment:  bso  11/27/2019: INSERTION OF PACEMAKER; N/A      Comment:  Procedure: INSERTION, PACEMAKER, DUAL CHAMBER-BIOTRONIK;               Surgeon: Xavier Ahuja III, MD;  Location: [facility] CATH;                 Service: Cardiology;  Laterality: N/A;  No date: loop recorder insertion  11/27/2019: REMOVAL OF IMPLANTABLE LOOP RECORDER; Left      Comment:  Procedure: REMOVAL, IMPLANTABLE LOOP RECORDER;  Surgeon:               Xavier Ahuja III, MD;  Location: [facility] CATH;  Service:                Cardiology;  Laterality: Left;  1992: SHOULDER ARTHROSCOPY W/ ROTATOR CUFF REPAIR; Right  No date: TONSILLECTOMY    Review of patient's family history indicates:  Problem: Cancer      Relation: Mother          Age of Onset: 73          Comment: leukemia  Problem: Hypertension      Relation: Mother          Age of Onset: (Not Specified)  Problem: Glaucoma      Relation: Father          Age of  Onset: (Not Specified)  Problem: Heart disease      Relation: Father          Age of Onset: (Not Specified)  Problem: Cancer      Relation: Father          Age of Onset: 76          Comment: testicular  Problem: Diabetes      Relation: Father          Age of Onset: (Not Specified)  Problem: Heart disease      Relation: Brother          Age of Onset: (Not Specified)  Problem: Amblyopia      Relation: Neg Hx          Age of Onset: (Not Specified)  Problem: Blindness      Relation: Neg Hx          Age of Onset: (Not Specified)  Problem: Cataracts      Relation: Neg Hx          Age of Onset: (Not Specified)  Problem: Macular degeneration      Relation: Neg Hx          Age of Onset: (Not Specified)  Problem: Retinal detachment      Relation: Neg Hx          Age of Onset: (Not Specified)  Problem: Strabismus      Relation: Neg Hx          Age of Onset: (Not Specified)  Problem: Stroke      Relation: Neg Hx          Age of Onset: (Not Specified)  Problem: Thyroid disease      Relation: Neg Hx          Age of Onset: (Not Specified)      Social History    Socioeconomic History      Marital status: Legally       Spouse name: Not on file      Number of children: Not on file      Years of education: Not on file      Highest education level: Not on file    Occupational History      Not on file    Social Needs      Financial resource strain: Somewhat hard      Food insecurity        Worry: Never true        Inability: Never true      Transportation needs        Medical: No        Non-medical: No    Tobacco Use      Smoking status: Never Smoker      Smokeless tobacco: Never Used    Substance and Sexual Activity      Alcohol use: No        Frequency: Never        Binge frequency: Never      Drug use: No      Sexual activity: Not Currently    Lifestyle      Physical activity        Days per week: 3 days        Minutes per session: 130 min      Stress: Not at all    Relationships      Social connections        Talks on  phone: Three times a week        Gets together: Patient refused        Attends Restorationism service: Never        Active member of club or organization: Yes        Attends meetings of clubs or organizations: Patient refused        Relationship status:     Other Topics      Concerns:        Not on file    Social History Narrative      Not on file      Current Outpatient Medications:  acetaminophen (TYLENOL) 500 MG tablet, Take 1,000 mg by mouth every 6 (six) hours as needed for Pain or Temperature greater than., Disp: , Rfl:   alendronate (FOSAMAX) 70 MG tablet, Take 1 tablet (70 mg total) by mouth every 7 days., Disp: 12 tablet, Rfl: 3  ASPIRIN LOW DOSE 81 mg EC tablet, Take 81 mg by mouth once daily. , Disp: , Rfl:   atorvastatin (LIPITOR) 40 MG tablet, Take 1 tablet (40 mg total) by mouth once daily., Disp: 90 tablet, Rfl: 3  ciprofloxacin HCl (CIPRO) 500 MG tablet, Take 500 mg by mouth 2 (two) times daily. x7 days, Disp: , Rfl:   cycloSPORINE (RESTASIS) 0.05 % ophthalmic emulsion, Place 0.4 mLs (1 drop total) into both eyes 2 (two) times daily., Disp: 1 vial, Rfl: 5  doxycycline (MONODOX) 100 MG capsule, Take 1 capsule (100 mg total) by mouth 2 (two) times daily., Disp: 20 capsule, Rfl: 0  ergocalciferol (ERGOCALCIFEROL) 50,000 unit Cap, Take 1 capsule (50,000 Units total) by mouth every 7 days., Disp: 24 capsule, Rfl: 3  fentaNYL (DURAGESIC) 25 mcg/hr, Place 1 patch onto the skin every 48 hours., Disp: 15 patch, Rfl: 0  fentaNYL (DURAGESIC) 25 mcg/hr, Place 1 patch onto the skin every 48 hours., Disp: 15 patch, Rfl: 0  fentaNYL (DURAGESIC) 25 mcg/hr, Place 1 patch onto the skin every 48 hours., Disp: 15 patch, Rfl: 0  fish oil-omega-3 fatty acids 300-1,000 mg capsule, Take 1 capsule by mouth once daily., Disp: , Rfl:   fluticasone propionate (FLONASE) 50 mcg/actuation nasal spray, USE 1 SPRAY IN EACH NOSTRIL EVERY DAY, Disp: 32 g, Rfl: 3  furosemide (LASIX) 20 MG tablet, TAKE 1 TABLET EVERY DAY, Disp: 90  tablet, Rfl: 3  gabapentin (NEURONTIN) 300 MG capsule, Take 4 capsules (1,200 mg total) by mouth every evening. Takes all 4 capsules @ bedtime, Disp: 360 capsule, Rfl: 3  hydrocodone-acetaminophen 10-325mg (NORCO)  mg Tab, Take 1 tablet by mouth every 8 (eight) hours as needed. , Disp: , Rfl:   ipratropium-albuteroL (COMBIVENT)  mcg/actuation inhaler, Inhale 1 puff into the lungs every 6 (six) hours as needed for Wheezing or Shortness of Breath. Rescue, Disp: 3 Package, Rfl: 3  ketoconazole (NIZORAL) 2 % shampoo, Apply topically twice a week., Disp: 120 mL, Rfl: 0  ketorolac (TORADOL) 10 mg tablet, Take 1 tablet (10 mg total) by mouth every 6 (six) hours as needed for Pain., Disp: 30 tablet, Rfl: 0  levocetirizine (XYZAL) 5 MG tablet, Take 1 tablet (5 mg total) by mouth every evening., Disp: 90 tablet, Rfl: 3  levothyroxine (SYNTHROID) 75 MCG tablet, Take 1 tablet (75 mcg total) by mouth once daily., Disp: 90 tablet, Rfl: 3  MISCELLANEOUS MEDICAL SUPPLY (COMPRESSION STOCKINGS MISC), , Disp: , Rfl:   naproxen (NAPROSYN) 500 MG tablet, Take 1 tablet (500 mg total) by mouth 2 (two) times daily with meals., Disp: 60 tablet, Rfl: 1  nitroGLYCERIN (NITROSTAT) 0.4 MG SL tablet, Place 1 tablet (0.4 mg total) under the tongue every 5 (five) minutes as needed., Disp: 25 tablet, Rfl: 2  ondansetron (ZOFRAN) 4 MG tablet, Take 1 tablet (4 mg total) by mouth every 6 (six) hours., Disp: 9 tablet, Rfl: 0  potassium chloride SA (K-DUR,KLOR-CON) 20 MEQ tablet, TAKE 1 TABLET EVERY DAY, Disp: 90 tablet, Rfl: 3  XARELTO 20 mg Tab, TAKE 1 TABLET DAILY WITH DINNER OR EVENING MEAL, Disp: 90 tablet, Rfl: 3  zolpidem (AMBIEN CR) 12.5 MG CR tablet, Take 1 tablet (12.5 mg total) by mouth every evening., Disp: 30 tablet, Rfl: 5    No current facility-administered medications for this visit.       Review of patient's allergies indicates:   -- Alcohol prep pads (alcohol swabs) -- Hives and Rash   -- Diazolidinyl urea -- Anaphylaxis    -- Influenza virus vaccines -- Hives and Swelling   -- Iodine -- Anaphylaxis    --  Other reaction(s): Unknown   -- Preservative -- Anaphylaxis    --  Many different preservatives per pt report   -- Thimerosal -- Anaphylaxis   -- Cephalosporins -- Rash   -- Lisinopril -- Palpitations   -- Penicillins -- Rash    --  Other reaction(s): Unknown   -- Sotalol -- Palpitations    Back Pain  This is a new problem. The current episode started yesterday. The problem occurs constantly. The problem has been gradually worsening since onset. The pain is present in the sacro-iliac. The quality of the pain is described as aching. The pain radiates to the left thigh. The pain is at a severity of 10/10. The pain is severe. The pain is worse during the day. The symptoms are aggravated by standing. Stiffness is present all day. Associated symptoms include abdominal pain, leg pain and pelvic pain. Pertinent negatives include no bladder incontinence, bowel incontinence, chest pain, dysuria, fever, headaches, numbness, paresis, paresthesias, perianal numbness, tingling, weakness or weight loss. She has tried analgesics for the symptoms. The treatment provided no relief.     Review of Systems   Constitutional: Negative for fever and weight loss.   Cardiovascular: Negative for chest pain.   Gastrointestinal: Positive for abdominal pain. Negative for bowel incontinence.   Genitourinary: Positive for pelvic pain. Negative for bladder incontinence, dysuria and hematuria.   Musculoskeletal: Positive for back pain and leg pain.   Neurological: Negative for tingling, weakness, numbness, headaches and paresthesias.         Objective:      Physical Exam  Pulmonary:      Effort: No respiratory distress.   Musculoskeletal:        Legs:          Assessment:       1. Urinary tract infection without hematuria, site unspecified    2. Lumbosacral radiculopathy        Plan:       1. Urinary tract infection without hematuria, site unspecified  Advised  patient that according to culture she should be on correct antibiotic, she can submit another UA so we can see if infection appears to be clearing. According to her description I think the pain may be related to a musculoskeletal issue, but it is impossible to know without evaluation in person and further testing. Declines in person evaluation at this time.   - URINALYSIS; Future  - Urine culture; Future    2. Lumbosacral radiculopathy  Patient has a MRI of L spine from 2019 which shows unroofing of the disk at l5-s1 with possible contact to the left nerve root as it exits. She declined surgical options at that time due to other ongoing health issues. Says it has not bothered her in quite a while.  Has not seen neurosurgery recently. She says this pain is very similar to previous pain that prompted the MRI. I advised that she follow up with neurosurgery and her pcp ASAP, again offered in person evaluation and to schedule radiology evaluation  and she declined.        Answers for HPI/ROS submitted by the patient on 9/17/2020   Back pain  genital pain: No

## 2020-09-18 ENCOUNTER — LAB VISIT (OUTPATIENT)
Dept: LAB | Facility: HOSPITAL | Age: 75
End: 2020-09-18
Attending: NURSE PRACTITIONER
Payer: MEDICARE

## 2020-09-18 DIAGNOSIS — N39.0 URINARY TRACT INFECTION WITHOUT HEMATURIA, SITE UNSPECIFIED: ICD-10-CM

## 2020-09-18 LAB
BILIRUB UR QL STRIP: NEGATIVE
CLARITY UR: CLEAR
COLOR UR: YELLOW
GLUCOSE UR QL STRIP: NEGATIVE
HGB UR QL STRIP: NEGATIVE
KETONES UR QL STRIP: NEGATIVE
LEUKOCYTE ESTERASE UR QL STRIP: NEGATIVE
NITRITE UR QL STRIP: NEGATIVE
PH UR STRIP: 6 [PH] (ref 5–8)
PROT UR QL STRIP: NEGATIVE
SP GR UR STRIP: 1.02 (ref 1–1.03)
URN SPEC COLLECT METH UR: NORMAL

## 2020-09-18 PROCEDURE — 81003 URINALYSIS AUTO W/O SCOPE: CPT | Mod: HCNC,PO

## 2020-09-18 PROCEDURE — 87086 URINE CULTURE/COLONY COUNT: CPT | Mod: HCNC

## 2020-09-19 LAB — BACTERIA UR CULT: NO GROWTH

## 2020-09-21 ENCOUNTER — PATIENT OUTREACH (OUTPATIENT)
Dept: OTHER | Facility: OTHER | Age: 75
End: 2020-09-21

## 2020-09-22 ENCOUNTER — TELEPHONE (OUTPATIENT)
Dept: PHARMACY | Facility: CLINIC | Age: 75
End: 2020-09-22

## 2020-09-28 ENCOUNTER — LAB VISIT (OUTPATIENT)
Dept: LAB | Facility: HOSPITAL | Age: 75
End: 2020-09-28
Attending: INTERNAL MEDICINE
Payer: MEDICARE

## 2020-09-28 DIAGNOSIS — N39.0 URINARY TRACT INFECTION WITHOUT HEMATURIA, SITE UNSPECIFIED: ICD-10-CM

## 2020-09-28 PROCEDURE — 87086 URINE CULTURE/COLONY COUNT: CPT

## 2020-09-28 PROCEDURE — 87086 URINE CULTURE/COLONY COUNT: CPT | Mod: PN

## 2020-09-30 LAB — BACTERIA UR CULT: NO GROWTH

## 2020-10-05 NOTE — PROGRESS NOTES
Digital Medicine: Health  Follow-Up    The history is provided by the patient.             Reason for review: Blood pressure not at goal        Topics Covered on Call: physical activity and Diet    Additional Follow-up details: Average blood pressure today is 132/87. Patients past couple of readings have been improving. She is unsure why her readings have been better. She hasn't made any changes but she finally got the hearing over with. That was taking up a lot of her time.           Diet-no change to diet    No change to diet.  Patient reports eating or drinking the following: Still watching sodium intake. She started drinking coffee again.       Physical Activity-Change      Additional physical activity details: She reports that she hasn't gotten very much exercise lately because she was getting reading for the hearing. She is hoping to get back to taking care of her yard and going on more walks now.       Medication Adherence-Medication adherence was assessed.      Substance, Sleep, Stress-Not assessed      Provided patient education.       Addressed patient questions and patient has my contact information if needed prior to next outreach. Patient verbalizes understanding.          There are no preventive care reminders to display for this patient.    Last 5 Patient Entered Readings                                      Current 30 Day Average: 132/87     Recent Readings 9/28/2020 9/19/2020 9/18/2020 9/18/2020 9/16/2020    SBP (mmHg) 127 121 140 150 132    DBP (mmHg) 77 77 86 101 87    Pulse 105 98 103 104 102

## 2020-10-06 ENCOUNTER — OFFICE VISIT (OUTPATIENT)
Dept: FAMILY MEDICINE | Facility: CLINIC | Age: 75
End: 2020-10-06
Payer: MEDICARE

## 2020-10-06 ENCOUNTER — TELEPHONE (OUTPATIENT)
Dept: FAMILY MEDICINE | Facility: CLINIC | Age: 75
End: 2020-10-06

## 2020-10-06 DIAGNOSIS — E06.3 HYPOTHYROIDISM DUE TO HASHIMOTO'S THYROIDITIS: ICD-10-CM

## 2020-10-06 DIAGNOSIS — M54.2 CHRONIC NECK PAIN: Primary | ICD-10-CM

## 2020-10-06 DIAGNOSIS — N39.0 URINARY TRACT INFECTION WITHOUT HEMATURIA, SITE UNSPECIFIED: ICD-10-CM

## 2020-10-06 DIAGNOSIS — J30.1 NON-SEASONAL ALLERGIC RHINITIS DUE TO POLLEN: ICD-10-CM

## 2020-10-06 DIAGNOSIS — N32.89 BLADDER SPASMS: ICD-10-CM

## 2020-10-06 DIAGNOSIS — E03.8 HYPOTHYROIDISM DUE TO HASHIMOTO'S THYROIDITIS: ICD-10-CM

## 2020-10-06 DIAGNOSIS — M54.17 LUMBOSACRAL RADICULOPATHY: ICD-10-CM

## 2020-10-06 DIAGNOSIS — G89.29 CHRONIC NECK PAIN: Primary | ICD-10-CM

## 2020-10-06 PROCEDURE — 1159F PR MEDICATION LIST DOCUMENTED IN MEDICAL RECORD: ICD-10-PCS | Mod: 95,,, | Performed by: INTERNAL MEDICINE

## 2020-10-06 PROCEDURE — 99499 RISK ADDL DX/OHS AUDIT: ICD-10-PCS | Mod: 95,,, | Performed by: INTERNAL MEDICINE

## 2020-10-06 PROCEDURE — 1101F PT FALLS ASSESS-DOCD LE1/YR: CPT | Mod: CPTII,95,, | Performed by: INTERNAL MEDICINE

## 2020-10-06 PROCEDURE — 99214 PR OFFICE/OUTPT VISIT, EST, LEVL IV, 30-39 MIN: ICD-10-PCS | Mod: 95,,, | Performed by: INTERNAL MEDICINE

## 2020-10-06 PROCEDURE — 1101F PR PT FALLS ASSESS DOC 0-1 FALLS W/OUT INJ PAST YR: ICD-10-PCS | Mod: CPTII,95,, | Performed by: INTERNAL MEDICINE

## 2020-10-06 PROCEDURE — 99214 OFFICE O/P EST MOD 30 MIN: CPT | Mod: 95,,, | Performed by: INTERNAL MEDICINE

## 2020-10-06 PROCEDURE — 99499 UNLISTED E&M SERVICE: CPT | Mod: 95,,, | Performed by: INTERNAL MEDICINE

## 2020-10-06 PROCEDURE — 1159F MED LIST DOCD IN RCRD: CPT | Mod: 95,,, | Performed by: INTERNAL MEDICINE

## 2020-10-06 RX ORDER — FENTANYL 25 UG/1
1 PATCH TRANSDERMAL
Qty: 15 PATCH | Refills: 0 | Status: SHIPPED | OUTPATIENT
Start: 2020-12-06 | End: 2021-01-04 | Stop reason: SDUPTHER

## 2020-10-06 RX ORDER — FENTANYL 25 UG/1
1 PATCH TRANSDERMAL
Qty: 15 PATCH | Refills: 0 | Status: SHIPPED | OUTPATIENT
Start: 2020-11-06 | End: 2021-01-04 | Stop reason: SDUPTHER

## 2020-10-06 RX ORDER — LEVOCETIRIZINE DIHYDROCHLORIDE 5 MG/1
5 TABLET, FILM COATED ORAL NIGHTLY
Qty: 90 TABLET | Refills: 3 | Status: SHIPPED | OUTPATIENT
Start: 2020-10-06 | End: 2021-01-04 | Stop reason: SDUPTHER

## 2020-10-06 RX ORDER — FENTANYL 25 UG/1
1 PATCH TRANSDERMAL
Qty: 15 PATCH | Refills: 0 | Status: SHIPPED | OUTPATIENT
Start: 2020-10-06 | End: 2021-01-04 | Stop reason: SDUPTHER

## 2020-10-06 RX ORDER — URINARY ANTISEPTIC ANTISPASMODIC 81.6; 40.8; 10.8; .12 MG/1; MG/1; MG/1; MG/1
1 TABLET ORAL 2 TIMES DAILY PRN
Qty: 180 TABLET | Refills: 3 | Status: SHIPPED | OUTPATIENT
Start: 2020-10-06 | End: 2022-04-18 | Stop reason: CLARIF

## 2020-10-06 NOTE — TELEPHONE ENCOUNTER
----- Message from Jess Arina sent at 10/6/2020  3:54 PM CDT -----  Regardin/25 to late  Contact: patint  Type:  Sooner Apoointment Request  Caller is requesting a sooner appointment.  Caller declined first available appointment listed below.  Caller will not accept being placed on the waitlist and is requesting a message be sent to doctor.  Name of Caller:  patient  When is the first available appointment? 21 virtual apt/medication refill  Symptoms:  f/u apt  Best Call Back Number:[phone]  Additional Information:  Patient states she was told she needed to be seen 21 but no spots were available for scheduling.  Please call to advise and schedule.  Thanks!

## 2020-10-07 ENCOUNTER — PATIENT OUTREACH (OUTPATIENT)
Dept: ADMINISTRATIVE | Facility: HOSPITAL | Age: 75
End: 2020-10-07

## 2020-10-07 ENCOUNTER — PATIENT MESSAGE (OUTPATIENT)
Dept: FAMILY MEDICINE | Facility: CLINIC | Age: 75
End: 2020-10-07

## 2020-10-07 NOTE — TELEPHONE ENCOUNTER
----- Message from Ny Del Rio sent at 10/7/2020  2:28 PM CDT -----  Regarding: pt appt  Contact: pt  Pt called needs later time on 1/4/2021 virtual at a later time     Pt can be reached at [phone]

## 2020-10-07 NOTE — LETTER
AUTHORIZATION FOR RELEASE OF   CONFIDENTIAL INFORMATION    Dear Chas Aldridge Jr., MD,    We are seeing Adry Owen, date of birth 1945, in the clinic at [facility]. Ross Nunes MD is the patient's PCP. Adry Owen has an outstanding lab/procedure at the time we reviewed her chart. In order to help keep her health information updated, she has authorized us to request the following medical record(s):                                                        COLONOSCOPY            Please fax records to [facility], John C Oubre, [place], [phone].   If you have any questions, please contact   Ally Correa, Care Coordinator  [facility]  Phone: [phone]  FAX: [phone]          Patient Name: Adry Owen  : 1945  Patient Phone #: [phone]

## 2020-10-07 NOTE — TELEPHONE ENCOUNTER
----- Message from Jesse Argueta sent at 10/7/2020 11:49 AM CDT -----  Contact: Patient  ATTN: Nurse    The pt called and needs an appt scheduled for the first week in January.  The earliest in the week as possible due to her medication refill    It must be a virtual visit in the afternoon    The pt can be reached at [phone] - She will not be available for a phone call between 12:30 and 1:30 today.

## 2020-10-08 NOTE — TELEPHONE ENCOUNTER
Received an incoming call from Mrs. Owen. She reports that she took 3 doses yesterday rather than taking 2 doses yesterday. She reports that this occurred due to a change in routine. She had an appointment. She reports that she has an alarm on her phone to remind her to take medications. She reports that this has occurred once before and Dr. Marvin recommended she skip the next dose. She denies any of the following: nausea, rash, headache, fatigue, pruritus,  vomiting, diarrhea, cough, constipation, arthralgia, nasopharyngitis, pyrexia, night sweats, cardiac and arterial vascular occlusive events, signs and symptoms of hepatotoxciicty, hemorrhage, and fluid retention. Recommended she call us if any of the previously listed occurs or if she has any questions or concerns. Refill moved up one day.

## 2020-10-16 ENCOUNTER — TELEPHONE (OUTPATIENT)
Dept: FAMILY MEDICINE | Facility: CLINIC | Age: 75
End: 2020-10-16

## 2020-10-16 DIAGNOSIS — N39.0 URINARY TRACT INFECTION WITHOUT HEMATURIA, SITE UNSPECIFIED: Primary | ICD-10-CM

## 2020-10-16 NOTE — TELEPHONE ENCOUNTER
----- Message from Marisa Smith sent at 10/16/2020 10:12 AM CDT -----  Contact: Arcelia  Type: Needs Medical Advice  Who Called:  Kade with [facility]  Symptoms (please be specific):    How long has patient had these symptoms:    Pharmacy name and phone #:  [facility]  [phone]  Best Call Back Number:   Additional Information: called regarding an alternative for patient urogesic blue not covered by patient's insurance

## 2020-10-17 RX ORDER — METHENAMINE HIPPURATE 1000 MG/1
1 TABLET ORAL 2 TIMES DAILY
Qty: 60 TABLET | Refills: 3 | Status: SHIPPED | OUTPATIENT
Start: 2020-10-17 | End: 2022-03-24

## 2020-10-19 ENCOUNTER — TELEPHONE (OUTPATIENT)
Dept: FAMILY MEDICINE | Facility: CLINIC | Age: 75
End: 2020-10-19

## 2020-10-19 NOTE — TELEPHONE ENCOUNTER
Spoke with patient and informed her that the new medication Methenamine was sent in to the [facility] mail order. Patient states verbal understanding.

## 2020-10-26 ENCOUNTER — SPECIALTY PHARMACY (OUTPATIENT)
Dept: PHARMACY | Facility: CLINIC | Age: 75
End: 2020-10-26

## 2020-10-26 DIAGNOSIS — C92.10 CHRONIC MYELOID LEUKEMIA, BCR/ABL-POSITIVE, NOT HAVING ACHIEVED REMISSION: Primary | ICD-10-CM

## 2020-10-26 NOTE — TELEPHONE ENCOUNTER
Specialty Pharmacy - Refill Coordination    Specialty Medication Orders Linked to Encounter      Most Recent Value   Medication #1  nilotinib (TASIGNA) 150 mg capsule (Order#[id number], Rx#[id number])          Refill Questions - Documented Responses      Most Recent Value   Relationship to patient of person spoken to?  Self   HIPAA/medical authority confirmed?  Yes   Any changes in contact preferences or allowed representatives?  No   Has the patient had any insurance changes?  No   Has the patient had any changes to specialty medication, dose, or instructions?  No   Has the patient started taking any new medications, herbals, or supplements?  No   Has the patient been diagnosed with any new medical conditions?  No   Does the patient have any new allergies to medications or foods?  No   Does the patient have any concerns about side effects?  No   Can the patient store medication/sharps container properly (at the correct temperature, away from children/pets, etc.)?  Yes   Can the patient call emergency services (911) in the event of an emergency?  Yes   Does the patient have any concerns or questions about taking or administering this medication as prescribed?  No   How many doses did the patient miss in the past 4 weeks or since the last fill?  0   How many doses does the patient have on hand?  1   How many days does the patient report on hand quantity will last?  1   Does the number of doses/days supply remaining match pharmacy expected amounts?  Yes   How will the patient receive the medication?  Mail   When does the patient need to receive the medication?  10/27/20   Shipping Address  Home   Address in [facility] confirmed and updated if neccessary?  Yes   Expected Copay ($)  0   Is the patient able to afford the medication copay?  Yes   Payment Method  zero copay   Days supply of Refill  28   Do you want to trigger an intervention?  No   Do you want to trigger an additional referral task?  No   Refill  activity completed?  Yes   Refill activity plan  Refill scheduled   Shipment/Pickup Date:  10/26/20          Current Outpatient Medications   Medication Sig    acetaminophen (TYLENOL) 500 MG tablet Take 1,000 mg by mouth every 6 (six) hours as needed for Pain or Temperature greater than.    alendronate (FOSAMAX) 70 MG tablet Take 1 tablet (70 mg total) by mouth every 7 days.    ASPIRIN LOW DOSE 81 mg EC tablet Take 81 mg by mouth once daily.     atorvastatin (LIPITOR) 40 MG tablet Take 1 tablet (40 mg total) by mouth once daily.    ciprofloxacin HCl (CIPRO) 500 MG tablet Take 500 mg by mouth 2 (two) times daily. x7 days    cycloSPORINE (RESTASIS) 0.05 % ophthalmic emulsion Place 0.4 mLs (1 drop total) into both eyes 2 (two) times daily.    doxycycline (MONODOX) 100 MG capsule Take 1 capsule (100 mg total) by mouth 2 (two) times daily.    ergocalciferol (ERGOCALCIFEROL) 50,000 unit Cap Take 1 capsule (50,000 Units total) by mouth every 7 days.    [START ON 12/6/2020] fentaNYL (DURAGESIC) 25 mcg/hr Place 1 patch onto the skin every 48 hours.    [START ON 11/6/2020] fentaNYL (DURAGESIC) 25 mcg/hr Place 1 patch onto the skin every 48 hours.    fentaNYL (DURAGESIC) 25 mcg/hr Place 1 patch onto the skin every 48 hours.    fish oil-omega-3 fatty acids 300-1,000 mg capsule Take 1 capsule by mouth once daily.    fluticasone propionate (FLONASE) 50 mcg/actuation nasal spray USE 1 SPRAY IN EACH NOSTRIL EVERY DAY    furosemide (LASIX) 20 MG tablet TAKE 1 TABLET EVERY DAY    gabapentin (NEURONTIN) 300 MG capsule Take 4 capsules (1,200 mg total) by mouth every evening. Takes all 4 capsules @ bedtime    hydrocodone-acetaminophen 10-325mg (NORCO)  mg Tab Take 1 tablet by mouth every 8 (eight) hours as needed.     ipratropium-albuteroL (COMBIVENT)  mcg/actuation inhaler Inhale 1 puff into the lungs every 6 (six) hours as needed for Wheezing or Shortness of Breath. Rescue    ketoconazole (NIZORAL) 2 %  shampoo Apply topically twice a week.    ketorolac (TORADOL) 10 mg tablet Take 1 tablet (10 mg total) by mouth every 6 (six) hours as needed for Pain.    levocetirizine (XYZAL) 5 MG tablet Take 1 tablet (5 mg total) by mouth every evening.    levothyroxine (SYNTHROID) 75 MCG tablet Take 1 tablet (75 mcg total) by mouth once daily.    methen-sod phos-meth blue-hyos (UROGESIC-BLUE) 81.6-40.8-0.12 mg Tab Take 1 tablet by mouth 2 (two) times daily as needed (bladder spasms).    methenamine (HIPREX) 1 gram Tab Take 1 tablet (1 g total) by mouth 2 (two) times daily.    MISCELLANEOUS MEDICAL SUPPLY (COMPRESSION STOCKINGS MISC)     mupirocin calcium 2% nasl oint (BACTROBAN) 2 % Oint by Nasal route 2 (two) times daily.    naproxen (NAPROSYN) 500 MG tablet Take 1 tablet (500 mg total) by mouth 2 (two) times daily with meals.    nilotinib (TASIGNA) 150 mg capsule Take 2 capsules (300 mg total) by mouth 2 (two) times daily.    nitroGLYCERIN (NITROSTAT) 0.4 MG SL tablet Place 1 tablet (0.4 mg total) under the tongue every 5 (five) minutes as needed.    ondansetron (ZOFRAN) 4 MG tablet Take 1 tablet (4 mg total) by mouth every 6 (six) hours.    potassium chloride SA (K-DUR,KLOR-CON) 20 MEQ tablet TAKE 1 TABLET EVERY DAY    XARELTO 20 mg Tab TAKE 1 TABLET DAILY WITH DINNER OR EVENING MEAL    zolpidem (AMBIEN CR) 12.5 MG CR tablet Take 1 tablet (12.5 mg total) by mouth every evening.   Last reviewed on 10/6/2020  3:55 PM by Ross Nunes MD    Review of patient's allergies indicates:   Allergen Reactions    Alcohol prep pads [alcohol swabs] Hives and Rash    Diazolidinyl urea Anaphylaxis    Influenza virus vaccines Hives and Swelling    Iodine Anaphylaxis     Other reaction(s): Unknown    Preservative Anaphylaxis     Many different preservatives per pt report    Thimerosal Anaphylaxis    Cephalosporins Rash    Lisinopril Palpitations    Penicillins Rash     Other reaction(s): Unknown    Sotalol Palpitations     Last reviewed on  10/26/2020 1:46 PM by Hasmukh Lott    [facility]. Patient confirmed they are in need of refill. Will ship out 10/26 to receive 10/27 with patient consent. Copay $0 in 004.  Patient has 2 doses remaining. Patient has not started any new medications, no missed doses, and no side effects. Patient taking medication as prescribed, no changes in direction.  & address verified.    Tasks added this encounter   2020 - Refill Call (Auto Added)   Tasks due within next 3 months   2020 - Clinical - Follow Up Assesement (90 day)  10/26/2020 - Clinical - Initial Assessment     Hasmukh Lott PharmD  [facility]  [address] [place] [id number]  Phone: [phone]  Fax: [phone]

## 2020-11-03 ENCOUNTER — LAB VISIT (OUTPATIENT)
Dept: LAB | Facility: HOSPITAL | Age: 75
End: 2020-11-03
Attending: INTERNAL MEDICINE
Payer: MEDICARE

## 2020-11-03 DIAGNOSIS — E06.3 CHRONIC LYMPHOCYTIC THYROIDITIS: Primary | ICD-10-CM

## 2020-11-03 DIAGNOSIS — E03.9 MYXEDEMA HEART DISEASE: ICD-10-CM

## 2020-11-03 DIAGNOSIS — L40.3 SAPHO SYNDROME: ICD-10-CM

## 2020-11-03 DIAGNOSIS — R73.01 IMPAIRED FASTING GLUCOSE: ICD-10-CM

## 2020-11-03 DIAGNOSIS — L70.9 SAPHO SYNDROME: ICD-10-CM

## 2020-11-03 DIAGNOSIS — M85.80 SAPHO SYNDROME: ICD-10-CM

## 2020-11-03 DIAGNOSIS — M86.9 SAPHO SYNDROME: ICD-10-CM

## 2020-11-03 DIAGNOSIS — C92.10 CHRONIC MYELOID LEUKEMIA, BCR/ABL-POSITIVE, NOT HAVING ACHIEVED REMISSION: ICD-10-CM

## 2020-11-03 DIAGNOSIS — M65.9 SAPHO SYNDROME: ICD-10-CM

## 2020-11-03 DIAGNOSIS — I51.9 MYXEDEMA HEART DISEASE: ICD-10-CM

## 2020-11-03 LAB
ALBUMIN SERPL BCP-MCNC: 4.3 G/DL (ref 3.5–5.2)
ALP SERPL-CCNC: 47 U/L (ref 38–145)
ALT SERPL W/O P-5'-P-CCNC: 32 U/L (ref 0–35)
ANION GAP SERPL CALC-SCNC: 7 MMOL/L (ref 8–16)
AST SERPL-CCNC: 47 U/L (ref 14–36)
BASOPHILS # BLD AUTO: 0.04 K/UL (ref 0–0.2)
BASOPHILS NFR BLD: 0.8 % (ref 0–1.9)
BILIRUB SERPL-MCNC: 1.4 MG/DL (ref 0.2–1.3)
BUN SERPL-MCNC: 11 MG/DL (ref 7–18)
CALCIUM SERPL-MCNC: 9.4 MG/DL (ref 8.4–10.2)
CHLORIDE SERPL-SCNC: 105 MMOL/L (ref 95–110)
CO2 SERPL-SCNC: 29 MMOL/L (ref 22–31)
CREAT SERPL-MCNC: 0.74 MG/DL (ref 0.5–1.4)
DIFFERENTIAL METHOD: ABNORMAL
EOSINOPHIL # BLD AUTO: 0.1 K/UL (ref 0–0.5)
EOSINOPHIL NFR BLD: 1.7 % (ref 0–8)
ERYTHROCYTE [DISTWIDTH] IN BLOOD BY AUTOMATED COUNT: 14.5 % (ref 11.5–14.5)
EST. GFR  (AFRICAN AMERICAN): >60 ML/MIN/1.73 M^2
EST. GFR  (NON AFRICAN AMERICAN): >60 ML/MIN/1.73 M^2
GLUCOSE SERPL-MCNC: 135 MG/DL (ref 70–110)
HCT VFR BLD AUTO: 40.4 % (ref 37–48.5)
HGB BLD-MCNC: 12.7 G/DL (ref 12–16)
IMM GRANULOCYTES # BLD AUTO: 0.01 K/UL (ref 0–0.04)
IMM GRANULOCYTES NFR BLD AUTO: 0.2 % (ref 0–0.5)
LYMPHOCYTES # BLD AUTO: 2 K/UL (ref 1–4.8)
LYMPHOCYTES NFR BLD: 37.7 % (ref 18–48)
MCH RBC QN AUTO: 28.2 PG (ref 27–31)
MCHC RBC AUTO-ENTMCNC: 31.4 G/DL (ref 32–36)
MCV RBC AUTO: 90 FL (ref 82–98)
MONOCYTES # BLD AUTO: 0.5 K/UL (ref 0.3–1)
MONOCYTES NFR BLD: 9 % (ref 4–15)
NEUTROPHILS # BLD AUTO: 2.6 K/UL (ref 1.8–7.7)
NEUTROPHILS NFR BLD: 50.6 % (ref 38–73)
NRBC BLD-RTO: 0 /100 WBC
PLATELET # BLD AUTO: 180 K/UL (ref 150–350)
PMV BLD AUTO: 11 FL (ref 9.2–12.9)
POTASSIUM SERPL-SCNC: 4.2 MMOL/L (ref 3.5–5.1)
PROT SERPL-MCNC: 7.8 G/DL (ref 6–8.4)
RBC # BLD AUTO: 4.51 M/UL (ref 4–5.4)
SODIUM SERPL-SCNC: 141 MMOL/L (ref 136–145)
T3FREE SP1 P CHAL SERPL-SCNC: 2.98 PG/ML (ref 2.77–5.27)
T4 FREE SP9 P CHAL SERPL-SCNC: 1.45 NG/DL (ref 0.78–2.19)
TSH SERPL DL<=0.005 MIU/L-ACNC: 1.35 UIU/ML (ref 0.4–4)
WBC # BLD AUTO: 5.22 K/UL (ref 3.9–12.7)

## 2020-11-03 PROCEDURE — 80053 COMPREHEN METABOLIC PANEL: CPT

## 2020-11-03 PROCEDURE — 84443 ASSAY THYROID STIM HORMONE: CPT | Mod: PN

## 2020-11-03 PROCEDURE — 81207 BCR/ABL1 GENE MINOR BP: CPT | Mod: HCNC

## 2020-11-03 PROCEDURE — 84439 ASSAY OF FREE THYROXINE: CPT | Mod: PN

## 2020-11-03 PROCEDURE — 85025 COMPLETE CBC W/AUTO DIFF WBC: CPT | Mod: PN

## 2020-11-03 PROCEDURE — 36415 COLL VENOUS BLD VENIPUNCTURE: CPT | Mod: HCNC,PN

## 2020-11-03 PROCEDURE — 84439 ASSAY OF FREE THYROXINE: CPT

## 2020-11-03 PROCEDURE — 81206 BCR/ABL1 GENE MAJOR BP: CPT | Mod: HCNC

## 2020-11-03 PROCEDURE — 80053 COMPREHEN METABOLIC PANEL: CPT | Mod: PN

## 2020-11-03 PROCEDURE — 85025 COMPLETE CBC W/AUTO DIFF WBC: CPT

## 2020-11-03 PROCEDURE — 86800 THYROGLOBULIN ANTIBODY: CPT | Mod: HCNC

## 2020-11-03 PROCEDURE — 84481 FREE ASSAY (FT-3): CPT

## 2020-11-03 PROCEDURE — 84443 ASSAY THYROID STIM HORMONE: CPT

## 2020-11-03 PROCEDURE — 84481 FREE ASSAY (FT-3): CPT | Mod: PN

## 2020-11-05 LAB
BCR/ABL RESULT, P190, QUANT, BLD: NORMAL
BCR/ABL,P210 RESULT: NORMAL
PATH REPORT.FINAL DX SPEC: NORMAL
PATH REPORT.FINAL DX SPEC: NORMAL
SPECIMEN TYPE, P190, QUANT, BLD: NORMAL
SPECIMEN TYPE: NORMAL
THRYOGLOBULIN INTERPRETATION: ABNORMAL
THYROGLOB AB SERPL-ACNC: 10 IU/ML
THYROGLOB SERPL-MCNC: 3.7 NG/ML

## 2020-11-16 ENCOUNTER — PATIENT OUTREACH (OUTPATIENT)
Dept: OTHER | Facility: OTHER | Age: 75
End: 2020-11-16

## 2020-11-16 ENCOUNTER — SPECIALTY PHARMACY (OUTPATIENT)
Dept: PHARMACY | Facility: CLINIC | Age: 75
End: 2020-11-16

## 2020-11-16 NOTE — TELEPHONE ENCOUNTER
Specialty Pharmacy - Refill Coordination    Specialty Medication Orders Linked to Encounter      Most Recent Value   Medication #1  nilotinib (TASIGNA) 150 mg capsule (Order#[id number], Rx#[id number])          Refill Questions - Documented Responses      Most Recent Value   Relationship to patient of person spoken to?  Self   HIPAA/medical authority confirmed?  Yes   Any changes in contact preferences or allowed representatives?  No   Has the patient had any insurance changes?  No   Has the patient had any changes to specialty medication, dose, or instructions?  No   Has the patient started taking any new medications, herbals, or supplements?  No   Has the patient been diagnosed with any new medical conditions?  No   Does the patient have any new allergies to medications or foods?  No   Does the patient have any concerns about side effects?  No   Can the patient store medication/sharps container properly (at the correct temperature, away from children/pets, etc.)?  Yes   Can the patient call emergency services (911) in the event of an emergency?  Yes   How many doses did the patient miss in the past 4 weeks or since the last fill?  0   How many doses does the patient have on hand?  7   How many days does the patient report on hand quantity will last?  7   Does the number of doses/days supply remaining match pharmacy expected amounts?  Yes   Does the patient feel that this medication is effective?  Yes   During the past 4 weeks, has patient missed any activities due to condition or medication?  No   During the past 4 weeks, did patient have any of the following urgent care visits?  None   How will the patient receive the medication?  Mail   When does the patient need to receive the medication?  11/24/20   Shipping Address  Home   Address in [facility] confirmed and updated if neccessary?  Yes   Expected Copay ($)  0   Is the patient able to afford the medication copay?  Yes   Payment Method  zero copay   Days  supply of Refill  28   Would patient like to speak to a pharmacist?  No   Do you want to trigger an intervention?  No   Do you want to trigger an additional referral task?  No   Refill activity completed?  Yes   Refill activity plan  Refill scheduled   Shipment/Pickup Date:  11/16/20          Current Outpatient Medications   Medication Sig    acetaminophen (TYLENOL) 500 MG tablet Take 1,000 mg by mouth every 6 (six) hours as needed for Pain or Temperature greater than.    alendronate (FOSAMAX) 70 MG tablet Take 1 tablet (70 mg total) by mouth every 7 days.    ASPIRIN LOW DOSE 81 mg EC tablet Take 81 mg by mouth once daily.     atorvastatin (LIPITOR) 40 MG tablet Take 1 tablet (40 mg total) by mouth once daily.    ciprofloxacin HCl (CIPRO) 500 MG tablet Take 500 mg by mouth 2 (two) times daily. x7 days    cycloSPORINE (RESTASIS) 0.05 % ophthalmic emulsion Place 0.4 mLs (1 drop total) into both eyes 2 (two) times daily.    doxycycline (MONODOX) 100 MG capsule Take 1 capsule (100 mg total) by mouth 2 (two) times daily.    ergocalciferol (ERGOCALCIFEROL) 50,000 unit Cap Take 1 capsule (50,000 Units total) by mouth every 7 days.    [START ON 12/6/2020] fentaNYL (DURAGESIC) 25 mcg/hr Place 1 patch onto the skin every 48 hours.    fentaNYL (DURAGESIC) 25 mcg/hr Place 1 patch onto the skin every 48 hours.    fentaNYL (DURAGESIC) 25 mcg/hr Place 1 patch onto the skin every 48 hours.    fish oil-omega-3 fatty acids 300-1,000 mg capsule Take 1 capsule by mouth once daily.    fluticasone propionate (FLONASE) 50 mcg/actuation nasal spray USE 1 SPRAY IN EACH NOSTRIL EVERY DAY    furosemide (LASIX) 20 MG tablet TAKE 1 TABLET EVERY DAY    gabapentin (NEURONTIN) 300 MG capsule Take 4 capsules (1,200 mg total) by mouth every evening. Takes all 4 capsules @ bedtime    hydrocodone-acetaminophen 10-325mg (NORCO)  mg Tab Take 1 tablet by mouth every 8 (eight) hours as needed.     ipratropium-albuteroL (COMBIVENT)   mcg/actuation inhaler Inhale 1 puff into the lungs every 6 (six) hours as needed for Wheezing or Shortness of Breath. Rescue    ketoconazole (NIZORAL) 2 % shampoo Apply topically twice a week.    ketorolac (TORADOL) 10 mg tablet Take 1 tablet (10 mg total) by mouth every 6 (six) hours as needed for Pain.    levocetirizine (XYZAL) 5 MG tablet Take 1 tablet (5 mg total) by mouth every evening.    levothyroxine (SYNTHROID) 75 MCG tablet Take 1 tablet (75 mcg total) by mouth once daily.    methen-sod phos-meth blue-hyos (UROGESIC-BLUE) 81.6-40.8-0.12 mg Tab Take 1 tablet by mouth 2 (two) times daily as needed (bladder spasms).    methenamine (HIPREX) 1 gram Tab Take 1 tablet (1 g total) by mouth 2 (two) times daily.    MISCELLANEOUS MEDICAL SUPPLY (COMPRESSION STOCKINGS MISC)     mupirocin calcium 2% nasl oint (BACTROBAN) 2 % Oint by Nasal route 2 (two) times daily.    naproxen (NAPROSYN) 500 MG tablet Take 1 tablet (500 mg total) by mouth 2 (two) times daily with meals.    nilotinib (TASIGNA) 150 mg capsule Take 2 capsules (300 mg total) by mouth 2 (two) times daily.    nitroGLYCERIN (NITROSTAT) 0.4 MG SL tablet Place 1 tablet (0.4 mg total) under the tongue every 5 (five) minutes as needed.    ondansetron (ZOFRAN) 4 MG tablet Take 1 tablet (4 mg total) by mouth every 6 (six) hours.    potassium chloride SA (K-DUR,KLOR-CON) 20 MEQ tablet TAKE 1 TABLET EVERY DAY    XARELTO 20 mg Tab TAKE 1 TABLET DAILY WITH DINNER OR EVENING MEAL    zolpidem (AMBIEN CR) 12.5 MG CR tablet Take 1 tablet (12.5 mg total) by mouth every evening.   Last reviewed on 10/6/2020  3:55 PM by Ross Nunes MD    Review of patient's allergies indicates:   Allergen Reactions    Alcohol prep pads [alcohol swabs] Hives and Rash    Diazolidinyl urea Anaphylaxis    Influenza virus vaccines Hives and Swelling    Iodine Anaphylaxis     Other reaction(s): Unknown    Preservative Anaphylaxis     Many different preservatives per pt  report    Thimerosal Anaphylaxis    Cephalosporins Rash    Lisinopril Palpitations    Penicillins Rash     Other reaction(s): Unknown    Sotalol Palpitations    Last reviewed on  10/26/2020 1:46 PM by Hasmukh Lott      Tasks added this encounter   12/14/2020 - Refill Call (Auto Added)   Tasks due within next 3 months   11/25/2020 - Clinical - Follow Up Assesement (90 day)     Yesenia Garcia, PharmD  [facility]  [address] [place] [id number]  Phone: [phone]  Fax: [phone]

## 2020-11-24 ENCOUNTER — SPECIALTY PHARMACY (OUTPATIENT)
Dept: PHARMACY | Facility: CLINIC | Age: 75
End: 2020-11-24

## 2020-11-24 DIAGNOSIS — C95.00 ACUTE LEUKEMIA NOT HAVING ACHIEVED REMISSION: Primary | ICD-10-CM

## 2020-11-24 NOTE — TELEPHONE ENCOUNTER
Specialty Pharmacy - Clinical Reassessment    Specialty Medication Orders Linked to Encounter      Most Recent Value   Medication #1  nilotinib (TASIGNA) 150 mg capsule (Order#[id number], Rx#[id number])        Subjective    Adry Owen is a 75 y.o. female, who is followed by the specialty pharmacy service for management and education.    Recent Encounters     Date Type Provider Description    11/24/2020 Specialty Pharmacy Lexie Devaughn Fowler PharmD Follow-up Clinical Reassessment    11/16/2020 Specialty Pharmacy Yesenia Garcia PharmD Refill Coordination    10/26/2020 Specialty Pharmacy Monica Busby Refill Coordination    10/26/2020 Specialty Pharmacy Keshia Quintana PharmD Referral Authorization        Clinical call attempts since last clinical assessment   No call attempts found.     Today she received follow up education for her specialty medication(s).    Current Outpatient Medications   Medication Sig    acetaminophen (TYLENOL) 500 MG tablet Take 1,000 mg by mouth every 6 (six) hours as needed for Pain or Temperature greater than.    alendronate (FOSAMAX) 70 MG tablet Take 1 tablet (70 mg total) by mouth every 7 days.    ASPIRIN LOW DOSE 81 mg EC tablet Take 81 mg by mouth once daily.     atorvastatin (LIPITOR) 40 MG tablet Take 1 tablet (40 mg total) by mouth once daily.    ciprofloxacin HCl (CIPRO) 500 MG tablet Take 500 mg by mouth 2 (two) times daily. x7 days    cycloSPORINE (RESTASIS) 0.05 % ophthalmic emulsion Place 0.4 mLs (1 drop total) into both eyes 2 (two) times daily.    doxycycline (MONODOX) 100 MG capsule Take 1 capsule (100 mg total) by mouth 2 (two) times daily.    ergocalciferol (ERGOCALCIFEROL) 50,000 unit Cap Take 1 capsule (50,000 Units total) by mouth every 7 days.    [START ON 12/6/2020] fentaNYL (DURAGESIC) 25 mcg/hr Place 1 patch onto the skin every 48 hours.    fentaNYL (DURAGESIC) 25 mcg/hr Place 1 patch onto the skin every 48 hours.    fentaNYL (DURAGESIC) 25 mcg/hr  Place 1 patch onto the skin every 48 hours.    fish oil-omega-3 fatty acids 300-1,000 mg capsule Take 1 capsule by mouth once daily.    fluticasone propionate (FLONASE) 50 mcg/actuation nasal spray USE 1 SPRAY IN EACH NOSTRIL EVERY DAY    furosemide (LASIX) 20 MG tablet TAKE 1 TABLET EVERY DAY    gabapentin (NEURONTIN) 300 MG capsule Take 4 capsules (1,200 mg total) by mouth every evening. Takes all 4 capsules @ bedtime    hydrocodone-acetaminophen 10-325mg (NORCO)  mg Tab Take 1 tablet by mouth every 8 (eight) hours as needed.     ipratropium-albuteroL (COMBIVENT)  mcg/actuation inhaler Inhale 1 puff into the lungs every 6 (six) hours as needed for Wheezing or Shortness of Breath. Rescue    ketoconazole (NIZORAL) 2 % shampoo Apply topically twice a week.    ketorolac (TORADOL) 10 mg tablet Take 1 tablet (10 mg total) by mouth every 6 (six) hours as needed for Pain.    levocetirizine (XYZAL) 5 MG tablet Take 1 tablet (5 mg total) by mouth every evening.    levothyroxine (SYNTHROID) 75 MCG tablet Take 1 tablet (75 mcg total) by mouth once daily.    methen-sod phos-meth blue-hyos (UROGESIC-BLUE) 81.6-40.8-0.12 mg Tab Take 1 tablet by mouth 2 (two) times daily as needed (bladder spasms).    methenamine (HIPREX) 1 gram Tab Take 1 tablet (1 g total) by mouth 2 (two) times daily.    MISCELLANEOUS MEDICAL SUPPLY (COMPRESSION STOCKINGS MISC)     mupirocin calcium 2% nasl oint (BACTROBAN) 2 % Oint by Nasal route 2 (two) times daily.    naproxen (NAPROSYN) 500 MG tablet Take 1 tablet (500 mg total) by mouth 2 (two) times daily with meals.    nilotinib (TASIGNA) 150 mg capsule Take 2 capsules (300 mg total) by mouth 2 (two) times daily.    nitroGLYCERIN (NITROSTAT) 0.4 MG SL tablet Place 1 tablet (0.4 mg total) under the tongue every 5 (five) minutes as needed.    ondansetron (ZOFRAN) 4 MG tablet Take 1 tablet (4 mg total) by mouth every 6 (six) hours.    potassium chloride SA (K-DUR,KLOR-CON) 20  MEQ tablet TAKE 1 TABLET EVERY DAY    XARELTO 20 mg Tab TAKE 1 TABLET DAILY WITH DINNER OR EVENING MEAL    zolpidem (AMBIEN CR) 12.5 MG CR tablet Take 1 tablet (12.5 mg total) by mouth every evening.   Last reviewed on 10/6/2020  3:55 PM by Ross Nunes MD    Review of patient's allergies indicates:   Allergen Reactions    Alcohol prep pads [alcohol swabs] Hives and Rash    Diazolidinyl urea Anaphylaxis    Influenza virus vaccines Hives and Swelling    Iodine Anaphylaxis     Other reaction(s): Unknown    Preservative Anaphylaxis     Many different preservatives per pt report    Thimerosal Anaphylaxis    Cephalosporins Rash    Lisinopril Palpitations    Penicillins Rash     Other reaction(s): Unknown    Sotalol Palpitations   Last reviewed on  11/24/2020 1:10 PM by Lexie Fowler    Drug Interactions    Drug interactions evaluated: yes  Clinically relevant drug interactions identified: yes   Interactions list: Nilotinib-fentanyl (category D): nilotinib may increase the serum concentration of fentanyl.   Nilotinib-atorvastatin (category C): nilotinib may decrease the metabolism of atorvastatin.   Nilotinib-hydrocodone and acetaminophen (category C): nilotinib may increase the serum concentration of hydrocodone.    Drug management plan: Nilotinib-fentanyl (category D): nilotinib may increase the serum concentration of fentanyl. A fentanyl dose reduction should be considered especially when nilotinib is added to patients receiving fentanyl until the effects of the combination are known. Monitor for respiratory depression and sedation. Upon discontinuation of nilotinib, a fentanyl dose increase may be considered. Monitor for signs and symptoms of withdrawal.  Nilotinib-atorvastatin (category C): nilotinib may decrease the metabolism of atorvastatin. Monitor for increased effects of the atorvastatin if nilotinib is initiated/dose increased, and decreased effects if nilotinib is discontinued/dose  decreased.  Nilotinib-hydrocodone and acetaminophen (category C): nilotinib may increase the serum concentration of hydrocodone. Monitor patients for increased hydrocodone toxicities, including fatal respiratory depression, if combined with nilotinib. Monitor patients at frequent intervals and consider hydrocodone dose reductions until stable drug effects are achieved.     Provided the patient with educational material regarding drug interactions: yes   Educational material comments: Counseled        Medication Adherence    What concerns does the patient have in regards to their medications: Denies  Patient reported X missed doses in the last month: 0  Any gaps in refill history greater than 2 weeks in the last 3 months: no  Demonstrates understanding of importance of adherence: yes  Informant: patient  Reliability of informant: reliable  Provider-estimated medication adherence level: %  Reasons for non-adherence: no problems identified   Other adherence tool: Memory   Support network for adherence: healthcare provider  Confirmed plan for next specialty medication refill: delivery by pharmacy  Refills needed for supportive medications: not needed       Adverse Effects    Constitution: System reviewed and is negative  Skin: System reviewed and is negative  Eyes: System reviewed and is negative  Endocrine and Metabolic: System reviewed and is negative  Gastrointestinal: System reviewed and is negative  Genitourinary: System reviewed and is negative  Cardiovascular: System reviewed and is negative  Hematologic: System reviewed and is negative  Musculoskeletal: System reviewed and is negative  HENT: System reviewed and is negative  Neurological: System reviewed and is negative  Psychiatric: System reviewed and is negative  Respiratory: System reviewed and is negative  *All other systems reviewed and are negative       Assessment Questions - Documented Responses      Most Recent Value   Assessment   Medication  "Reconciliation completed for patient  No [Medication reconciliation was declined.]   During the past 4 weeks, has patient missed any activities due to condition or medication?  No   During the past 4 weeks, did patient have any of the following urgent care visits?  None   Goals of Therapy Status  Achieving [BCR-ABL1 0.003%.]   Assesment completed?  Yes   Plan  Therapy continued [BCR-ABL1 0.003%.]   Do you need to open a clinical intervention (i-vent)?  No   Do you want to schedule first shipment?  Yes   Medication #1 Assessment Info   Patient status  Existing medication   Is this medication appropriate for the patient?  Yes   Is this medication effective?  Yes [BCR-ABL1 0.003%.]          Objective    She has a past medical history of *Atrial fibrillation, *Atrial flutter, Anticoagulant long-term use, Arthritis, Asthma, Atrial fibrillation or flutter, Cancer, Cataract, Chronic neck pain, Coronary artery disease (2004), Encounter for blood transfusion, Fibromyalgia, Hashimoto's disease, Heart block, History of diverticulitis, History of GI bleed, Hyperlipidemia, Hypertension, MI (myocardial infarction), and Thyroid disease.    Tried/failed medications: N/A    BP Readings from Last 4 Encounters:   02/27/20 120/77   01/22/20 124/62   01/10/20 130/68   11/28/19 (!) 115/50     Ht Readings from Last 4 Encounters:   07/22/20 5' 5" (1.651 m)   02/27/20 5' 5" (1.651 m)   01/22/20 5' 5" (1.651 m)   01/10/20 5' 5" (1.651 m)     Wt Readings from Last 4 Encounters:   07/22/20 74.8 kg (165 lb)   02/27/20 73.7 kg (162 lb 8 oz)   01/22/20 72.6 kg (160 lb)   01/10/20 70.6 kg (155 lb 10.3 oz)     Recent Labs   Lab Result Units 11/03/20  0747   RBC M/uL 4.51   Hemoglobin g/dL 12.7   Hematocrit % 40.4   WBC K/uL 5.22   Gran # (ANC) K/uL 2.6   Gran % % 50.6   Platelets K/uL 180   Sodium mmol/L 141   Potassium mmol/L 4.2   Chloride mmol/L 105   Glucose mg/dL 135 H   BUN mg/dL 11   Creatinine mg/dL 0.74   Calcium mg/dL 9.4   Total Protein " g/dL 7.8   Albumin g/dL 4.3   Total Bilirubin mg/dL 1.4 H   Alkaline Phosphatase U/L 47   AST U/L 47 H   ALT U/L 32     The goals of cancer treatment include:  · Achieving remission of cancer, if possible  · Reducing tumor size and spread of cancer, if remission is not possible  · Minimizing pain and symptoms of the cancer  · Preventing infection and other complications of treatment  · Promoting adequate nutrition  · Encouraging proper hydration  · Improving or maintaining quality of life  · Maintaining optimal therapy adherence  · Minimizing and managing side effects    Goals of Therapy Status: Achieving(BCR-ABL1 0.003%.)    Assessment/Plan  Patient plans to continue therapy without changes      Indication, dosage, appropriateness, effectiveness, safety and convenience of her specialty medication(s) were reviewed today.     Patient Counseling    Counseled the patient on the following: doses and administration discussed, safe handling, storage, and disposal discussed, possible adverse effects and management discussed, possible drug and prescription drug interactions discussed, possible drug and OTC drug and food interactions discussed, lab monitoring and follow-up discussed, use of contraception discussed, therapeutic rationale discussed, cost of medications and cost implications discussed, adherence and missed doses discussed, pharmacy contact information discussed       Spoke to Mrs. Owen. She reports taking nilotinib by taking two 150 mg tablets po BID. She reports 0 missed doses. She reports proper handling precautions and uses gloves. She reports that she stores her medication in bedroom at room temperature.   She denies any side effects. Declined to review the following potential adverse effects: nausea, rash, headache, fatigue, pruritus, vomiting, diarrhea, cough, constipation, arthralgia, nasopharyngitis, pyrexia, and night sweats, thrombocytopenia, neutropenia, and anemia. Declined to review the following  warnings and precautions: myelosuppression, cardiac and arterial vascular occlusive events, pancreatitis and elevated serum lipase, hepatotoxicity, electrolyte abnormalities, tumor lysis syndrome, hemorrhage, fluid retention, embryo-fetal toxicity, treatment discontinuation.    She denies any infections, changes in allergies, hypersensitivities, and intolerances, comorbidities, and medications. She declined medication reconciliation.   Renal dosage adjustment is not required for nilotinib. Tbili 1.4 mg/dL ALT 32 U/L AST 47 U/L. Hepatic dosage adjustment is not required. BCR-ABL1 0.003%. She believes this medication is effective. Next follow up has not been scheduled yet.   She denies any missed activities within the past 4 weeks. She denies any emergency room or urgent care visits within the past 4 weeks.  Confirmed 2 patient identifiers. No changes in allergies, conditions, and medications.       Tasks added this encounter   2/15/2021 - Clinical - Follow Up Assesement (90 day)   Tasks due within next 3 months   12/14/2020 - Refill Call (Auto Added)     Lexie Fowler PharmD  [facility]  [address] [place] [id number]  Phone: [phone]  Fax: [phone]

## 2020-12-16 ENCOUNTER — SPECIALTY PHARMACY (OUTPATIENT)
Dept: PHARMACY | Facility: CLINIC | Age: 75
End: 2020-12-16

## 2020-12-16 NOTE — TELEPHONE ENCOUNTER
Specialty Pharmacy - Refill Coordination    Specialty Medication Orders Linked to Encounter      Most Recent Value   Medication #1  nilotinib (TASIGNA) 150 mg capsule (Order#[id number], Rx#[id number])          Refill Questions - Documented Responses      Most Recent Value   Relationship to patient of person spoken to?  Self   HIPAA/medical authority confirmed?  Yes   Any changes in contact preferences or allowed representatives?  No   Has the patient had any insurance changes?  No   Has the patient had any changes to specialty medication, dose, or instructions?  No   Has the patient started taking any new medications, herbals, or supplements?  No   Has the patient been diagnosed with any new medical conditions?  No   Does the patient have any new allergies to medications or foods?  No   Does the patient have any concerns about side effects?  No   Can the patient store medication/sharps container properly (at the correct temperature, away from children/pets, etc.)?  Yes   Can the patient call emergency services (911) in the event of an emergency?  Yes   Does the patient have any concerns or questions about taking or administering this medication as prescribed?  No   How many doses did the patient miss in the past 4 weeks or since the last fill?  0   How many doses does the patient have on hand?  7   How many days does the patient report on hand quantity will last?  7   Does the number of doses/days supply remaining match pharmacy expected amounts?  Yes   Does the patient feel that this medication is effective?  Yes   During the past 4 weeks, has patient missed any activities due to condition or medication?  No   During the past 4 weeks, did patient have any of the following urgent care visits?  None   How will the patient receive the medication?  Mail   When does the patient need to receive the medication?  12/18/20   Shipping Address  Home   Address in [facility] confirmed and updated if neccessary?  Yes    Expected Copay ($)  0   Is the patient able to afford the medication copay?  Yes   Payment Method  zero copay   Days supply of Refill  28   Would patient like to speak to a pharmacist?  No   Do you want to trigger an intervention?  No   Do you want to trigger an additional referral task?  No   Refill activity completed?  Yes   Refill activity plan  Refill scheduled   Shipment/Pickup Date:  12/17/20          Current Outpatient Medications   Medication Sig    acetaminophen (TYLENOL) 500 MG tablet Take 1,000 mg by mouth every 6 (six) hours as needed for Pain or Temperature greater than.    alendronate (FOSAMAX) 70 MG tablet Take 1 tablet (70 mg total) by mouth every 7 days.    ASPIRIN LOW DOSE 81 mg EC tablet Take 81 mg by mouth once daily.     atorvastatin (LIPITOR) 40 MG tablet Take 1 tablet (40 mg total) by mouth once daily.    ciprofloxacin HCl (CIPRO) 500 MG tablet Take 500 mg by mouth 2 (two) times daily. x7 days    cycloSPORINE (RESTASIS) 0.05 % ophthalmic emulsion Place 0.4 mLs (1 drop total) into both eyes 2 (two) times daily.    doxycycline (MONODOX) 100 MG capsule Take 1 capsule (100 mg total) by mouth 2 (two) times daily.    ergocalciferol (ERGOCALCIFEROL) 50,000 unit Cap Take 1 capsule (50,000 Units total) by mouth every 7 days.    fentaNYL (DURAGESIC) 25 mcg/hr Place 1 patch onto the skin every 48 hours.    fentaNYL (DURAGESIC) 25 mcg/hr Place 1 patch onto the skin every 48 hours.    fentaNYL (DURAGESIC) 25 mcg/hr Place 1 patch onto the skin every 48 hours.    fish oil-omega-3 fatty acids 300-1,000 mg capsule Take 1 capsule by mouth once daily.    fluticasone propionate (FLONASE) 50 mcg/actuation nasal spray USE 1 SPRAY IN EACH NOSTRIL EVERY DAY    furosemide (LASIX) 20 MG tablet TAKE 1 TABLET EVERY DAY    gabapentin (NEURONTIN) 300 MG capsule Take 4 capsules (1,200 mg total) by mouth every evening. Takes all 4 capsules @ bedtime    hydrocodone-acetaminophen 10-325mg (NORCO)  mg  Tab Take 1 tablet by mouth every 8 (eight) hours as needed.     ipratropium-albuteroL (COMBIVENT)  mcg/actuation inhaler Inhale 1 puff into the lungs every 6 (six) hours as needed for Wheezing or Shortness of Breath. Rescue    ketoconazole (NIZORAL) 2 % shampoo Apply topically twice a week.    ketorolac (TORADOL) 10 mg tablet Take 1 tablet (10 mg total) by mouth every 6 (six) hours as needed for Pain.    levocetirizine (XYZAL) 5 MG tablet Take 1 tablet (5 mg total) by mouth every evening.    levothyroxine (SYNTHROID) 75 MCG tablet Take 1 tablet (75 mcg total) by mouth once daily.    methen-sod phos-meth blue-hyos (UROGESIC-BLUE) 81.6-40.8-0.12 mg Tab Take 1 tablet by mouth 2 (two) times daily as needed (bladder spasms).    methenamine (HIPREX) 1 gram Tab Take 1 tablet (1 g total) by mouth 2 (two) times daily.    MISCELLANEOUS MEDICAL SUPPLY (COMPRESSION STOCKINGS MISC)     mupirocin calcium 2% nasl oint (BACTROBAN) 2 % Oint by Nasal route 2 (two) times daily.    naproxen (NAPROSYN) 500 MG tablet Take 1 tablet (500 mg total) by mouth 2 (two) times daily with meals.    nilotinib (TASIGNA) 150 mg capsule Take 2 capsules (300 mg total) by mouth 2 (two) times daily.    nitroGLYCERIN (NITROSTAT) 0.4 MG SL tablet Place 1 tablet (0.4 mg total) under the tongue every 5 (five) minutes as needed.    ondansetron (ZOFRAN) 4 MG tablet Take 1 tablet (4 mg total) by mouth every 6 (six) hours.    potassium chloride SA (K-DUR,KLOR-CON) 20 MEQ tablet TAKE 1 TABLET EVERY DAY    XARELTO 20 mg Tab TAKE 1 TABLET DAILY WITH DINNER OR EVENING MEAL    zolpidem (AMBIEN CR) 12.5 MG CR tablet Take 1 tablet (12.5 mg total) by mouth every evening.   Last reviewed on 10/6/2020  3:55 PM by Ross Nunes MD    Review of patient's allergies indicates:   Allergen Reactions    Alcohol prep pads [alcohol swabs] Hives and Rash    Diazolidinyl urea Anaphylaxis    Influenza virus vaccines Hives and Swelling    Iodine Anaphylaxis      Other reaction(s): Unknown    Preservative Anaphylaxis     Many different preservatives per pt report    Thimerosal Anaphylaxis    Cephalosporins Rash    Lisinopril Palpitations    Penicillins Rash     Other reaction(s): Unknown    Sotalol Palpitations    Last reviewed on  11/24/2020 1:10 PM by Lexie Fowler      Tasks added this encounter   1/8/2021 - Refill Call (Auto Added)   Tasks due within next 3 months   2/15/2021 - Clinical - Follow Up Assesement (90 day)     [facility]  [address] [place] [id number]  Phone: [phone]  Fax: [phone]

## 2020-12-23 DIAGNOSIS — G47.01 INSOMNIA DUE TO MEDICAL CONDITION: ICD-10-CM

## 2020-12-23 RX ORDER — ZOLPIDEM TARTRATE 12.5 MG/1
12.5 TABLET, FILM COATED, EXTENDED RELEASE ORAL NIGHTLY
Qty: 30 TABLET | Refills: 0 | Status: SHIPPED | OUTPATIENT
Start: 2020-12-23 | End: 2021-01-04 | Stop reason: SDUPTHER

## 2020-12-23 NOTE — TELEPHONE ENCOUNTER
----- Message from Enriqueta Sanders sent at 12/23/2020  1:27 PM CST -----  Regarding: renewal auth  Contact: HERNANDEZ BEJARANO [[id number]]  Patient stated the authorization for zolpidem (AMBIEN CR) 12.5 MG CR tablet, need a renewal.    Patient will be using   [facility] [place] [id number] [address] [address]  Phone: [phone] Fax: [phone]    Please call patient at [phone].     Thanks!

## 2021-01-01 NOTE — TELEPHONE ENCOUNTER
----- Message from Angelica Garcia LPN sent at 7/22/2020  9:58 AM CDT -----  Patient requested a urinalysis upon her completion of the Cipro.     
I do not order surveillance urine cultures after antibiotics UNLESS they're still having symptoms.   
LVM regarding any symptoms after requesting urinalysis.  
Home

## 2021-01-04 ENCOUNTER — OFFICE VISIT (OUTPATIENT)
Dept: FAMILY MEDICINE | Facility: CLINIC | Age: 76
End: 2021-01-04
Payer: MEDICARE

## 2021-01-04 DIAGNOSIS — M54.2 CHRONIC NECK PAIN: ICD-10-CM

## 2021-01-04 DIAGNOSIS — J30.1 NON-SEASONAL ALLERGIC RHINITIS DUE TO POLLEN: ICD-10-CM

## 2021-01-04 DIAGNOSIS — G89.29 CHRONIC NECK PAIN: ICD-10-CM

## 2021-01-04 DIAGNOSIS — G47.01 INSOMNIA DUE TO MEDICAL CONDITION: ICD-10-CM

## 2021-01-04 DIAGNOSIS — E55.9 VITAMIN D DEFICIENCY: ICD-10-CM

## 2021-01-04 PROCEDURE — 1157F ADVNC CARE PLAN IN RCRD: CPT | Mod: 95,,, | Performed by: INTERNAL MEDICINE

## 2021-01-04 PROCEDURE — 1157F PR ADVANCE CARE PLAN OR EQUIV PRESENT IN MEDICAL RECORD: ICD-10-PCS | Mod: 95,,, | Performed by: INTERNAL MEDICINE

## 2021-01-04 PROCEDURE — 1159F MED LIST DOCD IN RCRD: CPT | Mod: 95,,, | Performed by: INTERNAL MEDICINE

## 2021-01-04 PROCEDURE — 1159F PR MEDICATION LIST DOCUMENTED IN MEDICAL RECORD: ICD-10-PCS | Mod: 95,,, | Performed by: INTERNAL MEDICINE

## 2021-01-04 PROCEDURE — 99214 PR OFFICE/OUTPT VISIT, EST, LEVL IV, 30-39 MIN: ICD-10-PCS | Mod: 95,,, | Performed by: INTERNAL MEDICINE

## 2021-01-04 PROCEDURE — 99214 OFFICE O/P EST MOD 30 MIN: CPT | Mod: 95,,, | Performed by: INTERNAL MEDICINE

## 2021-01-04 RX ORDER — ERGOCALCIFEROL 1.25 MG/1
50000 CAPSULE ORAL
Qty: 24 CAPSULE | Refills: 3 | Status: SHIPPED | OUTPATIENT
Start: 2021-01-04 | End: 2022-01-08

## 2021-01-04 RX ORDER — FENTANYL 25 UG/1
1 PATCH TRANSDERMAL
Qty: 15 PATCH | Refills: 0 | Status: SHIPPED | OUTPATIENT
Start: 2021-02-04 | End: 2021-04-06 | Stop reason: SDUPTHER

## 2021-01-04 RX ORDER — LEVOCETIRIZINE DIHYDROCHLORIDE 5 MG/1
5 TABLET, FILM COATED ORAL NIGHTLY
Qty: 90 TABLET | Refills: 3 | Status: SHIPPED | OUTPATIENT
Start: 2021-01-04 | End: 2022-01-02

## 2021-01-04 RX ORDER — FENTANYL 25 UG/1
1 PATCH TRANSDERMAL
Qty: 15 PATCH | Refills: 0 | Status: SHIPPED | OUTPATIENT
Start: 2021-03-04 | End: 2021-04-06 | Stop reason: SDUPTHER

## 2021-01-04 RX ORDER — ZOLPIDEM TARTRATE 12.5 MG/1
12.5 TABLET, FILM COATED, EXTENDED RELEASE ORAL NIGHTLY
Qty: 30 TABLET | Refills: 3 | Status: SHIPPED | OUTPATIENT
Start: 2021-01-04 | End: 2021-06-06

## 2021-01-04 RX ORDER — FENTANYL 25 UG/1
1 PATCH TRANSDERMAL
Qty: 15 PATCH | Refills: 0 | Status: SHIPPED | OUTPATIENT
Start: 2021-01-04 | End: 2021-04-06 | Stop reason: SDUPTHER

## 2021-01-04 RX ORDER — GABAPENTIN 300 MG/1
1200 CAPSULE ORAL NIGHTLY
Qty: 360 CAPSULE | Refills: 3 | Status: SHIPPED | OUTPATIENT
Start: 2021-01-04 | End: 2022-01-02

## 2021-01-08 ENCOUNTER — SPECIALTY PHARMACY (OUTPATIENT)
Dept: PHARMACY | Facility: CLINIC | Age: 76
End: 2021-01-08

## 2021-01-15 ENCOUNTER — TELEPHONE (OUTPATIENT)
Dept: FAMILY MEDICINE | Facility: CLINIC | Age: 76
End: 2021-01-15

## 2021-01-15 ENCOUNTER — DOCUMENTATION ONLY (OUTPATIENT)
Dept: FAMILY MEDICINE | Facility: CLINIC | Age: 76
End: 2021-01-15

## 2021-02-06 ENCOUNTER — SPECIALTY PHARMACY (OUTPATIENT)
Dept: PHARMACY | Facility: CLINIC | Age: 76
End: 2021-02-06

## 2021-02-15 ENCOUNTER — PATIENT MESSAGE (OUTPATIENT)
Dept: PHARMACY | Facility: CLINIC | Age: 76
End: 2021-02-15

## 2021-02-17 ENCOUNTER — PATIENT MESSAGE (OUTPATIENT)
Dept: PHARMACY | Facility: CLINIC | Age: 76
End: 2021-02-17

## 2021-02-24 ENCOUNTER — PATIENT MESSAGE (OUTPATIENT)
Dept: PHARMACY | Facility: CLINIC | Age: 76
End: 2021-02-24

## 2021-02-25 ENCOUNTER — SPECIALTY PHARMACY (OUTPATIENT)
Dept: PHARMACY | Facility: CLINIC | Age: 76
End: 2021-02-25

## 2021-03-02 ENCOUNTER — SPECIALTY PHARMACY (OUTPATIENT)
Dept: PHARMACY | Facility: CLINIC | Age: 76
End: 2021-03-02

## 2021-03-26 ENCOUNTER — PATIENT MESSAGE (OUTPATIENT)
Dept: PHARMACY | Facility: CLINIC | Age: 76
End: 2021-03-26

## 2021-03-30 ENCOUNTER — SPECIALTY PHARMACY (OUTPATIENT)
Dept: PHARMACY | Facility: CLINIC | Age: 76
End: 2021-03-30

## 2021-04-06 ENCOUNTER — OFFICE VISIT (OUTPATIENT)
Dept: FAMILY MEDICINE | Facility: CLINIC | Age: 76
End: 2021-04-06
Payer: MEDICARE

## 2021-04-06 ENCOUNTER — PATIENT MESSAGE (OUTPATIENT)
Dept: FAMILY MEDICINE | Facility: CLINIC | Age: 76
End: 2021-04-06

## 2021-04-06 ENCOUNTER — TELEPHONE (OUTPATIENT)
Dept: FAMILY MEDICINE | Facility: CLINIC | Age: 76
End: 2021-04-06

## 2021-04-06 DIAGNOSIS — G89.29 CHRONIC MIDLINE LOW BACK PAIN WITH SCIATICA, SCIATICA LATERALITY UNSPECIFIED: ICD-10-CM

## 2021-04-06 DIAGNOSIS — G89.29 CHRONIC NECK PAIN: Primary | ICD-10-CM

## 2021-04-06 DIAGNOSIS — M54.2 CHRONIC NECK PAIN: Primary | ICD-10-CM

## 2021-04-06 DIAGNOSIS — I10 ESSENTIAL HYPERTENSION: ICD-10-CM

## 2021-04-06 DIAGNOSIS — M54.40 CHRONIC MIDLINE LOW BACK PAIN WITH SCIATICA, SCIATICA LATERALITY UNSPECIFIED: ICD-10-CM

## 2021-04-06 DIAGNOSIS — I48.0 PAROXYSMAL ATRIAL FIBRILLATION: ICD-10-CM

## 2021-04-06 DIAGNOSIS — C92.10 CHRONIC MYELOID LEUKEMIA, BCR/ABL-POSITIVE, NOT HAVING ACHIEVED REMISSION: ICD-10-CM

## 2021-04-06 PROCEDURE — 99499 UNLISTED E&M SERVICE: CPT | Mod: 95,,, | Performed by: INTERNAL MEDICINE

## 2021-04-06 PROCEDURE — 99213 OFFICE O/P EST LOW 20 MIN: CPT | Mod: 95,,, | Performed by: INTERNAL MEDICINE

## 2021-04-06 PROCEDURE — 99213 PR OFFICE/OUTPT VISIT, EST, LEVL III, 20-29 MIN: ICD-10-PCS | Mod: 95,,, | Performed by: INTERNAL MEDICINE

## 2021-04-06 PROCEDURE — 1157F ADVNC CARE PLAN IN RCRD: CPT | Mod: 95,,, | Performed by: INTERNAL MEDICINE

## 2021-04-06 PROCEDURE — 1159F MED LIST DOCD IN RCRD: CPT | Mod: 95,,, | Performed by: INTERNAL MEDICINE

## 2021-04-06 PROCEDURE — 99499 RISK ADDL DX/OHS AUDIT: ICD-10-PCS | Mod: 95,,, | Performed by: INTERNAL MEDICINE

## 2021-04-06 PROCEDURE — 1157F PR ADVANCE CARE PLAN OR EQUIV PRESENT IN MEDICAL RECORD: ICD-10-PCS | Mod: 95,,, | Performed by: INTERNAL MEDICINE

## 2021-04-06 PROCEDURE — 1159F PR MEDICATION LIST DOCUMENTED IN MEDICAL RECORD: ICD-10-PCS | Mod: 95,,, | Performed by: INTERNAL MEDICINE

## 2021-04-06 RX ORDER — FENTANYL 25 UG/1
1 PATCH TRANSDERMAL
Qty: 15 PATCH | Refills: 0 | Status: SHIPPED | OUTPATIENT
Start: 2021-04-06 | End: 2021-07-01 | Stop reason: SDUPTHER

## 2021-04-06 RX ORDER — FENTANYL 25 UG/1
1 PATCH TRANSDERMAL
Qty: 15 PATCH | Refills: 0 | Status: SHIPPED | OUTPATIENT
Start: 2021-06-06 | End: 2021-07-01 | Stop reason: SDUPTHER

## 2021-04-06 RX ORDER — FENTANYL 25 UG/1
1 PATCH TRANSDERMAL
Qty: 15 PATCH | Refills: 0 | Status: SHIPPED | OUTPATIENT
Start: 2021-05-06 | End: 2021-07-01 | Stop reason: SDUPTHER

## 2021-04-09 NOTE — PROGRESS NOTES
Attempted to reach patient for routine follow up. Reviewed available blood pressure readings and labs. Per 2017 ACC/AHA Hypertension Guidelines, current 30-day average is at goal.      Last 5 Patient Entered Readings                Current 30 Day Average: 110/68  Recent Readings 4/9/2021 3/23/2021 2/9/2021 1/21/2021 1/8/2021   SBP (mmHg) 107 112 134 129 128   DBP (mmHg) 64 72 90 77 80   Pulse 99 93 102 96 95             Hypertension Medications             furosemide (LASIX) 20 MG tablet TAKE 1 TABLET EVERY DAY    nitroGLYCERIN (NITROSTAT) 0.4 MG SL tablet Place 1 tablet (0.4 mg total) under the tongue every 5 (five) minutes as needed.          Left a message and requested patient call back if she has any questions or concerns.     Will continue to monitor regularly. Will follow up in 3-4 months, sooner if blood pressure begins to trend up.

## 2021-04-22 ENCOUNTER — TELEPHONE (OUTPATIENT)
Dept: FAMILY MEDICINE | Facility: CLINIC | Age: 76
End: 2021-04-22

## 2021-04-22 ENCOUNTER — SPECIALTY PHARMACY (OUTPATIENT)
Dept: PHARMACY | Facility: CLINIC | Age: 76
End: 2021-04-22

## 2021-04-22 DIAGNOSIS — G89.29 CHRONIC NECK PAIN: Primary | ICD-10-CM

## 2021-04-22 DIAGNOSIS — M54.2 CHRONIC NECK PAIN: Primary | ICD-10-CM

## 2021-04-23 ENCOUNTER — DOCUMENTATION ONLY (OUTPATIENT)
Dept: FAMILY MEDICINE | Facility: CLINIC | Age: 76
End: 2021-04-23

## 2021-04-29 ENCOUNTER — PATIENT MESSAGE (OUTPATIENT)
Dept: RESEARCH | Facility: HOSPITAL | Age: 76
End: 2021-04-29

## 2021-05-14 ENCOUNTER — SPECIALTY PHARMACY (OUTPATIENT)
Dept: PHARMACY | Facility: CLINIC | Age: 76
End: 2021-05-14

## 2021-06-03 ENCOUNTER — TELEPHONE (OUTPATIENT)
Dept: FAMILY MEDICINE | Facility: CLINIC | Age: 76
End: 2021-06-03

## 2021-06-03 ENCOUNTER — PATIENT OUTREACH (OUTPATIENT)
Dept: ADMINISTRATIVE | Facility: HOSPITAL | Age: 76
End: 2021-06-03

## 2021-06-07 ENCOUNTER — TELEPHONE (OUTPATIENT)
Dept: FAMILY MEDICINE | Facility: CLINIC | Age: 76
End: 2021-06-07

## 2021-06-07 ENCOUNTER — PATIENT MESSAGE (OUTPATIENT)
Dept: FAMILY MEDICINE | Facility: CLINIC | Age: 76
End: 2021-06-07

## 2021-06-07 ENCOUNTER — SPECIALTY PHARMACY (OUTPATIENT)
Dept: PHARMACY | Facility: CLINIC | Age: 76
End: 2021-06-07

## 2021-06-28 ENCOUNTER — PATIENT MESSAGE (OUTPATIENT)
Dept: FAMILY MEDICINE | Facility: CLINIC | Age: 76
End: 2021-06-28

## 2021-06-29 ENCOUNTER — SPECIALTY PHARMACY (OUTPATIENT)
Dept: PHARMACY | Facility: CLINIC | Age: 76
End: 2021-06-29

## 2021-07-01 ENCOUNTER — HOSPITAL ENCOUNTER (OUTPATIENT)
Dept: RADIOLOGY | Facility: HOSPITAL | Age: 76
Discharge: HOME | End: 2021-07-01
Attending: INTERNAL MEDICINE
Payer: MEDICARE

## 2021-07-01 ENCOUNTER — OFFICE VISIT (OUTPATIENT)
Dept: FAMILY MEDICINE | Facility: CLINIC | Age: 76
End: 2021-07-01
Payer: MEDICARE

## 2021-07-01 VITALS
HEIGHT: 65 IN | DIASTOLIC BLOOD PRESSURE: 70 MMHG | BODY MASS INDEX: 27.15 KG/M2 | WEIGHT: 162.94 LBS | OXYGEN SATURATION: 97 % | SYSTOLIC BLOOD PRESSURE: 110 MMHG | HEART RATE: 107 BPM

## 2021-07-01 DIAGNOSIS — R60.0 EDEMA OF RIGHT LOWER LEG: ICD-10-CM

## 2021-07-01 DIAGNOSIS — I10 ESSENTIAL HYPERTENSION: ICD-10-CM

## 2021-07-01 DIAGNOSIS — M54.2 CHRONIC NECK PAIN: ICD-10-CM

## 2021-07-01 DIAGNOSIS — L03.115 CELLULITIS OF RIGHT LEG: Primary | ICD-10-CM

## 2021-07-01 DIAGNOSIS — I48.0 PAROXYSMAL ATRIAL FIBRILLATION: ICD-10-CM

## 2021-07-01 DIAGNOSIS — C92.10 CHRONIC MYELOID LEUKEMIA, BCR/ABL-POSITIVE, NOT HAVING ACHIEVED REMISSION: ICD-10-CM

## 2021-07-01 DIAGNOSIS — G89.29 CHRONIC NECK PAIN: ICD-10-CM

## 2021-07-01 PROCEDURE — 3288F FALL RISK ASSESSMENT DOCD: CPT | Mod: CPTII,S$GLB,, | Performed by: INTERNAL MEDICINE

## 2021-07-01 PROCEDURE — 1159F MED LIST DOCD IN RCRD: CPT | Mod: S$GLB,,, | Performed by: INTERNAL MEDICINE

## 2021-07-01 PROCEDURE — 99499 UNLISTED E&M SERVICE: CPT | Mod: HCNC,S$GLB,, | Performed by: INTERNAL MEDICINE

## 2021-07-01 PROCEDURE — 1157F ADVNC CARE PLAN IN RCRD: CPT | Mod: S$GLB,,, | Performed by: INTERNAL MEDICINE

## 2021-07-01 PROCEDURE — 93971 US LOWER EXTREMITY VEINS RIGHT: ICD-10-PCS | Mod: 26,RT,, | Performed by: RADIOLOGY

## 2021-07-01 PROCEDURE — 1159F PR MEDICATION LIST DOCUMENTED IN MEDICAL RECORD: ICD-10-PCS | Mod: S$GLB,,, | Performed by: INTERNAL MEDICINE

## 2021-07-01 PROCEDURE — 1157F PR ADVANCE CARE PLAN OR EQUIV PRESENT IN MEDICAL RECORD: ICD-10-PCS | Mod: S$GLB,,, | Performed by: INTERNAL MEDICINE

## 2021-07-01 PROCEDURE — 99499 RISK ADDL DX/OHS AUDIT: ICD-10-PCS | Mod: HCNC,S$GLB,, | Performed by: INTERNAL MEDICINE

## 2021-07-01 PROCEDURE — 3288F PR FALLS RISK ASSESSMENT DOCUMENTED: ICD-10-PCS | Mod: CPTII,S$GLB,, | Performed by: INTERNAL MEDICINE

## 2021-07-01 PROCEDURE — 99214 OFFICE O/P EST MOD 30 MIN: CPT | Mod: S$GLB,,, | Performed by: INTERNAL MEDICINE

## 2021-07-01 PROCEDURE — 93971 EXTREMITY STUDY: CPT | Mod: 26,RT,, | Performed by: RADIOLOGY

## 2021-07-01 PROCEDURE — 93971 EXTREMITY STUDY: CPT | Mod: TC,PO,RT

## 2021-07-01 PROCEDURE — 99214 PR OFFICE/OUTPT VISIT, EST, LEVL IV, 30-39 MIN: ICD-10-PCS | Mod: S$GLB,,, | Performed by: INTERNAL MEDICINE

## 2021-07-01 PROCEDURE — 99999 PR PBB SHADOW E&M-EST. PATIENT-LVL V: ICD-10-PCS | Mod: PBBFAC,,, | Performed by: INTERNAL MEDICINE

## 2021-07-01 PROCEDURE — 99999 PR PBB SHADOW E&M-EST. PATIENT-LVL V: CPT | Mod: PBBFAC,,, | Performed by: INTERNAL MEDICINE

## 2021-07-01 PROCEDURE — 1101F PR PT FALLS ASSESS DOC 0-1 FALLS W/OUT INJ PAST YR: ICD-10-PCS | Mod: CPTII,S$GLB,, | Performed by: INTERNAL MEDICINE

## 2021-07-01 PROCEDURE — 1101F PT FALLS ASSESS-DOCD LE1/YR: CPT | Mod: CPTII,S$GLB,, | Performed by: INTERNAL MEDICINE

## 2021-07-01 RX ORDER — FENTANYL 25 UG/1
1 PATCH TRANSDERMAL
Qty: 15 PATCH | Refills: 0 | Status: SHIPPED | OUTPATIENT
Start: 2021-07-01 | End: 2021-08-16

## 2021-07-01 RX ORDER — DOXYCYCLINE 100 MG/1
100 CAPSULE ORAL EVERY 12 HOURS
Qty: 14 CAPSULE | Refills: 0 | Status: SHIPPED | OUTPATIENT
Start: 2021-07-01 | End: 2021-07-08

## 2021-07-01 RX ORDER — FENTANYL 25 UG/1
1 PATCH TRANSDERMAL
Qty: 15 PATCH | Refills: 0 | Status: SHIPPED | OUTPATIENT
Start: 2021-09-01 | End: 2021-10-05 | Stop reason: SDUPTHER

## 2021-07-01 RX ORDER — FENTANYL 25 UG/1
1 PATCH TRANSDERMAL
Qty: 15 PATCH | Refills: 0 | Status: SHIPPED | OUTPATIENT
Start: 2021-08-01 | End: 2021-08-16 | Stop reason: SDUPTHER

## 2021-07-02 ENCOUNTER — PATIENT MESSAGE (OUTPATIENT)
Dept: FAMILY MEDICINE | Facility: CLINIC | Age: 76
End: 2021-07-02

## 2021-07-06 ENCOUNTER — PATIENT MESSAGE (OUTPATIENT)
Dept: FAMILY MEDICINE | Facility: CLINIC | Age: 76
End: 2021-07-06

## 2021-07-08 ENCOUNTER — OFFICE VISIT (OUTPATIENT)
Dept: FAMILY MEDICINE | Facility: CLINIC | Age: 76
End: 2021-07-08
Payer: MEDICARE

## 2021-07-08 VITALS
WEIGHT: 164.69 LBS | SYSTOLIC BLOOD PRESSURE: 120 MMHG | DIASTOLIC BLOOD PRESSURE: 72 MMHG | HEART RATE: 82 BPM | BODY MASS INDEX: 27.4 KG/M2 | OXYGEN SATURATION: 97 %

## 2021-07-08 DIAGNOSIS — L03.115 CELLULITIS OF RIGHT LEG: ICD-10-CM

## 2021-07-08 DIAGNOSIS — R60.0 EDEMA OF RIGHT LOWER LEG: Primary | ICD-10-CM

## 2021-07-08 PROCEDURE — 99999 PR PBB SHADOW E&M-EST. PATIENT-LVL V: ICD-10-PCS | Mod: PBBFAC,,, | Performed by: FAMILY MEDICINE

## 2021-07-08 PROCEDURE — 1159F MED LIST DOCD IN RCRD: CPT | Mod: S$GLB,,, | Performed by: FAMILY MEDICINE

## 2021-07-08 PROCEDURE — 1159F PR MEDICATION LIST DOCUMENTED IN MEDICAL RECORD: ICD-10-PCS | Mod: S$GLB,,, | Performed by: FAMILY MEDICINE

## 2021-07-08 PROCEDURE — 1125F AMNT PAIN NOTED PAIN PRSNT: CPT | Mod: S$GLB,,, | Performed by: FAMILY MEDICINE

## 2021-07-08 PROCEDURE — 99214 OFFICE O/P EST MOD 30 MIN: CPT | Mod: S$GLB,,, | Performed by: FAMILY MEDICINE

## 2021-07-08 PROCEDURE — 99214 PR OFFICE/OUTPT VISIT, EST, LEVL IV, 30-39 MIN: ICD-10-PCS | Mod: S$GLB,,, | Performed by: FAMILY MEDICINE

## 2021-07-08 PROCEDURE — 1157F PR ADVANCE CARE PLAN OR EQUIV PRESENT IN MEDICAL RECORD: ICD-10-PCS | Mod: S$GLB,,, | Performed by: FAMILY MEDICINE

## 2021-07-08 PROCEDURE — 99999 PR PBB SHADOW E&M-EST. PATIENT-LVL V: CPT | Mod: PBBFAC,,, | Performed by: FAMILY MEDICINE

## 2021-07-08 PROCEDURE — 1157F ADVNC CARE PLAN IN RCRD: CPT | Mod: S$GLB,,, | Performed by: FAMILY MEDICINE

## 2021-07-08 PROCEDURE — 1125F PR PAIN SEVERITY QUANTIFIED, PAIN PRESENT: ICD-10-PCS | Mod: S$GLB,,, | Performed by: FAMILY MEDICINE

## 2021-07-28 ENCOUNTER — SPECIALTY PHARMACY (OUTPATIENT)
Dept: PHARMACY | Facility: CLINIC | Age: 76
End: 2021-07-28

## 2021-08-16 PROBLEM — Z95.0 CARDIAC PACEMAKER IN SITU: Status: ACTIVE | Noted: 2021-08-16

## 2021-08-20 ENCOUNTER — PATIENT MESSAGE (OUTPATIENT)
Dept: PHARMACY | Facility: CLINIC | Age: 76
End: 2021-08-20

## 2021-08-24 ENCOUNTER — SPECIALTY PHARMACY (OUTPATIENT)
Dept: PHARMACY | Facility: CLINIC | Age: 76
End: 2021-08-24

## 2021-09-18 ENCOUNTER — SPECIALTY PHARMACY (OUTPATIENT)
Dept: PHARMACY | Facility: CLINIC | Age: 76
End: 2021-09-18

## 2021-09-27 ENCOUNTER — PATIENT MESSAGE (OUTPATIENT)
Dept: ADMINISTRATIVE | Facility: OTHER | Age: 76
End: 2021-09-27

## 2021-10-05 ENCOUNTER — OFFICE VISIT (OUTPATIENT)
Dept: FAMILY MEDICINE | Facility: CLINIC | Age: 76
End: 2021-10-05
Payer: MEDICARE

## 2021-10-05 DIAGNOSIS — C95.01 ACUTE LEUKEMIA IN REMISSION: ICD-10-CM

## 2021-10-05 DIAGNOSIS — G47.01 INSOMNIA DUE TO MEDICAL CONDITION: ICD-10-CM

## 2021-10-05 DIAGNOSIS — M54.2 CHRONIC NECK PAIN: Primary | ICD-10-CM

## 2021-10-05 DIAGNOSIS — G89.29 CHRONIC NECK PAIN: Primary | ICD-10-CM

## 2021-10-05 DIAGNOSIS — E78.49 OTHER HYPERLIPIDEMIA: ICD-10-CM

## 2021-10-05 PROBLEM — F32.4 MAJOR DEPRESSIVE DISORDER WITH SINGLE EPISODE, IN PARTIAL REMISSION: Status: RESOLVED | Noted: 2020-01-10 | Resolved: 2021-10-05

## 2021-10-05 PROBLEM — G40.909 SEIZURE DISORDER: Status: RESOLVED | Noted: 2019-10-22 | Resolved: 2021-10-05

## 2021-10-05 PROCEDURE — 99499 UNLISTED E&M SERVICE: CPT | Mod: HCNC,95,, | Performed by: INTERNAL MEDICINE

## 2021-10-05 PROCEDURE — 1159F PR MEDICATION LIST DOCUMENTED IN MEDICAL RECORD: ICD-10-PCS | Mod: HCNC,CPTII,95, | Performed by: INTERNAL MEDICINE

## 2021-10-05 PROCEDURE — 1157F PR ADVANCE CARE PLAN OR EQUIV PRESENT IN MEDICAL RECORD: ICD-10-PCS | Mod: HCNC,CPTII,95, | Performed by: INTERNAL MEDICINE

## 2021-10-05 PROCEDURE — 1160F PR REVIEW ALL MEDS BY PRESCRIBER/CLIN PHARMACIST DOCUMENTED: ICD-10-PCS | Mod: HCNC,CPTII,95, | Performed by: INTERNAL MEDICINE

## 2021-10-05 PROCEDURE — 99213 PR OFFICE/OUTPT VISIT, EST, LEVL III, 20-29 MIN: ICD-10-PCS | Mod: HCNC,95,, | Performed by: INTERNAL MEDICINE

## 2021-10-05 PROCEDURE — 99213 OFFICE O/P EST LOW 20 MIN: CPT | Mod: HCNC,95,, | Performed by: INTERNAL MEDICINE

## 2021-10-05 PROCEDURE — 1157F ADVNC CARE PLAN IN RCRD: CPT | Mod: HCNC,CPTII,95, | Performed by: INTERNAL MEDICINE

## 2021-10-05 PROCEDURE — 99499 RISK ADDL DX/OHS AUDIT: ICD-10-PCS | Mod: HCNC,95,, | Performed by: INTERNAL MEDICINE

## 2021-10-05 PROCEDURE — 1159F MED LIST DOCD IN RCRD: CPT | Mod: HCNC,CPTII,95, | Performed by: INTERNAL MEDICINE

## 2021-10-05 PROCEDURE — 1160F RVW MEDS BY RX/DR IN RCRD: CPT | Mod: HCNC,CPTII,95, | Performed by: INTERNAL MEDICINE

## 2021-10-05 RX ORDER — ZOLPIDEM TARTRATE 12.5 MG/1
TABLET, FILM COATED, EXTENDED RELEASE ORAL
Qty: 30 TABLET | Refills: 5 | Status: SHIPPED | OUTPATIENT
Start: 2021-10-05 | End: 2022-04-05

## 2021-10-05 RX ORDER — FENTANYL 25 UG/1
1 PATCH TRANSDERMAL
Qty: 15 PATCH | Refills: 0 | Status: SHIPPED | OUTPATIENT
Start: 2021-11-05 | End: 2022-01-03 | Stop reason: SDUPTHER

## 2021-10-05 RX ORDER — FENTANYL 25 UG/1
1 PATCH TRANSDERMAL
Qty: 15 PATCH | Refills: 0 | Status: SHIPPED | OUTPATIENT
Start: 2021-12-05 | End: 2022-01-03 | Stop reason: SDUPTHER

## 2021-10-05 RX ORDER — FENTANYL 25 UG/1
1 PATCH TRANSDERMAL
Qty: 15 PATCH | Refills: 0 | Status: SHIPPED | OUTPATIENT
Start: 2021-10-05 | End: 2022-01-03 | Stop reason: SDUPTHER

## 2021-10-16 ENCOUNTER — PATIENT MESSAGE (OUTPATIENT)
Dept: PHARMACY | Facility: CLINIC | Age: 76
End: 2021-10-16
Payer: MEDICARE

## 2021-10-20 ENCOUNTER — SPECIALTY PHARMACY (OUTPATIENT)
Dept: PHARMACY | Facility: CLINIC | Age: 76
End: 2021-10-20

## 2021-10-28 ENCOUNTER — PATIENT MESSAGE (OUTPATIENT)
Dept: FAMILY MEDICINE | Facility: CLINIC | Age: 76
End: 2021-10-28
Payer: MEDICARE

## 2021-11-16 ENCOUNTER — SPECIALTY PHARMACY (OUTPATIENT)
Dept: PHARMACY | Facility: CLINIC | Age: 76
End: 2021-11-16
Payer: MEDICARE

## 2021-12-10 ENCOUNTER — TELEPHONE (OUTPATIENT)
Dept: FAMILY MEDICINE | Facility: CLINIC | Age: 76
End: 2021-12-10
Payer: MEDICARE

## 2021-12-17 ENCOUNTER — SPECIALTY PHARMACY (OUTPATIENT)
Dept: PHARMACY | Facility: CLINIC | Age: 76
End: 2021-12-17
Payer: MEDICARE

## 2022-01-03 ENCOUNTER — OFFICE VISIT (OUTPATIENT)
Dept: FAMILY MEDICINE | Facility: CLINIC | Age: 77
End: 2022-01-03
Payer: MEDICARE

## 2022-01-03 ENCOUNTER — TELEPHONE (OUTPATIENT)
Dept: FAMILY MEDICINE | Facility: CLINIC | Age: 77
End: 2022-01-03

## 2022-01-03 DIAGNOSIS — E78.49 OTHER HYPERLIPIDEMIA: ICD-10-CM

## 2022-01-03 DIAGNOSIS — J30.1 NON-SEASONAL ALLERGIC RHINITIS DUE TO POLLEN: ICD-10-CM

## 2022-01-03 DIAGNOSIS — I10 ESSENTIAL HYPERTENSION: ICD-10-CM

## 2022-01-03 DIAGNOSIS — G89.29 CHRONIC NECK PAIN: Primary | ICD-10-CM

## 2022-01-03 DIAGNOSIS — M54.2 CHRONIC NECK PAIN: Primary | ICD-10-CM

## 2022-01-03 DIAGNOSIS — C95.01 ACUTE LEUKEMIA IN REMISSION: ICD-10-CM

## 2022-01-03 DIAGNOSIS — I48.0 PAROXYSMAL ATRIAL FIBRILLATION: ICD-10-CM

## 2022-01-03 PROCEDURE — 99499 UNLISTED E&M SERVICE: CPT | Mod: HCNC,95,, | Performed by: INTERNAL MEDICINE

## 2022-01-03 PROCEDURE — 99499 RISK ADDL DX/OHS AUDIT: ICD-10-PCS | Mod: HCNC,95,, | Performed by: INTERNAL MEDICINE

## 2022-01-03 PROCEDURE — 1160F PR REVIEW ALL MEDS BY PRESCRIBER/CLIN PHARMACIST DOCUMENTED: ICD-10-PCS | Mod: HCNC,CPTII,95, | Performed by: INTERNAL MEDICINE

## 2022-01-03 PROCEDURE — 99214 OFFICE O/P EST MOD 30 MIN: CPT | Mod: HCNC,95,, | Performed by: INTERNAL MEDICINE

## 2022-01-03 PROCEDURE — 1157F PR ADVANCE CARE PLAN OR EQUIV PRESENT IN MEDICAL RECORD: ICD-10-PCS | Mod: HCNC,CPTII,95, | Performed by: INTERNAL MEDICINE

## 2022-01-03 PROCEDURE — 1159F MED LIST DOCD IN RCRD: CPT | Mod: HCNC,CPTII,95, | Performed by: INTERNAL MEDICINE

## 2022-01-03 PROCEDURE — 1159F PR MEDICATION LIST DOCUMENTED IN MEDICAL RECORD: ICD-10-PCS | Mod: HCNC,CPTII,95, | Performed by: INTERNAL MEDICINE

## 2022-01-03 PROCEDURE — 1160F RVW MEDS BY RX/DR IN RCRD: CPT | Mod: HCNC,CPTII,95, | Performed by: INTERNAL MEDICINE

## 2022-01-03 PROCEDURE — 99214 PR OFFICE/OUTPT VISIT, EST, LEVL IV, 30-39 MIN: ICD-10-PCS | Mod: HCNC,95,, | Performed by: INTERNAL MEDICINE

## 2022-01-03 PROCEDURE — 1157F ADVNC CARE PLAN IN RCRD: CPT | Mod: HCNC,CPTII,95, | Performed by: INTERNAL MEDICINE

## 2022-01-03 RX ORDER — SPIRONOLACTONE 25 MG/1
25 TABLET ORAL DAILY
Qty: 30 TABLET | Refills: 11 | Status: SHIPPED | OUTPATIENT
Start: 2022-01-03 | End: 2022-11-27

## 2022-01-03 RX ORDER — FENTANYL 25 UG/1
1 PATCH TRANSDERMAL
Qty: 15 PATCH | Refills: 0 | Status: SHIPPED | OUTPATIENT
Start: 2022-03-03 | End: 2022-03-23

## 2022-01-03 RX ORDER — FENTANYL 25 UG/1
1 PATCH TRANSDERMAL
Qty: 15 PATCH | Refills: 0 | Status: SHIPPED | OUTPATIENT
Start: 2022-01-03 | End: 2022-03-23

## 2022-01-03 RX ORDER — FUROSEMIDE 20 MG/1
40 TABLET ORAL DAILY
Qty: 180 TABLET | Refills: 3 | Status: SHIPPED | OUTPATIENT
Start: 2022-01-03 | End: 2022-02-16

## 2022-01-03 RX ORDER — FENTANYL 25 UG/1
1 PATCH TRANSDERMAL
Qty: 15 PATCH | Refills: 0 | Status: SHIPPED | OUTPATIENT
Start: 2022-02-03 | End: 2022-03-23

## 2022-01-03 RX ORDER — GABAPENTIN 300 MG/1
CAPSULE ORAL
Qty: 360 CAPSULE | Refills: 3 | Status: SHIPPED | OUTPATIENT
Start: 2022-01-03 | End: 2022-07-05 | Stop reason: SDUPTHER

## 2022-01-03 RX ORDER — LEVOCETIRIZINE DIHYDROCHLORIDE 5 MG/1
5 TABLET, FILM COATED ORAL NIGHTLY
Qty: 90 TABLET | Refills: 3 | Status: SHIPPED | OUTPATIENT
Start: 2022-01-03 | End: 2023-02-22

## 2022-01-03 NOTE — PROGRESS NOTES
Patient ID: Adry Owen     Chief Complaint: Follow up for cervicalgia and hypertension      The patient location is: [place]   The chief complaint leading to consultation is: Follow up for cervicalgia and hypertension     Visit type: audiovisual    Face to Face time with patient: 15 mins   15 minutes of total time spent on the encounter, which includes face to face time and non-face to face time preparing to see the patient (eg, review of tests), Obtaining and/or reviewing separately obtained history, Documenting clinical information in the electronic or other health record, Independently interpreting results (not separately reported) and communicating results to the patient/family/caregiver, or Care coordination (not separately reported).         Each patient to whom he or she provides medical services by telemedicine is:  (1) informed of the relationship between the physician and patient and the respective role of any other health care provider with respect to management of the patient; and (2) notified that he or she may decline to receive medical services by telemedicine and may withdraw from such care at any time.    Notes:       HPI: Patient reports Blood Pressure being Uncontrolled lately w/ Systolic Blood Pressure 130-145. Traditionally this reduced with increasing lasix to 40 mg / day, but this hasn't worked. I want to try adding Spironolactone 25 mg to her lasix and stop potassium and check some labs in a week or so.     She also needs a refill on Fentanyl patches to control her cervicalgia- refilled. There may be some supply issues.     I refilled a few other meds as well.     Review of Systems   Constitutional: Negative.  Negative for fever.   HENT: Negative.    Eyes: Negative.    Respiratory: Negative.    Cardiovascular: Negative.  Negative for chest pain.   Gastrointestinal: Negative.  Negative for abdominal pain.   Endocrine: Negative.    Genitourinary: Negative.  Negative for dysuria, hematuria  and pelvic pain.   Musculoskeletal: Positive for back pain.   Skin: Negative.    Allergic/Immunologic: Negative.    Neurological: Negative.  Negative for weakness, numbness and headaches.   Hematological: Negative.    Psychiatric/Behavioral: Negative.           Objective:      Physical Exam   Physical Exam  Constitutional:       Appearance: Normal appearance.   HENT:      Head: Normocephalic and atraumatic.   Pulmonary:      Effort: Pulmonary effort is normal.   Neurological:      General: No focal deficit present.      Mental Status: She is alert and oriented to person, place, and time.   Psychiatric:         Mood and Affect: Mood normal.         Thought Content: Thought content normal.            Vitals: There were no vitals filed for this visit.       Current Outpatient Medications:     acetaminophen (TYLENOL) 500 MG tablet, Take 1,000 mg by mouth every 6 (six) hours as needed for Pain or Temperature greater than., Disp: , Rfl:     alendronate (FOSAMAX) 70 MG tablet, Take 1 tablet (70 mg total) by mouth every 7 days., Disp: 12 tablet, Rfl: 3    ASPIRIN LOW DOSE 81 mg EC tablet, Take 81 mg by mouth once daily. , Disp: , Rfl:     atorvastatin (LIPITOR) 40 MG tablet, TAKE 1 TABLET (40 MG TOTAL) BY MOUTH ONCE DAILY., Disp: 90 tablet, Rfl: 3    ciprofloxacin HCl (CIPRO) 500 MG tablet, Take 500 mg by mouth 2 (two) times daily. x7 days, Disp: , Rfl:     COMBIVENT RESPIMAT  mcg/actuation inhaler, INHALE 1 PUFF EVERY 6 HOURS AS NEEDED FOR WHEEZING OR SHORTNESS OF BREATH (RESCUE), Disp: 12 g, Rfl: 3    cycloSPORINE (RESTASIS) 0.05 % ophthalmic emulsion, Place 0.4 mLs (1 drop total) into both eyes 2 (two) times daily., Disp: 1 vial, Rfl: 5    doxycycline (MONODOX) 100 MG capsule, Take 1 capsule (100 mg total) by mouth 2 (two) times daily., Disp: 20 capsule, Rfl: 0    ergocalciferol (ERGOCALCIFEROL) 50,000 unit Cap, Take 1 capsule (50,000 Units total) by mouth every 7 days., Disp: 24 capsule, Rfl: 3    [START  ON 3/3/2022] fentaNYL (DURAGESIC) 25 mcg/hr, Place 1 patch onto the skin every 48 hours., Disp: 15 patch, Rfl: 0    [START ON 2/3/2022] fentaNYL (DURAGESIC) 25 mcg/hr, Place 1 patch onto the skin every 48 hours., Disp: 15 patch, Rfl: 0    fentaNYL (DURAGESIC) 25 mcg/hr, Place 1 patch onto the skin every 48 hours., Disp: 15 patch, Rfl: 0    fish oil-omega-3 fatty acids 300-1,000 mg capsule, Take 1 capsule by mouth once daily., Disp: , Rfl:     fluticasone propionate (FLONASE) 50 mcg/actuation nasal spray, USE 1 SPRAY IN EACH NOSTRIL EVERY DAY, Disp: 32 g, Rfl: 3    furosemide (LASIX) 20 MG tablet, Take 2 tablets (40 mg total) by mouth once daily., Disp: 180 tablet, Rfl: 3    gabapentin (NEURONTIN) 300 MG capsule, TAKE 4 CAPSULES (1,200 MG TOTAL) BY MOUTH EVERY EVENING. TAKE ALL 4 CAPSULES AT BEDTIME, Disp: 360 capsule, Rfl: 3    hydrocodone-acetaminophen 10-325mg (NORCO)  mg Tab, Take 1 tablet by mouth every 8 (eight) hours as needed. , Disp: , Rfl:     ketoconazole (NIZORAL) 2 % shampoo, Apply topically twice a week., Disp: 120 mL, Rfl: 0    ketorolac (TORADOL) 10 mg tablet, Take 1 tablet (10 mg total) by mouth every 6 (six) hours as needed for Pain., Disp: 30 tablet, Rfl: 0    levocetirizine (XYZAL) 5 MG tablet, Take 1 tablet (5 mg total) by mouth every evening., Disp: 90 tablet, Rfl: 3    levothyroxine (SYNTHROID) 75 MCG tablet, Take 1 tablet (75 mcg total) by mouth once daily., Disp: 90 tablet, Rfl: 3    methen-sod phos-meth blue-hyos (UROGESIC-BLUE) 81.6-40.8-0.12 mg Tab, Take 1 tablet by mouth 2 (two) times daily as needed (bladder spasms)., Disp: 180 tablet, Rfl: 3    methenamine (HIPREX) 1 gram Tab, Take 1 tablet (1 g total) by mouth 2 (two) times daily., Disp: 60 tablet, Rfl: 3    MISCELLANEOUS MEDICAL SUPPLY (COMPRESSION STOCKINGS MISC), , Disp: , Rfl:     mupirocin calcium 2% (BACTROBAN) 2 % cream, Apply to affected area 3 times daily, Disp: 30 g, Rfl: 0    mupirocin calcium 2% nasl  oint (BACTROBAN) 2 % Oint, by Nasal route 2 (two) times daily., Disp: 1 g, Rfl: 0    naproxen (NAPROSYN) 500 MG tablet, Take 1 tablet (500 mg total) by mouth 2 (two) times daily with meals., Disp: 60 tablet, Rfl: 1    nilotinib (TASIGNA) 150 mg capsule, Take 2 capsules by mouth 2 times per day. Take whole with water. Take on an empty stomach, 1 hour before or 2 hours after food., Disp: 112 capsule, Rfl: 11    nitroGLYCERIN (NITROSTAT) 0.4 MG SL tablet, Place 1 tablet (0.4 mg total) under the tongue every 5 (five) minutes as needed for Chest pain., Disp: 25 tablet, Rfl: 2    ondansetron (ZOFRAN) 4 MG tablet, Take 1 tablet (4 mg total) by mouth every 6 (six) hours., Disp: 9 tablet, Rfl: 0    potassium chloride SA (K-DUR,KLOR-CON) 20 MEQ tablet, TAKE 1 TABLET EVERY DAY, Disp: 90 tablet, Rfl: 3    spironolactone (ALDACTONE) 25 MG tablet, Take 1 tablet (25 mg total) by mouth once daily., Disp: 30 tablet, Rfl: 11    XARELTO 20 mg Tab, TAKE 1 TABLET DAILY WITH DINNER OR EVENING MEAL, Disp: 90 tablet, Rfl: 3    zolpidem (AMBIEN CR) 12.5 MG CR tablet, TAKE 1 TABLET BY MOUTH DAILY AT BEDTIME. **SUN BRAND**, Disp: 30 tablet, Rfl: 5   Assessment:       Patient Active Problem List    Diagnosis Date Noted    Cardiac pacemaker in situ 08/16/2021    Urinary tract infection without hematuria 10/06/2020    Lumbosacral radiculopathy 10/06/2020    Acute leukemia not having achieved remission 05/15/2020    Vitamin D deficiency 05/15/2020    Elevated blood pressure reading without diagnosis of hypertension 05/15/2020    JAYLIN on CPAP 04/08/2020    Insomnia due to medical condition 01/10/2020    Atrioventricular block, complete 11/27/2019    Third degree heart block 10/22/2019    Chronic neck pain 10/22/2019    Non-seasonal allergic rhinitis due to pollen 10/22/2019    Hypothyroidism due to Hashimoto's thyroiditis 07/01/2019    AV block, Mobitz 1 06/21/2018    Headache 06/20/2018    Digital mucous cyst of finger of  right hand 06/12/2018    Chronic myeloid leukemia, BCR/ABL-positive, not having achieved remission 05/23/2018    Leukemia not having achieved remission 05/08/2018    Anemia, chronic disease 05/07/2018    Leucocytosis 05/06/2018    Other headache syndrome 05/06/2018    Acute diverticulitis 11/07/2017    Acute lower GI bleeding 11/06/2017    Leukocytosis 11/06/2017    Atrial flutter 05/08/2017    Angioedema 04/11/2017    Urticaria 04/09/2017    Hashimoto encephalopathy 08/03/2016    GI bleeding 08/26/2013    Syncope 02/22/2013    Nuclear sclerosis 07/23/2012    Posterior vitreous detachment 07/23/2012    Essential hypertension 06/01/2012    Coronary artery disease due to calcified coronary lesion 05/11/2012    Other hyperlipidemia 05/11/2012    Paroxysmal atrial fibrillation 05/11/2012    Back pain 05/11/2012    Status post coronary artery stent placement 05/11/2012          Plan:       Adry Owen  was seen today for follow-up and may need lab work.    Diagnoses and all orders for this visit:    Diagnoses and all orders for this visit:    Chronic neck pain  -     fentaNYL (DURAGESIC) 25 mcg/hr; Place 1 patch onto the skin every 48 hours.  -     fentaNYL (DURAGESIC) 25 mcg/hr; Place 1 patch onto the skin every 48 hours.  -     fentaNYL (DURAGESIC) 25 mcg/hr; Place 1 patch onto the skin every 48 hours.  -     gabapentin (NEURONTIN) 300 MG capsule; TAKE 4 CAPSULES (1,200 MG TOTAL) BY MOUTH EVERY EVENING. TAKE ALL 4 CAPSULES AT BEDTIME    Essential hypertension  -     spironolactone (ALDACTONE) 25 MG tablet; Take 1 tablet (25 mg total) by mouth once daily.  -     furosemide (LASIX) 20 MG tablet; Take 2 tablets (40 mg total) by mouth once daily.  -     Basic Metabolic Panel; Future  -     Magnesium; Future  Monitor    Non-seasonal allergic rhinitis due to pollen  -     levocetirizine (XYZAL) 5 MG tablet; Take 1 tablet (5 mg total) by mouth every evening.    Acute leukemia in remission  Per   Julian     Paroxysmal atrial fibrillation  Still getting palpitations     Other hyperlipidemia  Monitor                     Answers for HPI/ROS submitted by the patient on 12/30/2021  Chronicity: chronic  Onset: more than 1 year ago  Frequency: daily  Progression since onset: gradually worsening  Pain location: thoracic spine  Pain quality: stabbing  Pain - numeric: 8/10  Pain is: the same all the time  Aggravated by: bending, position  Stiffness is present: all day  bladder incontinence: No  bowel incontinence: No  leg pain: Yes  paresis: No  paresthesias: Yes  perianal numbness: No  tingling: No  weight loss: No  genital pain: No  Pain severity: severe  Treatments tried: analgesics, acupuncture, brace/corset, chiropractic manipulation, heat  Improvement on treatment: mild

## 2022-01-07 ENCOUNTER — TELEPHONE (OUTPATIENT)
Dept: FAMILY MEDICINE | Facility: CLINIC | Age: 77
End: 2022-01-07
Payer: MEDICARE

## 2022-01-07 NOTE — TELEPHONE ENCOUNTER
----- Message from Michelle Fermin sent at 1/7/2022  3:05 PM CST -----  Type: Needs Medical Advice  Who Called:  patient   Best Call Back Number: [phone]  Additional Information: Per patient requesting lab orders be faxed to [facility] in [place] and a text once they have been faxed-please advise-thank you

## 2022-01-11 ENCOUNTER — PATIENT MESSAGE (OUTPATIENT)
Dept: FAMILY MEDICINE | Facility: CLINIC | Age: 77
End: 2022-01-11
Payer: MEDICARE

## 2022-01-12 ENCOUNTER — SPECIALTY PHARMACY (OUTPATIENT)
Dept: PHARMACY | Facility: CLINIC | Age: 77
End: 2022-01-12
Payer: MEDICARE

## 2022-01-12 ENCOUNTER — DOCUMENTATION ONLY (OUTPATIENT)
Dept: FAMILY MEDICINE | Facility: CLINIC | Age: 77
End: 2022-01-12
Payer: MEDICARE

## 2022-01-12 NOTE — TELEPHONE ENCOUNTER
Specialty Pharmacy - Refill Coordination    Specialty Medication Orders Linked to Encounter    Flowsheet Row Most Recent Value   Medication #1 nilotinib (TASIGNA) 150 mg capsule (Order#[id number], Rx#[id number])          Refill Questions - Documented Responses    Flowsheet Row Most Recent Value   Patient Availability and HIPAA Verification    Does patient want to proceed with activity? Yes   HIPAA/medical authority confirmed? Yes   Relationship to patient of person spoken to? Self   Refill Screening Questions    Changes to allergies? No   Changes to medications? No   New conditions since last clinic visit? No   Unplanned office visit, urgent care, ED, or hospital admission in the last 4 weeks? No   How does patient/caregiver feel medication is working? Excellent   Financial problems or insurance changes? No   How many doses of your specialty medications were missed in the last 4 weeks? 0   Would patient like to speak to a pharmacist? No   When does the patient need to receive the medication? 01/17/22   Refill Delivery Questions    How will the patient receive the medication? Delivery Natasha   When does the patient need to receive the medication? 01/17/22   Shipping Address Home   Address in [facility] confirmed and updated if neccessary? Yes   Expected Copay ($) 9.24   Is the patient able to afford the medication copay? Yes   Payment Method CC on file   Days supply of Refill 28   Supplies needed? No supplies needed   Refill activity completed? Yes   Refill activity plan Refill scheduled   Shipment/Pickup Date: 01/13/22          Current Outpatient Medications   Medication Sig    acetaminophen (TYLENOL) 500 MG tablet Take 1,000 mg by mouth every 6 (six) hours as needed for Pain or Temperature greater than.    alendronate (FOSAMAX) 70 MG tablet Take 1 tablet (70 mg total) by mouth every 7 days.    ASPIRIN LOW DOSE 81 mg EC tablet Take 81 mg by mouth once daily.     atorvastatin (LIPITOR) 40 MG tablet TAKE 1  TABLET (40 MG TOTAL) BY MOUTH ONCE DAILY.    ciprofloxacin HCl (CIPRO) 500 MG tablet Take 500 mg by mouth 2 (two) times daily. x7 days    COMBIVENT RESPIMAT  mcg/actuation inhaler INHALE 1 PUFF EVERY 6 HOURS AS NEEDED FOR WHEEZING OR SHORTNESS OF BREATH (RESCUE)    cycloSPORINE (RESTASIS) 0.05 % ophthalmic emulsion Place 0.4 mLs (1 drop total) into both eyes 2 (two) times daily.    doxycycline (MONODOX) 100 MG capsule Take 1 capsule (100 mg total) by mouth 2 (two) times daily.    ergocalciferol (ERGOCALCIFEROL) 50,000 unit Cap TAKE 1 CAPSULE (50,000 UNITS TOTAL) BY MOUTH EVERY 7 DAYS.    [START ON 3/3/2022] fentaNYL (DURAGESIC) 25 mcg/hr Place 1 patch onto the skin every 48 hours.    [START ON 2/3/2022] fentaNYL (DURAGESIC) 25 mcg/hr Place 1 patch onto the skin every 48 hours.    fentaNYL (DURAGESIC) 25 mcg/hr Place 1 patch onto the skin every 48 hours.    fish oil-omega-3 fatty acids 300-1,000 mg capsule Take 1 capsule by mouth once daily.    fluticasone propionate (FLONASE) 50 mcg/actuation nasal spray USE 1 SPRAY IN EACH NOSTRIL EVERY DAY    furosemide (LASIX) 20 MG tablet Take 2 tablets (40 mg total) by mouth once daily.    gabapentin (NEURONTIN) 300 MG capsule TAKE 4 CAPSULES (1,200 MG TOTAL) BY MOUTH EVERY EVENING. TAKE ALL 4 CAPSULES AT BEDTIME    hydrocodone-acetaminophen 10-325mg (NORCO)  mg Tab Take 1 tablet by mouth every 8 (eight) hours as needed.     ketoconazole (NIZORAL) 2 % shampoo Apply topically twice a week.    ketorolac (TORADOL) 10 mg tablet Take 1 tablet (10 mg total) by mouth every 6 (six) hours as needed for Pain.    levocetirizine (XYZAL) 5 MG tablet Take 1 tablet (5 mg total) by mouth every evening.    levothyroxine (SYNTHROID) 75 MCG tablet Take 1 tablet (75 mcg total) by mouth once daily.    methen-sod phos-meth blue-hyos (UROGESIC-BLUE) 81.6-40.8-0.12 mg Tab Take 1 tablet by mouth 2 (two) times daily as needed (bladder spasms).    methenamine (HIPREX) 1  gram Tab Take 1 tablet (1 g total) by mouth 2 (two) times daily.    MISCELLANEOUS MEDICAL SUPPLY (COMPRESSION STOCKINGS MISC)     mupirocin calcium 2% (BACTROBAN) 2 % cream Apply to affected area 3 times daily    mupirocin calcium 2% nasl oint (BACTROBAN) 2 % Oint by Nasal route 2 (two) times daily.    naproxen (NAPROSYN) 500 MG tablet Take 1 tablet (500 mg total) by mouth 2 (two) times daily with meals.    nilotinib (TASIGNA) 150 mg capsule Take 2 capsules by mouth 2 times per day. Take whole with water. Take on an empty stomach, 1 hour before or 2 hours after food.    nitroGLYCERIN (NITROSTAT) 0.4 MG SL tablet Place 1 tablet (0.4 mg total) under the tongue every 5 (five) minutes as needed for Chest pain.    ondansetron (ZOFRAN) 4 MG tablet Take 1 tablet (4 mg total) by mouth every 6 (six) hours.    potassium chloride SA (K-DUR,KLOR-CON) 20 MEQ tablet TAKE 1 TABLET EVERY DAY    spironolactone (ALDACTONE) 25 MG tablet Take 1 tablet (25 mg total) by mouth once daily.    XARELTO 20 mg Tab TAKE 1 TABLET DAILY WITH DINNER OR EVENING MEAL    zolpidem (AMBIEN CR) 12.5 MG CR tablet TAKE 1 TABLET BY MOUTH DAILY AT BEDTIME. **SUN BRAND**   Last reviewed on 1/3/2022  2:50 PM by Ross Nunes MD    Review of patient's allergies indicates:   Allergen Reactions    Alcohol prep pads [alcohol swabs] Hives and Rash    Diazolidinyl urea Anaphylaxis    Influenza virus vaccines Hives and Swelling    Iodine Anaphylaxis     Other reaction(s): Unknown    Preservative Anaphylaxis     Many different preservatives per pt report    Thimerosal Anaphylaxis    Cephalosporins Rash    Lisinopril Palpitations    Penicillins Rash     Other reaction(s): Unknown    Sotalol Palpitations    Last reviewed on  1/11/2022 2:48 PM by Melina Aguayo    Spoke with Mrs. Owen. She denied missed doses. She estimated a 5-day supply in her possession. She denied any changes in allergies, medical conditions, or medications. Delivery IVONNE  jabari scheduled for 1/13 for delivery on 1/13. $9.24 copay at 004. Confirmed 2 patient identifiers, allergies, medication list, comorbidities, shipping address, and payment information.    Tasks added this encounter   2/7/2022 - Refill Call (Auto Added)   Tasks due within next 3 months   No tasks due.     Lexie Fowler, PharmD  [facility]  [address] [address]  Phone: [phone]  Fax: [phone]

## 2022-01-17 ENCOUNTER — PATIENT MESSAGE (OUTPATIENT)
Dept: FAMILY MEDICINE | Facility: CLINIC | Age: 77
End: 2022-01-17
Payer: MEDICARE

## 2022-01-17 DIAGNOSIS — G89.29 CHRONIC NECK PAIN: Primary | ICD-10-CM

## 2022-01-17 DIAGNOSIS — M54.2 CHRONIC NECK PAIN: Primary | ICD-10-CM

## 2022-01-24 ENCOUNTER — PATIENT MESSAGE (OUTPATIENT)
Dept: FAMILY MEDICINE | Facility: CLINIC | Age: 77
End: 2022-01-24
Payer: MEDICARE

## 2022-01-26 ENCOUNTER — PATIENT MESSAGE (OUTPATIENT)
Dept: FAMILY MEDICINE | Facility: CLINIC | Age: 77
End: 2022-01-26
Payer: MEDICARE

## 2022-01-28 NOTE — TELEPHONE ENCOUNTER
----- Message from Yandy Matos sent at 1/28/2022 12:29 PM CST -----  Contact: patient  Type: Needs Medical Advice  Who Called:  patient  Best Call Back Number: [phone] (home)   Additional Information: patient is requesting to speak to someone about her referral to [facility]. She said she has been going around and around trying to get this taken care of and she needs to get it done. Was waiting on dr hart 3 days ago, she said. Please advise

## 2022-02-09 ENCOUNTER — SPECIALTY PHARMACY (OUTPATIENT)
Dept: PHARMACY | Facility: CLINIC | Age: 77
End: 2022-02-09
Payer: MEDICARE

## 2022-02-09 RX ORDER — AZELASTINE 1 MG/ML
1 SPRAY, METERED NASAL 2 TIMES DAILY
COMMUNITY
End: 2022-07-01 | Stop reason: SDUPTHER

## 2022-02-09 NOTE — TELEPHONE ENCOUNTER
Specialty Pharmacy - Refill Coordination    Specialty Medication Orders Linked to Encounter    Flowsheet Row Most Recent Value   Medication #1 nilotinib (TASIGNA) 150 mg capsule (Order#[id number], Rx#[id number])        Refill Questions - Documented Responses    Flowsheet Row Most Recent Value   Patient Availability and HIPAA Verification    Does patient want to proceed with activity? Yes   HIPAA/medical authority confirmed? Yes   Relationship to patient of person spoken to? Self   Refill Screening Questions    Changes to allergies? No   Changes to medications? Yes  [azelastine & ipratropium NS - advised no DDIs with [place]]   New conditions since last clinic visit? No   Unplanned office visit, urgent care, ED, or hospital admission in the last 4 weeks? No   How does patient/caregiver feel medication is working? Good   Financial problems or insurance changes? No   How many doses of your specialty medications were missed in the last 4 weeks? 0   Would patient like to speak to a pharmacist? No   When does the patient need to receive the medication? 02/15/22   Refill Delivery Questions    How will the patient receive the medication? Delivery Natasha   When does the patient need to receive the medication? 02/15/22   Shipping Address Home   Address in [facility] confirmed and updated if neccessary? Yes   Expected Copay ($) 0   Is the patient able to afford the medication copay? Yes   Payment Method zero copay   Days supply of Refill 28   Supplies needed? No supplies needed   Refill activity completed? Yes   Refill activity plan Refill scheduled   Shipment/Pickup Date: 02/15/22          Current Outpatient Medications   Medication Sig    azelastine (ASTELIN) 137 mcg (0.1 %) nasal spray 1 spray by Nasal route 2 (two) times daily.    IPRATROPIUM BROMIDE NASL by Nasal route.    acetaminophen (TYLENOL) 500 MG tablet Take 1,000 mg by mouth every 6 (six) hours as needed for Pain or Temperature greater than.     alendronate (FOSAMAX) 70 MG tablet Take 1 tablet (70 mg total) by mouth every 7 days.    ASPIRIN LOW DOSE 81 mg EC tablet Take 81 mg by mouth once daily.     atorvastatin (LIPITOR) 40 MG tablet TAKE 1 TABLET (40 MG TOTAL) BY MOUTH ONCE DAILY.    ciprofloxacin HCl (CIPRO) 500 MG tablet Take 500 mg by mouth 2 (two) times daily. x7 days    COMBIVENT RESPIMAT  mcg/actuation inhaler INHALE 1 PUFF EVERY 6 HOURS AS NEEDED FOR WHEEZING OR SHORTNESS OF BREATH (RESCUE)    cycloSPORINE (RESTASIS) 0.05 % ophthalmic emulsion Place 0.4 mLs (1 drop total) into both eyes 2 (two) times daily.    doxycycline (MONODOX) 100 MG capsule Take 1 capsule (100 mg total) by mouth 2 (two) times daily.    ergocalciferol (ERGOCALCIFEROL) 50,000 unit Cap TAKE 1 CAPSULE (50,000 UNITS TOTAL) BY MOUTH EVERY 7 DAYS.    [START ON 3/3/2022] fentaNYL (DURAGESIC) 25 mcg/hr Place 1 patch onto the skin every 48 hours.    fentaNYL (DURAGESIC) 25 mcg/hr Place 1 patch onto the skin every 48 hours.    fentaNYL (DURAGESIC) 25 mcg/hr Place 1 patch onto the skin every 48 hours.    fish oil-omega-3 fatty acids 300-1,000 mg capsule Take 1 capsule by mouth once daily.    fluticasone propionate (FLONASE) 50 mcg/actuation nasal spray USE 1 SPRAY IN EACH NOSTRIL EVERY DAY    furosemide (LASIX) 20 MG tablet Take 2 tablets (40 mg total) by mouth once daily.    gabapentin (NEURONTIN) 300 MG capsule TAKE 4 CAPSULES (1,200 MG TOTAL) BY MOUTH EVERY EVENING. TAKE ALL 4 CAPSULES AT BEDTIME    hydrocodone-acetaminophen 10-325mg (NORCO)  mg Tab Take 1 tablet by mouth every 8 (eight) hours as needed.     ketoconazole (NIZORAL) 2 % shampoo Apply topically twice a week.    ketorolac (TORADOL) 10 mg tablet Take 1 tablet (10 mg total) by mouth every 6 (six) hours as needed for Pain.    levocetirizine (XYZAL) 5 MG tablet Take 1 tablet (5 mg total) by mouth every evening.    levothyroxine (SYNTHROID) 75 MCG tablet Take 1 tablet (75 mcg total) by mouth  once daily.    methen-sod phos-meth blue-hyos (UROGESIC-BLUE) 81.6-40.8-0.12 mg Tab Take 1 tablet by mouth 2 (two) times daily as needed (bladder spasms).    methenamine (HIPREX) 1 gram Tab Take 1 tablet (1 g total) by mouth 2 (two) times daily.    MISCELLANEOUS MEDICAL SUPPLY (COMPRESSION STOCKINGS MISC)     mupirocin calcium 2% (BACTROBAN) 2 % cream Apply to affected area 3 times daily    mupirocin calcium 2% nasl oint (BACTROBAN) 2 % Oint by Nasal route 2 (two) times daily.    naproxen (NAPROSYN) 500 MG tablet Take 1 tablet (500 mg total) by mouth 2 (two) times daily with meals.    nilotinib (TASIGNA) 150 mg capsule Take 2 capsules by mouth 2 times per day. Take whole with water. Take on an empty stomach, 1 hour before or 2 hours after food.    nitroGLYCERIN (NITROSTAT) 0.4 MG SL tablet Place 1 tablet (0.4 mg total) under the tongue every 5 (five) minutes as needed for Chest pain.    ondansetron (ZOFRAN) 4 MG tablet Take 1 tablet (4 mg total) by mouth every 6 (six) hours.    potassium chloride SA (K-DUR,KLOR-CON) 20 MEQ tablet TAKE 1 TABLET EVERY DAY    spironolactone (ALDACTONE) 25 MG tablet Take 1 tablet (25 mg total) by mouth once daily.    XARELTO 20 mg Tab TAKE 1 TABLET DAILY WITH DINNER OR EVENING MEAL    zolpidem (AMBIEN CR) 12.5 MG CR tablet TAKE 1 TABLET BY MOUTH DAILY AT BEDTIME. **SUN BRAND**   Last reviewed on 1/3/2022  2:50 PM by Ross Nunes MD    Review of patient's allergies indicates:   Allergen Reactions    Alcohol prep pads [alcohol swabs] Hives and Rash    Diazolidinyl urea Anaphylaxis    Influenza virus vaccines Hives and Swelling    Iodine Anaphylaxis     Other reaction(s): Unknown    Preservative Anaphylaxis     Many different preservatives per pt report    Thimerosal Anaphylaxis    Cephalosporins Rash    Lisinopril Palpitations    Penicillins Rash     Other reaction(s): Unknown    Sotalol Palpitations    Last reviewed on  1/11/2022 2:48 PM by Melina BRANTLEY  Dede      Tasks added this encounter   3/8/2022 - Refill Call (Auto Added)   Tasks due within next 3 months   No tasks due.     James Hicks, PharmD  [facility]  [address]  [facility] [address]  Phone: [phone]  Fax: [phone]

## 2022-03-08 ENCOUNTER — SPECIALTY PHARMACY (OUTPATIENT)
Dept: PHARMACY | Facility: CLINIC | Age: 77
End: 2022-03-08
Payer: MEDICARE

## 2022-03-09 NOTE — TELEPHONE ENCOUNTER
Specialty Pharmacy - Refill Coordination    Specialty Medication Orders Linked to Encounter    Flowsheet Row Most Recent Value   Medication #1 nilotinib (TASIGNA) 150 mg capsule (Order#[id number], Rx#[id number])          Refill Questions - Documented Responses    Flowsheet Row Most Recent Value   Patient Availability and HIPAA Verification    Does patient want to proceed with activity? Yes   HIPAA/medical authority confirmed? Yes   Relationship to patient of person spoken to? Self   Refill Screening Questions    Changes to allergies? No   Changes to medications? No   New conditions since last clinic visit? No   Unplanned office visit, urgent care, ED, or hospital admission in the last 4 weeks? No   How does patient/caregiver feel medication is working? Good   Financial problems or insurance changes? No   How many doses of your specialty medications were missed in the last 4 weeks? 0   Would patient like to speak to a pharmacist? No   When does the patient need to receive the medication? 03/10/22   Refill Delivery Questions    How will the patient receive the medication? Delivery Natasha   When does the patient need to receive the medication? 03/10/22   Shipping Address Home   Address in [facility] confirmed and updated if neccessary? Yes   Expected Copay ($) 0   Is the patient able to afford the medication copay? Yes   Payment Method zero copay   Days supply of Refill 28   Supplies needed? No supplies needed   Refill activity completed? Yes   Refill activity plan Refill scheduled   Shipment/Pickup Date: 03/10/22          Current Outpatient Medications   Medication Sig    acetaminophen (TYLENOL) 500 MG tablet Take 1,000 mg by mouth every 6 (six) hours as needed for Pain or Temperature greater than.    alendronate (FOSAMAX) 70 MG tablet Take 1 tablet (70 mg total) by mouth every 7 days.    ASPIRIN LOW DOSE 81 mg EC tablet Take 81 mg by mouth once daily.     atorvastatin (LIPITOR) 40 MG tablet TAKE 1 TABLET (40  MG TOTAL) BY MOUTH ONCE DAILY.    azelastine (ASTELIN) 137 mcg (0.1 %) nasal spray 1 spray by Nasal route 2 (two) times daily.    ciprofloxacin HCl (CIPRO) 500 MG tablet Take 500 mg by mouth 2 (two) times daily. x7 days    COMBIVENT RESPIMAT  mcg/actuation inhaler INHALE 1 PUFF EVERY 6 HOURS AS NEEDED FOR WHEEZING OR SHORTNESS OF BREATH (RESCUE)    cycloSPORINE (RESTASIS) 0.05 % ophthalmic emulsion Place 0.4 mLs (1 drop total) into both eyes 2 (two) times daily.    doxycycline (MONODOX) 100 MG capsule Take 1 capsule (100 mg total) by mouth 2 (two) times daily.    ergocalciferol (ERGOCALCIFEROL) 50,000 unit Cap TAKE 1 CAPSULE (50,000 UNITS TOTAL) BY MOUTH EVERY 7 DAYS.    fentaNYL (DURAGESIC) 25 mcg/hr Place 1 patch onto the skin every 48 hours.    fentaNYL (DURAGESIC) 25 mcg/hr Place 1 patch onto the skin every 48 hours.    fentaNYL (DURAGESIC) 25 mcg/hr Place 1 patch onto the skin every 48 hours.    fish oil-omega-3 fatty acids 300-1,000 mg capsule Take 1 capsule by mouth once daily.    fluticasone propionate (FLONASE) 50 mcg/actuation nasal spray USE 1 SPRAY IN EACH NOSTRIL EVERY DAY    gabapentin (NEURONTIN) 300 MG capsule TAKE 4 CAPSULES (1,200 MG TOTAL) BY MOUTH EVERY EVENING. TAKE ALL 4 CAPSULES AT BEDTIME    hydrocodone-acetaminophen 10-325mg (NORCO)  mg Tab Take 1 tablet by mouth every 8 (eight) hours as needed.     IPRATROPIUM BROMIDE NASL by Nasal route.    ketoconazole (NIZORAL) 2 % shampoo Apply topically twice a week.    ketorolac (TORADOL) 10 mg tablet Take 1 tablet (10 mg total) by mouth every 6 (six) hours as needed for Pain.    levocetirizine (XYZAL) 5 MG tablet Take 1 tablet (5 mg total) by mouth every evening.    levothyroxine (SYNTHROID) 75 MCG tablet Take 1 tablet (75 mcg total) by mouth once daily.    methen-sod phos-meth blue-hyos (UROGESIC-BLUE) 81.6-40.8-0.12 mg Tab Take 1 tablet by mouth 2 (two) times daily as needed (bladder spasms).    methenamine (HIPREX)  1 gram Tab Take 1 tablet (1 g total) by mouth 2 (two) times daily.    MISCELLANEOUS MEDICAL SUPPLY (COMPRESSION STOCKINGS MISC)     mupirocin calcium 2% (BACTROBAN) 2 % cream Apply to affected area 3 times daily    mupirocin calcium 2% nasl oint (BACTROBAN) 2 % Oint by Nasal route 2 (two) times daily.    naproxen (NAPROSYN) 500 MG tablet Take 1 tablet (500 mg total) by mouth 2 (two) times daily with meals.    nilotinib (TASIGNA) 150 mg capsule Take 2 capsules by mouth twice a day.  take whole with water. take on an empty stomach 1 hour before or 2 hours after food.    nitroGLYCERIN (NITROSTAT) 0.4 MG SL tablet Place 1 tablet (0.4 mg total) under the tongue every 5 (five) minutes as needed for Chest pain.    ondansetron (ZOFRAN) 4 MG tablet Take 1 tablet (4 mg total) by mouth every 6 (six) hours.    potassium chloride SA (K-DUR,KLOR-CON) 20 MEQ tablet TAKE 1 TABLET EVERY DAY    spironolactone (ALDACTONE) 25 MG tablet Take 1 tablet (25 mg total) by mouth once daily.    XARELTO 20 mg Tab TAKE 1 TABLET DAILY WITH DINNER OR EVENING MEAL    zolpidem (AMBIEN CR) 12.5 MG CR tablet TAKE 1 TABLET BY MOUTH DAILY AT BEDTIME. **SUN BRAND**   Last reviewed on 2/16/2022  3:35 PM by Xavier Ahuja III, MD    Review of patient's allergies indicates:   Allergen Reactions    Alcohol prep pads [alcohol swabs] Hives and Rash    Diazolidinyl urea Anaphylaxis    Influenza virus vaccines Hives and Swelling    Iodine Anaphylaxis     Other reaction(s): Unknown    Preservative Anaphylaxis     Many different preservatives per pt report    Thimerosal Anaphylaxis    Cephalosporins Rash    Lisinopril Palpitations    Penicillins Rash     Other reaction(s): Unknown    Sotalol Palpitations    Last reviewed on  2/16/2022 3:35 PM by Xavier Ahuja      Tasks added this encounter   3/31/2022 - Refill Call (Auto Added)   Tasks due within next 3 months   No tasks due.     Velma Perdomo [facility]  [id number]  [facility] [address]  Phone: [phone]  Fax: [phone]

## 2022-03-21 ENCOUNTER — TELEPHONE (OUTPATIENT)
Dept: FAMILY MEDICINE | Facility: CLINIC | Age: 77
End: 2022-03-21
Payer: MEDICARE

## 2022-03-21 DIAGNOSIS — R31.0 GROSS HEMATURIA: Primary | ICD-10-CM

## 2022-03-21 NOTE — TELEPHONE ENCOUNTER
----- Message from Swathi Keith sent at 3/21/2022 12:36 PM CDT -----  Type: Needs Medical Advice  Who Called:  Patient  Best Call Back Number:   Additional Information: Calling to speak with the nurse about her possibly having a bladder or urinary tract infection. She is also wanting to get more urine sample cups to be left at the front for her to

## 2022-03-21 NOTE — TELEPHONE ENCOUNTER
Patient requesting order for urinalysis. She is having blood in her urine. She states she is only having blod in her urine. No burning, No odor. Told her she really needs to be seen for an appointment. She refused and stated she just needs some antibiotics. She brought by the specimen already. Just need an order.   Lab already has the urine specimen. She brought it today around 2:00 the lab only holds it for 24 hours.   Depending on when the order is placed and lab is notified. She may need to bring a new sample. Order pended. Please advise

## 2022-03-22 ENCOUNTER — PATIENT MESSAGE (OUTPATIENT)
Dept: ADMINISTRATIVE | Facility: OTHER | Age: 77
End: 2022-03-22
Payer: MEDICARE

## 2022-03-22 ENCOUNTER — PATIENT MESSAGE (OUTPATIENT)
Dept: FAMILY MEDICINE | Facility: CLINIC | Age: 77
End: 2022-03-22
Payer: MEDICARE

## 2022-03-22 ENCOUNTER — LAB VISIT (OUTPATIENT)
Dept: LAB | Facility: HOSPITAL | Age: 77
End: 2022-03-22
Attending: INTERNAL MEDICINE
Payer: MEDICARE

## 2022-03-22 DIAGNOSIS — R31.0 GROSS HEMATURIA: ICD-10-CM

## 2022-03-22 PROCEDURE — 81003 URINALYSIS AUTO W/O SCOPE: CPT | Mod: PO | Performed by: INTERNAL MEDICINE

## 2022-03-22 NOTE — TELEPHONE ENCOUNTER
I placed the order for the urinalysis, but she absolutely needs an office visit ASAP for imaging to look for bladder or kidney cancer.

## 2022-03-23 ENCOUNTER — HOSPITAL ENCOUNTER (OUTPATIENT)
Dept: RADIOLOGY | Facility: HOSPITAL | Age: 77
Discharge: HOME | End: 2022-03-23
Attending: INTERNAL MEDICINE
Payer: MEDICARE

## 2022-03-23 ENCOUNTER — OFFICE VISIT (OUTPATIENT)
Dept: FAMILY MEDICINE | Facility: CLINIC | Age: 77
End: 2022-03-23
Payer: MEDICARE

## 2022-03-23 DIAGNOSIS — M54.2 CHRONIC NECK PAIN: ICD-10-CM

## 2022-03-23 DIAGNOSIS — R31.0 GROSS HEMATURIA: ICD-10-CM

## 2022-03-23 DIAGNOSIS — G89.29 CHRONIC NECK PAIN: ICD-10-CM

## 2022-03-23 DIAGNOSIS — R31.0 GROSS HEMATURIA: Primary | ICD-10-CM

## 2022-03-23 PROBLEM — D72.829 LEUKOCYTOSIS: Status: RESOLVED | Noted: 2017-11-06 | Resolved: 2022-03-23

## 2022-03-23 PROBLEM — G44.89 OTHER HEADACHE SYNDROME: Status: RESOLVED | Noted: 2018-05-06 | Resolved: 2022-03-23

## 2022-03-23 PROCEDURE — 1160F RVW MEDS BY RX/DR IN RCRD: CPT | Mod: CPTII,95,, | Performed by: INTERNAL MEDICINE

## 2022-03-23 PROCEDURE — 99214 PR OFFICE/OUTPT VISIT, EST, LEVL IV, 30-39 MIN: ICD-10-PCS | Mod: 95,,, | Performed by: INTERNAL MEDICINE

## 2022-03-23 PROCEDURE — 99499 RISK ADDL DX/OHS AUDIT: ICD-10-PCS | Mod: HCNC,95,, | Performed by: INTERNAL MEDICINE

## 2022-03-23 PROCEDURE — 1157F ADVNC CARE PLAN IN RCRD: CPT | Mod: CPTII,95,, | Performed by: INTERNAL MEDICINE

## 2022-03-23 PROCEDURE — 99499 UNLISTED E&M SERVICE: CPT | Mod: HCNC,95,, | Performed by: INTERNAL MEDICINE

## 2022-03-23 PROCEDURE — 76770 US EXAM ABDO BACK WALL COMP: CPT | Mod: 26,,, | Performed by: RADIOLOGY

## 2022-03-23 PROCEDURE — 76770 US EXAM ABDO BACK WALL COMP: CPT | Mod: TC,PO

## 2022-03-23 PROCEDURE — 1160F PR REVIEW ALL MEDS BY PRESCRIBER/CLIN PHARMACIST DOCUMENTED: ICD-10-PCS | Mod: CPTII,95,, | Performed by: INTERNAL MEDICINE

## 2022-03-23 PROCEDURE — 1157F PR ADVANCE CARE PLAN OR EQUIV PRESENT IN MEDICAL RECORD: ICD-10-PCS | Mod: CPTII,95,, | Performed by: INTERNAL MEDICINE

## 2022-03-23 PROCEDURE — 99214 OFFICE O/P EST MOD 30 MIN: CPT | Mod: 95,,, | Performed by: INTERNAL MEDICINE

## 2022-03-23 PROCEDURE — 1159F PR MEDICATION LIST DOCUMENTED IN MEDICAL RECORD: ICD-10-PCS | Mod: CPTII,95,, | Performed by: INTERNAL MEDICINE

## 2022-03-23 PROCEDURE — 76770 US RETROPERITONEAL COMPLETE: ICD-10-PCS | Mod: 26,,, | Performed by: RADIOLOGY

## 2022-03-23 PROCEDURE — 1159F MED LIST DOCD IN RCRD: CPT | Mod: CPTII,95,, | Performed by: INTERNAL MEDICINE

## 2022-03-23 RX ORDER — FENTANYL 25 UG/1
1 PATCH TRANSDERMAL
Qty: 15 PATCH | Refills: 0 | Status: SHIPPED | OUTPATIENT
Start: 2022-04-04 | End: 2022-07-01

## 2022-03-23 RX ORDER — FENTANYL 25 UG/1
1 PATCH TRANSDERMAL
Qty: 15 PATCH | Refills: 0 | Status: SHIPPED | OUTPATIENT
Start: 2022-06-04 | End: 2022-07-01

## 2022-03-23 RX ORDER — FENTANYL 25 UG/1
1 PATCH TRANSDERMAL
Qty: 15 PATCH | Refills: 0 | Status: SHIPPED | OUTPATIENT
Start: 2022-05-04 | End: 2022-07-01

## 2022-03-23 NOTE — PROGRESS NOTES
Patient ID: Adry Owen     Chief Complaint: Hematuria     The patient location is: [place]   The chief complaint leading to consultation is: Hematuria     Visit type: audiovisual    Face to Face time with patient: 10 mins   15 minutes of total time spent on the encounter, which includes face to face time and non-face to face time preparing to see the patient (eg, review of tests), Obtaining and/or reviewing separately obtained history, Documenting clinical information in the electronic or other health record, Independently interpreting results (not separately reported) and communicating results to the patient/family/caregiver, or Care coordination (not separately reported).         Each patient to whom he or she provides medical services by telemedicine is:  (1) informed of the relationship between the physician and patient and the respective role of any other health care provider with respect to management of the patient; and (2) notified that he or she may decline to receive medical services by telemedicine and may withdraw from such care at any time.    Notes:       HPI: Patient Complains of gross, painless hematuria starting a few days ago that has improved. She dropped off a urine specimen and urinalysis was clear, but by her admission, her urine does clear after a few instances of urination. I would like to repeat a urinalysis and urine culture on a sample that looks bloody. I will also get a retroperitoneal Ultrasound to look for masses. She thinks it may be due to Xarelto and I tend to agree.     She does need a refill on her Fentanyl patches that I will order today. She will need a refill on Ambien later.     She had an episode of prolonged Afib associated with elevated Heart rate and Chest Pain. She did tell Leigha Hurley and she has a stress test soon.     Review of Systems   Constitutional: Negative.  Negative for chills.   HENT: Negative.    Eyes: Negative.    Respiratory: Negative.     Cardiovascular: Negative.    Gastrointestinal: Negative.  Negative for constipation, nausea and vomiting.   Endocrine: Negative.    Genitourinary: Positive for hematuria. Negative for flank pain, frequency and urgency.   Musculoskeletal: Negative.    Skin: Negative.  Negative for rash.   Allergic/Immunologic: Negative.    Neurological: Negative.    Hematological: Negative.    Psychiatric/Behavioral: Negative.           Objective:      Physical Exam   Physical Exam  Constitutional:       Appearance: Normal appearance.   Cardiovascular:      Pulses: Normal pulses.   Neurological:      General: No focal deficit present.      Mental Status: She is alert and oriented to person, place, and time.   Psychiatric:         Mood and Affect: Mood normal.         Thought Content: Thought content normal.            Vitals: There were no vitals filed for this visit.       Current Outpatient Medications:     acetaminophen (TYLENOL) 500 MG tablet, Take 1,000 mg by mouth every 6 (six) hours as needed for Pain or Temperature greater than., Disp: , Rfl:     alendronate (FOSAMAX) 70 MG tablet, Take 1 tablet (70 mg total) by mouth every 7 days., Disp: 12 tablet, Rfl: 3    ASPIRIN LOW DOSE 81 mg EC tablet, Take 81 mg by mouth once daily. , Disp: , Rfl:     atorvastatin (LIPITOR) 40 MG tablet, TAKE 1 TABLET (40 MG TOTAL) BY MOUTH ONCE DAILY., Disp: 90 tablet, Rfl: 3    azelastine (ASTELIN) 137 mcg (0.1 %) nasal spray, 1 spray by Nasal route 2 (two) times daily., Disp: , Rfl:     ciprofloxacin HCl (CIPRO) 500 MG tablet, Take 500 mg by mouth 2 (two) times daily. x7 days, Disp: , Rfl:     COMBIVENT RESPIMAT  mcg/actuation inhaler, INHALE 1 PUFF EVERY 6 HOURS AS NEEDED FOR WHEEZING OR SHORTNESS OF BREATH (RESCUE), Disp: 12 g, Rfl: 3    cycloSPORINE (RESTASIS) 0.05 % ophthalmic emulsion, Place 0.4 mLs (1 drop total) into both eyes 2 (two) times daily., Disp: 1 vial, Rfl: 5    doxycycline (MONODOX) 100 MG capsule, Take 1 capsule  (100 mg total) by mouth 2 (two) times daily., Disp: 20 capsule, Rfl: 0    ergocalciferol (ERGOCALCIFEROL) 50,000 unit Cap, TAKE 1 CAPSULE (50,000 UNITS TOTAL) BY MOUTH EVERY 7 DAYS., Disp: 12 capsule, Rfl: 3    [START ON 6/4/2022] fentaNYL (DURAGESIC) 25 mcg/hr, Place 1 patch onto the skin every 48 hours., Disp: 15 patch, Rfl: 0    [START ON 5/4/2022] fentaNYL (DURAGESIC) 25 mcg/hr, Place 1 patch onto the skin every 48 hours., Disp: 15 patch, Rfl: 0    [START ON 4/4/2022] fentaNYL (DURAGESIC) 25 mcg/hr, Place 1 patch onto the skin every 48 hours., Disp: 15 patch, Rfl: 0    fish oil-omega-3 fatty acids 300-1,000 mg capsule, Take 1 capsule by mouth once daily., Disp: , Rfl:     fluticasone propionate (FLONASE) 50 mcg/actuation nasal spray, USE 1 SPRAY IN EACH NOSTRIL EVERY DAY, Disp: 32 g, Rfl: 3    gabapentin (NEURONTIN) 300 MG capsule, TAKE 4 CAPSULES (1,200 MG TOTAL) BY MOUTH EVERY EVENING. TAKE ALL 4 CAPSULES AT BEDTIME, Disp: 360 capsule, Rfl: 3    hydrocodone-acetaminophen 10-325mg (NORCO)  mg Tab, Take 1 tablet by mouth every 8 (eight) hours as needed. , Disp: , Rfl:     IPRATROPIUM BROMIDE NASL, by Nasal route., Disp: , Rfl:     ketoconazole (NIZORAL) 2 % shampoo, Apply topically twice a week., Disp: 120 mL, Rfl: 0    ketorolac (TORADOL) 10 mg tablet, Take 1 tablet (10 mg total) by mouth every 6 (six) hours as needed for Pain., Disp: 30 tablet, Rfl: 0    levocetirizine (XYZAL) 5 MG tablet, Take 1 tablet (5 mg total) by mouth every evening., Disp: 90 tablet, Rfl: 3    levothyroxine (SYNTHROID) 75 MCG tablet, Take 1 tablet (75 mcg total) by mouth once daily., Disp: 90 tablet, Rfl: 3    methen-sod phos-meth blue-hyos (UROGESIC-BLUE) 81.6-40.8-0.12 mg Tab, Take 1 tablet by mouth 2 (two) times daily as needed (bladder spasms)., Disp: 180 tablet, Rfl: 3    methenamine (HIPREX) 1 gram Tab, Take 1 tablet (1 g total) by mouth 2 (two) times daily., Disp: 60 tablet, Rfl: 3    MISCELLANEOUS MEDICAL  SUPPLY (COMPRESSION STOCKINGS MISC), , Disp: , Rfl:     mupirocin calcium 2% nasl oint (BACTROBAN) 2 % Oint, by Nasal route 2 (two) times daily., Disp: 1 g, Rfl: 0    naproxen (NAPROSYN) 500 MG tablet, Take 1 tablet (500 mg total) by mouth 2 (two) times daily with meals., Disp: 60 tablet, Rfl: 1    nilotinib (TASIGNA) 150 mg capsule, Take 2 capsules by mouth twice a day.  take whole with water. take on an empty stomach 1 hour before or 2 hours after food., Disp: 992 capsule, Rfl: 0    nitroGLYCERIN (NITROSTAT) 0.4 MG SL tablet, Place 1 tablet (0.4 mg total) under the tongue every 5 (five) minutes as needed for Chest pain., Disp: 25 tablet, Rfl: 2    ondansetron (ZOFRAN) 4 MG tablet, Take 1 tablet (4 mg total) by mouth every 6 (six) hours., Disp: 9 tablet, Rfl: 0    potassium chloride SA (K-DUR,KLOR-CON) 20 MEQ tablet, TAKE 1 TABLET EVERY DAY, Disp: 90 tablet, Rfl: 3    spironolactone (ALDACTONE) 25 MG tablet, Take 1 tablet (25 mg total) by mouth once daily., Disp: 30 tablet, Rfl: 11    XARELTO 20 mg Tab, TAKE 1 TABLET DAILY WITH DINNER OR EVENING MEAL, Disp: 90 tablet, Rfl: 3    zolpidem (AMBIEN CR) 12.5 MG CR tablet, TAKE 1 TABLET BY MOUTH DAILY AT BEDTIME. **SUN BRAND**, Disp: 30 tablet, Rfl: 5   Assessment:       Patient Active Problem List    Diagnosis Date Noted    Cardiac pacemaker in situ 08/16/2021    Urinary tract infection without hematuria 10/06/2020    Lumbosacral radiculopathy 10/06/2020    Acute leukemia not having achieved remission 05/15/2020    Vitamin D deficiency 05/15/2020    Elevated blood pressure reading without diagnosis of hypertension 05/15/2020    JAYLIN on CPAP 04/08/2020    Insomnia due to medical condition 01/10/2020    Atrioventricular block, complete 11/27/2019    Third degree heart block 10/22/2019    Chronic neck pain 10/22/2019    Non-seasonal allergic rhinitis due to pollen 10/22/2019    Hypothyroidism due to Hashimoto's thyroiditis 07/01/2019    AV block,  Aritz 1 06/21/2018    Headache 06/20/2018    Digital mucous cyst of finger of right hand 06/12/2018    Chronic myeloid leukemia, BCR/ABL-positive, not having achieved remission 05/23/2018    Leukemia not having achieved remission 05/08/2018    Anemia, chronic disease 05/07/2018    Leucocytosis 05/06/2018    Acute diverticulitis 11/07/2017    Acute lower GI bleeding 11/06/2017    Atrial flutter 05/08/2017    Angioedema 04/11/2017    Urticaria 04/09/2017    Hashimoto encephalopathy 08/03/2016    GI bleeding 08/26/2013    Syncope 02/22/2013    Nuclear sclerosis 07/23/2012    Posterior vitreous detachment 07/23/2012    Essential hypertension 06/01/2012    Coronary artery disease due to calcified coronary lesion 05/11/2012    Other hyperlipidemia 05/11/2012    Paroxysmal atrial fibrillation 05/11/2012    Back pain 05/11/2012    Status post coronary artery stent placement 05/11/2012          Plan:       Adry Owen  was seen today for follow-up and may need lab work.    Diagnoses and all orders for this visit:    Diagnoses and all orders for this visit:    Gross hematuria  -     Urinalysis; Future  -     Urinalysis Microscopic; Future  -     Urine culture; Future  -     US Retroperitoneal Complete; Future    Chronic neck pain  -     fentaNYL (DURAGESIC) 25 mcg/hr; Place 1 patch onto the skin every 48 hours.  -     fentaNYL (DURAGESIC) 25 mcg/hr; Place 1 patch onto the skin every 48 hours.  -     fentaNYL (DURAGESIC) 25 mcg/hr; Place 1 patch onto the skin every 48 hours.                    Answers for HPI/ROS submitted by the patient on 3/22/2022  Onset: yesterday  Frequency: every urination  Pain - numeric: 4/10  Fever: no fever  Sexually active?: No  History of pyelonephritis?: No  discharge: No  hesitancy: No  possible pregnancy: No  sweats: No  weight loss: No  withholding: No  behavior changes: No  Treatments tried: acetaminophen  Improvement on treatment: no relief  Pain severity: no  pain  catheterization: No  diabetes insipidus: No  diabetes mellitus: No  genitourinary reflux: No  hypertension: Yes  single kidney: No  STD: No  urinary stasis: No  urological procedure: No  kidney stones: No

## 2022-03-24 ENCOUNTER — PATIENT MESSAGE (OUTPATIENT)
Dept: FAMILY MEDICINE | Facility: CLINIC | Age: 77
End: 2022-03-24
Payer: MEDICARE

## 2022-03-24 ENCOUNTER — LAB VISIT (OUTPATIENT)
Dept: LAB | Facility: HOSPITAL | Age: 77
End: 2022-03-24
Attending: INTERNAL MEDICINE
Payer: MEDICARE

## 2022-03-24 DIAGNOSIS — R31.0 GROSS HEMATURIA: ICD-10-CM

## 2022-03-24 DIAGNOSIS — N30.01 ACUTE CYSTITIS WITH HEMATURIA: Primary | ICD-10-CM

## 2022-03-24 LAB
BACTERIA #/AREA URNS HPF: ABNORMAL /HPF
BILIRUB UR QL STRIP: NEGATIVE
CLARITY UR: ABNORMAL
COLOR UR: YELLOW
GLUCOSE UR QL STRIP: NEGATIVE
HGB UR QL STRIP: ABNORMAL
KETONES UR QL STRIP: NEGATIVE
LEUKOCYTE ESTERASE UR QL STRIP: NEGATIVE
MICROSCOPIC COMMENT: ABNORMAL
NITRITE UR QL STRIP: POSITIVE
PH UR STRIP: 5 [PH] (ref 5–8)
PROT UR QL STRIP: NEGATIVE
RBC #/AREA URNS HPF: 25 /HPF (ref 0–4)
SP GR UR STRIP: 1.02 (ref 1–1.03)
URN SPEC COLLECT METH UR: ABNORMAL
WBC #/AREA URNS HPF: 6 /HPF (ref 0–5)

## 2022-03-24 PROCEDURE — 87186 SC STD MICRODIL/AGAR DIL: CPT | Performed by: INTERNAL MEDICINE

## 2022-03-24 PROCEDURE — 87077 CULTURE AEROBIC IDENTIFY: CPT | Performed by: INTERNAL MEDICINE

## 2022-03-24 PROCEDURE — 81000 URINALYSIS NONAUTO W/SCOPE: CPT | Mod: PO | Performed by: INTERNAL MEDICINE

## 2022-03-24 PROCEDURE — 87086 URINE CULTURE/COLONY COUNT: CPT | Performed by: INTERNAL MEDICINE

## 2022-03-24 PROCEDURE — 87088 URINE BACTERIA CULTURE: CPT | Performed by: INTERNAL MEDICINE

## 2022-03-24 RX ORDER — CYCLOSPORINE 0.5 MG/ML
1 EMULSION OPHTHALMIC 2 TIMES DAILY
Qty: 1 EACH | Refills: 5 | Status: SHIPPED | OUTPATIENT
Start: 2022-03-24 | End: 2022-03-25 | Stop reason: SDUPTHER

## 2022-03-24 NOTE — TELEPHONE ENCOUNTER
----- Message from Kezia Mcgee sent at 3/24/2022  2:07 PM CDT -----  Contact: Pharmacy  Type:  RX Refill Request    Who Called: [facility]    Refill or New Rx: refill    RX Name and Strength: cycloSPORINE (RESTASIS) 0.05 % ophthalmic emulsion    How is the patient currently taking it? (ex. 1XDay)    Is this a 30 day or 90 day RX:    Preferred Pharmacy with phone number:     [facility] [place] [address] [place] [address] [address] [facility] [address]  Phone: [phone] Fax: [phone]      Local or Mail Order:    Ordering Provider:    Would the patient rather a call back or a response via [facility]?    Best Call Back Number: [phone]    Additional Information: patient states this Rx was supposed to be sent with other orders; please advise

## 2022-03-25 ENCOUNTER — TELEPHONE (OUTPATIENT)
Dept: FAMILY MEDICINE | Facility: CLINIC | Age: 77
End: 2022-03-25
Payer: MEDICARE

## 2022-03-25 ENCOUNTER — PATIENT MESSAGE (OUTPATIENT)
Dept: FAMILY MEDICINE | Facility: CLINIC | Age: 77
End: 2022-03-25
Payer: MEDICARE

## 2022-03-25 RX ORDER — CIPROFLOXACIN 500 MG/1
500 TABLET ORAL 2 TIMES DAILY
Qty: 14 TABLET | Refills: 0 | Status: SHIPPED | OUTPATIENT
Start: 2022-03-25 | End: 2022-04-01

## 2022-03-25 NOTE — TELEPHONE ENCOUNTER
----- Message from Ny Lang sent at 3/25/2022  1:39 PM CDT -----  Contact: Mateusz  Type:  RX Refill Request    Who Called: [facility]  Refill or New Rx: New RX  RX Name and Strength: cycloSPORINE (RESTASIS) 0.05 % ophthalmic emulsion  How is the patient currently taking it? (ex. 1XDay): Route: Place 1 drop into both eyes 2 (two) times daily  Is this a 30 day or 90 day RX: 1 each  Preferred Pharmacy with phone number: [facility], [address] [place] [address] PH: [phone]  Best Call Back Number:  [phone]  Additional Information: pt no longer uses [facility]

## 2022-03-25 NOTE — TELEPHONE ENCOUNTER
Can you please resend the prescription for the restatsis. It looks like it was printed instead of electronically sent. Medication pended. Please advise

## 2022-03-26 RX ORDER — CYCLOSPORINE 0.5 MG/ML
1 EMULSION OPHTHALMIC 2 TIMES DAILY
Qty: 60 EACH | Refills: 11 | Status: SHIPPED | OUTPATIENT
Start: 2022-03-26 | End: 2023-02-21

## 2022-03-27 LAB — BACTERIA UR CULT: ABNORMAL

## 2022-03-29 ENCOUNTER — PATIENT MESSAGE (OUTPATIENT)
Dept: FAMILY MEDICINE | Facility: CLINIC | Age: 77
End: 2022-03-29
Payer: MEDICARE

## 2022-04-01 ENCOUNTER — PATIENT MESSAGE (OUTPATIENT)
Dept: OTHER | Facility: OTHER | Age: 77
End: 2022-04-01
Payer: MEDICARE

## 2022-04-01 ENCOUNTER — SPECIALTY PHARMACY (OUTPATIENT)
Dept: PHARMACY | Facility: CLINIC | Age: 77
End: 2022-04-01
Payer: MEDICARE

## 2022-04-01 NOTE — TELEPHONE ENCOUNTER
Refill shipment canceled per patient's request to wait after her appointment on Tuesday. Will have [facility] reach out on Wednesday after 12.

## 2022-04-01 NOTE — TELEPHONE ENCOUNTER
Specialty Pharmacy - Refill Coordination    Specialty Medication Orders Linked to Encounter    Flowsheet Row Most Recent Value   Medication #1 nilotinib (TASIGNA) 150 mg capsule (Order#[id number], Rx#[id number])          Refill Questions - Documented Responses    Flowsheet Row Most Recent Value   Patient Availability and HIPAA Verification    Does patient want to proceed with activity? Yes   HIPAA/medical authority confirmed? Yes   Relationship to patient of person spoken to? Self   Refill Screening Questions    Changes to allergies? No   Changes to medications? No   New conditions since last clinic visit? Yes  [heart block]   Unplanned office visit, urgent care, ED, or hospital admission in the last 4 weeks? No   How does patient/caregiver feel medication is working? Good   Financial problems or insurance changes? No   How many doses of your specialty medications were missed in the last 4 weeks? 0   Would patient like to speak to a pharmacist? No   When does the patient need to receive the medication? 04/06/22   Refill Delivery Questions    How will the patient receive the medication? Delivery Natasha   When does the patient need to receive the medication? 04/06/22   Shipping Address Home   Address in [facility] confirmed and updated if neccessary? Yes   Expected Copay ($) 0   Is the patient able to afford the medication copay? Yes   Payment Method zero copay   Days supply of Refill 28   Supplies needed? No supplies needed   Refill activity completed? Yes   Refill activity plan Refill scheduled   Shipment/Pickup Date: 04/06/22          Current Outpatient Medications   Medication Sig    alendronate (FOSAMAX) 70 MG tablet Take 1 tablet (70 mg total) by mouth every 7 days.    ASPIRIN LOW DOSE 81 mg EC tablet Take 81 mg by mouth once daily.     atorvastatin (LIPITOR) 40 MG tablet TAKE 1 TABLET (40 MG TOTAL) BY MOUTH ONCE DAILY.    azelastine (ASTELIN) 137 mcg (0.1 %) nasal spray 1 spray by Nasal route 2 (two)  times daily.    ciprofloxacin HCl (CIPRO) 500 MG tablet Take 1 tablet (500 mg total) by mouth 2 (two) times daily. for 7 days    COMBIVENT RESPIMAT  mcg/actuation inhaler INHALE 1 PUFF EVERY 6 HOURS AS NEEDED FOR WHEEZING OR SHORTNESS OF BREATH (RESCUE)    cycloSPORINE (RESTASIS) 0.05 % ophthalmic emulsion Place 1 drop into both eyes 2 (two) times daily.    ergocalciferol (ERGOCALCIFEROL) 50,000 unit Cap TAKE 1 CAPSULE (50,000 UNITS TOTAL) BY MOUTH EVERY 7 DAYS.    [START ON 6/4/2022] fentaNYL (DURAGESIC) 25 mcg/hr Place 1 patch onto the skin every 48 hours.    [START ON 5/4/2022] fentaNYL (DURAGESIC) 25 mcg/hr Place 1 patch onto the skin every 48 hours.    [START ON 4/4/2022] fentaNYL (DURAGESIC) 25 mcg/hr Place 1 patch onto the skin every 48 hours.    fish oil-omega-3 fatty acids 300-1,000 mg capsule Take 1 capsule by mouth once daily.    fluticasone propionate (FLONASE) 50 mcg/actuation nasal spray USE 1 SPRAY IN EACH NOSTRIL EVERY DAY    gabapentin (NEURONTIN) 300 MG capsule TAKE 4 CAPSULES (1,200 MG TOTAL) BY MOUTH EVERY EVENING. TAKE ALL 4 CAPSULES AT BEDTIME    hydrocodone-acetaminophen 10-325mg (NORCO)  mg Tab Take 1 tablet by mouth every 8 (eight) hours as needed.     IPRATROPIUM BROMIDE NASL by Nasal route.    ketoconazole (NIZORAL) 2 % shampoo Apply topically twice a week.    ketorolac (TORADOL) 10 mg tablet Take 1 tablet (10 mg total) by mouth every 6 (six) hours as needed for Pain.    levocetirizine (XYZAL) 5 MG tablet Take 1 tablet (5 mg total) by mouth every evening.    levothyroxine (SYNTHROID) 75 MCG tablet Take 1 tablet (75 mcg total) by mouth once daily.    methen-sod phos-meth blue-hyos (UROGESIC-BLUE) 81.6-40.8-0.12 mg Tab Take 1 tablet by mouth 2 (two) times daily as needed (bladder spasms).    MISCELLANEOUS MEDICAL SUPPLY (COMPRESSION STOCKINGS MISC)     mupirocin calcium 2% nasl oint (BACTROBAN) 2 % Oint by Nasal route 2 (two) times daily.    naproxen  (NAPROSYN) 500 MG tablet Take 1 tablet (500 mg total) by mouth 2 (two) times daily with meals.    nilotinib (TASIGNA) 150 mg capsule Take 2 capsules by mouth twice a day.  take whole with water. take on an empty stomach 1 hour before or 2 hours after food.    nitroGLYCERIN (NITROSTAT) 0.4 MG SL tablet Place 1 tablet (0.4 mg total) under the tongue every 5 (five) minutes as needed for Chest pain.    ondansetron (ZOFRAN) 4 MG tablet Take 1 tablet (4 mg total) by mouth every 6 (six) hours.    spironolactone (ALDACTONE) 25 MG tablet Take 1 tablet (25 mg total) by mouth once daily.    XARELTO 20 mg Tab TAKE 1 TABLET DAILY WITH DINNER OR EVENING MEAL    zolpidem (AMBIEN CR) 12.5 MG CR tablet TAKE 1 TABLET BY MOUTH DAILY AT BEDTIME. **SUN BRAND**   Last reviewed on 3/24/2022  2:23 PM by Claudia Gerard NP    Review of patient's allergies indicates:   Allergen Reactions    Alcohol prep pads [alcohol swabs] Hives and Rash    Diazolidinyl urea Anaphylaxis    Influenza virus vaccines Hives and Swelling    Iodine Anaphylaxis     Other reaction(s): Unknown    Preservative Anaphylaxis     Many different preservatives per pt report    Thimerosal Anaphylaxis    Cephalosporins Rash    Lisinopril Palpitations    Penicillins Rash     Other reaction(s): Unknown    Sotalol Palpitations    Last reviewed on  4/1/2022 1:12 PM by Apolonia Griffin    Spoke with Mrs. Owen. She denied missed doses. She was unable to report a days supply in her possession. She denied any changes in allergies. She reported a new heart condition--heart block and that Dr. Marvin is aware and has instructed her to proceed. Called Dr. Fitzpatrick office at [phone] to confirm she can proceed. Unaable to reach anyone and left a voice message for GENEVIEVE Waterman requesting a call back. Delivery IVONNE barboza scheduled for 4/6 for delivery on 4/6. $0 copay at 004. Confirmed 2 patient identifiers, allergies, medication list, comorbidities, shipping  address, and payment information.    Tasks added this encounter   No tasks added.   Tasks due within next 3 months   3/31/2022 - Refill Call (Auto Added)     Lexie Fowler, PharmD  [facility]  [address]  [facility] [address]  Phone: [phone]  Fax: [phone]

## 2022-04-04 ENCOUNTER — PATIENT MESSAGE (OUTPATIENT)
Dept: FAMILY MEDICINE | Facility: CLINIC | Age: 77
End: 2022-04-04
Payer: MEDICARE

## 2022-04-04 DIAGNOSIS — N30.01 ACUTE CYSTITIS WITH HEMATURIA: Primary | ICD-10-CM

## 2022-04-04 DIAGNOSIS — G47.01 INSOMNIA DUE TO MEDICAL CONDITION: ICD-10-CM

## 2022-04-04 NOTE — TELEPHONE ENCOUNTER
Previous phone call was NOT escalated to MS teams message per standard operating procedure.     Called Dr. Fitzpatrick office on 4/4 at [phone] to inquire as to whether they were aware of new heart condition. Spoke to Columba and she reported she would call back and confirm whether she can continue given the QT prolongation risk associated with nilotinib.

## 2022-04-04 NOTE — TELEPHONE ENCOUNTER
No new care gaps identified.  Powered by [employer]. Reference number: [id number].   4/04/2022 11:51:26 AM CDT

## 2022-04-04 NOTE — TELEPHONE ENCOUNTER
Care Due:                  Date            Visit Type   Department     Provider  --------------------------------------------------------------------------------                                MYCHART                              SAME DAY                              [facility]  Last Visit: 03-      VISIT        MEDICINE       Ross Nunes                              ESTABLISHED                              PATIENT -    [facility]  Next Visit: 07-      VIRTUAL      [facility]       Ross Nunes                                                            Last  Test          Frequency    Reason                     Performed    Due Date  --------------------------------------------------------------------------------    CMP.........  12 months..  spironolactone...........  11-   10-    TSH.........  12 months..  levothyroxine............  11-   10-    Powered by [employer]. Reference number: [id number].   4/04/2022 8:13:23 AM CDT

## 2022-04-04 NOTE — TELEPHONE ENCOUNTER
----- Message from Aurora Tam sent at 4/4/2022 12:16 PM CDT -----  .Type: Patient Call Back    Who called: self     What is the request in detail: pt is requesting a call back in regards to her breathing machine, states she has a couple questions     Can the clinic reply by [facility]?    Would the patient rather a call back or a response via [facility]? Call back     Best call back number: [phone]    Thank you

## 2022-04-05 ENCOUNTER — LAB VISIT (OUTPATIENT)
Dept: LAB | Facility: HOSPITAL | Age: 77
End: 2022-04-05
Attending: INTERNAL MEDICINE
Payer: MEDICARE

## 2022-04-05 DIAGNOSIS — N30.01 ACUTE CYSTITIS WITH HEMATURIA: ICD-10-CM

## 2022-04-05 PROCEDURE — 87088 URINE BACTERIA CULTURE: CPT | Performed by: INTERNAL MEDICINE

## 2022-04-05 PROCEDURE — 87086 URINE CULTURE/COLONY COUNT: CPT | Performed by: INTERNAL MEDICINE

## 2022-04-05 RX ORDER — ZOLPIDEM TARTRATE 12.5 MG/1
TABLET, FILM COATED, EXTENDED RELEASE ORAL
Qty: 30 TABLET | Refills: 5 | OUTPATIENT
Start: 2022-04-05

## 2022-04-05 RX ORDER — ZOLPIDEM TARTRATE 12.5 MG/1
TABLET, FILM COATED, EXTENDED RELEASE ORAL
Qty: 30 TABLET | Refills: 5 | Status: SHIPPED | OUTPATIENT
Start: 2022-04-05 | End: 2022-09-03

## 2022-04-05 NOTE — TELEPHONE ENCOUNTER
Pt states she has been cleared by Dr. Jordan to resume Tasigna. Refill/shipment set up for delivery 4/7/22. Pt had no further questions or concerns.

## 2022-04-05 NOTE — TELEPHONE ENCOUNTER
Called Dr. Marvin's office at [phone] to confirm she can continue nilotinib. Spoke to GENEVIEVE Waterman and she confirmed she can continue nilotinib.

## 2022-04-07 ENCOUNTER — PATIENT MESSAGE (OUTPATIENT)
Dept: FAMILY MEDICINE | Facility: CLINIC | Age: 77
End: 2022-04-07
Payer: MEDICARE

## 2022-04-07 LAB — BACTERIA UR CULT: ABNORMAL

## 2022-04-18 ENCOUNTER — PATIENT MESSAGE (OUTPATIENT)
Dept: ADMINISTRATIVE | Facility: OTHER | Age: 77
End: 2022-04-18
Payer: MEDICARE

## 2022-04-18 ENCOUNTER — PATIENT MESSAGE (OUTPATIENT)
Dept: FAMILY MEDICINE | Facility: CLINIC | Age: 77
End: 2022-04-18
Payer: MEDICARE

## 2022-04-21 PROBLEM — R94.39 ABNORMAL STRESS TEST: Status: ACTIVE | Noted: 2022-04-21

## 2022-05-03 ENCOUNTER — SPECIALTY PHARMACY (OUTPATIENT)
Dept: PHARMACY | Facility: CLINIC | Age: 77
End: 2022-05-03
Payer: MEDICARE

## 2022-05-03 NOTE — TELEPHONE ENCOUNTER
Patient had a planned hospital visit for a stress test. Her QTC interval was 483 msecs. She had a stent placed and was discharged. It may be appropriate for her to continue Tasigna, but I left a voicemail with Columba VALLEJO at Dr. Ceron's office to be sure. Refill set for delivery on 5/5 pending provider's response. See LUIS

## 2022-05-03 NOTE — TELEPHONE ENCOUNTER
Specialty Pharmacy - Refill Coordination    Specialty Medication Orders Linked to Encounter    Flowsheet Row Most Recent Value   Medication #1 nilotinib (TASIGNA) 150 mg capsule (Order#[id number], Rx#[id number])          Refill Questions - Documented Responses    Flowsheet Row Most Recent Value   Patient Availability and HIPAA Verification    Does patient want to proceed with activity? Yes   HIPAA/medical authority confirmed? Yes   Relationship to patient of person spoken to? Self   Refill Screening Questions    Changes to allergies? No   Changes to medications? No   New conditions since last clinic visit? No   Unplanned office visit, urgent care, ED, or hospital admission in the last 4 weeks? No   How does patient/caregiver feel medication is working? Good   Financial problems or insurance changes? No   How many doses of your specialty medications were missed in the last 4 weeks? 0   Would patient like to speak to a pharmacist? No   When does the patient need to receive the medication? 05/07/22   Refill Delivery Questions    How will the patient receive the medication? Delivery Natasha   When does the patient need to receive the medication? 05/07/22   Shipping Address Home   Address in [facility] confirmed and updated if neccessary? Yes   Expected Copay ($) 0   Is the patient able to afford the medication copay? Yes   Payment Method zero copay   Days supply of Refill 28   Supplies needed? No supplies needed   Refill activity completed? Yes   Refill activity plan Refill scheduled   Shipment/Pickup Date: 05/05/22          Current Outpatient Medications   Medication Sig    alendronate (FOSAMAX) 70 MG tablet Take 1 tablet (70 mg total) by mouth every 7 days.    ASPIRIN LOW DOSE 81 mg EC tablet Take 81 mg by mouth once daily.     atorvastatin (LIPITOR) 40 MG tablet TAKE 1 TABLET (40 MG TOTAL) BY MOUTH ONCE DAILY.    azelastine (ASTELIN) 137 mcg (0.1 %) nasal spray 1 spray by Nasal route 2 (two) times daily.     COMBIVENT RESPIMAT  mcg/actuation inhaler INHALE 1 PUFF EVERY 6 HOURS AS NEEDED FOR WHEEZING OR SHORTNESS OF BREATH (RESCUE)    cycloSPORINE (RESTASIS) 0.05 % ophthalmic emulsion Place 1 drop into both eyes 2 (two) times daily.    ergocalciferol (ERGOCALCIFEROL) 50,000 unit Cap TAKE 1 CAPSULE (50,000 UNITS TOTAL) BY MOUTH EVERY 7 DAYS.    [START ON 6/4/2022] fentaNYL (DURAGESIC) 25 mcg/hr Place 1 patch onto the skin every 48 hours.    [START ON 5/4/2022] fentaNYL (DURAGESIC) 25 mcg/hr Place 1 patch onto the skin every 48 hours.    fentaNYL (DURAGESIC) 25 mcg/hr Place 1 patch onto the skin every 48 hours.    fish oil-omega-3 fatty acids 300-1,000 mg capsule Take 1 capsule by mouth once daily.    fluticasone propionate (FLONASE) 50 mcg/actuation nasal spray USE 1 SPRAY IN EACH NOSTRIL EVERY DAY    gabapentin (NEURONTIN) 300 MG capsule TAKE 4 CAPSULES (1,200 MG TOTAL) BY MOUTH EVERY EVENING. TAKE ALL 4 CAPSULES AT BEDTIME    hydrocodone-acetaminophen 10-325mg (NORCO)  mg Tab Take 1 tablet by mouth every 8 (eight) hours as needed.     IPRATROPIUM BROMIDE NASL by Nasal route once as needed.    levocetirizine (XYZAL) 5 MG tablet Take 1 tablet (5 mg total) by mouth every evening.    levothyroxine (SYNTHROID) 75 MCG tablet Take 1 tablet (75 mcg total) by mouth once daily.    MISCELLANEOUS MEDICAL SUPPLY (COMPRESSION STOCKINGS MISC)     mupirocin calcium 2% nasl oint (BACTROBAN) 2 % Oint by Nasal route 2 (two) times daily.    nilotinib (TASIGNA) 150 mg capsule Take 2 capsules by mouth twice a day.  take whole with water. take on an empty stomach 1 hour before or 2 hours after food.    nitroGLYCERIN (NITROSTAT) 0.4 MG SL tablet Place 1 tablet (0.4 mg total) under the tongue every 5 (five) minutes as needed for Chest pain.    spironolactone (ALDACTONE) 25 MG tablet Take 1 tablet (25 mg total) by mouth once daily.    XARELTO 20 mg Tab TAKE 1 TABLET DAILY WITH DINNER OR EVENING MEAL    zolpidem  (AMBIEN CR) 12.5 MG CR tablet take 1 tablet by mouth daily at bedtime. **sun brand**   Last reviewed on 4/21/2022 10:25 AM by Ana Jha RN    Review of patient's allergies indicates:   Allergen Reactions    Alcohol prep pads [alcohol swabs] Hives and Rash    Diazolidinyl urea Anaphylaxis    Influenza virus vaccines Hives and Swelling    Iodine Anaphylaxis     Other reaction(s): Unknown    Preservative Anaphylaxis     Many different preservatives per pt report    Thimerosal Anaphylaxis    Bactoshield chg [chlorhexidine gluconate] Rash    Cephalosporins Rash    Lisinopril Palpitations    Penicillins Rash     Other reaction(s): Unknown    Sotalol Palpitations    Last reviewed on  4/21/2022 12:14 PM by Lore Brown      Tasks added this encounter   5/28/2022 - Refill Call (Auto Added)   Tasks due within next 3 months   No tasks due.     Eunice Perdomo [facility]  [address] [place] [address]  Phone: [phone]  Fax: [phone]

## 2022-05-04 NOTE — TELEPHONE ENCOUNTER
Incoming call from Columba RN at Dr. Ceron, per Dr. Ceron, he is aware of QTC and patient is to continue Tasigna. Columba inquired about delivery date-- same-day  set up for 5/5. Forwarded message to assigned PharmMIKIE Feliciano as LILLIAM. Closed ivent.

## 2022-05-09 ENCOUNTER — PATIENT MESSAGE (OUTPATIENT)
Dept: SMOKING CESSATION | Facility: CLINIC | Age: 77
End: 2022-05-09
Payer: MEDICARE

## 2022-05-17 ENCOUNTER — PATIENT MESSAGE (OUTPATIENT)
Dept: FAMILY MEDICINE | Facility: CLINIC | Age: 77
End: 2022-05-17
Payer: MEDICARE

## 2022-05-17 DIAGNOSIS — G47.33 OSA TREATED WITH BIPAP: Primary | ICD-10-CM

## 2022-05-18 NOTE — TELEPHONE ENCOUNTER
I am not sure about this process, but I think it involves me placing a new BiPAP order. Please call her to confirm.

## 2022-05-18 NOTE — TELEPHONE ENCOUNTER
Call placed to patient,   She is going to sign off on form and bring it in.     With the record, we can complete the order details.     Procedure code is included in order, and will need to be faxed to numbers included in message.

## 2022-05-23 ENCOUNTER — PATIENT MESSAGE (OUTPATIENT)
Dept: FAMILY MEDICINE | Facility: CLINIC | Age: 77
End: 2022-05-23
Payer: MEDICARE

## 2022-05-30 ENCOUNTER — PATIENT MESSAGE (OUTPATIENT)
Dept: PHARMACY | Facility: CLINIC | Age: 77
End: 2022-05-30
Payer: MEDICARE

## 2022-05-30 ENCOUNTER — SPECIALTY PHARMACY (OUTPATIENT)
Dept: PHARMACY | Facility: CLINIC | Age: 77
End: 2022-05-30
Payer: MEDICARE

## 2022-05-30 NOTE — TELEPHONE ENCOUNTER
Specialty Pharmacy - Refill Coordination    Specialty Medication Orders Linked to Encounter    Flowsheet Row Most Recent Value   Medication #1 nilotinib (TASIGNA) 150 mg capsule (Order#[id number], Rx#[id number])          Refill Questions - Documented Responses    Flowsheet Row Most Recent Value   Patient Availability and HIPAA Verification    Does patient want to proceed with activity? Yes   HIPAA/medical authority confirmed? Yes   Relationship to patient of person spoken to? Self   Refill Screening Questions    Changes to allergies? No   Changes to medications? No   New conditions since last clinic visit? No   Unplanned office visit, urgent care, ED, or hospital admission in the last 4 weeks? No   How does patient/caregiver feel medication is working? Good   Financial problems or insurance changes? No   How many doses of your specialty medications were missed in the last 4 weeks? 0   Would patient like to speak to a pharmacist? No   When does the patient need to receive the medication? 06/04/22   Refill Delivery Questions    How will the patient receive the medication? Delivery Natasha   When does the patient need to receive the medication? 06/04/22   Shipping Address Home   Address in [facility] confirmed and updated if neccessary? Yes   Expected Copay ($) 0   Is the patient able to afford the medication copay? Yes   Payment Method zero copay   Days supply of Refill 28   Supplies needed? No supplies needed   Refill activity completed? Yes   Refill activity plan Refill scheduled   Shipment/Pickup Date: 06/02/22          Current Outpatient Medications   Medication Sig    alendronate (FOSAMAX) 70 MG tablet Take 1 tablet (70 mg total) by mouth every 7 days.    ASPIRIN LOW DOSE 81 mg EC tablet Take 81 mg by mouth once daily.     atorvastatin (LIPITOR) 40 MG tablet TAKE 1 TABLET (40 MG TOTAL) BY MOUTH ONCE DAILY.    azelastine (ASTELIN) 137 mcg (0.1 %) nasal spray 1 spray by Nasal route 2 (two) times daily.     COMBIVENT RESPIMAT  mcg/actuation inhaler INHALE 1 PUFF EVERY 6 HOURS AS NEEDED FOR WHEEZING OR SHORTNESS OF BREATH (RESCUE)    cycloSPORINE (RESTASIS) 0.05 % ophthalmic emulsion Place 1 drop into both eyes 2 (two) times daily.    ergocalciferol (ERGOCALCIFEROL) 50,000 unit Cap TAKE 1 CAPSULE (50,000 UNITS TOTAL) BY MOUTH EVERY 7 DAYS.    [START ON 6/4/2022] fentaNYL (DURAGESIC) 25 mcg/hr Place 1 patch onto the skin every 48 hours.    fentaNYL (DURAGESIC) 25 mcg/hr Place 1 patch onto the skin every 48 hours.    fentaNYL (DURAGESIC) 25 mcg/hr Place 1 patch onto the skin every 48 hours.    fish oil-omega-3 fatty acids 300-1,000 mg capsule Take 1 capsule by mouth once daily.    fluticasone propionate (FLONASE) 50 mcg/actuation nasal spray USE 1 SPRAY IN EACH NOSTRIL EVERY DAY    gabapentin (NEURONTIN) 300 MG capsule TAKE 4 CAPSULES (1,200 MG TOTAL) BY MOUTH EVERY EVENING. TAKE ALL 4 CAPSULES AT BEDTIME    hydrocodone-acetaminophen 10-325mg (NORCO)  mg Tab Take 1 tablet by mouth every 8 (eight) hours as needed.     IPRATROPIUM BROMIDE NASL by Nasal route once as needed.    levocetirizine (XYZAL) 5 MG tablet Take 1 tablet (5 mg total) by mouth every evening.    levothyroxine (SYNTHROID) 75 MCG tablet Take 1 tablet (75 mcg total) by mouth once daily.    MISCELLANEOUS MEDICAL SUPPLY (COMPRESSION STOCKINGS MISC)     mupirocin calcium 2% nasl oint (BACTROBAN) 2 % Oint by Nasal route 2 (two) times daily.    nilotinib (TASIGNA) 150 mg capsule Take 2 capsules by mouth twice a day.  take whole with water. take on an empty stomach 1 hour before or 2 hours after food.    nitroGLYCERIN (NITROSTAT) 0.4 MG SL tablet Place 1 tablet (0.4 mg total) under the tongue every 5 (five) minutes as needed for Chest pain.    spironolactone (ALDACTONE) 25 MG tablet Take 1 tablet (25 mg total) by mouth once daily.    XARELTO 20 mg Tab TAKE 1 TABLET DAILY WITH DINNER OR EVENING MEAL    zolpidem (AMBIEN CR) 12.5 MG CR  tablet take 1 tablet by mouth daily at bedtime. **sun brand**   Last reviewed on 4/21/2022 10:25 AM by Ana Jha RN    Review of patient's allergies indicates:   Allergen Reactions    Alcohol prep pads [alcohol swabs] Hives and Rash    Diazolidinyl urea Anaphylaxis    Influenza virus vaccines Hives and Swelling    Iodine Anaphylaxis     Other reaction(s): Unknown    Preservative Anaphylaxis     Many different preservatives per pt report    Thimerosal Anaphylaxis    Bactoshield chg [chlorhexidine gluconate] Rash    Cephalosporins Rash    Lisinopril Palpitations    Penicillins Rash     Other reaction(s): Unknown    Sotalol Palpitations    Last reviewed on  4/21/2022 12:14 PM by Lore Brown      Tasks added this encounter   6/25/2022 - Refill Call (Auto Added)   Tasks due within next 3 months   No tasks due.     Anastacio Rodríguez, PharmD  [facility] - [facility]ty Pharm[facility]  [address] [place] [address]  Phone: [phone]  Fax: [phone]

## 2022-06-05 ENCOUNTER — PATIENT MESSAGE (OUTPATIENT)
Dept: FAMILY MEDICINE | Facility: CLINIC | Age: 77
End: 2022-06-05
Payer: MEDICARE

## 2022-06-06 ENCOUNTER — PATIENT MESSAGE (OUTPATIENT)
Dept: FAMILY MEDICINE | Facility: CLINIC | Age: 77
End: 2022-06-06
Payer: MEDICARE

## 2022-06-27 ENCOUNTER — SPECIALTY PHARMACY (OUTPATIENT)
Dept: PHARMACY | Facility: CLINIC | Age: 77
End: 2022-06-27
Payer: MEDICARE

## 2022-06-27 NOTE — TELEPHONE ENCOUNTER
Specialty Pharmacy - Refill Coordination    Specialty Medication Orders Linked to Encounter    Flowsheet Row Most Recent Value   Medication #1 nilotinib (TASIGNA) 150 mg capsule (Order#[id number], Rx#[id number])          Refill Questions - Documented Responses    Flowsheet Row Most Recent Value   Patient Availability and HIPAA Verification    Does patient want to proceed with activity? Yes   HIPAA/medical authority confirmed? Yes   Relationship to patient of person spoken to? Self   Refill Screening Questions    Changes to allergies? No   Changes to medications? No   New conditions since last clinic visit? No   Unplanned office visit, urgent care, ED, or hospital admission in the last 4 weeks? No   How does patient/caregiver feel medication is working? Good   Financial problems or insurance changes? No   How many doses of your specialty medications were missed in the last 4 weeks? 0   Would patient like to speak to a pharmacist? No   When does the patient need to receive the medication? 07/02/22   Refill Delivery Questions    How will the patient receive the medication? Delivery Natasha   When does the patient need to receive the medication? 07/02/22   Shipping Address Home   Address in [facility] confirmed and updated if neccessary? Yes   Expected Copay ($) 0   Is the patient able to afford the medication copay? Yes   Payment Method zero copay   Days supply of Refill 28   Supplies needed? No supplies needed   Refill activity completed? Yes   Refill activity plan Refill scheduled   Shipment/Pickup Date: 06/29/22          Current Outpatient Medications   Medication Sig    alendronate (FOSAMAX) 70 MG tablet Take 1 tablet (70 mg total) by mouth every 7 days.    ASPIRIN LOW DOSE 81 mg EC tablet Take 81 mg by mouth once daily.     atorvastatin (LIPITOR) 40 MG tablet TAKE 1 TABLET (40 MG TOTAL) BY MOUTH ONCE DAILY.    azelastine (ASTELIN) 137 mcg (0.1 %) nasal spray 1 spray by Nasal route 2 (two) times daily.     COMBIVENT RESPIMAT  mcg/actuation inhaler INHALE 1 PUFF EVERY 6 HOURS AS NEEDED FOR WHEEZING OR SHORTNESS OF BREATH (RESCUE)    cycloSPORINE (RESTASIS) 0.05 % ophthalmic emulsion Place 1 drop into both eyes 2 (two) times daily.    ergocalciferol (ERGOCALCIFEROL) 50,000 unit Cap TAKE 1 CAPSULE (50,000 UNITS TOTAL) BY MOUTH EVERY 7 DAYS.    fentaNYL (DURAGESIC) 25 mcg/hr Place 1 patch onto the skin every 48 hours.    fentaNYL (DURAGESIC) 25 mcg/hr Place 1 patch onto the skin every 48 hours.    fentaNYL (DURAGESIC) 25 mcg/hr Place 1 patch onto the skin every 48 hours.    fish oil-omega-3 fatty acids 300-1,000 mg capsule Take 1 capsule by mouth once daily.    fluticasone propionate (FLONASE) 50 mcg/actuation nasal spray USE 1 SPRAY IN EACH NOSTRIL EVERY DAY    gabapentin (NEURONTIN) 300 MG capsule TAKE 4 CAPSULES (1,200 MG TOTAL) BY MOUTH EVERY EVENING. TAKE ALL 4 CAPSULES AT BEDTIME    hydrocodone-acetaminophen 10-325mg (NORCO)  mg Tab Take 1 tablet by mouth every 8 (eight) hours as needed.     IPRATROPIUM BROMIDE NASL by Nasal route once as needed.    levocetirizine (XYZAL) 5 MG tablet Take 1 tablet (5 mg total) by mouth every evening.    levothyroxine (SYNTHROID) 75 MCG tablet Take 1 tablet (75 mcg total) by mouth once daily.    MISCELLANEOUS MEDICAL SUPPLY (COMPRESSION STOCKINGS MISC)     mupirocin calcium 2% nasl oint (BACTROBAN) 2 % Oint by Nasal route 2 (two) times daily.    nilotinib (TASIGNA) 150 mg capsule Take 2 capsules by mouth twice a day.  take whole with water. take on an empty stomach 1 hour before or 2 hours after food.    nitroGLYCERIN (NITROSTAT) 0.4 MG SL tablet Place 1 tablet (0.4 mg total) under the tongue every 5 (five) minutes as needed for Chest pain.    spironolactone (ALDACTONE) 25 MG tablet Take 1 tablet (25 mg total) by mouth once daily.    XARELTO 20 mg Tab TAKE 1 TABLET DAILY WITH DINNER OR EVENING MEAL    zolpidem (AMBIEN CR) 12.5 MG CR tablet take 1 tablet  by mouth daily at bedtime. **sun brand**   Last reviewed on 4/21/2022 10:25 AM by Ana Jha RN    Review of patient's allergies indicates:   Allergen Reactions    Alcohol prep pads [alcohol swabs] Hives and Rash    Diazolidinyl urea Anaphylaxis    Influenza virus vaccines Hives and Swelling    Iodine Anaphylaxis     Other reaction(s): Unknown    Preservative Anaphylaxis     Many different preservatives per pt report    Thimerosal Anaphylaxis    Bactoshield chg [chlorhexidine gluconate] Rash    Cephalosporins Rash    Lisinopril Palpitations    Penicillins Rash     Other reaction(s): Unknown    Sotalol Palpitations    Last reviewed on  4/21/2022 12:14 PM by Lore Brown      Tasks added this encounter   7/23/2022 - Refill Call (Auto Added)   Tasks due within next 3 months   No tasks due.     Nelly Perodmo [facility]  [address] [address]  Phone: [phone]  Fax: [phone]

## 2022-06-29 ENCOUNTER — PATIENT MESSAGE (OUTPATIENT)
Dept: FAMILY MEDICINE | Facility: CLINIC | Age: 77
End: 2022-06-29
Payer: MEDICARE

## 2022-06-29 ENCOUNTER — TELEPHONE (OUTPATIENT)
Dept: FAMILY MEDICINE | Facility: CLINIC | Age: 77
End: 2022-06-29
Payer: MEDICARE

## 2022-06-29 ENCOUNTER — LAB VISIT (OUTPATIENT)
Dept: LAB | Facility: HOSPITAL | Age: 77
End: 2022-06-29
Attending: INTERNAL MEDICINE
Payer: MEDICARE

## 2022-06-29 DIAGNOSIS — R30.0 DYSURIA: Primary | ICD-10-CM

## 2022-06-29 DIAGNOSIS — R30.0 DYSURIA: ICD-10-CM

## 2022-06-29 LAB
BACTERIA #/AREA URNS HPF: ABNORMAL /HPF
BILIRUB UR QL STRIP: NEGATIVE
CLARITY UR: ABNORMAL
COLOR UR: YELLOW
GLUCOSE UR QL STRIP: NEGATIVE
HGB UR QL STRIP: ABNORMAL
KETONES UR QL STRIP: NEGATIVE
LEUKOCYTE ESTERASE UR QL STRIP: NEGATIVE
MICROSCOPIC COMMENT: ABNORMAL
NITRITE UR QL STRIP: POSITIVE
PH UR STRIP: 6 [PH] (ref 5–8)
PROT UR QL STRIP: NEGATIVE
RBC #/AREA URNS HPF: 6 /HPF (ref 0–4)
SP GR UR STRIP: 1.02 (ref 1–1.03)
URN SPEC COLLECT METH UR: ABNORMAL
WBC #/AREA URNS HPF: 2 /HPF (ref 0–5)

## 2022-06-29 PROCEDURE — 81000 URINALYSIS NONAUTO W/SCOPE: CPT | Mod: PO | Performed by: INTERNAL MEDICINE

## 2022-06-29 NOTE — TELEPHONE ENCOUNTER
Patient dropped off a urine sample for suspected Urinary tract infection. Will check urinalysis and urine culture if appropriate.

## 2022-06-29 NOTE — TELEPHONE ENCOUNTER
Spoke with Neelima in lab, made aware that order was in for urinalysis. Neelima verbalized understanding.

## 2022-07-01 ENCOUNTER — OFFICE VISIT (OUTPATIENT)
Dept: FAMILY MEDICINE | Facility: CLINIC | Age: 77
End: 2022-07-01
Payer: MEDICARE

## 2022-07-01 DIAGNOSIS — G89.29 CHRONIC NECK PAIN: Primary | ICD-10-CM

## 2022-07-01 DIAGNOSIS — M54.17 LUMBOSACRAL RADICULOPATHY: ICD-10-CM

## 2022-07-01 DIAGNOSIS — J30.1 NON-SEASONAL ALLERGIC RHINITIS DUE TO POLLEN: ICD-10-CM

## 2022-07-01 DIAGNOSIS — R31.0 GROSS HEMATURIA: ICD-10-CM

## 2022-07-01 DIAGNOSIS — G47.33 OSA TREATED WITH BIPAP: ICD-10-CM

## 2022-07-01 DIAGNOSIS — M54.2 CHRONIC NECK PAIN: Primary | ICD-10-CM

## 2022-07-01 PROCEDURE — 1160F PR REVIEW ALL MEDS BY PRESCRIBER/CLIN PHARMACIST DOCUMENTED: ICD-10-PCS | Mod: CPTII,95,, | Performed by: INTERNAL MEDICINE

## 2022-07-01 PROCEDURE — 1159F PR MEDICATION LIST DOCUMENTED IN MEDICAL RECORD: ICD-10-PCS | Mod: CPTII,95,, | Performed by: INTERNAL MEDICINE

## 2022-07-01 PROCEDURE — 1157F ADVNC CARE PLAN IN RCRD: CPT | Mod: CPTII,95,, | Performed by: INTERNAL MEDICINE

## 2022-07-01 PROCEDURE — 1160F RVW MEDS BY RX/DR IN RCRD: CPT | Mod: CPTII,95,, | Performed by: INTERNAL MEDICINE

## 2022-07-01 PROCEDURE — 99214 OFFICE O/P EST MOD 30 MIN: CPT | Mod: 95,,, | Performed by: INTERNAL MEDICINE

## 2022-07-01 PROCEDURE — 99214 PR OFFICE/OUTPT VISIT, EST, LEVL IV, 30-39 MIN: ICD-10-PCS | Mod: 95,,, | Performed by: INTERNAL MEDICINE

## 2022-07-01 PROCEDURE — 1157F PR ADVANCE CARE PLAN OR EQUIV PRESENT IN MEDICAL RECORD: ICD-10-PCS | Mod: CPTII,95,, | Performed by: INTERNAL MEDICINE

## 2022-07-01 PROCEDURE — 1159F MED LIST DOCD IN RCRD: CPT | Mod: CPTII,95,, | Performed by: INTERNAL MEDICINE

## 2022-07-01 RX ORDER — FENTANYL 25 UG/1
1 PATCH TRANSDERMAL
Qty: 15 PATCH | Refills: 0 | Status: SHIPPED | OUTPATIENT
Start: 2022-08-04 | End: 2022-09-30 | Stop reason: SDUPTHER

## 2022-07-01 RX ORDER — AZELASTINE 1 MG/ML
1 SPRAY, METERED NASAL 2 TIMES DAILY
Qty: 30 ML | Refills: 11 | Status: SHIPPED | OUTPATIENT
Start: 2022-07-01 | End: 2022-09-30 | Stop reason: SDUPTHER

## 2022-07-01 RX ORDER — FENTANYL 25 UG/1
1 PATCH TRANSDERMAL
Qty: 15 PATCH | Refills: 0 | Status: SHIPPED | OUTPATIENT
Start: 2022-09-04 | End: 2022-09-30 | Stop reason: SDUPTHER

## 2022-07-01 RX ORDER — FENTANYL 25 UG/1
1 PATCH TRANSDERMAL
Qty: 15 PATCH | Refills: 0 | Status: SHIPPED | OUTPATIENT
Start: 2022-07-04 | End: 2022-09-30 | Stop reason: SDUPTHER

## 2022-07-05 ENCOUNTER — PATIENT MESSAGE (OUTPATIENT)
Dept: FAMILY MEDICINE | Facility: CLINIC | Age: 77
End: 2022-07-05
Payer: MEDICARE

## 2022-07-05 DIAGNOSIS — M54.2 CHRONIC NECK PAIN: ICD-10-CM

## 2022-07-05 DIAGNOSIS — G89.29 CHRONIC NECK PAIN: ICD-10-CM

## 2022-07-05 NOTE — TELEPHONE ENCOUNTER
No new care gaps identified.  [facility] Embedded Care Gaps. Reference number: [id number]. 7/05/2022   3:58:18 PM CDT

## 2022-07-06 RX ORDER — GABAPENTIN 300 MG/1
CAPSULE ORAL
Qty: 450 CAPSULE | Refills: 3 | Status: SHIPPED | OUTPATIENT
Start: 2022-07-06 | End: 2023-01-23 | Stop reason: SDUPTHER

## 2022-07-07 ENCOUNTER — TELEPHONE (OUTPATIENT)
Dept: FAMILY MEDICINE | Facility: CLINIC | Age: 77
End: 2022-07-07
Payer: MEDICARE

## 2022-07-07 NOTE — TELEPHONE ENCOUNTER
----- Message from Simon Lam sent at 6/21/2022  9:49 AM CDT -----  Regarding: Bipap ST  Contact: [phone]  Good morning! Order is pending copies of patient's sleep study and current F2F to document the need for a Bipap replacement. Thanks! Herbert

## 2022-07-08 ENCOUNTER — TELEPHONE (OUTPATIENT)
Dept: FAMILY MEDICINE | Facility: CLINIC | Age: 77
End: 2022-07-08
Payer: MEDICARE

## 2022-07-08 NOTE — TELEPHONE ENCOUNTER
----- Message from Ross Nunes MD sent at 7/8/2022 10:37 AM CDT -----  I am not exactly sure why she needs a new BiPAP. Please ask her, route me the response and I'll add it to her note.     ----- Message -----  From: Saray Robins MA  Sent: 7/7/2022   2:47 PM CDT  To: Ross Nunes MD    [facility] is questioning why she needs a new BiPap machine.  They said it needs to be said in her recent office visit stating why she needs a new machine.    Thank you  DEEJAY So

## 2022-07-09 NOTE — TELEPHONE ENCOUNTER
"Please thank her for the explanation, but I think they are looking for "my BiPAP is malfunctioning" or something similar. Please call her back to discuss.   "

## 2022-07-11 ENCOUNTER — PATIENT MESSAGE (OUTPATIENT)
Dept: FAMILY MEDICINE | Facility: CLINIC | Age: 77
End: 2022-07-11
Payer: MEDICARE

## 2022-07-11 NOTE — TELEPHONE ENCOUNTER
Dr Nunes-   Patient wants you to override [employer] for a new machine. I explained to her the guidelines for new machine but she insisted I send you a message. Just a LILLIAM

## 2022-07-25 ENCOUNTER — SPECIALTY PHARMACY (OUTPATIENT)
Dept: PHARMACY | Facility: CLINIC | Age: 77
End: 2022-07-25
Payer: MEDICARE

## 2022-07-25 NOTE — TELEPHONE ENCOUNTER
Specialty Pharmacy - Refill Coordination    Specialty Medication Orders Linked to Encounter    Flowsheet Row Most Recent Value   Medication #1 nilotinib (TASIGNA) 150 mg capsule (Order#[id number], Rx#[id number])          Refill Questions - Documented Responses    Flowsheet Row Most Recent Value   Patient Availability and HIPAA Verification    Does patient want to proceed with activity? Yes   HIPAA/medical authority confirmed? Yes   Relationship to patient of person spoken to? Self   Refill Screening Questions    Changes to allergies? No   Changes to medications? No   New conditions since last clinic visit? No   Unplanned office visit, urgent care, ED, or hospital admission in the last 4 weeks? No   How does patient/caregiver feel medication is working? Good   Financial problems or insurance changes? No   How many doses of your specialty medications were missed in the last 4 weeks? 0   Would patient like to speak to a pharmacist? No   When does the patient need to receive the medication? 07/30/22   Refill Delivery Questions    How will the patient receive the medication? Delivery Natasha   When does the patient need to receive the medication? 07/30/22   Shipping Address Home   Address in [facility] confirmed and updated if neccessary? Yes   Expected Copay ($) 0   Is the patient able to afford the medication copay? Yes   Payment Method zero copay   Days supply of Refill 28   Supplies needed? No supplies needed   Refill activity completed? Yes   Refill activity plan Refill scheduled   Shipment/Pickup Date: 07/26/22          Current Outpatient Medications   Medication Sig    alendronate (FOSAMAX) 70 MG tablet Take 1 tablet (70 mg total) by mouth every 7 days.    ASPIRIN LOW DOSE 81 mg EC tablet Take 81 mg by mouth once daily.     atorvastatin (LIPITOR) 40 MG tablet TAKE 1 TABLET ONE TIME DAILY    azelastine (ASTELIN) 137 mcg (0.1 %) nasal spray 1 spray (137 mcg total) by Nasal route 2 (two) times daily.     COMBIVENT RESPIMAT  mcg/actuation inhaler INHALE 1 PUFF EVERY 6 HOURS AS NEEDED FOR WHEEZING OR SHORTNESS OF BREATH (RESCUE)    cycloSPORINE (RESTASIS) 0.05 % ophthalmic emulsion Place 1 drop into both eyes 2 (two) times daily.    ergocalciferol (ERGOCALCIFEROL) 50,000 unit Cap TAKE 1 CAPSULE (50,000 UNITS TOTAL) BY MOUTH EVERY 7 DAYS.    [START ON 9/4/2022] fentaNYL (DURAGESIC) 25 mcg/hr Place 1 patch onto the skin every 48 hours.    [START ON 8/4/2022] fentaNYL (DURAGESIC) 25 mcg/hr Place 1 patch onto the skin every 48 hours.    fentaNYL (DURAGESIC) 25 mcg/hr Place 1 patch onto the skin every 48 hours.    fish oil-omega-3 fatty acids 300-1,000 mg capsule Take 1 capsule by mouth once daily.    fluticasone propionate (FLONASE) 50 mcg/actuation nasal spray USE 1 SPRAY IN EACH NOSTRIL EVERY DAY    gabapentin (NEURONTIN) 300 MG capsule Take 2 PO Q am and 3 PO Q pm    hydrocodone-acetaminophen 10-325mg (NORCO)  mg Tab Take 1 tablet by mouth every 8 (eight) hours as needed.     IPRATROPIUM BROMIDE NASL by Nasal route once as needed.    levocetirizine (XYZAL) 5 MG tablet Take 1 tablet (5 mg total) by mouth every evening.    levothyroxine (SYNTHROID) 75 MCG tablet Take 1 tablet (75 mcg total) by mouth once daily.    MISCELLANEOUS MEDICAL SUPPLY (COMPRESSION STOCKINGS MISC)     mupirocin calcium 2% nasl oint (BACTROBAN) 2 % Oint by Nasal route 2 (two) times daily.    nilotinib (TASIGNA) 150 mg capsule Take 2 capsules by mouth twice a day. Take whole with water. Take on an empty stomach 1 hour before or 2 hours after food.    nitroGLYCERIN (NITROSTAT) 0.4 MG SL tablet Place 1 tablet (0.4 mg total) under the tongue every 5 (five) minutes as needed for Chest pain.    spironolactone (ALDACTONE) 25 MG tablet Take 1 tablet (25 mg total) by mouth once daily.    XARELTO 20 mg Tab TAKE 1 TABLET DAILY WITH DINNER OR EVENING MEAL    zolpidem (AMBIEN CR) 12.5 MG CR tablet take 1 tablet by mouth daily at  bedtime. **sun brand**   Last reviewed on 7/1/2022  3:07 PM by Ross Nunes MD    Review of patient's allergies indicates:   Allergen Reactions    Alcohol prep pads [alcohol swabs] Hives and Rash    Diazolidinyl urea Anaphylaxis    Influenza virus vaccines Hives and Swelling    Iodine Anaphylaxis     Other reaction(s): Unknown    Preservative Anaphylaxis     Many different preservatives per pt report    Thimerosal Anaphylaxis    Bactoshield chg [chlorhexidine gluconate] Rash    Cephalosporins Rash    Lisinopril Palpitations    Penicillins Rash     Other reaction(s): Unknown    Sotalol Palpitations    Last reviewed on  7/6/2022 10:44 PM by Ross Nunes      Tasks added this encounter   8/20/2022 - Refill Call (Auto Added)   Tasks due within next 3 months   No tasks due.     Nelly Perdomo [facility] - Specialty Pharm[facility]  [address] [facility] [address]  Phone: [phone]  Fax: [phone]

## 2022-08-03 NOTE — TELEPHONE ENCOUNTER
Incoming call from patient asking about DDI between Tasigna and Niacin.  DDI evaluated and no DDI found.  Informed patient of this and routed assigned [facility].

## 2022-08-09 ENCOUNTER — TELEPHONE (OUTPATIENT)
Dept: FAMILY MEDICINE | Facility: CLINIC | Age: 77
End: 2022-08-09
Payer: MEDICARE

## 2022-08-12 ENCOUNTER — PATIENT MESSAGE (OUTPATIENT)
Dept: FAMILY MEDICINE | Facility: CLINIC | Age: 77
End: 2022-08-12
Payer: MEDICARE

## 2022-08-18 ENCOUNTER — TELEPHONE (OUTPATIENT)
Dept: RHEUMATOLOGY | Facility: CLINIC | Age: 77
End: 2022-08-18
Payer: MEDICARE

## 2022-08-18 NOTE — TELEPHONE ENCOUNTER
----- Message from Lindsey Rios sent at 8/18/2022  1:55 PM CDT -----  Contact: [phone]  Type: Needs Medical Advice  Who Called:  Pt     Best Call Back Number: [phone]    Additional Information:Pt is calling to schedule NP pt appt. Pt has a referral on file and would maury to know next avail date.Pls call back and advise Pt asking to be called after 1pm     Patient booked with Dr. Yarbrough May  3, but also referral sent to Dr. Fay and numbers given for FANNIE, Sneha Sims and Zay. Will keep on unofficial wait list. Patient to call if getting seen sooner. Printed referral mailed to patient at her request. VANESA

## 2022-08-22 ENCOUNTER — PATIENT MESSAGE (OUTPATIENT)
Dept: PHARMACY | Facility: CLINIC | Age: 77
End: 2022-08-22
Payer: MEDICARE

## 2022-08-23 ENCOUNTER — SPECIALTY PHARMACY (OUTPATIENT)
Dept: PHARMACY | Facility: CLINIC | Age: 77
End: 2022-08-23
Payer: MEDICARE

## 2022-08-23 NOTE — TELEPHONE ENCOUNTER
Specialty Pharmacy - Refill Coordination    Specialty Medication Orders Linked to Encounter    Flowsheet Row Most Recent Value   Medication #1 nilotinib (TASIGNA) 150 mg capsule (Order#[id number], Rx#[id number])          Refill Questions - Documented Responses    Flowsheet Row Most Recent Value   Patient Availability and HIPAA Verification    Does patient want to proceed with activity? Yes   HIPAA/medical authority confirmed? Yes   Relationship to patient of person spoken to? Self   Refill Screening Questions    Changes to allergies? No   Changes to medications? Yes  [taking baclofen and using voltaren gel -- no ddis]   New conditions since last clinic visit? No   Unplanned office visit, urgent care, ED, or hospital admission in the last 4 weeks? No   How does patient/caregiver feel medication is working? Good   Financial problems or insurance changes? No   How many doses of your specialty medications were missed in the last 4 weeks? 0   Would patient like to speak to a pharmacist? No   When does the patient need to receive the medication? 08/24/22   Refill Delivery Questions    How will the patient receive the medication? Delivery Natasha   When does the patient need to receive the medication? 08/24/22   Shipping Address Home   Address in [facility] confirmed and updated if neccessary? Yes   Expected Copay ($) 0   Is the patient able to afford the medication copay? Yes   Payment Method zero copay   Days supply of Refill 28   Supplies needed? No supplies needed   Refill activity completed? Yes   Refill activity plan Refill scheduled   Shipment/Pickup Date: 08/23/22          Current Outpatient Medications   Medication Sig    alendronate (FOSAMAX) 70 MG tablet Take 1 tablet (70 mg total) by mouth every 7 days.    ASPIRIN LOW DOSE 81 mg EC tablet Take 81 mg by mouth once daily.     atorvastatin (LIPITOR) 40 MG tablet TAKE 1 TABLET ONE TIME DAILY    azelastine (ASTELIN) 137 mcg (0.1 %) nasal spray 1 spray (137  mcg total) by Nasal route 2 (two) times daily.    COMBIVENT RESPIMAT  mcg/actuation inhaler INHALE 1 PUFF EVERY 6 HOURS AS NEEDED FOR WHEEZING OR SHORTNESS OF BREATH (RESCUE)    cycloSPORINE (RESTASIS) 0.05 % ophthalmic emulsion Place 1 drop into both eyes 2 (two) times daily.    ergocalciferol (ERGOCALCIFEROL) 50,000 unit Cap TAKE 1 CAPSULE (50,000 UNITS TOTAL) BY MOUTH EVERY 7 DAYS.    [START ON 9/4/2022] fentaNYL (DURAGESIC) 25 mcg/hr Place 1 patch onto the skin every 48 hours.    fentaNYL (DURAGESIC) 25 mcg/hr Place 1 patch onto the skin every 48 hours.    fentaNYL (DURAGESIC) 25 mcg/hr Place 1 patch onto the skin every 48 hours.    fish oil-omega-3 fatty acids 300-1,000 mg capsule Take 1 capsule by mouth once daily.    fluticasone propionate (FLONASE) 50 mcg/actuation nasal spray USE 1 SPRAY IN EACH NOSTRIL EVERY DAY    gabapentin (NEURONTIN) 300 MG capsule Take 2 PO Q am and 3 PO Q pm    hydrocodone-acetaminophen 10-325mg (NORCO)  mg Tab Take 1 tablet by mouth every 8 (eight) hours as needed.     IPRATROPIUM BROMIDE NASL by Nasal route once as needed.    levocetirizine (XYZAL) 5 MG tablet Take 1 tablet (5 mg total) by mouth every evening.    levothyroxine (SYNTHROID) 75 MCG tablet Take 1 tablet (75 mcg total) by mouth once daily.    MISCELLANEOUS MEDICAL SUPPLY (COMPRESSION STOCKINGS MISC)     mupirocin calcium 2% nasl oint (BACTROBAN) 2 % Oint by Nasal route 2 (two) times daily.    nilotinib (TASIGNA) 150 mg capsule Take 2 capsules by mouth twice a day. Take whole with water. Take on an empty stomach 1 hour before or 2 hours after food.    nitroGLYCERIN (NITROSTAT) 0.4 MG SL tablet Place 1 tablet (0.4 mg total) under the tongue every 5 (five) minutes as needed for Chest pain.    spironolactone (ALDACTONE) 25 MG tablet Take 1 tablet (25 mg total) by mouth once daily.    XARELTO 20 mg Tab TAKE 1 TABLET DAILY WITH DINNER OR EVENING MEAL    zolpidem (AMBIEN CR) 12.5 MG CR tablet  take 1 tablet by mouth daily at bedtime. **sun brand**   Last reviewed on 8/17/2022  3:43 PM by Xavier Ahuja III, MD    Review of patient's allergies indicates:   Allergen Reactions    Alcohol prep pads [alcohol swabs] Hives and Rash    Diazolidinyl urea Anaphylaxis    Influenza virus vaccines Hives and Swelling    Iodine Anaphylaxis     Other reaction(s): Unknown    Preservative Anaphylaxis     Many different preservatives per pt report    Thimerosal Anaphylaxis    Bactoshield chg [chlorhexidine gluconate] Rash    Cephalosporins Rash    Lisinopril Palpitations    Penicillins Rash     Other reaction(s): Unknown    Sotalol Palpitations    Last reviewed on  8/17/2022 3:43 PM by Xavier Ahuja      Tasks added this encounter   9/14/2022 - Refill Call (Auto Added)   Tasks due within next 3 months   No tasks due.     Katheryn Blevins, PharmD  [facility]  [address] [place] [address]  Phone: [phone]  Fax: [phone]

## 2022-08-31 ENCOUNTER — OFFICE VISIT (OUTPATIENT)
Dept: FAMILY MEDICINE | Facility: CLINIC | Age: 77
End: 2022-08-31
Payer: MEDICARE

## 2022-08-31 DIAGNOSIS — R22.0 LUMP ON FACE: Primary | ICD-10-CM

## 2022-08-31 DIAGNOSIS — S09.93XA FACIAL INJURY, INITIAL ENCOUNTER: ICD-10-CM

## 2022-08-31 DIAGNOSIS — M79.7 FIBROMYALGIA: ICD-10-CM

## 2022-08-31 DIAGNOSIS — Z78.0 MENOPAUSE: ICD-10-CM

## 2022-08-31 PROCEDURE — 1159F PR MEDICATION LIST DOCUMENTED IN MEDICAL RECORD: ICD-10-PCS | Mod: CPTII,95,, | Performed by: NURSE PRACTITIONER

## 2022-08-31 PROCEDURE — 1157F PR ADVANCE CARE PLAN OR EQUIV PRESENT IN MEDICAL RECORD: ICD-10-PCS | Mod: CPTII,95,, | Performed by: NURSE PRACTITIONER

## 2022-08-31 PROCEDURE — 1157F ADVNC CARE PLAN IN RCRD: CPT | Mod: CPTII,95,, | Performed by: NURSE PRACTITIONER

## 2022-08-31 PROCEDURE — 99213 OFFICE O/P EST LOW 20 MIN: CPT | Mod: 95,,, | Performed by: NURSE PRACTITIONER

## 2022-08-31 PROCEDURE — 1160F RVW MEDS BY RX/DR IN RCRD: CPT | Mod: CPTII,95,, | Performed by: NURSE PRACTITIONER

## 2022-08-31 PROCEDURE — 1159F MED LIST DOCD IN RCRD: CPT | Mod: CPTII,95,, | Performed by: NURSE PRACTITIONER

## 2022-08-31 PROCEDURE — 99213 PR OFFICE/OUTPT VISIT, EST, LEVL III, 20-29 MIN: ICD-10-PCS | Mod: 95,,, | Performed by: NURSE PRACTITIONER

## 2022-08-31 PROCEDURE — 1160F PR REVIEW ALL MEDS BY PRESCRIBER/CLIN PHARMACIST DOCUMENTED: ICD-10-PCS | Mod: CPTII,95,, | Performed by: NURSE PRACTITIONER

## 2022-08-31 RX ORDER — BACLOFEN 20 MG/1
20 TABLET ORAL
Status: ON HOLD | COMMUNITY
End: 2023-04-19 | Stop reason: HOSPADM

## 2022-08-31 NOTE — PROGRESS NOTES
Subjective:       Patient ID: Adry Owen is a 77 y.o. female.    Chief Complaint:   Last seen in primary care by DURGA Nunes PCP on 07/01/2022.  First time seeing me in the clinic    HPI  There were no vitals filed for this visit.  Review of Systems   Constitutional:  Negative for activity change and unexpected weight change.   HENT:  Positive for hearing loss. Negative for rhinorrhea and trouble swallowing.    Eyes:  Positive for visual disturbance. Negative for discharge.   Respiratory:  Negative for chest tightness and wheezing.    Cardiovascular:  Negative for chest pain and palpitations.   Gastrointestinal:  Negative for blood in stool, constipation, diarrhea and vomiting.   Endocrine: Negative for polydipsia and polyuria.   Genitourinary:  Negative for difficulty urinating, dysuria, hematuria and menstrual problem.   Musculoskeletal:  Positive for arthralgias, joint swelling and neck pain.   Neurological:  Positive for weakness and headaches.   Psychiatric/Behavioral:  Negative for confusion and dysphoric mood.      The patient location is: [place]  The chief complaint leading to consultation is:   States bad fibromyalgia flair and hands will lock up  She has baclofen at home but had not been taking it and wants to know if she can start back. I told her to take one daily for a few days and then go back up to 2 times daily  Awaken with lump and it has a little redness. States remember there was a limb that she went under mowing yesterday but wasn't sure if it hit hat but wasn't sure if it hit her head. States she was riding a mower  No headache    Visit type: audiovisual    Face to Face time with patient: 3:31 pm- 3:44  18 minutes of total time spent on the encounter, which includes face to face time and non-face to face time preparing to see the patient (eg, review of tests), Obtaining and/or reviewing separately obtained history, Documenting clinical information in the electronic or other health record,  Independently interpreting results (not separately reported) and communicating results to the patient/family/caregiver, or Care coordination (not separately reported).     Each patient to whom he or she provides medical services by telemedicine is:  (1) informed of the relationship between the physician and patient and the respective role of any other health care provider with respect to management of the patient; and (2) notified that he or she may decline to receive medical services by telemedicine and may withdraw from such care at any time.    Notes:   States had ultrasound yesterday and Dr. Ahuja will call and give her results.  Objective:      Physical Exam  Constitutional:       General: She is awake.      Appearance: Normal appearance. She is well-developed and well-groomed.   HENT:      Head: Normocephalic.        Comments: Area about the size of a quarter, minimally reddened, states slightly sensitive  Neurological:      Mental Status: She is alert.   Psychiatric:         Attention and Perception: Attention and perception normal.         Mood and Affect: Mood and affect normal.         Speech: Speech normal.         Behavior: Behavior normal. Behavior is cooperative.         Thought Content: Thought content normal.         Cognition and Memory: Cognition and memory normal.         Judgment: Judgment normal.       Assessment & Plan:       Lump on face  Ice to area and continue to monitor for any changes in size and color    Facial injury, initial encounter    Fibromyalgia - Continue current medication and informed she could resume her baclofen which she has at home.  States she has made an appt to see rheumatology to follow u with her fibromyalgia      Medication List with Changes/Refills   Current Medications    ALENDRONATE (FOSAMAX) 70 MG TABLET    Take 1 tablet (70 mg total) by mouth every 7 days.    ASPIRIN LOW DOSE 81 MG EC TABLET    Take 81 mg by mouth once daily.     ATORVASTATIN (LIPITOR) 40 MG  TABLET    TAKE 1 TABLET ONE TIME DAILY    AZELASTINE (ASTELIN) 137 MCG (0.1 %) NASAL SPRAY    1 spray (137 mcg total) by Nasal route 2 (two) times daily.    BACLOFEN (LIORESAL) 20 MG TABLET    Take 20 mg by mouth daily 2 hours after breakfast.    COMBIVENT RESPIMAT  MCG/ACTUATION INHALER    INHALE 1 PUFF EVERY 6 HOURS AS NEEDED FOR WHEEZING OR SHORTNESS OF BREATH (RESCUE)    CYCLOSPORINE (RESTASIS) 0.05 % OPHTHALMIC EMULSION    Place 1 drop into both eyes 2 (two) times daily.    ERGOCALCIFEROL (ERGOCALCIFEROL) 50,000 UNIT CAP    TAKE 1 CAPSULE (50,000 UNITS TOTAL) BY MOUTH EVERY 7 DAYS.    FENTANYL (DURAGESIC) 25 MCG/HR    Place 1 patch onto the skin every 48 hours.    FENTANYL (DURAGESIC) 25 MCG/HR    Place 1 patch onto the skin every 48 hours.    FENTANYL (DURAGESIC) 25 MCG/HR    Place 1 patch onto the skin every 48 hours.    FISH OIL-OMEGA-3 FATTY ACIDS 300-1,000 MG CAPSULE    Take 1 capsule by mouth once daily.    FLUTICASONE PROPIONATE (FLONASE) 50 MCG/ACTUATION NASAL SPRAY    USE 1 SPRAY IN EACH NOSTRIL EVERY DAY    GABAPENTIN (NEURONTIN) 300 MG CAPSULE    Take 2 PO Q am and 3 PO Q pm    HYDROCODONE-ACETAMINOPHEN 10-325MG (NORCO)  MG TAB    Take 1 tablet by mouth every 8 (eight) hours as needed.     IPRATROPIUM BROMIDE NASL    by Nasal route once as needed.    LEVOCETIRIZINE (XYZAL) 5 MG TABLET    Take 1 tablet (5 mg total) by mouth every evening.    LEVOTHYROXINE (SYNTHROID) 75 MCG TABLET    Take 1 tablet (75 mcg total) by mouth once daily.    MISCELLANEOUS MEDICAL SUPPLY (COMPRESSION STOCKINGS MISC)        MUPIROCIN CALCIUM 2% NASL OINT (BACTROBAN) 2 % OINT    by Nasal route 2 (two) times daily.    NILOTINIB (TASIGNA) 150 MG CAPSULE    Take 2 capsules by mouth twice a day. Take whole with water. Take on an empty stomach 1 hour before or 2 hours after food.    NITROGLYCERIN (NITROSTAT) 0.4 MG SL TABLET    Place 1 tablet (0.4 mg total) under the tongue every 5 (five) minutes as needed for Chest  pain.    SPIRONOLACTONE (ALDACTONE) 25 MG TABLET    Take 1 tablet (25 mg total) by mouth once daily.    XARELTO 20 MG TAB    TAKE 1 TABLET DAILY WITH DINNER OR EVENING MEAL    ZOLPIDEM (AMBIEN CR) 12.5 MG CR TABLET    take 1 tablet by mouth daily at bedtime. **sun brand**      No follow-ups on file.

## 2022-09-14 ENCOUNTER — PATIENT MESSAGE (OUTPATIENT)
Dept: PHARMACY | Facility: CLINIC | Age: 77
End: 2022-09-14
Payer: MEDICARE

## 2022-09-15 ENCOUNTER — SPECIALTY PHARMACY (OUTPATIENT)
Dept: PHARMACY | Facility: CLINIC | Age: 77
End: 2022-09-15
Payer: MEDICARE

## 2022-09-15 NOTE — TELEPHONE ENCOUNTER
Outgoing call regarding Tasigna. Pt thinks she has 4 to 5 on hand. Informed pt second refill request has been sent to provider. And pt will call on 9/16/22 to make sure she has enough.

## 2022-09-19 NOTE — TELEPHONE ENCOUNTER
Specialty Pharmacy - Refill Coordination    Specialty Medication Orders Linked to Encounter      Flowsheet Row Most Recent Value   Medication #1 nilotinib (TASIGNA) 150 mg capsule (Order#[id number], Rx#[id number])          Refill Questions - Documented Responses      Flowsheet Row Most Recent Value   Patient Availability and HIPAA Verification    Does patient want to proceed with activity? Yes   HIPAA/medical authority confirmed? Yes   Relationship to patient of person spoken to? Self   Refill Screening Questions    Changes to allergies? No   Changes to medications? No   New conditions since last clinic visit? No   Unplanned office visit, urgent care, ED, or hospital admission in the last 4 weeks? No   How does patient/caregiver feel medication is working? Good   Financial problems or insurance changes? No   How many doses of your specialty medications were missed in the last 4 weeks? 1   Why were doses missed? Had a change in daily routine   Would patient like to speak to a pharmacist? No   When does the patient need to receive the medication? 09/23/22   Refill Delivery Questions    How will the patient receive the medication? Delivery Natasha   When does the patient need to receive the medication? 09/23/22   Shipping Address Home   Address in [facility] confirmed and updated if neccessary? Yes   Expected Copay ($) 0   Is the patient able to afford the medication copay? Yes   Payment Method zero copay   Days supply of Refill 28   Supplies needed? No supplies needed   Refill activity completed? Yes   Refill activity plan Refill scheduled   Shipment/Pickup Date: 09/20/22            Current Outpatient Medications   Medication Sig    alendronate (FOSAMAX) 70 MG tablet Take 1 tablet (70 mg total) by mouth every 7 days.    ASPIRIN LOW DOSE 81 mg EC tablet Take 81 mg by mouth once daily.     atorvastatin (LIPITOR) 40 MG tablet TAKE 1 TABLET ONE TIME DAILY    azelastine (ASTELIN) 137 mcg (0.1 %) nasal spray 1 spray  (137 mcg total) by Nasal route 2 (two) times daily.    baclofen (LIORESAL) 20 MG tablet Take 20 mg by mouth daily 2 hours after breakfast.    COMBIVENT RESPIMAT  mcg/actuation inhaler INHALE 1 PUFF EVERY 6 HOURS AS NEEDED FOR WHEEZING OR SHORTNESS OF BREATH (RESCUE)    cycloSPORINE (RESTASIS) 0.05 % ophthalmic emulsion Place 1 drop into both eyes 2 (two) times daily.    ergocalciferol (ERGOCALCIFEROL) 50,000 unit Cap TAKE 1 CAPSULE (50,000 UNITS TOTAL) BY MOUTH EVERY 7 DAYS.    fentaNYL (DURAGESIC) 25 mcg/hr Place 1 patch onto the skin every 48 hours.    fentaNYL (DURAGESIC) 25 mcg/hr Place 1 patch onto the skin every 48 hours.    fentaNYL (DURAGESIC) 25 mcg/hr Place 1 patch onto the skin every 48 hours.    fish oil-omega-3 fatty acids 300-1,000 mg capsule Take 1 capsule by mouth once daily.    fluticasone propionate (FLONASE) 50 mcg/actuation nasal spray USE 1 SPRAY IN EACH NOSTRIL EVERY DAY    gabapentin (NEURONTIN) 300 MG capsule Take 2 PO Q am and 3 PO Q pm    hydrocodone-acetaminophen 10-325mg (NORCO)  mg Tab Take 1 tablet by mouth every 8 (eight) hours as needed.     IPRATROPIUM BROMIDE NASL by Nasal route once as needed.    levocetirizine (XYZAL) 5 MG tablet Take 1 tablet (5 mg total) by mouth every evening.    levothyroxine (SYNTHROID) 75 MCG tablet Take 1 tablet (75 mcg total) by mouth once daily.    MISCELLANEOUS MEDICAL SUPPLY (COMPRESSION STOCKINGS MISC)     mupirocin calcium 2% nasl oint (BACTROBAN) 2 % Oint by Nasal route 2 (two) times daily.    nilotinib (TASIGNA) 150 mg capsule Take 2 capsules by mouth twice a day. Take whole with water. Take on an empty stomach 1 hour before or 2 hours after food.    nitroGLYCERIN (NITROSTAT) 0.4 MG SL tablet Place 1 tablet (0.4 mg total) under the tongue every 5 (five) minutes as needed for Chest pain.    spironolactone (ALDACTONE) 25 MG tablet Take 1 tablet (25 mg total) by mouth once daily.    XARELTO 20 mg Tab TAKE 1 TABLET DAILY WITH DINNER OR  EVENING MEAL    zolpidem (AMBIEN CR) 12.5 MG CR tablet take 1 tablet by mouth daily at bedtime. **sun brand**   Last reviewed on 8/31/2022  3:36 PM by Meredith Franz, DNP, APRN    Review of patient's allergies indicates:   Allergen Reactions    Alcohol prep pads [alcohol swabs] Hives and Rash    Diazolidinyl urea Anaphylaxis    Influenza virus vaccines Hives and Swelling    Iodine Anaphylaxis     Other reaction(s): Unknown    Preservative Anaphylaxis     Many different preservatives per pt report    Thimerosal Anaphylaxis    Bactoshield chg [chlorhexidine gluconate] Rash    Cephalosporins Rash    Lisinopril Palpitations    Penicillins Rash     Other reaction(s): Unknown    Sotalol Palpitations    Last reviewed on  9/2/2022 10:49 AM by Mary Echevarria      Tasks added this encounter   10/14/2022 - Refill Call (Auto Added)   Tasks due within next 3 months   No tasks due.     James Hicks, PharmD  [facility]  [address]  [facility] [address]  Phone: [phone]  Fax: [phone]

## 2022-09-23 ENCOUNTER — PATIENT MESSAGE (OUTPATIENT)
Dept: FAMILY MEDICINE | Facility: CLINIC | Age: 77
End: 2022-09-23
Payer: MEDICARE

## 2022-09-23 DIAGNOSIS — Z78.0 MENOPAUSE: Primary | ICD-10-CM

## 2022-09-26 ENCOUNTER — LAB VISIT (OUTPATIENT)
Dept: LAB | Facility: HOSPITAL | Age: 77
End: 2022-09-26
Attending: INTERNAL MEDICINE
Payer: MEDICARE

## 2022-09-26 ENCOUNTER — PATIENT MESSAGE (OUTPATIENT)
Dept: FAMILY MEDICINE | Facility: CLINIC | Age: 77
End: 2022-09-26
Payer: MEDICARE

## 2022-09-26 DIAGNOSIS — R31.0 GROSS HEMATURIA: ICD-10-CM

## 2022-09-26 LAB
BACTERIA #/AREA URNS HPF: ABNORMAL /HPF
BILIRUB UR QL STRIP: NEGATIVE
CLARITY UR: ABNORMAL
COLOR UR: YELLOW
GLUCOSE UR QL STRIP: NEGATIVE
HGB UR QL STRIP: ABNORMAL
KETONES UR QL STRIP: NEGATIVE
LEUKOCYTE ESTERASE UR QL STRIP: ABNORMAL
MICROSCOPIC COMMENT: ABNORMAL
NITRITE UR QL STRIP: NEGATIVE
PH UR STRIP: 6 [PH] (ref 5–8)
PROT UR QL STRIP: NEGATIVE
RBC #/AREA URNS HPF: 15 /HPF (ref 0–4)
SP GR UR STRIP: >=1.03 (ref 1–1.03)
URN SPEC COLLECT METH UR: ABNORMAL
WBC #/AREA URNS HPF: 5 /HPF (ref 0–5)

## 2022-09-26 PROCEDURE — 87086 URINE CULTURE/COLONY COUNT: CPT | Performed by: INTERNAL MEDICINE

## 2022-09-26 PROCEDURE — 81000 URINALYSIS NONAUTO W/SCOPE: CPT | Mod: PO | Performed by: INTERNAL MEDICINE

## 2022-09-27 ENCOUNTER — PATIENT MESSAGE (OUTPATIENT)
Dept: FAMILY MEDICINE | Facility: CLINIC | Age: 77
End: 2022-09-27
Payer: MEDICARE

## 2022-09-27 LAB
BACTERIA UR CULT: NORMAL
BACTERIA UR CULT: NORMAL

## 2022-09-28 ENCOUNTER — PATIENT MESSAGE (OUTPATIENT)
Dept: FAMILY MEDICINE | Facility: CLINIC | Age: 77
End: 2022-09-28
Payer: MEDICARE

## 2022-09-28 DIAGNOSIS — E06.3 HYPOTHYROIDISM DUE TO HASHIMOTO'S THYROIDITIS: ICD-10-CM

## 2022-09-28 DIAGNOSIS — E03.8 HYPOTHYROIDISM DUE TO HASHIMOTO'S THYROIDITIS: ICD-10-CM

## 2022-09-29 RX ORDER — LEVOTHYROXINE SODIUM 75 UG/1
75 TABLET ORAL DAILY
Qty: 90 TABLET | Refills: 3 | Status: ON HOLD | OUTPATIENT
Start: 2022-09-29

## 2022-09-29 NOTE — TELEPHONE ENCOUNTER
No new care gaps identified.  [facility] Embedded Care Gaps. Reference number: [id number]. 9/29/2022   8:56:52 AM EVARISTOT

## 2022-09-30 ENCOUNTER — OFFICE VISIT (OUTPATIENT)
Dept: FAMILY MEDICINE | Facility: CLINIC | Age: 77
End: 2022-09-30
Payer: MEDICARE

## 2022-09-30 ENCOUNTER — PATIENT MESSAGE (OUTPATIENT)
Dept: FAMILY MEDICINE | Facility: CLINIC | Age: 77
End: 2022-09-30

## 2022-09-30 ENCOUNTER — TELEPHONE (OUTPATIENT)
Dept: FAMILY MEDICINE | Facility: CLINIC | Age: 77
End: 2022-09-30

## 2022-09-30 DIAGNOSIS — G47.01 INSOMNIA DUE TO MEDICAL CONDITION: ICD-10-CM

## 2022-09-30 DIAGNOSIS — G89.29 CHRONIC NECK PAIN: Primary | ICD-10-CM

## 2022-09-30 DIAGNOSIS — R31.0 GROSS HEMATURIA: ICD-10-CM

## 2022-09-30 DIAGNOSIS — M54.2 CHRONIC NECK PAIN: Primary | ICD-10-CM

## 2022-09-30 DIAGNOSIS — I48.0 PAROXYSMAL ATRIAL FIBRILLATION: ICD-10-CM

## 2022-09-30 DIAGNOSIS — J30.1 NON-SEASONAL ALLERGIC RHINITIS DUE TO POLLEN: ICD-10-CM

## 2022-09-30 PROCEDURE — 1160F RVW MEDS BY RX/DR IN RCRD: CPT | Mod: CPTII,95,, | Performed by: INTERNAL MEDICINE

## 2022-09-30 PROCEDURE — 1157F PR ADVANCE CARE PLAN OR EQUIV PRESENT IN MEDICAL RECORD: ICD-10-PCS | Mod: CPTII,95,, | Performed by: INTERNAL MEDICINE

## 2022-09-30 PROCEDURE — 1160F PR REVIEW ALL MEDS BY PRESCRIBER/CLIN PHARMACIST DOCUMENTED: ICD-10-PCS | Mod: CPTII,95,, | Performed by: INTERNAL MEDICINE

## 2022-09-30 PROCEDURE — 1159F PR MEDICATION LIST DOCUMENTED IN MEDICAL RECORD: ICD-10-PCS | Mod: CPTII,95,, | Performed by: INTERNAL MEDICINE

## 2022-09-30 PROCEDURE — 99214 OFFICE O/P EST MOD 30 MIN: CPT | Mod: 95,,, | Performed by: INTERNAL MEDICINE

## 2022-09-30 PROCEDURE — 99214 PR OFFICE/OUTPT VISIT, EST, LEVL IV, 30-39 MIN: ICD-10-PCS | Mod: 95,,, | Performed by: INTERNAL MEDICINE

## 2022-09-30 PROCEDURE — 1157F ADVNC CARE PLAN IN RCRD: CPT | Mod: CPTII,95,, | Performed by: INTERNAL MEDICINE

## 2022-09-30 PROCEDURE — 1159F MED LIST DOCD IN RCRD: CPT | Mod: CPTII,95,, | Performed by: INTERNAL MEDICINE

## 2022-09-30 RX ORDER — FENTANYL 25 UG/1
1 PATCH TRANSDERMAL
Qty: 15 PATCH | Refills: 0 | Status: SHIPPED | OUTPATIENT
Start: 2022-12-26 | End: 2023-01-23 | Stop reason: SDUPTHER

## 2022-09-30 RX ORDER — AZELASTINE 1 MG/ML
1 SPRAY, METERED NASAL 2 TIMES DAILY
Qty: 30 ML | Refills: 11 | Status: SHIPPED | OUTPATIENT
Start: 2022-09-30 | End: 2023-02-21 | Stop reason: SDUPTHER

## 2022-09-30 RX ORDER — ZOLPIDEM TARTRATE 12.5 MG/1
TABLET, FILM COATED, EXTENDED RELEASE ORAL
Qty: 30 TABLET | Refills: 5 | Status: SHIPPED | OUTPATIENT
Start: 2022-09-30 | End: 2023-02-14 | Stop reason: SDUPTHER

## 2022-09-30 RX ORDER — FENTANYL 25 UG/1
1 PATCH TRANSDERMAL
Qty: 15 PATCH | Refills: 0 | Status: SHIPPED | OUTPATIENT
Start: 2022-10-31 | End: 2023-01-23 | Stop reason: SDUPTHER

## 2022-09-30 RX ORDER — FENTANYL 25 UG/1
1 PATCH TRANSDERMAL
Qty: 15 PATCH | Refills: 0 | Status: SHIPPED | OUTPATIENT
Start: 2022-11-28 | End: 2023-01-23 | Stop reason: SDUPTHER

## 2022-09-30 RX ORDER — FENTANYL 25 UG/1
1 PATCH TRANSDERMAL
Qty: 15 PATCH | Refills: 0 | Status: SHIPPED | OUTPATIENT
Start: 2022-10-03 | End: 2023-01-23 | Stop reason: SDUPTHER

## 2022-09-30 NOTE — TELEPHONE ENCOUNTER
----- Message from Lindsey Rios sent at 9/30/2022  4:09 PM CDT -----  Contact: [phone]  Type: Needs Medical Advice  Who Called:  Pt     Best Call Back Number: [phone]    Additional Information:Pt is calling to speak with Malka. Pls call back and advise.

## 2022-09-30 NOTE — PROGRESS NOTES
Patient ID: Adry Owen     Chief Complaint: Follow up urinalysis and Fentanyl refills     The patient location is: [place]   The chief complaint leading to consultation is: Follow up urinalysis and Fentanyl refills    Visit type: audiovisual    Face to Face time with patient: 15 mins  15 minutes of total time spent on the encounter, which includes face to face time and non-face to face time preparing to see the patient (eg, review of tests), Obtaining and/or reviewing separately obtained history, Documenting clinical information in the electronic or other health record, Independently interpreting results (not separately reported) and communicating results to the patient/family/caregiver, or Care coordination (not separately reported).         Each patient to whom he or she provides medical services by telemedicine is:  (1) informed of the relationship between the physician and patient and the respective role of any other health care provider with respect to management of the patient; and (2) notified that he or she may decline to receive medical services by telemedicine and may withdraw from such care at any time.    Notes:        HPI:  Patient presents for refills of her fentanyl patches that she uses every 48 hours control her chronic neck pain. I decided to fill for month's worth and we will see her back again for routine follow-up in the beginning of January.  She did drop off a urine sample due to recurrent hematuria and it does show the presence of red blood cells but not too many bacteria and no nitrites.  She did have many bacteria but her urine culture grew out mixed urogenital hardik.  She has trouble wiping herself sometimes due to arthritis so this is most likely the reason for the 30 sample.  Regardless, I do not think she has a true urinary tract infection since she has no dysuria nor does she have fever.  She has had a cystoscopy at least once in the past to investigate the hematuria but no  reason was given that I do not have those records.  If it persists though I would like her to see it on the date urologist for another cystoscopy.  We did an ultrasound in March and it did not show any masses or kidneys or ureters but that did not rule out bladder pathology.  She would like to wait on a Urology referral this time but will consider if the hematuria persists.    Review of Systems   Constitutional: Negative.  Negative for activity change and unexpected weight change.   HENT:  Positive for hearing loss. Negative for rhinorrhea and trouble swallowing.    Eyes:  Positive for visual disturbance. Negative for discharge.   Respiratory: Negative.  Negative for chest tightness and wheezing.    Cardiovascular: Negative.  Negative for chest pain and palpitations.   Gastrointestinal: Negative.  Negative for blood in stool, constipation, diarrhea and vomiting.   Endocrine: Negative.  Negative for polydipsia and polyuria.   Genitourinary:  Positive for hematuria. Negative for difficulty urinating, dysuria and menstrual problem.   Musculoskeletal:  Positive for arthralgias and neck pain. Negative for joint swelling.   Skin: Negative.    Allergic/Immunologic: Negative.    Neurological: Negative.  Negative for weakness and headaches.   Hematological: Negative.    Psychiatric/Behavioral: Negative.  Negative for confusion and dysphoric mood.         Objective:      Physical Exam   Physical Exam  Constitutional:       Appearance: Normal appearance.   HENT:      Head: Normocephalic and atraumatic.   Pulmonary:      Effort: Pulmonary effort is normal.   Neurological:      General: No focal deficit present.      Mental Status: She is alert and oriented to person, place, and time.   Psychiatric:         Mood and Affect: Mood normal.         Behavior: Behavior normal.          Vitals: There were no vitals filed for this visit.       Current Outpatient Medications:     alendronate (FOSAMAX) 70 MG tablet, Take 1 tablet (70 mg  total) by mouth every 7 days., Disp: 12 tablet, Rfl: 3    ASPIRIN LOW DOSE 81 mg EC tablet, Take 81 mg by mouth once daily. , Disp: , Rfl:     atorvastatin (LIPITOR) 40 MG tablet, TAKE 1 TABLET ONE TIME DAILY, Disp: 90 tablet, Rfl: 3    azelastine (ASTELIN) 137 mcg (0.1 %) nasal spray, 1 spray (137 mcg total) by Nasal route 2 (two) times daily., Disp: 30 mL, Rfl: 11    baclofen (LIORESAL) 20 MG tablet, Take 20 mg by mouth daily 2 hours after breakfast., Disp: , Rfl:     COMBIVENT RESPIMAT  mcg/actuation inhaler, INHALE 1 PUFF EVERY 6 HOURS AS NEEDED FOR WHEEZING OR SHORTNESS OF BREATH (RESCUE), Disp: 12 g, Rfl: 3    cycloSPORINE (RESTASIS) 0.05 % ophthalmic emulsion, Place 1 drop into both eyes 2 (two) times daily., Disp: 60 each, Rfl: 11    ergocalciferol (ERGOCALCIFEROL) 50,000 unit Cap, TAKE 1 CAPSULE (50,000 UNITS TOTAL) BY MOUTH EVERY 7 DAYS., Disp: 12 capsule, Rfl: 3    [START ON 12/26/2022] fentaNYL (DURAGESIC) 25 mcg/hr, Place 1 patch onto the skin every 48 hours., Disp: 15 patch, Rfl: 0    [START ON 11/28/2022] fentaNYL (DURAGESIC) 25 mcg/hr, Place 1 patch onto the skin every 48 hours., Disp: 15 patch, Rfl: 0    [START ON 10/31/2022] fentaNYL (DURAGESIC) 25 mcg/hr, Place 1 patch onto the skin every 48 hours., Disp: 15 patch, Rfl: 0    [START ON 10/3/2022] fentaNYL (DURAGESIC) 25 mcg/hr, Place 1 patch onto the skin every 48 hours., Disp: 15 patch, Rfl: 0    fish oil-omega-3 fatty acids 300-1,000 mg capsule, Take 1 capsule by mouth once daily., Disp: , Rfl:     fluticasone propionate (FLONASE) 50 mcg/actuation nasal spray, USE 1 SPRAY IN EACH NOSTRIL EVERY DAY, Disp: 32 g, Rfl: 3    gabapentin (NEURONTIN) 300 MG capsule, Take 2 PO Q am and 3 PO Q pm, Disp: 450 capsule, Rfl: 3    hydrocodone-acetaminophen 10-325mg (NORCO)  mg Tab, Take 1 tablet by mouth every 8 (eight) hours as needed. , Disp: , Rfl:     IPRATROPIUM BROMIDE NASL, by Nasal route once as needed., Disp: , Rfl:     levocetirizine  (XYZAL) 5 MG tablet, Take 1 tablet (5 mg total) by mouth every evening., Disp: 90 tablet, Rfl: 3    levothyroxine (SYNTHROID) 75 MCG tablet, Take 1 tablet (75 mcg total) by mouth once daily., Disp: 90 tablet, Rfl: 3    MISCELLANEOUS MEDICAL SUPPLY (COMPRESSION STOCKINGS MISC), , Disp: , Rfl:     mupirocin calcium 2% nasl oint (BACTROBAN) 2 % Oint, by Nasal route 2 (two) times daily., Disp: 1 g, Rfl: 0    nilotinib (TASIGNA) 150 mg capsule, Take 2 capsules by mouth twice a day. Take whole with water. Take on an empty stomach 1 hour before or 2 hours after food., Disp: 992 capsule, Rfl: 0    nitroGLYCERIN (NITROSTAT) 0.4 MG SL tablet, Place 1 tablet (0.4 mg total) under the tongue every 5 (five) minutes as needed for Chest pain., Disp: 25 tablet, Rfl: 2    spironolactone (ALDACTONE) 25 MG tablet, Take 1 tablet (25 mg total) by mouth once daily., Disp: 30 tablet, Rfl: 11    XARELTO 20 mg Tab, TAKE 1 TABLET DAILY WITH DINNER OR EVENING MEAL, Disp: 90 tablet, Rfl: 3    zolpidem (AMBIEN CR) 12.5 MG CR tablet, Take 1 PO QHS as needed insomnia, Disp: 30 tablet, Rfl: 5   Assessment:       Patient Active Problem List    Diagnosis Date Noted    Gross hematuria 07/01/2022    Abnormal stress test 04/21/2022    Cardiac pacemaker in situ 08/16/2021    Urinary tract infection without hematuria 10/06/2020    Lumbosacral radiculopathy 10/06/2020    Acute leukemia not having achieved remission 05/15/2020    Vitamin D deficiency 05/15/2020    Elevated blood pressure reading without diagnosis of hypertension 05/15/2020    JAYLIN treated with BiPAP 04/08/2020    Insomnia due to medical condition 01/10/2020    Atrioventricular block, complete 11/27/2019    Third degree heart block 10/22/2019    Chronic neck pain 10/22/2019    Non-seasonal allergic rhinitis due to pollen 10/22/2019    Hypothyroidism due to Hashimoto's thyroiditis 07/01/2019    AV block, Mobitz 1 06/21/2018    Headache 06/20/2018    Digital mucous cyst of finger of right hand  06/12/2018    Chronic myeloid leukemia, BCR/ABL-positive, not having achieved remission 05/23/2018    Leukemia not having achieved remission 05/08/2018    Anemia, chronic disease 05/07/2018    Leucocytosis 05/06/2018    Acute diverticulitis 11/07/2017    Acute lower GI bleeding 11/06/2017    Atrial flutter 05/08/2017    Angioedema 04/11/2017    Urticaria 04/09/2017    Hashimoto encephalopathy 08/03/2016    GI bleeding 08/26/2013    Syncope 02/22/2013    Nuclear sclerosis 07/23/2012    Posterior vitreous detachment 07/23/2012    Essential hypertension 06/01/2012    Coronary artery disease due to calcified coronary lesion 05/11/2012    Other hyperlipidemia 05/11/2012    Paroxysmal atrial fibrillation 05/11/2012    Back pain 05/11/2012    Status post coronary artery stent placement 05/11/2012          Plan:       Adry Owen  was seen today for follow-up and may need lab work.    Diagnoses and all orders for this visit:    Diagnoses and all orders for this visit:    Chronic neck pain  -     fentaNYL (DURAGESIC) 25 mcg/hr; Place 1 patch onto the skin every 48 hours.  -     fentaNYL (DURAGESIC) 25 mcg/hr; Place 1 patch onto the skin every 48 hours.  -     fentaNYL (DURAGESIC) 25 mcg/hr; Place 1 patch onto the skin every 48 hours.  -     fentaNYL (DURAGESIC) 25 mcg/hr; Place 1 patch onto the skin every 48 hours.  Controlled with med     Non-seasonal allergic rhinitis due to pollen  -     azelastine (ASTELIN) 137 mcg (0.1 %) nasal spray; 1 spray (137 mcg total) by Nasal route 2 (two) times daily.  Controlled with med     Insomnia due to medical condition  -     zolpidem (AMBIEN CR) 12.5 MG CR tablet; Take 1 PO QHS as needed insomnia  Controlled with med     Paroxysmal atrial fibrillation  Heart rate Controlled and still taking Xarelto     Gross hematuria   Consider Urology referral if it persists.

## 2022-09-30 NOTE — TELEPHONE ENCOUNTER
Spoke to Pt and she forgot to ask about bi pap machine. Advised message was forwarded to Dr. Nunes.

## 2022-10-07 ENCOUNTER — PATIENT MESSAGE (OUTPATIENT)
Dept: FAMILY MEDICINE | Facility: CLINIC | Age: 77
End: 2022-10-07
Payer: MEDICARE

## 2022-10-09 ENCOUNTER — TELEPHONE (OUTPATIENT)
Dept: FAMILY MEDICINE | Facility: CLINIC | Age: 77
End: 2022-10-09
Payer: MEDICARE

## 2022-10-09 NOTE — TELEPHONE ENCOUNTER
----- Message from Marj Brooks, Patient Care Assistant sent at 10/7/2022  4:35 PM CDT -----  Regarding: appointment  Contact: pt  Type:  Sooner Appointment Request    Caller is requesting a sooner appointment.  Caller declined first available appointment listed below.  Caller will not accept being placed on the waitlist and is requesting a message be sent to doctor.    Name of Caller:  pt   When is the first available appointment?  2022 - requesting 1/2023  Symptoms:  f/u   Best Call Back Number:  [phone] (home)     Additional Information:  please call pt to advise. Thanks!

## 2022-10-18 ENCOUNTER — SPECIALTY PHARMACY (OUTPATIENT)
Dept: PHARMACY | Facility: CLINIC | Age: 77
End: 2022-10-18
Payer: MEDICARE

## 2022-10-18 NOTE — TELEPHONE ENCOUNTER
Specialty Pharmacy - Refill Coordination    Specialty Medication Orders Linked to Encounter      Flowsheet Row Most Recent Value   Medication #1 nilotinib (TASIGNA) 150 mg capsule (Order#[id number], Rx#[id number])            Refill Questions - Documented Responses      Flowsheet Row Most Recent Value   Patient Availability and HIPAA Verification    Does patient want to proceed with activity? Yes   HIPAA/medical authority confirmed? Yes   Relationship to patient of person spoken to? Self   Refill Screening Questions    Changes to allergies? No   Changes to medications? No   New conditions since last clinic visit? No   Unplanned office visit, urgent care, ED, or hospital admission in the last 4 weeks? No   How does patient/caregiver feel medication is working? Good   Financial problems or insurance changes? No   How many doses of your specialty medications were missed in the last 4 weeks? 0   Would patient like to speak to a pharmacist? No   When does the patient need to receive the medication? 10/21/22   Refill Delivery Questions    How will the patient receive the medication? [facility]   When does the patient need to receive the medication? 10/21/22   Shipping Address Home   Address in [facility] confirmed and updated if neccessary? Yes   Expected Copay ($) 0   Is the patient able to afford the medication copay? Yes   Payment Method zero copay   Days supply of Refill 28   Supplies needed? No supplies needed   Refill activity completed? Yes   Refill activity plan Refill scheduled   Shipment/Pickup Date: 10/20/22            Current Outpatient Medications   Medication Sig    alendronate (FOSAMAX) 70 MG tablet Take 1 tablet (70 mg total) by mouth every 7 days.    ASPIRIN LOW DOSE 81 mg EC tablet Take 81 mg by mouth once daily.     atorvastatin (LIPITOR) 40 MG tablet TAKE 1 TABLET ONE TIME DAILY    azelastine (ASTELIN) 137 mcg (0.1 %) nasal spray 1 spray (137 mcg total) by Nasal route 2 (two) times daily.    baclofen  (LIORESAL) 20 MG tablet Take 20 mg by mouth daily 2 hours after breakfast.    COMBIVENT RESPIMAT  mcg/actuation inhaler INHALE 1 PUFF EVERY 6 HOURS AS NEEDED FOR WHEEZING OR SHORTNESS OF BREATH (RESCUE)    cycloSPORINE (RESTASIS) 0.05 % ophthalmic emulsion Place 1 drop into both eyes 2 (two) times daily.    ergocalciferol (ERGOCALCIFEROL) 50,000 unit Cap TAKE 1 CAPSULE (50,000 UNITS TOTAL) BY MOUTH EVERY 7 DAYS.    [START ON 12/26/2022] fentaNYL (DURAGESIC) 25 mcg/hr Place 1 patch onto the skin every 48 hours.    [START ON 11/28/2022] fentaNYL (DURAGESIC) 25 mcg/hr Place 1 patch onto the skin every 48 hours.    [START ON 10/31/2022] fentaNYL (DURAGESIC) 25 mcg/hr Place 1 patch onto the skin every 48 hours.    fentaNYL (DURAGESIC) 25 mcg/hr Place 1 patch onto the skin every 48 hours.    fish oil-omega-3 fatty acids 300-1,000 mg capsule Take 1 capsule by mouth once daily.    fluticasone propionate (FLONASE) 50 mcg/actuation nasal spray USE 1 SPRAY IN EACH NOSTRIL EVERY DAY    gabapentin (NEURONTIN) 300 MG capsule Take 2 PO Q am and 3 PO Q pm    hydrocodone-acetaminophen 10-325mg (NORCO)  mg Tab Take 1 tablet by mouth every 8 (eight) hours as needed.     IPRATROPIUM BROMIDE NASL by Nasal route once as needed.    levocetirizine (XYZAL) 5 MG tablet Take 1 tablet (5 mg total) by mouth every evening.    levothyroxine (SYNTHROID) 75 MCG tablet Take 1 tablet (75 mcg total) by mouth once daily.    MISCELLANEOUS MEDICAL SUPPLY (COMPRESSION STOCKINGS MISC)     mupirocin calcium 2% nasl oint (BACTROBAN) 2 % Oint by Nasal route 2 (two) times daily.    nilotinib (TASIGNA) 150 mg capsule Take 2 capsules by mouth twice a day. Take whole with water. Take on an empty stomach 1 hour before or 2 hours after food.    nitroGLYCERIN (NITROSTAT) 0.4 MG SL tablet Place 1 tablet (0.4 mg total) under the tongue every 5 (five) minutes as needed for Chest pain.    spironolactone (ALDACTONE) 25 MG tablet Take 1 tablet (25 mg total) by  mouth once daily.    XARELTO 20 mg Tab TAKE 1 TABLET DAILY WITH DINNER OR EVENING MEAL    zolpidem (AMBIEN CR) 12.5 MG CR tablet Take 1 PO QHS as needed insomnia   Last reviewed on 9/30/2022  2:20 PM by Ross Nunes MD    Review of patient's allergies indicates:   Allergen Reactions    Alcohol prep pads [alcohol swabs] Hives and Rash    Diazolidinyl urea Anaphylaxis    Influenza virus vaccines Hives and Swelling    Iodine Anaphylaxis     Other reaction(s): Unknown    Preservative Anaphylaxis     Many different preservatives per pt report    Thimerosal Anaphylaxis    Bactoshield chg [chlorhexidine gluconate] Rash    Cephalosporins Rash    Lisinopril Palpitations    Penicillins Rash     Other reaction(s): Unknown    Sotalol Palpitations    Last reviewed on  9/30/2022 2:20 PM by Ross Nunes      Tasks added this encounter   11/11/2022 - Refill Call (Auto Added)   Tasks due within next 3 months   No tasks due.     Eunice Perdomo [facility]  [address] [place] [id number]  Phone: [phone]  Fax: [phone]

## 2022-11-03 ENCOUNTER — TELEPHONE (OUTPATIENT)
Dept: RHEUMATOLOGY | Facility: CLINIC | Age: 77
End: 2022-11-03
Payer: MEDICARE

## 2022-11-03 NOTE — TELEPHONE ENCOUNTER
----- Message from Sarahi Pace sent at 11/3/2022  2:03 PM CDT -----  Type: Patient Call Back         Who called: Pt          What is the request in detail: Pt called in regarding up coming appointment on 5/3/22 at 9 am . Pt wanted to know if possible she could come in at a later time 2pm if so ?            Can the clinic reply by [facility]?no          Would the patient rather a call back or a response via [facility]? Call back          Best call back number: [phone] (mobile)          Additional Information:           Thank You    Patient will cancel due to multiple health issues that do  not allow her to safely drive early in the day. Encouraging patient to get her heart and sleep apnea issues addressed as priority. PCP is welcome to discuss her OA care with Dr. Yarbrough if needed. CG

## 2022-11-11 ENCOUNTER — PATIENT MESSAGE (OUTPATIENT)
Dept: PHARMACY | Facility: CLINIC | Age: 77
End: 2022-11-11
Payer: MEDICARE

## 2022-11-14 ENCOUNTER — SPECIALTY PHARMACY (OUTPATIENT)
Dept: PHARMACY | Facility: CLINIC | Age: 77
End: 2022-11-14
Payer: MEDICARE

## 2022-11-14 NOTE — TELEPHONE ENCOUNTER
Specialty Pharmacy - Refill Coordination    Specialty Medication Orders Linked to Encounter      Flowsheet Row Most Recent Value   Medication #1 nilotinib (TASIGNA) 150 mg capsule (Order#[id number], Rx#[id number])            Refill Questions - Documented Responses      Flowsheet Row Most Recent Value   Patient Availability and HIPAA Verification    Does patient want to proceed with activity? Yes   HIPAA/medical authority confirmed? Yes   Relationship to patient of person spoken to? Self   Refill Screening Questions    Changes to allergies? No   Changes to medications? No   New conditions since last clinic visit? No   Unplanned office visit, urgent care, ED, or hospital admission in the last 4 weeks? No   How does patient/caregiver feel medication is working? Good   Financial problems or insurance changes? No   How many doses of your specialty medications were missed in the last 4 weeks? 0   Would patient like to speak to a pharmacist? No   When does the patient need to receive the medication? 11/19/22   Refill Delivery Questions    How will the patient receive the medication? [facility]   When does the patient need to receive the medication? 11/19/22   Shipping Address Home   Address in [facility] confirmed and updated if neccessary? Yes   Expected Copay ($) 0   Is the patient able to afford the medication copay? Yes   Payment Method zero copay   Days supply of Refill 28   Supplies needed? No supplies needed   Refill activity completed? Yes   Refill activity plan Refill scheduled   Shipment/Pickup Date: 11/15/22            Current Outpatient Medications   Medication Sig    alendronate (FOSAMAX) 70 MG tablet Take 1 tablet (70 mg total) by mouth every 7 days.    ASPIRIN LOW DOSE 81 mg EC tablet Take 81 mg by mouth once daily.     atorvastatin (LIPITOR) 40 MG tablet TAKE 1 TABLET ONE TIME DAILY    azelastine (ASTELIN) 137 mcg (0.1 %) nasal spray 1 spray (137 mcg total) by Nasal route 2 (two) times daily.    baclofen  (LIORESAL) 20 MG tablet Take 20 mg by mouth daily 2 hours after breakfast.    COMBIVENT RESPIMAT  mcg/actuation inhaler INHALE 1 PUFF EVERY 6 HOURS AS NEEDED FOR WHEEZING OR SHORTNESS OF BREATH (RESCUE)    cycloSPORINE (RESTASIS) 0.05 % ophthalmic emulsion Place 1 drop into both eyes 2 (two) times daily.    ergocalciferol (ERGOCALCIFEROL) 50,000 unit Cap TAKE 1 CAPSULE (50,000 UNITS TOTAL) BY MOUTH EVERY 7 DAYS.    [START ON 12/26/2022] fentaNYL (DURAGESIC) 25 mcg/hr Place 1 patch onto the skin every 48 hours.    [START ON 11/28/2022] fentaNYL (DURAGESIC) 25 mcg/hr Place 1 patch onto the skin every 48 hours.    fentaNYL (DURAGESIC) 25 mcg/hr Place 1 patch onto the skin every 48 hours.    fentaNYL (DURAGESIC) 25 mcg/hr Place 1 patch onto the skin every 48 hours.    fish oil-omega-3 fatty acids 300-1,000 mg capsule Take 1 capsule by mouth once daily.    fluticasone propionate (FLONASE) 50 mcg/actuation nasal spray USE 1 SPRAY IN EACH NOSTRIL EVERY DAY    gabapentin (NEURONTIN) 300 MG capsule Take 2 PO Q am and 3 PO Q pm    hydrocodone-acetaminophen 10-325mg (NORCO)  mg Tab Take 1 tablet by mouth every 8 (eight) hours as needed.     IPRATROPIUM BROMIDE NASL by Nasal route once as needed.    levocetirizine (XYZAL) 5 MG tablet Take 1 tablet (5 mg total) by mouth every evening.    levothyroxine (SYNTHROID) 75 MCG tablet Take 1 tablet (75 mcg total) by mouth once daily.    MISCELLANEOUS MEDICAL SUPPLY (COMPRESSION STOCKINGS MISC)     mupirocin calcium 2% nasl oint (BACTROBAN) 2 % Oint by Nasal route 2 (two) times daily.    nilotinib (TASIGNA) 150 mg capsule Take 2 capsules by mouth twice a day. Take whole with water. Take on an empty stomach 1 hour before or 2 hours after food.    nitroGLYCERIN (NITROSTAT) 0.4 MG SL tablet Place 1 tablet (0.4 mg total) under the tongue every 5 (five) minutes as needed for Chest pain.    spironolactone (ALDACTONE) 25 MG tablet Take 1 tablet (25 mg total) by mouth once daily.     XARELTO 20 mg Tab TAKE 1 TABLET DAILY WITH DINNER OR EVENING MEAL    zolpidem (AMBIEN CR) 12.5 MG CR tablet Take 1 PO QHS as needed insomnia   Last reviewed on 11/10/2022  9:41 PM by Ar Iraheta MD    Review of patient's allergies indicates:   Allergen Reactions    Alcohol prep pads [alcohol swabs] Hives and Rash    Diazolidinyl urea Anaphylaxis    Influenza virus vaccines Hives and Swelling    Iodine Anaphylaxis     Other reaction(s): Unknown    Preservative Anaphylaxis     Many different preservatives per pt report    Thimerosal Anaphylaxis    Bactoshield chg [chlorhexidine gluconate] Rash    Cephalosporins Rash    Lisinopril Palpitations    Penicillins Rash     Other reaction(s): Unknown    Sotalol Palpitations    Last reviewed on  11/10/2022 9:41 PM by Ar Iraheta      Tasks added this encounter   12/10/2022 - Refill Call (Auto Added)   Tasks due within next 3 months   No tasks due.     Eunice Perdomo [facility] - Specialty Pharmacy  [address] [place] [address]  Phone: [phone]  Fax: [phone]

## 2022-12-05 ENCOUNTER — SPECIALTY PHARMACY (OUTPATIENT)
Dept: PHARMACY | Facility: CLINIC | Age: 77
End: 2022-12-05
Payer: MEDICARE

## 2022-12-05 NOTE — TELEPHONE ENCOUNTER
Returning a call to pt. Discussed future MRI medication.Pt asked about potential interactions between Tasigna and Valium (the medication that was prescribed the pt during her last MRI.  There is a Cat C DDI:C: Monitor therapy  Summary CY Inhibitors (Moderate) may increase the serum concentration of DiazePAM. Severity Moderate Reliability Rating Good  Patient Management Monitor for increased diazepam effects and toxicities (eg, somnolence, sedation) if combined with moderate CY inhibitors.    Discussed the potential increased Efficacy.     Also discussed the additive CNS depressant effects with other home medication. Discussed monitoring for sedation, monitoring and making sure provider is aware. Let patient know Valium has some effect for muscle spasms and may be duplicative with PRN baclofen.

## 2022-12-06 ENCOUNTER — PATIENT MESSAGE (OUTPATIENT)
Dept: FAMILY MEDICINE | Facility: CLINIC | Age: 77
End: 2022-12-06
Payer: MEDICARE

## 2022-12-06 DIAGNOSIS — E55.9 VITAMIN D DEFICIENCY: ICD-10-CM

## 2022-12-07 RX ORDER — ERGOCALCIFEROL 1.25 MG/1
50000 CAPSULE ORAL
Qty: 12 CAPSULE | Refills: 3 | Status: SHIPPED | OUTPATIENT
Start: 2022-12-07 | End: 2023-05-08 | Stop reason: SDUPTHER

## 2022-12-09 ENCOUNTER — PATIENT MESSAGE (OUTPATIENT)
Dept: FAMILY MEDICINE | Facility: CLINIC | Age: 77
End: 2022-12-09
Payer: MEDICARE

## 2022-12-09 NOTE — TELEPHONE ENCOUNTER
Patient says she needs an appeal for her Zolpidem, but I wonder if it's really a Prior Authorization.

## 2022-12-12 ENCOUNTER — SPECIALTY PHARMACY (OUTPATIENT)
Dept: PHARMACY | Facility: CLINIC | Age: 77
End: 2022-12-12
Payer: MEDICARE

## 2022-12-12 NOTE — TELEPHONE ENCOUNTER
Specialty Pharmacy - Refill Coordination    Specialty Medication Orders Linked to Encounter      Flowsheet Row Most Recent Value   Medication #1 nilotinib (TASIGNA) 150 mg capsule (Order#[id number], Rx#[id number])            Refill Questions - Documented Responses      Flowsheet Row Most Recent Value   Patient Availability and HIPAA Verification    Does patient want to proceed with activity? Yes   HIPAA/medical authority confirmed? Yes   Relationship to patient of person spoken to? Self   Refill Screening Questions    Changes to allergies? No   Changes to medications? No   New conditions since last clinic visit? No   Unplanned office visit, urgent care, ED, or hospital admission in the last 4 weeks? No   How does patient/caregiver feel medication is working? Good   Financial problems or insurance changes? No   How many doses of your specialty medications were missed in the last 4 weeks? 0   Would patient like to speak to a pharmacist? No   When does the patient need to receive the medication? 12/19/22   Refill Delivery Questions    How will the patient receive the medication? MEDRx   When does the patient need to receive the medication? 12/19/22   Shipping Address Home   Address in [facility] confirmed and updated if neccessary? Yes   Expected Copay ($) 0   Is the patient able to afford the medication copay? Yes   Payment Method zero copay   Days supply of Refill 28   Supplies needed? No supplies needed   Refill activity completed? Yes   Refill activity plan Refill scheduled   Shipment/Pickup Date: 12/13/22            Current Outpatient Medications   Medication Sig    alendronate (FOSAMAX) 70 MG tablet Take 1 tablet (70 mg total) by mouth every 7 days.    ASPIRIN LOW DOSE 81 mg EC tablet Take 81 mg by mouth once daily.     atorvastatin (LIPITOR) 40 MG tablet TAKE 1 TABLET ONE TIME DAILY    azelastine (ASTELIN) 137 mcg (0.1 %) nasal spray 1 spray (137 mcg total) by Nasal route 2 (two) times daily.    baclofen  (LIORESAL) 20 MG tablet Take 20 mg by mouth daily 2 hours after breakfast.    COMBIVENT RESPIMAT  mcg/actuation inhaler INHALE 1 PUFF EVERY 6 HOURS AS NEEDED FOR WHEEZING OR SHORTNESS OF BREATH (RESCUE)    cycloSPORINE (RESTASIS) 0.05 % ophthalmic emulsion Place 1 drop into both eyes 2 (two) times daily.    ergocalciferol (ERGOCALCIFEROL) 50,000 unit Cap Take 1 capsule (50,000 Units total) by mouth every 7 days.    [START ON 12/26/2022] fentaNYL (DURAGESIC) 25 mcg/hr Place 1 patch onto the skin every 48 hours.    fentaNYL (DURAGESIC) 25 mcg/hr Place 1 patch onto the skin every 48 hours.    fentaNYL (DURAGESIC) 25 mcg/hr Place 1 patch onto the skin every 48 hours.    fentaNYL (DURAGESIC) 25 mcg/hr Place 1 patch onto the skin every 48 hours.    fish oil-omega-3 fatty acids 300-1,000 mg capsule Take 1 capsule by mouth once daily.    fluticasone propionate (FLONASE) 50 mcg/actuation nasal spray USE 1 SPRAY IN EACH NOSTRIL EVERY DAY    gabapentin (NEURONTIN) 300 MG capsule Take 2 PO Q am and 3 PO Q pm    hydrocodone-acetaminophen 10-325mg (NORCO)  mg Tab Take 1 tablet by mouth every 8 (eight) hours as needed.     IPRATROPIUM BROMIDE NASL by Nasal route once as needed.    levocetirizine (XYZAL) 5 MG tablet Take 1 tablet (5 mg total) by mouth every evening.    levothyroxine (SYNTHROID) 75 MCG tablet Take 1 tablet (75 mcg total) by mouth once daily.    MISCELLANEOUS MEDICAL SUPPLY (COMPRESSION STOCKINGS MISC)     mupirocin calcium 2% nasl oint (BACTROBAN) 2 % Oint by Nasal route 2 (two) times daily.    nilotinib (TASIGNA) 150 mg capsule Take 2 capsules by mouth twice a day. Take whole with water. Take on an empty stomach 1 hour before or 2 hours after food.    nitroGLYCERIN (NITROSTAT) 0.4 MG SL tablet Place 1 tablet (0.4 mg total) under the tongue every 5 (five) minutes as needed for Chest pain.    spironolactone (ALDACTONE) 25 MG tablet TAKE 1 TABLET (25 MG TOTAL) BY MOUTH ONCE DAILY.    XARELTO 20 mg Tab TAKE  1 TABLET DAILY WITH DINNER OR EVENING MEAL    zolpidem (AMBIEN CR) 12.5 MG CR tablet Take 1 PO QHS as needed insomnia   Last reviewed on 11/10/2022  9:41 PM by Ar Iraheta MD    Review of patient's allergies indicates:   Allergen Reactions    Alcohol prep pads [alcohol swabs] Hives and Rash    Diazolidinyl urea Anaphylaxis    Influenza virus vaccines Hives and Swelling    Iodine Anaphylaxis     Other reaction(s): Unknown    Preservative Anaphylaxis     Many different preservatives per pt report    Thimerosal Anaphylaxis    Bactoshield chg [chlorhexidine gluconate] Rash    Cephalosporins Rash    Lisinopril Palpitations    Penicillins Rash     Other reaction(s): Unknown    Sotalol Palpitations    Last reviewed on  12/6/2022 12:45 PM by Eunice Richmond      Tasks added this encounter   1/9/2023 - Refill Call (Auto Added)   Tasks due within next 3 months   No tasks due.     [facility] - Specialty Pharmacy  [address] [facility] [address]  Phone: [phone]  Fax: [phone]

## 2022-12-28 ENCOUNTER — PATIENT MESSAGE (OUTPATIENT)
Dept: FAMILY MEDICINE | Facility: CLINIC | Age: 77
End: 2022-12-28
Payer: MEDICARE

## 2023-01-02 ENCOUNTER — PATIENT MESSAGE (OUTPATIENT)
Dept: FAMILY MEDICINE | Facility: CLINIC | Age: 78
End: 2023-01-02
Payer: MEDICARE

## 2023-01-09 ENCOUNTER — SPECIALTY PHARMACY (OUTPATIENT)
Dept: PHARMACY | Facility: CLINIC | Age: 78
End: 2023-01-09
Payer: MEDICARE

## 2023-01-09 NOTE — TELEPHONE ENCOUNTER
Specialty Pharmacy - Refill Coordination    Specialty Medication Orders Linked to Encounter      Flowsheet Row Most Recent Value   Medication #1 nilotinib (TASIGNA) 150 mg capsule (Order#[id number], Rx#[id number])            Refill Questions - Documented Responses      Flowsheet Row Most Recent Value   Patient Availability and HIPAA Verification    Does patient want to proceed with activity? Yes   HIPAA/medical authority confirmed? Yes   Relationship to patient of person spoken to? Self   Refill Screening Questions    Changes to allergies? No   Changes to medications? No   New conditions since last clinic visit? No   Unplanned office visit, urgent care, ED, or hospital admission in the last 4 weeks? No   How does patient/caregiver feel medication is working? Good   Financial problems or insurance changes? No   How many doses of your specialty medications were missed in the last 4 weeks? 0   Would patient like to speak to a pharmacist? No   When does the patient need to receive the medication? 01/16/23   Refill Delivery Questions    When does the patient need to receive the medication? 01/16/23   Shipping Address Home   Address in [facility] confirmed and updated if neccessary? Yes   Expected Copay ($) 9.8   Is the patient able to afford the medication copay? Yes   Payment Method CC on file   Days supply of Refill 28   Supplies needed? No supplies needed   Refill activity completed? Yes   Refill activity plan Refill scheduled   Shipment/Pickup Date: 01/12/23            Current Outpatient Medications   Medication Sig    alendronate (FOSAMAX) 70 MG tablet Take 1 tablet (70 mg total) by mouth every 7 days.    ASPIRIN LOW DOSE 81 mg EC tablet Take 81 mg by mouth once daily.     atorvastatin (LIPITOR) 40 MG tablet TAKE 1 TABLET ONE TIME DAILY    azelastine (ASTELIN) 137 mcg (0.1 %) nasal spray 1 spray (137 mcg total) by Nasal route 2 (two) times daily.    baclofen (LIORESAL) 20 MG tablet Take 20 mg by mouth daily 2  hours after breakfast.    COMBIVENT RESPIMAT  mcg/actuation inhaler INHALE 1 PUFF EVERY 6 HOURS AS NEEDED FOR WHEEZING OR SHORTNESS OF BREATH (RESCUE)    cycloSPORINE (RESTASIS) 0.05 % ophthalmic emulsion Place 1 drop into both eyes 2 (two) times daily.    ergocalciferol (ERGOCALCIFEROL) 50,000 unit Cap Take 1 capsule (50,000 Units total) by mouth every 7 days.    fentaNYL (DURAGESIC) 25 mcg/hr Place 1 patch onto the skin every 48 hours.    fentaNYL (DURAGESIC) 25 mcg/hr Place 1 patch onto the skin every 48 hours.    fentaNYL (DURAGESIC) 25 mcg/hr Place 1 patch onto the skin every 48 hours.    fentaNYL (DURAGESIC) 25 mcg/hr Place 1 patch onto the skin every 48 hours.    fish oil-omega-3 fatty acids 300-1,000 mg capsule Take 1 capsule by mouth once daily.    fluticasone propionate (FLONASE) 50 mcg/actuation nasal spray USE 1 SPRAY IN EACH NOSTRIL EVERY DAY    gabapentin (NEURONTIN) 300 MG capsule Take 2 PO Q am and 3 PO Q pm    hydrocodone-acetaminophen 10-325mg (NORCO)  mg Tab Take 1 tablet by mouth every 8 (eight) hours as needed.     IPRATROPIUM BROMIDE NASL by Nasal route once as needed.    levocetirizine (XYZAL) 5 MG tablet Take 1 tablet (5 mg total) by mouth every evening.    levothyroxine (SYNTHROID) 75 MCG tablet Take 1 tablet (75 mcg total) by mouth once daily.    MISCELLANEOUS MEDICAL SUPPLY (COMPRESSION STOCKINGS MISC)     mupirocin calcium 2% nasl oint (BACTROBAN) 2 % Oint by Nasal route 2 (two) times daily.    nilotinib (TASIGNA) 150 mg capsule Take 2 capsules by mouth twice a day. Take whole with water. Take on an empty stomach 1 hour before or 2 hours after food.    nitroGLYCERIN (NITROSTAT) 0.4 MG SL tablet Place 1 tablet (0.4 mg total) under the tongue every 5 (five) minutes as needed for Chest pain.    spironolactone (ALDACTONE) 25 MG tablet TAKE 1 TABLET (25 MG TOTAL) BY MOUTH ONCE DAILY.    XARELTO 20 mg Tab TAKE 1 TABLET DAILY WITH DINNER OR EVENING MEAL    zolpidem (AMBIEN CR) 12.5  MG CR tablet Take 1 PO QHS as needed insomnia   Last reviewed on 11/10/2022  9:41 PM by Ar Iraheta MD    Review of patient's allergies indicates:   Allergen Reactions    Alcohol prep pads [alcohol swabs] Hives and Rash    Diazolidinyl urea Anaphylaxis    Influenza virus vaccines Hives and Swelling    Iodine Anaphylaxis     Other reaction(s): Unknown    Preservative Anaphylaxis     Many different preservatives per pt report    Thimerosal Anaphylaxis    Bactoshield chg [chlorhexidine gluconate] Rash    Cephalosporins Rash    Lisinopril Palpitations    Penicillins Rash     Other reaction(s): Unknown    Sotalol Palpitations    Last reviewed on  12/6/2022 12:45 PM by Eunice Richmond      Tasks added this encounter   2/6/2023 - Refill Call (Auto Added)   Tasks due within next 3 months   No tasks due.     Dorothy Perdomo [facility] - Specialty Pharmacy  [address] [facility] [address]  Phone: [phone]  Fax: [phone]

## 2023-01-12 ENCOUNTER — OFFICE VISIT (OUTPATIENT)
Dept: FAMILY MEDICINE | Facility: CLINIC | Age: 78
End: 2023-01-12
Payer: MEDICARE

## 2023-01-12 ENCOUNTER — PATIENT MESSAGE (OUTPATIENT)
Dept: FAMILY MEDICINE | Facility: CLINIC | Age: 78
End: 2023-01-12

## 2023-01-12 DIAGNOSIS — M54.17 LUMBOSACRAL RADICULOPATHY: Primary | ICD-10-CM

## 2023-01-12 DIAGNOSIS — I25.10 CORONARY ARTERY DISEASE DUE TO CALCIFIED CORONARY LESION: ICD-10-CM

## 2023-01-12 DIAGNOSIS — G47.01 INSOMNIA DUE TO MEDICAL CONDITION: ICD-10-CM

## 2023-01-12 DIAGNOSIS — I25.84 CORONARY ARTERY DISEASE DUE TO CALCIFIED CORONARY LESION: ICD-10-CM

## 2023-01-12 DIAGNOSIS — M85.852 OSTEOPENIA OF NECKS OF BOTH FEMURS: ICD-10-CM

## 2023-01-12 DIAGNOSIS — E03.8 HYPOTHYROIDISM DUE TO HASHIMOTO'S THYROIDITIS: ICD-10-CM

## 2023-01-12 DIAGNOSIS — I48.0 PAROXYSMAL ATRIAL FIBRILLATION: ICD-10-CM

## 2023-01-12 DIAGNOSIS — M85.851 OSTEOPENIA OF NECKS OF BOTH FEMURS: ICD-10-CM

## 2023-01-12 DIAGNOSIS — E06.3 HYPOTHYROIDISM DUE TO HASHIMOTO'S THYROIDITIS: ICD-10-CM

## 2023-01-12 DIAGNOSIS — E06.3 HYPOTHYROIDISM DUE TO HASHIMOTO'S THYROIDITIS: Primary | ICD-10-CM

## 2023-01-12 DIAGNOSIS — E03.8 HYPOTHYROIDISM DUE TO HASHIMOTO'S THYROIDITIS: Primary | ICD-10-CM

## 2023-01-12 DIAGNOSIS — E78.49 OTHER HYPERLIPIDEMIA: ICD-10-CM

## 2023-01-12 DIAGNOSIS — G47.33 OSA TREATED WITH BIPAP: ICD-10-CM

## 2023-01-12 DIAGNOSIS — C92.11 CHRONIC MYELOID LEUKEMIA, BCR/ABL-POSITIVE, IN REMISSION: ICD-10-CM

## 2023-01-12 PROBLEM — C95.00 ACUTE LEUKEMIA NOT HAVING ACHIEVED REMISSION: Status: RESOLVED | Noted: 2020-05-15 | Resolved: 2023-01-12

## 2023-01-12 PROCEDURE — 99214 OFFICE O/P EST MOD 30 MIN: CPT | Mod: HCNC,95,, | Performed by: INTERNAL MEDICINE

## 2023-01-12 PROCEDURE — 99214 PR OFFICE/OUTPT VISIT, EST, LEVL IV, 30-39 MIN: ICD-10-PCS | Mod: HCNC,95,, | Performed by: INTERNAL MEDICINE

## 2023-01-12 PROCEDURE — 1157F PR ADVANCE CARE PLAN OR EQUIV PRESENT IN MEDICAL RECORD: ICD-10-PCS | Mod: HCNC,CPTII,95, | Performed by: INTERNAL MEDICINE

## 2023-01-12 PROCEDURE — 1160F PR REVIEW ALL MEDS BY PRESCRIBER/CLIN PHARMACIST DOCUMENTED: ICD-10-PCS | Mod: HCNC,CPTII,95, | Performed by: INTERNAL MEDICINE

## 2023-01-12 PROCEDURE — 1157F ADVNC CARE PLAN IN RCRD: CPT | Mod: HCNC,CPTII,95, | Performed by: INTERNAL MEDICINE

## 2023-01-12 PROCEDURE — 1160F RVW MEDS BY RX/DR IN RCRD: CPT | Mod: HCNC,CPTII,95, | Performed by: INTERNAL MEDICINE

## 2023-01-12 PROCEDURE — 1159F MED LIST DOCD IN RCRD: CPT | Mod: HCNC,CPTII,95, | Performed by: INTERNAL MEDICINE

## 2023-01-12 PROCEDURE — 1159F PR MEDICATION LIST DOCUMENTED IN MEDICAL RECORD: ICD-10-PCS | Mod: HCNC,CPTII,95, | Performed by: INTERNAL MEDICINE

## 2023-01-12 NOTE — PROGRESS NOTES
Patient ID: Adry Owen     Chief Complaint: Labs and MRI review    The patient location is: [place]   The chief complaint leading to consultation is: Lab and MRI review     Visit type: audiovisual    Face to Face time with patient: 15 mins   20 minutes of total time spent on the encounter, which includes face to face time and non-face to face time preparing to see the patient (eg, review of tests), Obtaining and/or reviewing separately obtained history, Documenting clinical information in the electronic or other health record, Independently interpreting results (not separately reported) and communicating results to the patient/family/caregiver, or Care coordination (not separately reported).         Each patient to whom he or she provides medical services by telemedicine is:  (1) informed of the relationship between the physician and patient and the respective role of any other health care provider with respect to management of the patient; and (2) notified that he or she may decline to receive medical services by telemedicine and may withdraw from such care at any time.    Notes:       HPI: Follow up for chronic neck and lower back pain.  She has been evaluated by an outside neurosurgeon and I did look at the MRIs of both her cervical and thoracic and lumbar spine.  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  Unfortunately she is not sure if that neurosurgeon can help her as she has had a previous fusion.  There was some concern that some marrow edema in her C1 vertebrae constitute under a recurrence of her cancer but her cancer markers have been undetectable for 15 months so her oncologist said it is most likely due to inflammation.  Her lumbar spine also has significant degenerative joint disease in the lower lumbar levels and the tentative plan is to put rods and screws in the lower 3 to give some stabilization.  She does have osteopenia in her  hips but her spine has normal bone density.  She inquired with her insurance about whether not they would approve Prolia in place of Fosamax and the tentative answers yes so I have placed that therapy plan.  She finds it difficult to get compliant with Fosamax due the fact that she needs to not eat afterwards and does not always sit up straight after she takes it.  She also wonders if it can be flaring her AFib because she has occurrences of it in the morning hours.  We will see with insurance approves.  I wonder if her thyroid is being overmedicated but she does see an endocrinologist to get a thyroid functions tests.  If she does not see her in the near future, I will order this test.  She is not yet due for refills on her fentanyl patches but we have an office visit in a few weeks to get those refilled.    Review of Systems   Constitutional:  Positive for activity change and unexpected weight change.   HENT:  Positive for hearing loss and rhinorrhea. Negative for trouble swallowing.    Eyes:  Positive for visual disturbance. Negative for discharge.   Respiratory: Negative.  Negative for chest tightness and wheezing.    Cardiovascular:  Positive for palpitations. Negative for chest pain.   Gastrointestinal: Negative.  Negative for blood in stool, constipation, diarrhea and vomiting.   Endocrine: Negative.  Negative for polydipsia and polyuria.   Genitourinary: Negative.  Negative for difficulty urinating, dysuria, hematuria and menstrual problem.   Musculoskeletal:  Positive for arthralgias, back pain and neck pain. Negative for joint swelling.   Skin: Negative.    Allergic/Immunologic: Negative.    Neurological: Negative.  Negative for weakness and headaches.   Hematological: Negative.    Psychiatric/Behavioral: Negative.  Negative for confusion and dysphoric mood.         Objective:      Physical Exam   Physical Exam  Constitutional:       Appearance: Normal appearance.   HENT:      Head: Normocephalic and  atraumatic.   Pulmonary:      Effort: Pulmonary effort is normal.   Neurological:      Mental Status: She is alert and oriented to person, place, and time.   Psychiatric:         Thought Content: Thought content normal.          Vitals: There were no vitals filed for this visit.       Current Outpatient Medications:     alendronate (FOSAMAX) 70 MG tablet, Take 1 tablet (70 mg total) by mouth every 7 days., Disp: 12 tablet, Rfl: 3    ASPIRIN LOW DOSE 81 mg EC tablet, Take 81 mg by mouth once daily. , Disp: , Rfl:     atorvastatin (LIPITOR) 40 MG tablet, TAKE 1 TABLET ONE TIME DAILY, Disp: 90 tablet, Rfl: 3    azelastine (ASTELIN) 137 mcg (0.1 %) nasal spray, 1 spray (137 mcg total) by Nasal route 2 (two) times daily., Disp: 30 mL, Rfl: 11    baclofen (LIORESAL) 20 MG tablet, Take 20 mg by mouth daily 2 hours after breakfast., Disp: , Rfl:     COMBIVENT RESPIMAT  mcg/actuation inhaler, INHALE 1 PUFF EVERY 6 HOURS AS NEEDED FOR WHEEZING OR SHORTNESS OF BREATH (RESCUE), Disp: 12 g, Rfl: 3    cycloSPORINE (RESTASIS) 0.05 % ophthalmic emulsion, Place 1 drop into both eyes 2 (two) times daily., Disp: 60 each, Rfl: 11    ergocalciferol (ERGOCALCIFEROL) 50,000 unit Cap, Take 1 capsule (50,000 Units total) by mouth every 7 days., Disp: 12 capsule, Rfl: 3    fentaNYL (DURAGESIC) 25 mcg/hr, Place 1 patch onto the skin every 48 hours., Disp: 15 patch, Rfl: 0    fentaNYL (DURAGESIC) 25 mcg/hr, Place 1 patch onto the skin every 48 hours., Disp: 15 patch, Rfl: 0    fentaNYL (DURAGESIC) 25 mcg/hr, Place 1 patch onto the skin every 48 hours., Disp: 15 patch, Rfl: 0    fentaNYL (DURAGESIC) 25 mcg/hr, Place 1 patch onto the skin every 48 hours., Disp: 15 patch, Rfl: 0    fish oil-omega-3 fatty acids 300-1,000 mg capsule, Take 1 capsule by mouth once daily., Disp: , Rfl:     fluticasone propionate (FLONASE) 50 mcg/actuation nasal spray, USE 1 SPRAY IN EACH NOSTRIL EVERY DAY, Disp: 32 g, Rfl: 3    gabapentin (NEURONTIN) 300 MG  capsule, Take 2 PO Q am and 3 PO Q pm, Disp: 450 capsule, Rfl: 3    hydrocodone-acetaminophen 10-325mg (NORCO)  mg Tab, Take 1 tablet by mouth every 8 (eight) hours as needed. , Disp: , Rfl:     IPRATROPIUM BROMIDE NASL, by Nasal route once as needed., Disp: , Rfl:     levocetirizine (XYZAL) 5 MG tablet, Take 1 tablet (5 mg total) by mouth every evening., Disp: 90 tablet, Rfl: 3    levothyroxine (SYNTHROID) 75 MCG tablet, Take 1 tablet (75 mcg total) by mouth once daily., Disp: 90 tablet, Rfl: 3    MISCELLANEOUS MEDICAL SUPPLY (COMPRESSION STOCKINGS MISC), , Disp: , Rfl:     mupirocin calcium 2% nasl oint (BACTROBAN) 2 % Oint, by Nasal route 2 (two) times daily., Disp: 1 g, Rfl: 0    nilotinib (TASIGNA) 150 mg capsule, Take 2 capsules by mouth twice a day. Take whole with water. Take on an empty stomach 1 hour before or 2 hours after food., Disp: 992 capsule, Rfl: 0    nitroGLYCERIN (NITROSTAT) 0.4 MG SL tablet, Place 1 tablet (0.4 mg total) under the tongue every 5 (five) minutes as needed for Chest pain., Disp: 25 tablet, Rfl: 2    spironolactone (ALDACTONE) 25 MG tablet, TAKE 1 TABLET (25 MG TOTAL) BY MOUTH ONCE DAILY., Disp: 30 tablet, Rfl: 1    XARELTO 20 mg Tab, TAKE 1 TABLET DAILY WITH DINNER OR EVENING MEAL, Disp: 90 tablet, Rfl: 3    zolpidem (AMBIEN CR) 12.5 MG CR tablet, Take 1 PO QHS as needed insomnia, Disp: 30 tablet, Rfl: 5   Assessment:       Patient Active Problem List    Diagnosis Date Noted    Osteopenia of necks of both femurs 01/12/2023    Gross hematuria 07/01/2022    Abnormal stress test 04/21/2022    Cardiac pacemaker in situ 08/16/2021    Urinary tract infection without hematuria 10/06/2020    Lumbosacral radiculopathy 10/06/2020    Vitamin D deficiency 05/15/2020    Elevated blood pressure reading without diagnosis of hypertension 05/15/2020    JAYLIN treated with BiPAP 04/08/2020    Insomnia due to medical condition 01/10/2020    Atrioventricular block, complete 11/27/2019    Third  degree heart block 10/22/2019    Chronic neck pain 10/22/2019    Non-seasonal allergic rhinitis due to pollen 10/22/2019    Hypothyroidism due to Hashimoto's thyroiditis 07/01/2019    AV block, Mobitz 1 06/21/2018    Headache 06/20/2018    Digital mucous cyst of finger of right hand 06/12/2018    Chronic myeloid leukemia, BCR/ABL-positive, in remission 05/23/2018    Leukemia not having achieved remission 05/08/2018    Anemia, chronic disease 05/07/2018    Leucocytosis 05/06/2018    Acute diverticulitis 11/07/2017    Acute lower GI bleeding 11/06/2017    Atrial flutter 05/08/2017    Angioedema 04/11/2017    Urticaria 04/09/2017    Hashimoto encephalopathy 08/03/2016    GI bleeding 08/26/2013    Syncope 02/22/2013    Nuclear sclerosis 07/23/2012    Posterior vitreous detachment 07/23/2012    Essential hypertension 06/01/2012    Coronary artery disease due to calcified coronary lesion 05/11/2012    Other hyperlipidemia 05/11/2012    Paroxysmal atrial fibrillation 05/11/2012    Back pain 05/11/2012    Status post coronary artery stent placement 05/11/2012          Plan:       Adry Owen  was seen today for follow-up and may need lab work.    Diagnoses and all orders for this visit:    Diagnoses and all orders for this visit:    Lumbosacral radiculopathy  Planning for surgery soon     Osteopenia of necks of both femurs  Trade Fosamax for Prolia     JAYLIN treated with BiPAP  -     Cancel: CPAP/BIPAP SUPPLIES  -     CPAP/BIPAP SUPPLIES    Coronary artery disease due to calcified coronary lesion  Stable     Paroxysmal atrial fibrillation  Continue Xarelto   Heart rate can get high     Other hyperlipidemia  Monitor    Chronic myeloid leukemia, BCR/ABL-positive, in remission  In remission     Hypothyroidism due to Hashimoto's thyroiditis  Needs Thryroid Function Tests soon     Insomnia due to medical condition  Controlled with med     Other orders  -     denosumab (PROLIA) injection 60 mg

## 2023-01-17 DIAGNOSIS — M85.852 OSTEOPENIA OF NECKS OF BOTH FEMURS: Primary | ICD-10-CM

## 2023-01-17 DIAGNOSIS — M85.851 OSTEOPENIA OF NECKS OF BOTH FEMURS: Primary | ICD-10-CM

## 2023-01-19 ENCOUNTER — INFUSION (OUTPATIENT)
Dept: INFUSION THERAPY | Facility: HOSPITAL | Age: 78
End: 2023-01-19
Attending: INTERNAL MEDICINE
Payer: MEDICARE

## 2023-01-19 VITALS — DIASTOLIC BLOOD PRESSURE: 70 MMHG | SYSTOLIC BLOOD PRESSURE: 126 MMHG | RESPIRATION RATE: 16 BRPM | HEART RATE: 102 BPM

## 2023-01-19 DIAGNOSIS — M85.852 OSTEOPENIA OF NECKS OF BOTH FEMURS: Primary | ICD-10-CM

## 2023-01-19 DIAGNOSIS — M85.851 OSTEOPENIA OF NECKS OF BOTH FEMURS: Primary | ICD-10-CM

## 2023-01-19 PROCEDURE — 63600175 PHARM REV CODE 636 W HCPCS: Mod: JG,HCNC,PN | Performed by: INTERNAL MEDICINE

## 2023-01-19 PROCEDURE — 96372 THER/PROPH/DIAG INJ SC/IM: CPT | Mod: HCNC,PN

## 2023-01-19 RX ADMIN — DENOSUMAB 60 MG: 60 INJECTION SUBCUTANEOUS at 04:01

## 2023-01-19 NOTE — PLAN OF CARE
Problem: Adult Inpatient Plan of Care  Goal: Plan of Care Review  Outcome: Met   Tolerated injection well today  Discharge instructions given and pt d/c to home per w/c  NAD

## 2023-01-23 ENCOUNTER — OFFICE VISIT (OUTPATIENT)
Dept: FAMILY MEDICINE | Facility: CLINIC | Age: 78
End: 2023-01-23
Payer: MEDICARE

## 2023-01-23 DIAGNOSIS — E03.8 HYPOTHYROIDISM DUE TO HASHIMOTO'S THYROIDITIS: ICD-10-CM

## 2023-01-23 DIAGNOSIS — I25.10 CORONARY ARTERY DISEASE DUE TO CALCIFIED CORONARY LESION: ICD-10-CM

## 2023-01-23 DIAGNOSIS — G89.29 CHRONIC NECK PAIN: Primary | ICD-10-CM

## 2023-01-23 DIAGNOSIS — I48.0 PAROXYSMAL ATRIAL FIBRILLATION: ICD-10-CM

## 2023-01-23 DIAGNOSIS — M54.17 LUMBOSACRAL RADICULOPATHY: ICD-10-CM

## 2023-01-23 DIAGNOSIS — M54.2 CHRONIC NECK PAIN: Primary | ICD-10-CM

## 2023-01-23 DIAGNOSIS — I25.84 CORONARY ARTERY DISEASE DUE TO CALCIFIED CORONARY LESION: ICD-10-CM

## 2023-01-23 DIAGNOSIS — E06.3 HYPOTHYROIDISM DUE TO HASHIMOTO'S THYROIDITIS: ICD-10-CM

## 2023-01-23 PROCEDURE — 1159F MED LIST DOCD IN RCRD: CPT | Mod: HCNC,CPTII,95, | Performed by: INTERNAL MEDICINE

## 2023-01-23 PROCEDURE — 1159F PR MEDICATION LIST DOCUMENTED IN MEDICAL RECORD: ICD-10-PCS | Mod: HCNC,CPTII,95, | Performed by: INTERNAL MEDICINE

## 2023-01-23 PROCEDURE — 1160F RVW MEDS BY RX/DR IN RCRD: CPT | Mod: HCNC,CPTII,95, | Performed by: INTERNAL MEDICINE

## 2023-01-23 PROCEDURE — 99214 PR OFFICE/OUTPT VISIT, EST, LEVL IV, 30-39 MIN: ICD-10-PCS | Mod: HCNC,95,, | Performed by: INTERNAL MEDICINE

## 2023-01-23 PROCEDURE — 99214 OFFICE O/P EST MOD 30 MIN: CPT | Mod: HCNC,95,, | Performed by: INTERNAL MEDICINE

## 2023-01-23 PROCEDURE — 1157F ADVNC CARE PLAN IN RCRD: CPT | Mod: HCNC,CPTII,95, | Performed by: INTERNAL MEDICINE

## 2023-01-23 PROCEDURE — 1157F PR ADVANCE CARE PLAN OR EQUIV PRESENT IN MEDICAL RECORD: ICD-10-PCS | Mod: HCNC,CPTII,95, | Performed by: INTERNAL MEDICINE

## 2023-01-23 PROCEDURE — 1160F PR REVIEW ALL MEDS BY PRESCRIBER/CLIN PHARMACIST DOCUMENTED: ICD-10-PCS | Mod: HCNC,CPTII,95, | Performed by: INTERNAL MEDICINE

## 2023-01-23 RX ORDER — FENTANYL 25 UG/1
1 PATCH TRANSDERMAL
Qty: 15 PATCH | Refills: 0 | Status: ON HOLD | OUTPATIENT
Start: 2023-04-03 | End: 2023-04-19 | Stop reason: HOSPADM

## 2023-01-23 RX ORDER — FENTANYL 25 UG/1
1 PATCH TRANSDERMAL
Qty: 15 PATCH | Refills: 0 | Status: ON HOLD | OUTPATIENT
Start: 2023-02-20 | End: 2023-04-19 | Stop reason: HOSPADM

## 2023-01-23 RX ORDER — GABAPENTIN 300 MG/1
CAPSULE ORAL
Qty: 450 CAPSULE | Refills: 3 | Status: SHIPPED | OUTPATIENT
Start: 2023-01-23 | End: 2023-05-30 | Stop reason: SDUPTHER

## 2023-01-23 RX ORDER — FENTANYL 25 UG/1
1 PATCH TRANSDERMAL
Qty: 15 PATCH | Refills: 0 | Status: ON HOLD | OUTPATIENT
Start: 2023-01-23 | End: 2023-04-19 | Stop reason: HOSPADM

## 2023-01-23 RX ORDER — FENTANYL 25 UG/1
1 PATCH TRANSDERMAL
Qty: 15 PATCH | Refills: 0 | Status: ON HOLD | OUTPATIENT
Start: 2023-03-06 | End: 2023-04-19 | Stop reason: HOSPADM

## 2023-01-23 NOTE — PROGRESS NOTES
Patient ID: Adry Owen     Chief Complaint: Cervicalgia and Lumbago     The patient location is: [place]   The chief complaint leading to consultation is: Cevicalgia and Lumbago     Visit type: audiovisual    Face to Face time with patient: 15 mins  20 minutes of total time spent on the encounter, which includes face to face time and non-face to face time preparing to see the patient (eg, review of tests), Obtaining and/or reviewing separately obtained history, Documenting clinical information in the electronic or other health record, Independently interpreting results (not separately reported) and communicating results to the patient/family/caregiver, or Care coordination (not separately reported).         Each patient to whom he or she provides medical services by telemedicine is:  (1) informed of the relationship between the physician and patient and the respective role of any other health care provider with respect to management of the patient; and (2) notified that he or she may decline to receive medical services by telemedicine and may withdraw from such care at any time.    Notes:        HPI: Patient needs a refill on her Fentanyl patches that she takes for chronic cervicalgia. She's been stable on this dose for a while, so I have refilled it. She does have an upcoming surgery on her lumbar spine by Dr. Stringer, but she may opt to do it in [place] as it has better cardiology coverage and inpatient rehab. She is understandably concerned about her physical limitations and how her ADLs will be impacted. I do wish her the best. I do want her to get the new Prevnar 20 before going to surgery.     Review of Systems   Constitutional: Negative.  Negative for activity change and unexpected weight change.   HENT:  Positive for hearing loss. Negative for rhinorrhea and trouble swallowing.    Eyes:  Positive for visual disturbance. Negative for discharge.   Respiratory: Negative.  Negative for chest  tightness and wheezing.    Cardiovascular: Negative.  Negative for chest pain and palpitations.   Gastrointestinal: Negative.  Negative for blood in stool, constipation, diarrhea and vomiting.   Endocrine: Negative.  Negative for polydipsia and polyuria.   Genitourinary: Negative.  Negative for difficulty urinating, dysuria, hematuria and menstrual problem.   Musculoskeletal:  Positive for arthralgias, back pain and neck pain. Negative for joint swelling.   Skin: Negative.    Allergic/Immunologic: Negative.    Neurological: Negative.  Negative for weakness and headaches.   Hematological: Negative.    Psychiatric/Behavioral: Negative.  Negative for confusion and dysphoric mood.         Objective:      Physical Exam   Physical Exam  Constitutional:       Appearance: Normal appearance.   HENT:      Head: Normocephalic and atraumatic.   Pulmonary:      Effort: Pulmonary effort is normal.   Neurological:      Mental Status: She is alert and oriented to person, place, and time.   Psychiatric:         Thought Content: Thought content normal.          Vitals: There were no vitals filed for this visit.       Current Outpatient Medications:     alendronate (FOSAMAX) 70 MG tablet, Take 1 tablet (70 mg total) by mouth every 7 days., Disp: 12 tablet, Rfl: 3    ASPIRIN LOW DOSE 81 mg EC tablet, Take 81 mg by mouth once daily. , Disp: , Rfl:     atorvastatin (LIPITOR) 40 MG tablet, TAKE 1 TABLET ONE TIME DAILY, Disp: 90 tablet, Rfl: 3    azelastine (ASTELIN) 137 mcg (0.1 %) nasal spray, 1 spray (137 mcg total) by Nasal route 2 (two) times daily., Disp: 30 mL, Rfl: 11    baclofen (LIORESAL) 20 MG tablet, Take 20 mg by mouth daily 2 hours after breakfast., Disp: , Rfl:     COMBIVENT RESPIMAT  mcg/actuation inhaler, INHALE 1 PUFF EVERY 6 HOURS AS NEEDED FOR WHEEZING OR SHORTNESS OF BREATH (RESCUE), Disp: 12 g, Rfl: 3    cycloSPORINE (RESTASIS) 0.05 % ophthalmic emulsion, Place 1 drop into both eyes 2 (two) times daily., Disp:  60 each, Rfl: 11    ergocalciferol (ERGOCALCIFEROL) 50,000 unit Cap, Take 1 capsule (50,000 Units total) by mouth every 7 days., Disp: 12 capsule, Rfl: 3    fentaNYL (DURAGESIC) 25 mcg/hr, Place 1 patch onto the skin every 48 hours., Disp: 15 patch, Rfl: 0    [START ON 2/20/2023] fentaNYL (DURAGESIC) 25 mcg/hr, Place 1 patch onto the skin every 48 hours., Disp: 15 patch, Rfl: 0    [START ON 3/6/2023] fentaNYL (DURAGESIC) 25 mcg/hr, Place 1 patch onto the skin every 48 hours., Disp: 15 patch, Rfl: 0    [START ON 4/3/2023] fentaNYL (DURAGESIC) 25 mcg/hr, Place 1 patch onto the skin every 48 hours., Disp: 15 patch, Rfl: 0    fish oil-omega-3 fatty acids 300-1,000 mg capsule, Take 1 capsule by mouth once daily., Disp: , Rfl:     fluticasone propionate (FLONASE) 50 mcg/actuation nasal spray, USE 1 SPRAY IN EACH NOSTRIL EVERY DAY, Disp: 32 g, Rfl: 3    gabapentin (NEURONTIN) 300 MG capsule, Take 2 PO Q am and 3 PO Q pm, Disp: 450 capsule, Rfl: 3    hydrocodone-acetaminophen 10-325mg (NORCO)  mg Tab, Take 1 tablet by mouth every 8 (eight) hours as needed. , Disp: , Rfl:     IPRATROPIUM BROMIDE NASL, by Nasal route once as needed., Disp: , Rfl:     levocetirizine (XYZAL) 5 MG tablet, Take 1 tablet (5 mg total) by mouth every evening., Disp: 90 tablet, Rfl: 3    levothyroxine (SYNTHROID) 75 MCG tablet, Take 1 tablet (75 mcg total) by mouth once daily., Disp: 90 tablet, Rfl: 3    MISCELLANEOUS MEDICAL SUPPLY (COMPRESSION STOCKINGS MISC), , Disp: , Rfl:     mupirocin calcium 2% nasl oint (BACTROBAN) 2 % Oint, by Nasal route 2 (two) times daily., Disp: 1 g, Rfl: 0    nilotinib (TASIGNA) 150 mg capsule, Take 2 capsules by mouth twice a day. Take whole with water. Take on an empty stomach 1 hour before or 2 hours after food., Disp: 992 capsule, Rfl: 0    nitroGLYCERIN (NITROSTAT) 0.4 MG SL tablet, Place 1 tablet (0.4 mg total) under the tongue every 5 (five) minutes as needed for Chest pain., Disp: 25 tablet, Rfl: 2     spironolactone (ALDACTONE) 25 MG tablet, TAKE 1 TABLET (25 MG TOTAL) BY MOUTH ONCE DAILY., Disp: 30 tablet, Rfl: 1    XARELTO 20 mg Tab, TAKE 1 TABLET DAILY WITH DINNER OR EVENING MEAL, Disp: 90 tablet, Rfl: 3    zolpidem (AMBIEN CR) 12.5 MG CR tablet, Take 1 PO QHS as needed insomnia, Disp: 30 tablet, Rfl: 5   Assessment:       Patient Active Problem List    Diagnosis Date Noted    Osteopenia of necks of both femurs 01/12/2023    Gross hematuria 07/01/2022    Abnormal stress test 04/21/2022    Cardiac pacemaker in situ 08/16/2021    Urinary tract infection without hematuria 10/06/2020    Lumbosacral radiculopathy 10/06/2020    Vitamin D deficiency 05/15/2020    Elevated blood pressure reading without diagnosis of hypertension 05/15/2020    JAYLIN treated with BiPAP 04/08/2020    Insomnia due to medical condition 01/10/2020    Atrioventricular block, complete 11/27/2019    Third degree heart block 10/22/2019    Chronic neck pain 10/22/2019    Non-seasonal allergic rhinitis due to pollen 10/22/2019    Hypothyroidism due to Hashimoto's thyroiditis 07/01/2019    AV block, Mobitz 1 06/21/2018    Headache 06/20/2018    Digital mucous cyst of finger of right hand 06/12/2018    Chronic myeloid leukemia, BCR/ABL-positive, in remission 05/23/2018    Leukemia not having achieved remission 05/08/2018    Anemia, chronic disease 05/07/2018    Leucocytosis 05/06/2018    Acute diverticulitis 11/07/2017    Acute lower GI bleeding 11/06/2017    Atrial flutter 05/08/2017    Angioedema 04/11/2017    Urticaria 04/09/2017    Hashimoto encephalopathy 08/03/2016    GI bleeding 08/26/2013    Syncope 02/22/2013    Nuclear sclerosis 07/23/2012    Posterior vitreous detachment 07/23/2012    Essential hypertension 06/01/2012    Coronary artery disease due to calcified coronary lesion 05/11/2012    Other hyperlipidemia 05/11/2012    Paroxysmal atrial fibrillation 05/11/2012    Back pain 05/11/2012    Status post coronary artery stent placement  05/11/2012          Plan:       Adry Owen  was seen today for follow-up and may need lab work.    Diagnoses and all orders for this visit:    Diagnoses and all orders for this visit:    Chronic neck pain  -     gabapentin (NEURONTIN) 300 MG capsule; Take 2 PO Q am and 3 PO Q pm  -     fentaNYL (DURAGESIC) 25 mcg/hr; Place 1 patch onto the skin every 48 hours.  -     fentaNYL (DURAGESIC) 25 mcg/hr; Place 1 patch onto the skin every 48 hours.  -     fentaNYL (DURAGESIC) 25 mcg/hr; Place 1 patch onto the skin every 48 hours.  -     fentaNYL (DURAGESIC) 25 mcg/hr; Place 1 patch onto the skin every 48 hours.  Controlled with med     Hypothyroidism due to Hashimoto's thyroiditis  Check labs  soon     Lumbosacral radiculopathy  Surgery soon     Coronary artery disease due to calcified coronary lesion  Stable    Paroxysmal atrial fibrillation  Controlled with med

## 2023-02-02 ENCOUNTER — TELEPHONE (OUTPATIENT)
Dept: FAMILY MEDICINE | Facility: CLINIC | Age: 78
End: 2023-02-02
Payer: MEDICARE

## 2023-02-02 ENCOUNTER — PATIENT MESSAGE (OUTPATIENT)
Dept: FAMILY MEDICINE | Facility: CLINIC | Age: 78
End: 2023-02-02
Payer: MEDICARE

## 2023-02-02 NOTE — TELEPHONE ENCOUNTER
----- Message from Dio Esqueda sent at 2/2/2023  1:06 PM CST -----  Type: Needs Medical Advice  Who Called:  pt  Symptoms (please be specific):  pt said she need a pre-op appt with the dr--please call and advise  Best Call Back Number: [phone] (home)   Pt said she also need a order for her Bipap supplies  Additional Information: thank you--pt said please call her after lunch

## 2023-02-05 ENCOUNTER — PATIENT MESSAGE (OUTPATIENT)
Dept: FAMILY MEDICINE | Facility: CLINIC | Age: 78
End: 2023-02-05
Payer: MEDICARE

## 2023-02-05 DIAGNOSIS — G47.33 OSA TREATED WITH BIPAP: Primary | ICD-10-CM

## 2023-02-06 ENCOUNTER — SPECIALTY PHARMACY (OUTPATIENT)
Dept: PHARMACY | Facility: CLINIC | Age: 78
End: 2023-02-06
Payer: MEDICARE

## 2023-02-06 NOTE — TELEPHONE ENCOUNTER
Outgoing call regarding Tasigna refill,  pt stated she had two of her medication package blow up on her the ones that open like a book. 2 capsules of medication was destroyed trying to get them out the package. the carboard she stated is thick you can not pop the medication out of it .  Transferred to [facility]

## 2023-02-06 NOTE — TELEPHONE ENCOUNTER
Specialty Pharmacy - Refill Coordination    Patient states the new packaging is harder to open because the perforation is not deep enough and the cardboard is thick. I Informed Ms. Owen that unfortunately we are unable to change to a different  because Tasigna is only made by [employer]. Patient agreed to contact [employer] at [phone] to express packaging concerns.     Specialty Medication Orders Linked to Encounter      Flowsheet Row Most Recent Value   Medication #1 nilotinib (TASIGNA) 150 mg capsule (Order#[id number], Rx#[id number])            Refill Questions - Documented Responses      Flowsheet Row Most Recent Value   Patient Availability and HIPAA Verification    Does patient want to proceed with activity? Yes   HIPAA/medical authority confirmed? Yes   Relationship to patient of person spoken to? Self   Refill Screening Questions    Changes to allergies? No   Changes to medications? No   New conditions since last clinic visit? No   Unplanned office visit, urgent care, ED, or hospital admission in the last 4 weeks? No   How does patient/caregiver feel medication is working? Good   Financial problems or insurance changes? No   How many doses of your specialty medications were missed in the last 4 weeks? 0   Would patient like to speak to a pharmacist? No   When does the patient need to receive the medication? 02/10/23   Refill Delivery Questions    How will the patient receive the medication? MEDRx   When does the patient need to receive the medication? 02/10/23   Shipping Address Home   Address in [facility] confirmed and updated if neccessary? Yes   Expected Copay ($) 0   Is the patient able to afford the medication copay? Yes   Payment Method zero copay   Days supply of Refill 28   Supplies needed? No supplies needed   Refill activity completed? Yes   Refill activity plan Refill scheduled   Shipment/Pickup Date: 02/07/23            Current Outpatient Medications   Medication Sig     alendronate (FOSAMAX) 70 MG tablet Take 1 tablet (70 mg total) by mouth every 7 days.    ASPIRIN LOW DOSE 81 mg EC tablet Take 81 mg by mouth once daily.     atorvastatin (LIPITOR) 40 MG tablet TAKE 1 TABLET ONE TIME DAILY    azelastine (ASTELIN) 137 mcg (0.1 %) nasal spray 1 spray (137 mcg total) by Nasal route 2 (two) times daily.    baclofen (LIORESAL) 20 MG tablet Take 20 mg by mouth daily 2 hours after breakfast.    COMBIVENT RESPIMAT  mcg/actuation inhaler INHALE 1 PUFF EVERY 6 HOURS AS NEEDED FOR WHEEZING OR SHORTNESS OF BREATH (RESCUE)    cycloSPORINE (RESTASIS) 0.05 % ophthalmic emulsion Place 1 drop into both eyes 2 (two) times daily.    ergocalciferol (ERGOCALCIFEROL) 50,000 unit Cap Take 1 capsule (50,000 Units total) by mouth every 7 days.    fentaNYL (DURAGESIC) 25 mcg/hr Place 1 patch onto the skin every 48 hours.    [START ON 2/20/2023] fentaNYL (DURAGESIC) 25 mcg/hr Place 1 patch onto the skin every 48 hours.    [START ON 3/6/2023] fentaNYL (DURAGESIC) 25 mcg/hr Place 1 patch onto the skin every 48 hours.    [START ON 4/3/2023] fentaNYL (DURAGESIC) 25 mcg/hr Place 1 patch onto the skin every 48 hours.    fish oil-omega-3 fatty acids 300-1,000 mg capsule Take 1 capsule by mouth once daily.    fluticasone propionate (FLONASE) 50 mcg/actuation nasal spray USE 1 SPRAY IN EACH NOSTRIL EVERY DAY    gabapentin (NEURONTIN) 300 MG capsule Take 2 PO Q am and 3 PO Q pm    hydrocodone-acetaminophen 10-325mg (NORCO)  mg Tab Take 1 tablet by mouth every 8 (eight) hours as needed.     IPRATROPIUM BROMIDE NASL by Nasal route once as needed.    levocetirizine (XYZAL) 5 MG tablet Take 1 tablet (5 mg total) by mouth every evening.    levothyroxine (SYNTHROID) 75 MCG tablet Take 1 tablet (75 mcg total) by mouth once daily.    MISCELLANEOUS MEDICAL SUPPLY (COMPRESSION STOCKINGS MISC)     mupirocin calcium 2% nasl oint (BACTROBAN) 2 % Oint by Nasal route 2 (two) times daily.    nilotinib (TASIGNA) 150 mg  capsule Take 2 capsules by mouth twice a day. Take whole with water. Take on an empty stomach 1 hour before or 2 hours after food.    nitroGLYCERIN (NITROSTAT) 0.4 MG SL tablet Place 1 tablet (0.4 mg total) under the tongue every 5 (five) minutes as needed for Chest pain.    spironolactone (ALDACTONE) 25 MG tablet TAKE 1 TABLET (25 MG TOTAL) BY MOUTH ONCE DAILY.    XARELTO 20 mg Tab TAKE 1 TABLET DAILY WITH DINNER OR EVENING MEAL    zolpidem (AMBIEN CR) 12.5 MG CR tablet Take 1 PO QHS as needed insomnia   Last reviewed on 1/23/2023  2:09 PM by Ross Nunes MD    Review of patient's allergies indicates:   Allergen Reactions    Alcohol prep pads [alcohol swabs] Hives and Rash    Diazolidinyl urea Anaphylaxis    Influenza virus vaccines Hives and Swelling    Iodine Anaphylaxis     Other reaction(s): Unknown    Preservative Anaphylaxis     Many different preservatives per pt report    Thimerosal Anaphylaxis    Bactoshield chg [chlorhexidine gluconate] Rash    Cephalosporins Rash    Lisinopril Palpitations    Penicillins Rash     Other reaction(s): Unknown    Sotalol Palpitations    Last reviewed on  1/23/2023 2:09 PM by Ross Nunes      Tasks added this encounter   No tasks added.   Tasks due within next 3 months   2/6/2023 - Refill Call (Auto Added)     FARZAD OLIVIER, PharmD  [facility]  [address] [place] [id number]  Phone: [phone]  Fax: [phone]

## 2023-02-07 DIAGNOSIS — Z00.00 ENCOUNTER FOR MEDICARE ANNUAL WELLNESS EXAM: ICD-10-CM

## 2023-02-09 DIAGNOSIS — Z00.00 ENCOUNTER FOR MEDICARE ANNUAL WELLNESS EXAM: ICD-10-CM

## 2023-02-12 DIAGNOSIS — R73.01 IMPAIRED FASTING GLUCOSE: Primary | ICD-10-CM

## 2023-02-14 ENCOUNTER — PATIENT MESSAGE (OUTPATIENT)
Dept: FAMILY MEDICINE | Facility: CLINIC | Age: 78
End: 2023-02-14
Payer: MEDICARE

## 2023-02-14 DIAGNOSIS — G47.01 INSOMNIA DUE TO MEDICAL CONDITION: ICD-10-CM

## 2023-02-14 RX ORDER — ZOLPIDEM TARTRATE 12.5 MG/1
TABLET, FILM COATED, EXTENDED RELEASE ORAL
Qty: 30 TABLET | Refills: 5 | Status: SHIPPED | OUTPATIENT
Start: 2023-02-14 | End: 2023-03-21 | Stop reason: SDUPTHER

## 2023-02-14 NOTE — TELEPHONE ENCOUNTER
No new care gaps identified.  [facility] Embedded Care Gaps. Reference number: [id number]. 2/14/2023   3:27:06 PM CST

## 2023-02-20 ENCOUNTER — PATIENT MESSAGE (OUTPATIENT)
Dept: FAMILY MEDICINE | Facility: CLINIC | Age: 78
End: 2023-02-20
Payer: MEDICARE

## 2023-02-20 DIAGNOSIS — J30.1 NON-SEASONAL ALLERGIC RHINITIS DUE TO POLLEN: ICD-10-CM

## 2023-02-20 NOTE — TELEPHONE ENCOUNTER
Spoke with DME. They sent a CMN order this morning. Form is in Dr. Nunes's folder for a signature.

## 2023-02-21 RX ORDER — AZELASTINE 1 MG/ML
1 SPRAY, METERED NASAL 2 TIMES DAILY
Qty: 30 ML | Refills: 11 | Status: SHIPPED | OUTPATIENT
Start: 2023-02-21 | End: 2023-07-24 | Stop reason: SDUPTHER

## 2023-02-21 NOTE — TELEPHONE ENCOUNTER
Patient requesting refill. Medication pended. There is also paperwork in your yellow folder to be completed for her bipap supplies.

## 2023-02-22 ENCOUNTER — PATIENT MESSAGE (OUTPATIENT)
Dept: FAMILY MEDICINE | Facility: CLINIC | Age: 78
End: 2023-02-22
Payer: MEDICARE

## 2023-02-22 DIAGNOSIS — J30.1 NON-SEASONAL ALLERGIC RHINITIS DUE TO POLLEN: ICD-10-CM

## 2023-02-22 RX ORDER — LEVOCETIRIZINE DIHYDROCHLORIDE 5 MG/1
TABLET, FILM COATED ORAL
Qty: 90 TABLET | Refills: 3 | Status: SHIPPED | OUTPATIENT
Start: 2023-02-22 | End: 2024-02-25

## 2023-02-22 NOTE — TELEPHONE ENCOUNTER
No new care gaps identified.  [facility] Embedded Care Gaps. Reference number: [id number]. 2/22/2023   4:32:59 PM CST

## 2023-02-22 NOTE — TELEPHONE ENCOUNTER
Refill Decision Note   Adry Owen  is requesting a refill authorization.  Brief Assessment and Rationale for Refill:  Approve     Medication Therapy Plan:       Medication Reconciliation Completed: No   Comments:     No Care Gaps recommended.     Note composed:4:58 PM 02/22/2023

## 2023-02-28 ENCOUNTER — TELEPHONE (OUTPATIENT)
Dept: FAMILY MEDICINE | Facility: CLINIC | Age: 78
End: 2023-02-28
Payer: MEDICARE

## 2023-02-28 NOTE — TELEPHONE ENCOUNTER
----- Message from Yandy Matos sent at 2/28/2023  2:42 PM CST -----  Contact: patient  Type: Needs Medical Advice  Who Called:  patient  Best Call Back Number: [phone] (home)   Additional Information: requesting a call back regarding mri results and what is going on with her. She would like to speak to someone today

## 2023-02-28 NOTE — TELEPHONE ENCOUNTER
----- Message from Marium Gautam MA sent at 2/28/2023  3:16 PM CST -----  Contact: patient  Please advise     ----- Message -----  From: Yandy Matos  Sent: 2/28/2023   2:43 PM CST  To: [facility] Staff    Type: Needs Medical Advice  Who Called:  patient  Best Call Back Number: [phone] (home)   Additional Information: requesting a call back regarding mri results and what is going on with her. She would like to speak to someone today

## 2023-03-01 ENCOUNTER — PATIENT MESSAGE (OUTPATIENT)
Dept: FAMILY MEDICINE | Facility: CLINIC | Age: 78
End: 2023-03-01
Payer: MEDICARE

## 2023-03-01 NOTE — TELEPHONE ENCOUNTER
----- Message from Arturo Morris sent at 3/1/2023  8:19 AM CST -----  Regarding: Appointment  Contact: Patient  Type:  Sooner Apoointment Request    Caller is requesting a sooner appointment.  Caller declined first available appointment listed below.  Caller will not accept being placed on the waitlist and is requesting a message be sent to doctor.  Name of Caller:Patient  When is the first available appointment?03/03/23  Symptoms:Pain  Would the patient rather a call back or a response via [facility]? call  Best Call Back Number:[phone]  Additional Information: Patient called regarding a sooner appointment.

## 2023-03-03 ENCOUNTER — SPECIALTY PHARMACY (OUTPATIENT)
Dept: PHARMACY | Facility: CLINIC | Age: 78
End: 2023-03-03
Payer: MEDICARE

## 2023-03-03 ENCOUNTER — OFFICE VISIT (OUTPATIENT)
Dept: FAMILY MEDICINE | Facility: CLINIC | Age: 78
End: 2023-03-03
Payer: MEDICARE

## 2023-03-03 DIAGNOSIS — E03.8 HYPOTHYROIDISM DUE TO HASHIMOTO'S THYROIDITIS: ICD-10-CM

## 2023-03-03 DIAGNOSIS — M54.2 CHRONIC NECK PAIN: Primary | ICD-10-CM

## 2023-03-03 DIAGNOSIS — E06.3 HYPOTHYROIDISM DUE TO HASHIMOTO'S THYROIDITIS: ICD-10-CM

## 2023-03-03 DIAGNOSIS — I48.0 PAROXYSMAL ATRIAL FIBRILLATION: ICD-10-CM

## 2023-03-03 DIAGNOSIS — R73.01 IMPAIRED FASTING GLUCOSE: ICD-10-CM

## 2023-03-03 DIAGNOSIS — G89.29 CHRONIC NECK PAIN: Primary | ICD-10-CM

## 2023-03-03 PROCEDURE — 1159F PR MEDICATION LIST DOCUMENTED IN MEDICAL RECORD: ICD-10-PCS | Mod: HCNC,CPTII,95, | Performed by: INTERNAL MEDICINE

## 2023-03-03 PROCEDURE — 99213 PR OFFICE/OUTPT VISIT, EST, LEVL III, 20-29 MIN: ICD-10-PCS | Mod: HCNC,95,, | Performed by: INTERNAL MEDICINE

## 2023-03-03 PROCEDURE — 1160F RVW MEDS BY RX/DR IN RCRD: CPT | Mod: HCNC,CPTII,95, | Performed by: INTERNAL MEDICINE

## 2023-03-03 PROCEDURE — 1159F MED LIST DOCD IN RCRD: CPT | Mod: HCNC,CPTII,95, | Performed by: INTERNAL MEDICINE

## 2023-03-03 PROCEDURE — 1160F PR REVIEW ALL MEDS BY PRESCRIBER/CLIN PHARMACIST DOCUMENTED: ICD-10-PCS | Mod: HCNC,CPTII,95, | Performed by: INTERNAL MEDICINE

## 2023-03-03 PROCEDURE — 1157F ADVNC CARE PLAN IN RCRD: CPT | Mod: HCNC,CPTII,95, | Performed by: INTERNAL MEDICINE

## 2023-03-03 PROCEDURE — 99213 OFFICE O/P EST LOW 20 MIN: CPT | Mod: HCNC,95,, | Performed by: INTERNAL MEDICINE

## 2023-03-03 PROCEDURE — 1157F PR ADVANCE CARE PLAN OR EQUIV PRESENT IN MEDICAL RECORD: ICD-10-PCS | Mod: HCNC,CPTII,95, | Performed by: INTERNAL MEDICINE

## 2023-03-03 RX ORDER — PREDNISONE 10 MG/1
10 TABLET ORAL DAILY
Qty: 5 TABLET | Refills: 0 | Status: SHIPPED | OUTPATIENT
Start: 2023-03-03 | End: 2023-03-08

## 2023-03-03 NOTE — TELEPHONE ENCOUNTER
Patient states she has Lower Lumbar back surgery scheduled on 4/13/2023. LVM with DEEJAY Smith at Dr. Marvin to inform MD of patient's upcoming surgery and request they reach out to her to discuss holding therpay prior to and after surgery.

## 2023-03-03 NOTE — TELEPHONE ENCOUNTER
Specialty Pharmacy - Refill Coordination    Specialty Medication Orders Linked to Encounter      Flowsheet Row Most Recent Value   Medication #1 nilotinib (TASIGNA) 150 mg capsule (Order#[id number], Rx#[id number])            Refill Questions - Documented Responses      Flowsheet Row Most Recent Value   Patient Availability and HIPAA Verification    Does patient want to proceed with activity? Yes   HIPAA/medical authority confirmed? Yes   Relationship to patient of person spoken to? Self   Refill Screening Questions    Changes to allergies? No   Changes to medications? No   New conditions since last clinic visit? No   Unplanned office visit, urgent care, ED, or hospital admission in the last 4 weeks? No   How does patient/caregiver feel medication is working? Good   Financial problems or insurance changes? No   How many doses of your specialty medications were missed in the last 4 weeks? 0   Would patient like to speak to a pharmacist? No   When does the patient need to receive the medication? 03/07/23   Refill Delivery Questions    How will the patient receive the medication? MEDRx   When does the patient need to receive the medication? 03/07/23   Shipping Address Home   Address in [facility] confirmed and updated if neccessary? Yes   Expected Copay ($) 0   Is the patient able to afford the medication copay? Yes   Payment Method zero copay   Days supply of Refill 28   Supplies needed? No supplies needed   Refill activity completed? Yes   Refill activity plan Refill scheduled   Shipment/Pickup Date: 03/06/23            Current Outpatient Medications   Medication Sig    alendronate (FOSAMAX) 70 MG tablet Take 1 tablet (70 mg total) by mouth every 7 days.    ASPIRIN LOW DOSE 81 mg EC tablet Take 81 mg by mouth once daily.     atorvastatin (LIPITOR) 40 MG tablet TAKE 1 TABLET ONE TIME DAILY    azelastine (ASTELIN) 137 mcg (0.1 %) nasal spray 1 spray (137 mcg total) by Nasal route 2 (two) times daily.    baclofen  (LIORESAL) 20 MG tablet Take 20 mg by mouth daily 2 hours after breakfast.    COMBIVENT RESPIMAT  mcg/actuation inhaler INHALE 1 PUFF EVERY 6 HOURS AS NEEDED FOR WHEEZING OR SHORTNESS OF BREATH (RESCUE)    cycloSPORINE (RESTASIS) 0.05 % ophthalmic emulsion PLACE 1 DROP INTO BOTH EYES 2 TIMES DAILY.    ergocalciferol (ERGOCALCIFEROL) 50,000 unit Cap Take 1 capsule (50,000 Units total) by mouth every 7 days.    fentaNYL (DURAGESIC) 25 mcg/hr Place 1 patch onto the skin every 48 hours.    fentaNYL (DURAGESIC) 25 mcg/hr Place 1 patch onto the skin every 48 hours.    [START ON 3/6/2023] fentaNYL (DURAGESIC) 25 mcg/hr Place 1 patch onto the skin every 48 hours.    [START ON 4/3/2023] fentaNYL (DURAGESIC) 25 mcg/hr Place 1 patch onto the skin every 48 hours.    fish oil-omega-3 fatty acids 300-1,000 mg capsule Take 1 capsule by mouth once daily.    fluticasone propionate (FLONASE) 50 mcg/actuation nasal spray USE 1 SPRAY IN EACH NOSTRIL EVERY DAY    gabapentin (NEURONTIN) 300 MG capsule Take 2 PO Q am and 3 PO Q pm    hydrocodone-acetaminophen 10-325mg (NORCO)  mg Tab Take 1 tablet by mouth every 8 (eight) hours as needed.     IPRATROPIUM BROMIDE NASL by Nasal route once as needed.    levocetirizine (XYZAL) 5 MG tablet TAKE 1 TABLET EVERY EVENING    levothyroxine (SYNTHROID) 75 MCG tablet Take 1 tablet (75 mcg total) by mouth once daily.    MISCELLANEOUS MEDICAL SUPPLY (COMPRESSION STOCKINGS MISC)     mupirocin calcium 2% nasl oint (BACTROBAN) 2 % Oint by Nasal route 2 (two) times daily.    nilotinib (TASIGNA) 150 mg capsule Take 2 capsules by mouth twice a day. Take whole with water. Take on an empty stomach 1 hour before or 2 hours after food.    nitroGLYCERIN (NITROSTAT) 0.4 MG SL tablet Place 1 tablet (0.4 mg total) under the tongue every 5 (five) minutes as needed for Chest pain.    spironolactone (ALDACTONE) 25 MG tablet TAKE 1 TABLET (25 MG TOTAL) BY MOUTH ONCE DAILY.    XARELTO 20 mg Tab TAKE 1 TABLET  DAILY WITH DINNER OR EVENING MEAL    zolpidem (AMBIEN CR) 12.5 MG CR tablet Take 1 PO QHS as needed insomnia   Last reviewed on 2/22/2023  2:49 PM by Michelle Harrell NP    Review of patient's allergies indicates:   Allergen Reactions    Alcohol prep pads [alcohol swabs] Hives and Rash    Diazolidinyl urea Anaphylaxis    Influenza virus vaccines Hives and Swelling    Iodine Anaphylaxis     Other reaction(s): Unknown    Preservative Anaphylaxis     Many different preservatives per pt report    Thimerosal Anaphylaxis    Bactoshield chg [chlorhexidine gluconate] Rash    Cephalosporins Rash    Lisinopril Palpitations    Penicillins Rash     Other reaction(s): Unknown    Sotalol Palpitations    Last reviewed on  2/22/2023 2:49 PM by Michelle Harrell      Tasks added this encounter   No tasks added.   Tasks due within next 3 months   3/3/2023 - Refill Call (Auto Added)     FARZAD OLIVIER, PharmD  [facility]  [address] [place], [place] [address]  [facility] [address]  Phone: [phone]  Fax: [phone]

## 2023-03-03 NOTE — TELEPHONE ENCOUNTER
Outgoing call regarding Tasign refill, pt is having surgery soon and want to know if the hospital will provide her with her Tasigna or is she allow to bring her medication. Pt stated to call back at 11:30. Informed pt [facility] will follow up at 11:30

## 2023-03-03 NOTE — PROGRESS NOTES
Patient ID: Adry Owen     Chief Complaint: Follow up labs      HPI: Follow up labs that show an A1c 5.9. She has impaired fasting glucose, but that won't preclude her from getting her surgery. She was initially going to get her lumbar surgery first, but in the last 2 weeks, her cervical spine has flared and is causing much more pain. She's considering prioritizing her cervical spine surgery due to a big increase in pain, but she doesn't have an appointment with him for another 3 weeks. She is still using Fentanyl patches and hydrocodone. I did review the MRI and it does show significant degenerative joint disease in her cervical spine. I offered her a short course of prednisone 10mg. She's also seeing floaters in bilateral eyes making me think she has a retinal problem and I advise her to see Optho.     Review of Systems   Constitutional: Negative.    HENT: Negative.     Eyes: Negative.    Respiratory: Negative.     Cardiovascular: Negative.    Gastrointestinal: Negative.    Endocrine: Negative.    Genitourinary: Negative.    Musculoskeletal:  Positive for back pain and neck pain.   Skin: Negative.    Allergic/Immunologic: Negative.    Neurological: Negative.    Hematological: Negative.    Psychiatric/Behavioral: Negative.          Objective:      Physical Exam   Physical Exam  Constitutional:       Appearance: Normal appearance.   HENT:      Head: Normocephalic and atraumatic.   Pulmonary:      Effort: Pulmonary effort is normal.   Neurological:      Mental Status: She is alert and oriented to person, place, and time.   Psychiatric:         Thought Content: Thought content normal.          Vitals: There were no vitals filed for this visit.       Current Outpatient Medications:     alendronate (FOSAMAX) 70 MG tablet, Take 1 tablet (70 mg total) by mouth every 7 days., Disp: 12 tablet, Rfl: 3    ASPIRIN LOW DOSE 81 mg EC tablet, Take 81 mg by mouth once daily. , Disp: , Rfl:     atorvastatin (LIPITOR) 40 MG  tablet, TAKE 1 TABLET ONE TIME DAILY, Disp: 90 tablet, Rfl: 3    azelastine (ASTELIN) 137 mcg (0.1 %) nasal spray, 1 spray (137 mcg total) by Nasal route 2 (two) times daily., Disp: 30 mL, Rfl: 11    baclofen (LIORESAL) 20 MG tablet, Take 20 mg by mouth daily 2 hours after breakfast., Disp: , Rfl:     COMBIVENT RESPIMAT  mcg/actuation inhaler, INHALE 1 PUFF EVERY 6 HOURS AS NEEDED FOR WHEEZING OR SHORTNESS OF BREATH (RESCUE), Disp: 12 g, Rfl: 3    cycloSPORINE (RESTASIS) 0.05 % ophthalmic emulsion, PLACE 1 DROP INTO BOTH EYES 2 TIMES DAILY., Disp: 60 each, Rfl: 11    ergocalciferol (ERGOCALCIFEROL) 50,000 unit Cap, Take 1 capsule (50,000 Units total) by mouth every 7 days., Disp: 12 capsule, Rfl: 3    fentaNYL (DURAGESIC) 25 mcg/hr, Place 1 patch onto the skin every 48 hours., Disp: 15 patch, Rfl: 0    fentaNYL (DURAGESIC) 25 mcg/hr, Place 1 patch onto the skin every 48 hours., Disp: 15 patch, Rfl: 0    [START ON 3/6/2023] fentaNYL (DURAGESIC) 25 mcg/hr, Place 1 patch onto the skin every 48 hours., Disp: 15 patch, Rfl: 0    [START ON 4/3/2023] fentaNYL (DURAGESIC) 25 mcg/hr, Place 1 patch onto the skin every 48 hours., Disp: 15 patch, Rfl: 0    fish oil-omega-3 fatty acids 300-1,000 mg capsule, Take 1 capsule by mouth once daily., Disp: , Rfl:     fluticasone propionate (FLONASE) 50 mcg/actuation nasal spray, USE 1 SPRAY IN EACH NOSTRIL EVERY DAY, Disp: 32 g, Rfl: 3    gabapentin (NEURONTIN) 300 MG capsule, Take 2 PO Q am and 3 PO Q pm, Disp: 450 capsule, Rfl: 3    hydrocodone-acetaminophen 10-325mg (NORCO)  mg Tab, Take 1 tablet by mouth every 8 (eight) hours as needed. , Disp: , Rfl:     IPRATROPIUM BROMIDE NASL, by Nasal route once as needed., Disp: , Rfl:     levocetirizine (XYZAL) 5 MG tablet, TAKE 1 TABLET EVERY EVENING, Disp: 90 tablet, Rfl: 3    levothyroxine (SYNTHROID) 75 MCG tablet, Take 1 tablet (75 mcg total) by mouth once daily., Disp: 90 tablet, Rfl: 3    MISCELLANEOUS MEDICAL SUPPLY  (COMPRESSION STOCKINGS MISC), , Disp: , Rfl:     mupirocin calcium 2% nasl oint (BACTROBAN) 2 % Oint, by Nasal route 2 (two) times daily., Disp: 1 g, Rfl: 0    nilotinib (TASIGNA) 150 mg capsule, Take 2 capsules by mouth twice a day. Take whole with water. Take on an empty stomach 1 hour before or 2 hours after food., Disp: 992 capsule, Rfl: 0    nitroGLYCERIN (NITROSTAT) 0.4 MG SL tablet, Place 1 tablet (0.4 mg total) under the tongue every 5 (five) minutes as needed for Chest pain., Disp: 25 tablet, Rfl: 2    predniSONE (DELTASONE) 10 MG tablet, Take 1 tablet (10 mg total) by mouth once daily. for 5 days, Disp: 5 tablet, Rfl: 0    spironolactone (ALDACTONE) 25 MG tablet, TAKE 1 TABLET (25 MG TOTAL) BY MOUTH ONCE DAILY., Disp: 90 tablet, Rfl: 1    XARELTO 20 mg Tab, TAKE 1 TABLET DAILY WITH DINNER OR EVENING MEAL, Disp: 90 tablet, Rfl: 3    zolpidem (AMBIEN CR) 12.5 MG CR tablet, Take 1 PO QHS as needed insomnia, Disp: 30 tablet, Rfl: 5   Assessment:       Patient Active Problem List    Diagnosis Date Noted    Osteopenia of necks of both femurs 01/12/2023    Gross hematuria 07/01/2022    Abnormal stress test 04/21/2022    Cardiac pacemaker in situ 08/16/2021    Urinary tract infection without hematuria 10/06/2020    Lumbosacral radiculopathy 10/06/2020    Vitamin D deficiency 05/15/2020    Elevated blood pressure reading without diagnosis of hypertension 05/15/2020    JAYLIN treated with BiPAP 04/08/2020    Insomnia due to medical condition 01/10/2020    Atrioventricular block, complete 11/27/2019    Third degree heart block 10/22/2019    Chronic neck pain 10/22/2019    Non-seasonal allergic rhinitis due to pollen 10/22/2019    Hypothyroidism due to Hashimoto's thyroiditis 07/01/2019    AV block, Mobitz 1 06/21/2018    Headache 06/20/2018    Digital mucous cyst of finger of right hand 06/12/2018    Chronic myeloid leukemia, BCR/ABL-positive, in remission 05/23/2018    Leukemia not having achieved remission 05/08/2018     Anemia, chronic disease 05/07/2018    Leucocytosis 05/06/2018    Acute diverticulitis 11/07/2017    Acute lower GI bleeding 11/06/2017    Atrial flutter 05/08/2017    Angioedema 04/11/2017    Urticaria 04/09/2017    Hashimoto encephalopathy 08/03/2016    GI bleeding 08/26/2013    Syncope 02/22/2013    Nuclear sclerosis 07/23/2012    Posterior vitreous detachment 07/23/2012    Essential hypertension 06/01/2012    Coronary artery disease due to calcified coronary lesion 05/11/2012    Other hyperlipidemia 05/11/2012    Paroxysmal atrial fibrillation 05/11/2012    Back pain 05/11/2012    Status post coronary artery stent placement 05/11/2012          Plan:       Adry Oewn  was seen today for follow-up and may need lab work.    Diagnoses and all orders for this visit:    Diagnoses and all orders for this visit:    Chronic neck pain  -     predniSONE (DELTASONE) 10 MG tablet; Take 1 tablet (10 mg total) by mouth once daily. for 5 days  Continue other pain meds    Impaired fasting glucose  A1c ok at 5.9     Hypothyroidism due to Hashimoto's thyroiditis  Lowering Levothyroxine dose per Dr. Rodgers    Paroxysmal atrial fibrillation  Monitor Heart rate                 Answers submitted by the patient for this visit:  Back Pain Questionnaire (Submitted on 3/1/2023)  Chief Complaint: Back pain  Chronicity: chronic  Onset: more than 1 year ago  Frequency: daily  Progression since onset: rapidly worsening  Pain location: thoracic spine  Pain quality: aching  Pain - numeric: 10/10  Pain is: the same all the time  Stiffness is present: all day  Risk factors: history of cancer, history of osteoporosis  Pain severity: severe  Improvement on treatment: no relief

## 2023-03-03 NOTE — TELEPHONE ENCOUNTER
Specialty Pharmacy - Refill Coordination    Specialty Medication Orders Linked to Encounter      Flowsheet Row Most Recent Value   Medication #1 nilotinib (TASIGNA) 150 mg capsule (Order#[id number], Rx#[id number])            Refill Questions - Documented Responses      Flowsheet Row Most Recent Value   Patient Availability and HIPAA Verification    Does patient want to proceed with activity? Yes   HIPAA/medical authority confirmed? Yes   Relationship to patient of person spoken to? Self   Refill Screening Questions    Changes to allergies? No   Changes to medications? No   New conditions since last clinic visit? No   Unplanned office visit, urgent care, ED, or hospital admission in the last 4 weeks? No   How does patient/caregiver feel medication is working? Good   Financial problems or insurance changes? No   How many doses of your specialty medications were missed in the last 4 weeks? 0   Would patient like to speak to a pharmacist? No   When does the patient need to receive the medication? 03/07/23   Refill Delivery Questions    How will the patient receive the medication? MEDRx   When does the patient need to receive the medication? 03/07/23   Shipping Address Home   Address in [facility] confirmed and updated if neccessary? Yes   Expected Copay ($) 0   Is the patient able to afford the medication copay? Yes   Payment Method zero copay   Days supply of Refill 28   Supplies needed? No supplies needed   Refill activity completed? Yes   Refill activity plan Refill scheduled   Shipment/Pickup Date: 03/06/23            Current Outpatient Medications   Medication Sig    alendronate (FOSAMAX) 70 MG tablet Take 1 tablet (70 mg total) by mouth every 7 days.    ASPIRIN LOW DOSE 81 mg EC tablet Take 81 mg by mouth once daily.     atorvastatin (LIPITOR) 40 MG tablet TAKE 1 TABLET ONE TIME DAILY    azelastine (ASTELIN) 137 mcg (0.1 %) nasal spray 1 spray (137 mcg total) by Nasal route 2 (two) times daily.    baclofen  (LIORESAL) 20 MG tablet Take 20 mg by mouth daily 2 hours after breakfast.    COMBIVENT RESPIMAT  mcg/actuation inhaler INHALE 1 PUFF EVERY 6 HOURS AS NEEDED FOR WHEEZING OR SHORTNESS OF BREATH (RESCUE)    cycloSPORINE (RESTASIS) 0.05 % ophthalmic emulsion PLACE 1 DROP INTO BOTH EYES 2 TIMES DAILY.    ergocalciferol (ERGOCALCIFEROL) 50,000 unit Cap Take 1 capsule (50,000 Units total) by mouth every 7 days.    fentaNYL (DURAGESIC) 25 mcg/hr Place 1 patch onto the skin every 48 hours.    fentaNYL (DURAGESIC) 25 mcg/hr Place 1 patch onto the skin every 48 hours.    [START ON 3/6/2023] fentaNYL (DURAGESIC) 25 mcg/hr Place 1 patch onto the skin every 48 hours.    [START ON 4/3/2023] fentaNYL (DURAGESIC) 25 mcg/hr Place 1 patch onto the skin every 48 hours.    fish oil-omega-3 fatty acids 300-1,000 mg capsule Take 1 capsule by mouth once daily.    fluticasone propionate (FLONASE) 50 mcg/actuation nasal spray USE 1 SPRAY IN EACH NOSTRIL EVERY DAY    gabapentin (NEURONTIN) 300 MG capsule Take 2 PO Q am and 3 PO Q pm    hydrocodone-acetaminophen 10-325mg (NORCO)  mg Tab Take 1 tablet by mouth every 8 (eight) hours as needed.     IPRATROPIUM BROMIDE NASL by Nasal route once as needed.    levocetirizine (XYZAL) 5 MG tablet TAKE 1 TABLET EVERY EVENING    levothyroxine (SYNTHROID) 75 MCG tablet Take 1 tablet (75 mcg total) by mouth once daily.    MISCELLANEOUS MEDICAL SUPPLY (COMPRESSION STOCKINGS MISC)     mupirocin calcium 2% nasl oint (BACTROBAN) 2 % Oint by Nasal route 2 (two) times daily.    nilotinib (TASIGNA) 150 mg capsule Take 2 capsules by mouth twice a day. Take whole with water. Take on an empty stomach 1 hour before or 2 hours after food.    nitroGLYCERIN (NITROSTAT) 0.4 MG SL tablet Place 1 tablet (0.4 mg total) under the tongue every 5 (five) minutes as needed for Chest pain.    spironolactone (ALDACTONE) 25 MG tablet TAKE 1 TABLET (25 MG TOTAL) BY MOUTH ONCE DAILY.    XARELTO 20 mg Tab TAKE 1 TABLET  DAILY WITH DINNER OR EVENING MEAL    zolpidem (AMBIEN CR) 12.5 MG CR tablet Take 1 PO QHS as needed insomnia   Last reviewed on 2/22/2023  2:49 PM by Michelle Harrell NP    Review of patient's allergies indicates:   Allergen Reactions    Alcohol prep pads [alcohol swabs] Hives and Rash    Diazolidinyl urea Anaphylaxis    Influenza virus vaccines Hives and Swelling    Iodine Anaphylaxis     Other reaction(s): Unknown    Preservative Anaphylaxis     Many different preservatives per pt report    Thimerosal Anaphylaxis    Bactoshield chg [chlorhexidine gluconate] Rash    Cephalosporins Rash    Lisinopril Palpitations    Penicillins Rash     Other reaction(s): Unknown    Sotalol Palpitations    Last reviewed on  2/22/2023 2:49 PM by Michelle Harrell    Interventions added this encounter   Open: [facility] Provider Intervention: nilotinib (TASIGNA) 150 mg capsule     Tasks added this encounter   3/28/2023 - Refill Call (Auto Added)   Tasks due within next 3 months   No tasks due.     FARZAD OLIVIER, PharmD  [facility]  [address] [place] [id number]  Phone: [phone]  Fax: [phone]

## 2023-03-06 ENCOUNTER — PATIENT MESSAGE (OUTPATIENT)
Dept: FAMILY MEDICINE | Facility: CLINIC | Age: 78
End: 2023-03-06
Payer: MEDICARE

## 2023-03-13 ENCOUNTER — OFFICE VISIT (OUTPATIENT)
Dept: FAMILY MEDICINE | Facility: CLINIC | Age: 78
End: 2023-03-13
Payer: MEDICARE

## 2023-03-13 VITALS
OXYGEN SATURATION: 98 % | HEIGHT: 65 IN | BODY MASS INDEX: 26.96 KG/M2 | SYSTOLIC BLOOD PRESSURE: 108 MMHG | DIASTOLIC BLOOD PRESSURE: 70 MMHG | HEART RATE: 82 BPM

## 2023-03-13 DIAGNOSIS — C92.11 CHRONIC MYELOID LEUKEMIA, BCR/ABL-POSITIVE, IN REMISSION: ICD-10-CM

## 2023-03-13 DIAGNOSIS — I25.10 CORONARY ARTERY DISEASE DUE TO CALCIFIED CORONARY LESION: ICD-10-CM

## 2023-03-13 DIAGNOSIS — M54.2 CHRONIC NECK PAIN: ICD-10-CM

## 2023-03-13 DIAGNOSIS — I10 ESSENTIAL HYPERTENSION: ICD-10-CM

## 2023-03-13 DIAGNOSIS — G89.29 CHRONIC NECK PAIN: ICD-10-CM

## 2023-03-13 DIAGNOSIS — I25.84 CORONARY ARTERY DISEASE DUE TO CALCIFIED CORONARY LESION: ICD-10-CM

## 2023-03-13 DIAGNOSIS — I48.0 PAROXYSMAL ATRIAL FIBRILLATION: ICD-10-CM

## 2023-03-13 DIAGNOSIS — R79.1 ABNORMAL COAGULATION PROFILE: ICD-10-CM

## 2023-03-13 DIAGNOSIS — Z01.818 PRE-OP EVALUATION: Primary | ICD-10-CM

## 2023-03-13 DIAGNOSIS — M54.17 LUMBOSACRAL RADICULOPATHY: ICD-10-CM

## 2023-03-13 PROCEDURE — 1125F AMNT PAIN NOTED PAIN PRSNT: CPT | Mod: HCNC,CPTII,S$GLB, | Performed by: INTERNAL MEDICINE

## 2023-03-13 PROCEDURE — 1159F MED LIST DOCD IN RCRD: CPT | Mod: HCNC,CPTII,S$GLB, | Performed by: INTERNAL MEDICINE

## 2023-03-13 PROCEDURE — 1101F PT FALLS ASSESS-DOCD LE1/YR: CPT | Mod: HCNC,CPTII,S$GLB, | Performed by: INTERNAL MEDICINE

## 2023-03-13 PROCEDURE — 1160F RVW MEDS BY RX/DR IN RCRD: CPT | Mod: HCNC,CPTII,S$GLB, | Performed by: INTERNAL MEDICINE

## 2023-03-13 PROCEDURE — 3078F PR MOST RECENT DIASTOLIC BLOOD PRESSURE < 80 MM HG: ICD-10-PCS | Mod: HCNC,CPTII,S$GLB, | Performed by: INTERNAL MEDICINE

## 2023-03-13 PROCEDURE — 99214 OFFICE O/P EST MOD 30 MIN: CPT | Mod: HCNC,S$GLB,, | Performed by: INTERNAL MEDICINE

## 2023-03-13 PROCEDURE — 1157F PR ADVANCE CARE PLAN OR EQUIV PRESENT IN MEDICAL RECORD: ICD-10-PCS | Mod: HCNC,CPTII,S$GLB, | Performed by: INTERNAL MEDICINE

## 2023-03-13 PROCEDURE — 99214 PR OFFICE/OUTPT VISIT, EST, LEVL IV, 30-39 MIN: ICD-10-PCS | Mod: HCNC,S$GLB,, | Performed by: INTERNAL MEDICINE

## 2023-03-13 PROCEDURE — 99999 PR PBB SHADOW E&M-EST. PATIENT-LVL V: ICD-10-PCS | Mod: PBBFAC,HCNC,, | Performed by: INTERNAL MEDICINE

## 2023-03-13 PROCEDURE — 1101F PR PT FALLS ASSESS DOC 0-1 FALLS W/OUT INJ PAST YR: ICD-10-PCS | Mod: HCNC,CPTII,S$GLB, | Performed by: INTERNAL MEDICINE

## 2023-03-13 PROCEDURE — 1159F PR MEDICATION LIST DOCUMENTED IN MEDICAL RECORD: ICD-10-PCS | Mod: HCNC,CPTII,S$GLB, | Performed by: INTERNAL MEDICINE

## 2023-03-13 PROCEDURE — 1160F PR REVIEW ALL MEDS BY PRESCRIBER/CLIN PHARMACIST DOCUMENTED: ICD-10-PCS | Mod: HCNC,CPTII,S$GLB, | Performed by: INTERNAL MEDICINE

## 2023-03-13 PROCEDURE — 3074F SYST BP LT 130 MM HG: CPT | Mod: HCNC,CPTII,S$GLB, | Performed by: INTERNAL MEDICINE

## 2023-03-13 PROCEDURE — 99999 PR PBB SHADOW E&M-EST. PATIENT-LVL V: CPT | Mod: PBBFAC,HCNC,, | Performed by: INTERNAL MEDICINE

## 2023-03-13 PROCEDURE — 1125F PR PAIN SEVERITY QUANTIFIED, PAIN PRESENT: ICD-10-PCS | Mod: HCNC,CPTII,S$GLB, | Performed by: INTERNAL MEDICINE

## 2023-03-13 PROCEDURE — 1157F ADVNC CARE PLAN IN RCRD: CPT | Mod: HCNC,CPTII,S$GLB, | Performed by: INTERNAL MEDICINE

## 2023-03-13 PROCEDURE — 3074F PR MOST RECENT SYSTOLIC BLOOD PRESSURE < 130 MM HG: ICD-10-PCS | Mod: HCNC,CPTII,S$GLB, | Performed by: INTERNAL MEDICINE

## 2023-03-13 PROCEDURE — 3078F DIAST BP <80 MM HG: CPT | Mod: HCNC,CPTII,S$GLB, | Performed by: INTERNAL MEDICINE

## 2023-03-13 PROCEDURE — 3288F FALL RISK ASSESSMENT DOCD: CPT | Mod: HCNC,CPTII,S$GLB, | Performed by: INTERNAL MEDICINE

## 2023-03-13 PROCEDURE — 3288F PR FALLS RISK ASSESSMENT DOCUMENTED: ICD-10-PCS | Mod: HCNC,CPTII,S$GLB, | Performed by: INTERNAL MEDICINE

## 2023-03-13 RX ORDER — PREDNISONE 10 MG/1
TABLET ORAL
Qty: 5 TABLET | Refills: 0 | OUTPATIENT
Start: 2023-03-13

## 2023-03-13 RX ORDER — PREDNISONE 10 MG/1
10 TABLET ORAL DAILY
Qty: 5 TABLET | Refills: 0 | Status: SHIPPED | OUTPATIENT
Start: 2023-03-13 | End: 2023-05-08

## 2023-03-13 NOTE — PROGRESS NOTES
Patient ID: Adry Owen     Chief Complaint:   Chief Complaint   Patient presents with    Pre-op Exam        HPI:  Here for preop examination in advance of a surgery that may be on her cervical spine or her lumbar spine depending on what is hurting her more. She will see Surgery in 16 days to discuss. The original plan was to do a lumbar fusion, but her neck is hurting her much more. It did improve with a short course of steroids, but it returned after she snapped her neck by accident. I will give her another course of Prednisone 10 mg / day x 5 days. I did complete the pre-op form and will clear her for the surgery as long as she stops her ASA and Xarelto for 5-7 days prior to the surgery. Labs reviewed and they are mostly good. Minor abnormalities in Physical Therapy and PTT are likely DY Xarelto. CXR was sent to Dr. Stringer and I need to track down an EKG that cardiology did.     Review of Systems   Constitutional: Negative.    HENT: Negative.     Eyes: Negative.    Respiratory: Negative.     Cardiovascular: Negative.    Gastrointestinal: Negative.    Endocrine: Negative.    Genitourinary: Negative.    Musculoskeletal:  Positive for back pain and neck pain.   Skin: Negative.    Allergic/Immunologic: Negative.    Neurological: Negative.    Hematological: Negative.    Psychiatric/Behavioral: Negative.          Objective:      Physical Exam   Physical Exam  Vitals and nursing note reviewed.   Constitutional:       Appearance: Normal appearance. She is well-developed.   HENT:      Head: Normocephalic and atraumatic.      Nose: Nose normal.      Mouth/Throat:      Mouth: Mucous membranes are moist.   Eyes:      Extraocular Movements: Extraocular movements intact.      Conjunctiva/sclera: Conjunctivae normal.      Pupils: Pupils are equal, round, and reactive to light.   Cardiovascular:      Rate and Rhythm: Normal rate. Rhythm irregular.      Pulses: Normal pulses.      Heart sounds: Normal heart sounds.  "  Pulmonary:      Effort: Pulmonary effort is normal.      Breath sounds: Normal breath sounds.   Abdominal:      General: Bowel sounds are normal.      Palpations: Abdomen is soft.   Musculoskeletal:      Cervical back: Normal range of motion and neck supple.      Comments: In a back brace      Skin:     General: Skin is warm and dry.      Capillary Refill: Capillary refill takes less than 2 seconds.   Neurological:      General: No focal deficit present.      Mental Status: She is alert and oriented to person, place, and time.   Psychiatric:         Mood and Affect: Mood normal.         Behavior: Behavior normal.         Thought Content: Thought content normal.         Judgment: Judgment normal.          Vitals:   Vitals:    03/13/23 1501   BP: 108/70   BP Location: Right arm   Patient Position: Sitting   BP Method: Medium (Manual)   Pulse: 82   SpO2: 98%   Height: 5' 5" (1.651 m)          Current Outpatient Medications:     ASPIRIN LOW DOSE 81 mg EC tablet, Take 81 mg by mouth once daily. , Disp: , Rfl:     atorvastatin (LIPITOR) 40 MG tablet, TAKE 1 TABLET ONE TIME DAILY, Disp: 90 tablet, Rfl: 3    azelastine (ASTELIN) 137 mcg (0.1 %) nasal spray, 1 spray (137 mcg total) by Nasal route 2 (two) times daily., Disp: 30 mL, Rfl: 11    baclofen (LIORESAL) 20 MG tablet, Take 20 mg by mouth daily 2 hours after breakfast., Disp: , Rfl:     COMBIVENT RESPIMAT  mcg/actuation inhaler, INHALE 1 PUFF EVERY 6 HOURS AS NEEDED FOR WHEEZING OR SHORTNESS OF BREATH (RESCUE), Disp: 12 g, Rfl: 3    cycloSPORINE (RESTASIS) 0.05 % ophthalmic emulsion, PLACE 1 DROP INTO BOTH EYES 2 TIMES DAILY., Disp: 60 each, Rfl: 11    ergocalciferol (ERGOCALCIFEROL) 50,000 unit Cap, Take 1 capsule (50,000 Units total) by mouth every 7 days., Disp: 12 capsule, Rfl: 3    fentaNYL (DURAGESIC) 25 mcg/hr, Place 1 patch onto the skin every 48 hours., Disp: 15 patch, Rfl: 0    fentaNYL (DURAGESIC) 25 mcg/hr, Place 1 patch onto the skin every 48 " hours., Disp: 15 patch, Rfl: 0    fentaNYL (DURAGESIC) 25 mcg/hr, Place 1 patch onto the skin every 48 hours., Disp: 15 patch, Rfl: 0    [START ON 4/3/2023] fentaNYL (DURAGESIC) 25 mcg/hr, Place 1 patch onto the skin every 48 hours., Disp: 15 patch, Rfl: 0    fish oil-omega-3 fatty acids 300-1,000 mg capsule, Take 1 capsule by mouth once daily., Disp: , Rfl:     fluticasone propionate (FLONASE) 50 mcg/actuation nasal spray, USE 1 SPRAY IN EACH NOSTRIL EVERY DAY, Disp: 32 g, Rfl: 3    gabapentin (NEURONTIN) 300 MG capsule, Take 2 PO Q am and 3 PO Q pm, Disp: 450 capsule, Rfl: 3    hydrocodone-acetaminophen 10-325mg (NORCO)  mg Tab, Take 1 tablet by mouth every 8 (eight) hours as needed. , Disp: , Rfl:     IPRATROPIUM BROMIDE NASL, by Nasal route once as needed., Disp: , Rfl:     levocetirizine (XYZAL) 5 MG tablet, TAKE 1 TABLET EVERY EVENING, Disp: 90 tablet, Rfl: 3    levothyroxine (SYNTHROID) 75 MCG tablet, Take 1 tablet (75 mcg total) by mouth once daily., Disp: 90 tablet, Rfl: 3    MISCELLANEOUS MEDICAL SUPPLY (COMPRESSION STOCKINGS MISC), , Disp: , Rfl:     mupirocin calcium 2% nasl oint (BACTROBAN) 2 % Oint, by Nasal route 2 (two) times daily., Disp: 1 g, Rfl: 0    nilotinib (TASIGNA) 150 mg capsule, Take 2 capsules by mouth twice a day. Take whole with water. Take on an empty stomach 1 hour before or 2 hours after food., Disp: 992 capsule, Rfl: 0    nitroGLYCERIN (NITROSTAT) 0.4 MG SL tablet, Place 1 tablet (0.4 mg total) under the tongue every 5 (five) minutes as needed for Chest pain., Disp: 25 tablet, Rfl: 2    spironolactone (ALDACTONE) 25 MG tablet, TAKE 1 TABLET (25 MG TOTAL) BY MOUTH ONCE DAILY., Disp: 90 tablet, Rfl: 1    XARELTO 20 mg Tab, TAKE 1 TABLET DAILY WITH DINNER OR EVENING MEAL, Disp: 90 tablet, Rfl: 3    zolpidem (AMBIEN CR) 12.5 MG CR tablet, Take 1 PO QHS as needed insomnia, Disp: 30 tablet, Rfl: 5    alendronate (FOSAMAX) 70 MG tablet, Take 1 tablet (70 mg total) by mouth every 7  days., Disp: 12 tablet, Rfl: 3    predniSONE (DELTASONE) 10 MG tablet, Take 1 tablet (10 mg total) by mouth once daily., Disp: 5 tablet, Rfl: 0   Assessment:       Patient Active Problem List    Diagnosis Date Noted    Osteopenia of necks of both femurs 01/12/2023    Gross hematuria 07/01/2022    Abnormal stress test 04/21/2022    Cardiac pacemaker in situ 08/16/2021    Urinary tract infection without hematuria 10/06/2020    Lumbosacral radiculopathy 10/06/2020    Vitamin D deficiency 05/15/2020    Elevated blood pressure reading without diagnosis of hypertension 05/15/2020    JAYLIN treated with BiPAP 04/08/2020    Insomnia due to medical condition 01/10/2020    Atrioventricular block, complete 11/27/2019    Third degree heart block 10/22/2019    Chronic neck pain 10/22/2019    Non-seasonal allergic rhinitis due to pollen 10/22/2019    Hypothyroidism due to Hashimoto's thyroiditis 07/01/2019    AV block, Mobitz 1 06/21/2018    Headache 06/20/2018    Digital mucous cyst of finger of right hand 06/12/2018    Chronic myeloid leukemia, BCR/ABL-positive, in remission 05/23/2018    Leukemia not having achieved remission 05/08/2018    Anemia, chronic disease 05/07/2018    Leucocytosis 05/06/2018    Acute diverticulitis 11/07/2017    Acute lower GI bleeding 11/06/2017    Atrial flutter 05/08/2017    Angioedema 04/11/2017    Urticaria 04/09/2017    Hashimoto encephalopathy 08/03/2016    GI bleeding 08/26/2013    Syncope 02/22/2013    Nuclear sclerosis 07/23/2012    Posterior vitreous detachment 07/23/2012    Essential hypertension 06/01/2012    Coronary artery disease due to calcified coronary lesion 05/11/2012    Other hyperlipidemia 05/11/2012    Paroxysmal atrial fibrillation 05/11/2012    Back pain 05/11/2012    Status post coronary artery stent placement 05/11/2012          Plan:       Adry K Owen  was seen today for follow-up and may need lab work.    Diagnoses and all orders for this visit:    Adry was seen today  for pre-op exam.    Diagnoses and all orders for this visit:    Pre-op evaluation  -     Cancel: CBC Auto Differential; Future  -     Cancel: Comprehensive Metabolic Panel; Future  -     Cancel: Protime-INR; Future  -     Cancel: IN OFFICE EKG 12-LEAD (to Muse)  -     X-Ray Chest PA And Lateral; Future  Don't get CXR yet     Chronic neck pain  -     predniSONE (DELTASONE) 10 MG tablet; Take 1 tablet (10 mg total) by mouth once daily.  Monitor as it improved with steroids   May get surgery here first     Abnormal coagulation profile  -     Cancel: Protime-INR; Future  Due to XArelto     Lumbosacral radiculopathy  Will need surgery eventually      Paroxysmal atrial fibrillation  Heart rate Controlled   Continue Xarelto     Coronary artery disease due to calcified coronary lesion  Stable    Chronic myeloid leukemia, BCR/ABL-positive, in remission  In remission     Essential hypertension  Controlled with med

## 2023-03-13 NOTE — TELEPHONE ENCOUNTER
No new care gaps identified.  [facility] Embedded Care Gaps. Reference number: [id number]. 3/13/2023   5:48:27 PM CDT

## 2023-03-14 ENCOUNTER — PATIENT MESSAGE (OUTPATIENT)
Dept: FAMILY MEDICINE | Facility: CLINIC | Age: 78
End: 2023-03-14
Payer: MEDICARE

## 2023-03-20 PROBLEM — R94.39 ABNORMAL STRESS TEST: Status: RESOLVED | Noted: 2022-04-21 | Resolved: 2023-03-20

## 2023-03-28 ENCOUNTER — SPECIALTY PHARMACY (OUTPATIENT)
Dept: PHARMACY | Facility: CLINIC | Age: 78
End: 2023-03-28
Payer: MEDICARE

## 2023-04-03 ENCOUNTER — SPECIALTY PHARMACY (OUTPATIENT)
Dept: PHARMACY | Facility: CLINIC | Age: 78
End: 2023-04-03
Payer: MEDICARE

## 2023-04-03 DIAGNOSIS — C92.11 CHRONIC MYELOID LEUKEMIA, BCR/ABL-POSITIVE, IN REMISSION: Primary | ICD-10-CM

## 2023-04-03 NOTE — TELEPHONE ENCOUNTER
Specialty Pharmacy - Refill Coordination    Specialty Medication Orders Linked to Encounter      Flowsheet Row Most Recent Value   Medication #1 nilotinib (TASIGNA) 150 mg capsule (Order#[id number], Rx#[id number])          Refill Questions - Documented Responses      Flowsheet Row Most Recent Value   Patient Availability and HIPAA Verification    Does patient want to proceed with activity? Unable to Reach          We have had multiple attempts to the patient and have been unsuccessful to reach the patient. We will stop reaching out to the patient but in the event that the patient needs the med and contacts us, we will communicate and begin dispensing for the patient. At your next visit with the patient, please review the importance of being in contact with our specialty pharmacy as a part of our care team.      FARZAD OLIVIER, PharmD  [facility]  [address] [facility] [address]  Phone: [phone]  Fax: [phone]

## 2023-04-03 NOTE — TELEPHONE ENCOUNTER
Specialty Pharmacy - Refill Coordination    Specialty Medication Orders Linked to Encounter      Flowsheet Row Most Recent Value   Medication #1 nilotinib (TASIGNA) 150 mg capsule (Order#[id number], Rx#[id number])     Pt was informed on holding therapy before her appt. Pt has an appointment with provider today. She stated she will bring it up during appt.        Refill Questions - Documented Responses      Flowsheet Row Most Recent Value   Patient Availability and HIPAA Verification    Does patient want to proceed with activity? Yes   HIPAA/medical authority confirmed? Yes   Relationship to patient of person spoken to? Self   Refill Screening Questions    Changes to allergies? No   Changes to medications? No   New conditions since last clinic visit? No   Unplanned office visit, urgent care, ED, or hospital admission in the last 4 weeks? No   How does patient/caregiver feel medication is working? Very good   Financial problems or insurance changes? No   How many doses of your specialty medications were missed in the last 4 weeks? 0   Would patient like to speak to a pharmacist? No   When does the patient need to receive the medication? 04/08/23   Refill Delivery Questions    How will the patient receive the medication? MEDRx   When does the patient need to receive the medication? 04/08/23   Shipping Address Home   Address in [facility] confirmed and updated if neccessary? Yes   Expected Copay ($) 0   Is the patient able to afford the medication copay? Yes   Payment Method zero copay   Days supply of Refill 28   Supplies needed? No supplies needed   Refill activity completed? Yes   Refill activity plan Refill scheduled   Shipment/Pickup Date: 04/05/23            Current Outpatient Medications   Medication Sig    alendronate (FOSAMAX) 70 MG tablet Take 1 tablet (70 mg total) by mouth every 7 days.    ASPIRIN LOW DOSE 81 mg EC tablet Take 81 mg by mouth once daily.     atorvastatin (LIPITOR) 40 MG tablet TAKE 1  TABLET ONE TIME DAILY    azelastine (ASTELIN) 137 mcg (0.1 %) nasal spray 1 spray (137 mcg total) by Nasal route 2 (two) times daily.    baclofen (LIORESAL) 20 MG tablet Take 20 mg by mouth daily 2 hours after breakfast.    COMBIVENT RESPIMAT  mcg/actuation inhaler INHALE 1 PUFF EVERY 6 HOURS AS NEEDED FOR WHEEZING OR SHORTNESS OF BREATH (RESCUE)    cycloSPORINE (RESTASIS) 0.05 % ophthalmic emulsion PLACE 1 DROP INTO BOTH EYES 2 TIMES DAILY.    ergocalciferol (ERGOCALCIFEROL) 50,000 unit Cap Take 1 capsule (50,000 Units total) by mouth every 7 days.    fentaNYL (DURAGESIC) 25 mcg/hr Place 1 patch onto the skin every 48 hours.    fentaNYL (DURAGESIC) 25 mcg/hr Place 1 patch onto the skin every 48 hours.    fentaNYL (DURAGESIC) 25 mcg/hr Place 1 patch onto the skin every 48 hours.    fentaNYL (DURAGESIC) 25 mcg/hr Place 1 patch onto the skin every 48 hours.    fish oil-omega-3 fatty acids 300-1,000 mg capsule Take 1 capsule by mouth once daily.    fluticasone propionate (FLONASE) 50 mcg/actuation nasal spray USE 1 SPRAY IN EACH NOSTRIL EVERY DAY    gabapentin (NEURONTIN) 300 MG capsule Take 2 PO Q am and 3 PO Q pm    hydrocodone-acetaminophen 10-325mg (NORCO)  mg Tab Take 1 tablet by mouth every 8 (eight) hours as needed.     IPRATROPIUM BROMIDE NASL by Nasal route once as needed.    levocetirizine (XYZAL) 5 MG tablet TAKE 1 TABLET EVERY EVENING    levothyroxine (SYNTHROID) 75 MCG tablet Take 1 tablet (75 mcg total) by mouth once daily.    MISCELLANEOUS MEDICAL SUPPLY (COMPRESSION STOCKINGS MISC)     mupirocin calcium 2% nasl oint (BACTROBAN) 2 % Oint by Nasal route 2 (two) times daily.    nilotinib (TASIGNA) 150 mg capsule Take 2 capsules by mouth twice a day. Take whole with water. Take on an empty stomach 1 hour before or 2 hours after food.    nitroGLYCERIN (NITROSTAT) 0.4 MG SL tablet Place 1 tablet (0.4 mg total) under the tongue every 5 (five) minutes as needed for Chest pain.    predniSONE  (DELTASONE) 10 MG tablet Take 1 tablet (10 mg total) by mouth once daily.    spironolactone (ALDACTONE) 25 MG tablet TAKE 1 TABLET (25 MG TOTAL) BY MOUTH ONCE DAILY.    XARELTO 20 mg Tab TAKE 1 TABLET DAILY WITH DINNER OR EVENING MEAL    zolpidem (AMBIEN CR) 12.5 MG CR tablet Take 1 PO QHS as needed insomnia   Last reviewed on 3/13/2023  3:39 PM by Ross Nunes MD    Review of patient's allergies indicates:   Allergen Reactions    Alcohol prep pads [alcohol swabs] Hives and Rash    Diazolidinyl urea Anaphylaxis    Influenza virus vaccines Hives and Swelling    Iodine Anaphylaxis     Other reaction(s): Unknown    Preservative Anaphylaxis     Many different preservatives per pt report    Thimerosal Anaphylaxis    Bactoshield chg [chlorhexidine gluconate] Rash    Feldene [piroxicam]      Syncope, muscle spasms.     Cephalosporins Rash    Lisinopril Palpitations    Penicillins Rash     Other reaction(s): Unknown    Sotalol Palpitations    Last reviewed on  3/14/2023 4:17 PM by Lisa Gallego      Tasks added this encounter   No tasks added.   Tasks due within next 3 months   3/28/2023 - Refill Call (Auto Added)     Sean Lim, PharmD  [facility]  [address] [place] [address]  Phone: [phone]  Fax: [phone]

## 2023-04-05 ENCOUNTER — SPECIALTY PHARMACY (OUTPATIENT)
Dept: PHARMACY | Facility: CLINIC | Age: 78
End: 2023-04-05
Payer: MEDICARE

## 2023-04-05 NOTE — TELEPHONE ENCOUNTER
Incoming call from Mrs. Owen questioning how long to hold her Tasigna prior to surgery. Discussed that that would be a choice by her provider.     LVM at Dr Marvin's office asking to call patient back

## 2023-04-14 RX ORDER — METHOCARBAMOL 500 MG/1
500 TABLET, FILM COATED ORAL EVERY 8 HOURS PRN
Status: ON HOLD | COMMUNITY
End: 2023-04-19 | Stop reason: HOSPADM

## 2023-04-14 NOTE — TELEPHONE ENCOUNTER
She was instructed to hold Tasigna the morning prior to surgery. Patient states she has restarted Tasigna today.    During the call, Patient reports she will start robaxin but has an older prescription for baclofen as well. No DDIs exists between baclofen and tasigna or robaxin and tasigna. I advised her to take the newest RX for robaxin and discard baclofen.     We also discussed pain management medications. Patient recently had a surgery to address back pain. She states her provider instructed her to stop Fentanyl and she is concerned about stopping this medication abruptly. I advised her to reach out to her pain management provider or PCP for further instructions on pain management scripts.     Patient expressed understanding.

## 2023-04-15 PROBLEM — K92.2 ACUTE LOWER GI BLEEDING: Status: RESOLVED | Noted: 2017-11-06 | Resolved: 2023-04-15

## 2023-04-15 PROBLEM — T78.3XXA ANGIOEDEMA: Status: RESOLVED | Noted: 2017-04-11 | Resolved: 2023-04-15

## 2023-04-15 PROBLEM — D63.8 ANEMIA, CHRONIC DISEASE: Status: RESOLVED | Noted: 2018-05-07 | Resolved: 2023-04-15

## 2023-04-15 PROBLEM — K57.92 ACUTE DIVERTICULITIS: Status: RESOLVED | Noted: 2017-11-07 | Resolved: 2023-04-15

## 2023-04-15 PROBLEM — R51.9 HEADACHE: Status: RESOLVED | Noted: 2018-06-20 | Resolved: 2023-04-15

## 2023-04-15 PROBLEM — I44.1 AV BLOCK, MOBITZ 1: Status: RESOLVED | Noted: 2018-06-21 | Resolved: 2023-04-15

## 2023-04-15 PROBLEM — L50.9 URTICARIA: Status: RESOLVED | Noted: 2017-04-09 | Resolved: 2023-04-15

## 2023-04-15 PROBLEM — M54.17 LUMBOSACRAL RADICULOPATHY: Status: RESOLVED | Noted: 2020-10-06 | Resolved: 2023-04-15

## 2023-04-15 PROBLEM — N39.0 URINARY TRACT INFECTION WITHOUT HEMATURIA: Status: RESOLVED | Noted: 2020-10-06 | Resolved: 2023-04-15

## 2023-04-15 PROBLEM — I44.2 THIRD DEGREE HEART BLOCK: Status: RESOLVED | Noted: 2019-10-22 | Resolved: 2023-04-15

## 2023-04-15 PROBLEM — R41.82 AMS (ALTERED MENTAL STATUS): Status: ACTIVE | Noted: 2023-04-15

## 2023-04-15 PROBLEM — M67.441 DIGITAL MUCOUS CYST OF FINGER OF RIGHT HAND: Status: RESOLVED | Noted: 2018-06-12 | Resolved: 2023-04-15

## 2023-04-15 PROBLEM — G47.01 INSOMNIA DUE TO MEDICAL CONDITION: Status: RESOLVED | Noted: 2020-01-10 | Resolved: 2023-04-15

## 2023-04-15 PROBLEM — J30.1 NON-SEASONAL ALLERGIC RHINITIS DUE TO POLLEN: Status: RESOLVED | Noted: 2019-10-22 | Resolved: 2023-04-15

## 2023-04-15 PROBLEM — C95.90 LEUKEMIA NOT HAVING ACHIEVED REMISSION: Status: RESOLVED | Noted: 2018-05-08 | Resolved: 2023-04-15

## 2023-04-15 PROBLEM — R03.0 ELEVATED BLOOD PRESSURE READING WITHOUT DIAGNOSIS OF HYPERTENSION: Status: RESOLVED | Noted: 2020-05-15 | Resolved: 2023-04-15

## 2023-04-15 PROBLEM — R31.0 GROSS HEMATURIA: Status: RESOLVED | Noted: 2022-07-01 | Resolved: 2023-04-15

## 2023-04-15 PROBLEM — E55.9 VITAMIN D DEFICIENCY: Status: RESOLVED | Noted: 2020-05-15 | Resolved: 2023-04-15

## 2023-04-15 PROBLEM — D72.829 LEUCOCYTOSIS: Status: RESOLVED | Noted: 2018-05-06 | Resolved: 2023-04-15

## 2023-04-16 PROBLEM — G92.9 ENCEPHALOPATHY, TOXIC: Status: ACTIVE | Noted: 2023-04-15

## 2023-04-17 ENCOUNTER — TELEPHONE (OUTPATIENT)
Dept: FAMILY MEDICINE | Facility: CLINIC | Age: 78
End: 2023-04-17
Payer: MEDICARE

## 2023-04-17 NOTE — TELEPHONE ENCOUNTER
Called pt to try to reschedule appt. Patient stated that she will give us a call to reschedule her appt.

## 2023-04-20 ENCOUNTER — TELEPHONE (OUTPATIENT)
Dept: FAMILY MEDICINE | Facility: CLINIC | Age: 78
End: 2023-04-20
Payer: MEDICARE

## 2023-04-20 NOTE — TELEPHONE ENCOUNTER
Patient called to request a DDI between Tasigna and remeron. Informed pt that this medication are safe to take concurrently. Patient expressed understanding.

## 2023-05-01 ENCOUNTER — PATIENT MESSAGE (OUTPATIENT)
Dept: PHARMACY | Facility: CLINIC | Age: 78
End: 2023-05-01
Payer: MEDICARE

## 2023-05-03 ENCOUNTER — SPECIALTY PHARMACY (OUTPATIENT)
Dept: PHARMACY | Facility: CLINIC | Age: 78
End: 2023-05-03
Payer: MEDICARE

## 2023-05-03 NOTE — TELEPHONE ENCOUNTER
Outgoing call regarding Lindsey, pt stated her new medication and in the notes her assigned pharmacist consulted on the new medication. Didn't note the new medication due to already document. Pt states she has an appointment with her provider on 5/8.

## 2023-05-08 ENCOUNTER — LAB VISIT (OUTPATIENT)
Dept: LAB | Facility: HOSPITAL | Age: 78
End: 2023-05-08
Attending: NURSE PRACTITIONER
Payer: MEDICARE

## 2023-05-08 ENCOUNTER — OFFICE VISIT (OUTPATIENT)
Dept: FAMILY MEDICINE | Facility: CLINIC | Age: 78
End: 2023-05-08
Payer: MEDICARE

## 2023-05-08 VITALS
TEMPERATURE: 98 F | OXYGEN SATURATION: 97 % | DIASTOLIC BLOOD PRESSURE: 68 MMHG | SYSTOLIC BLOOD PRESSURE: 126 MMHG | BODY MASS INDEX: 28.19 KG/M2 | WEIGHT: 169.19 LBS | HEIGHT: 65 IN | HEART RATE: 92 BPM

## 2023-05-08 DIAGNOSIS — I10 ESSENTIAL HYPERTENSION: ICD-10-CM

## 2023-05-08 DIAGNOSIS — R74.8 ELEVATED LIVER ENZYMES: ICD-10-CM

## 2023-05-08 DIAGNOSIS — E55.9 VITAMIN D DEFICIENCY: ICD-10-CM

## 2023-05-08 DIAGNOSIS — I20.89 ANGINA AT REST: ICD-10-CM

## 2023-05-08 DIAGNOSIS — I48.3 TYPICAL ATRIAL FLUTTER: ICD-10-CM

## 2023-05-08 DIAGNOSIS — Z98.1 S/P CERVICAL SPINAL FUSION: Primary | ICD-10-CM

## 2023-05-08 LAB
ALBUMIN SERPL BCP-MCNC: 3.5 G/DL (ref 3.5–5.2)
ALP SERPL-CCNC: 66 U/L (ref 55–135)
ALT SERPL W/O P-5'-P-CCNC: 19 U/L (ref 10–44)
ANION GAP SERPL CALC-SCNC: 5 MMOL/L (ref 8–16)
AST SERPL-CCNC: 18 U/L (ref 10–40)
BILIRUB SERPL-MCNC: 0.7 MG/DL (ref 0.1–1)
BUN SERPL-MCNC: 13 MG/DL (ref 8–23)
CALCIUM SERPL-MCNC: 9.3 MG/DL (ref 8.7–10.5)
CHLORIDE SERPL-SCNC: 105 MMOL/L (ref 95–110)
CO2 SERPL-SCNC: 29 MMOL/L (ref 23–29)
CREAT SERPL-MCNC: 0.8 MG/DL (ref 0.5–1.4)
EST. GFR  (NO RACE VARIABLE): >60 ML/MIN/1.73 M^2
GLUCOSE SERPL-MCNC: 88 MG/DL (ref 70–110)
POTASSIUM SERPL-SCNC: 4.7 MMOL/L (ref 3.5–5.1)
PROT SERPL-MCNC: 7.2 G/DL (ref 6–8.4)
SODIUM SERPL-SCNC: 139 MMOL/L (ref 136–145)

## 2023-05-08 PROCEDURE — 3074F PR MOST RECENT SYSTOLIC BLOOD PRESSURE < 130 MM HG: ICD-10-PCS | Mod: CPTII,S$GLB,, | Performed by: NURSE PRACTITIONER

## 2023-05-08 PROCEDURE — 1160F RVW MEDS BY RX/DR IN RCRD: CPT | Mod: CPTII,S$GLB,, | Performed by: NURSE PRACTITIONER

## 2023-05-08 PROCEDURE — 80053 COMPREHEN METABOLIC PANEL: CPT | Performed by: NURSE PRACTITIONER

## 2023-05-08 PROCEDURE — 1157F ADVNC CARE PLAN IN RCRD: CPT | Mod: CPTII,S$GLB,, | Performed by: NURSE PRACTITIONER

## 2023-05-08 PROCEDURE — 1101F PR PT FALLS ASSESS DOC 0-1 FALLS W/OUT INJ PAST YR: ICD-10-PCS | Mod: CPTII,S$GLB,, | Performed by: NURSE PRACTITIONER

## 2023-05-08 PROCEDURE — 3074F SYST BP LT 130 MM HG: CPT | Mod: CPTII,S$GLB,, | Performed by: NURSE PRACTITIONER

## 2023-05-08 PROCEDURE — 1159F PR MEDICATION LIST DOCUMENTED IN MEDICAL RECORD: ICD-10-PCS | Mod: CPTII,S$GLB,, | Performed by: NURSE PRACTITIONER

## 2023-05-08 PROCEDURE — 3078F PR MOST RECENT DIASTOLIC BLOOD PRESSURE < 80 MM HG: ICD-10-PCS | Mod: CPTII,S$GLB,, | Performed by: NURSE PRACTITIONER

## 2023-05-08 PROCEDURE — 3078F DIAST BP <80 MM HG: CPT | Mod: CPTII,S$GLB,, | Performed by: NURSE PRACTITIONER

## 2023-05-08 PROCEDURE — 99214 PR OFFICE/OUTPT VISIT, EST, LEVL IV, 30-39 MIN: ICD-10-PCS | Mod: S$GLB,,, | Performed by: NURSE PRACTITIONER

## 2023-05-08 PROCEDURE — 1160F PR REVIEW ALL MEDS BY PRESCRIBER/CLIN PHARMACIST DOCUMENTED: ICD-10-PCS | Mod: CPTII,S$GLB,, | Performed by: NURSE PRACTITIONER

## 2023-05-08 PROCEDURE — 99999 PR PBB SHADOW E&M-EST. PATIENT-LVL V: CPT | Mod: PBBFAC,,, | Performed by: NURSE PRACTITIONER

## 2023-05-08 PROCEDURE — 1125F PR PAIN SEVERITY QUANTIFIED, PAIN PRESENT: ICD-10-PCS | Mod: CPTII,S$GLB,, | Performed by: NURSE PRACTITIONER

## 2023-05-08 PROCEDURE — 3288F FALL RISK ASSESSMENT DOCD: CPT | Mod: CPTII,S$GLB,, | Performed by: NURSE PRACTITIONER

## 2023-05-08 PROCEDURE — 99999 PR PBB SHADOW E&M-EST. PATIENT-LVL V: ICD-10-PCS | Mod: PBBFAC,,, | Performed by: NURSE PRACTITIONER

## 2023-05-08 PROCEDURE — 1101F PT FALLS ASSESS-DOCD LE1/YR: CPT | Mod: CPTII,S$GLB,, | Performed by: NURSE PRACTITIONER

## 2023-05-08 PROCEDURE — 3288F PR FALLS RISK ASSESSMENT DOCUMENTED: ICD-10-PCS | Mod: CPTII,S$GLB,, | Performed by: NURSE PRACTITIONER

## 2023-05-08 PROCEDURE — 99214 OFFICE O/P EST MOD 30 MIN: CPT | Mod: S$GLB,,, | Performed by: NURSE PRACTITIONER

## 2023-05-08 PROCEDURE — 1159F MED LIST DOCD IN RCRD: CPT | Mod: CPTII,S$GLB,, | Performed by: NURSE PRACTITIONER

## 2023-05-08 PROCEDURE — 1157F PR ADVANCE CARE PLAN OR EQUIV PRESENT IN MEDICAL RECORD: ICD-10-PCS | Mod: CPTII,S$GLB,, | Performed by: NURSE PRACTITIONER

## 2023-05-08 PROCEDURE — 36415 COLL VENOUS BLD VENIPUNCTURE: CPT | Mod: PO | Performed by: NURSE PRACTITIONER

## 2023-05-08 PROCEDURE — 1125F AMNT PAIN NOTED PAIN PRSNT: CPT | Mod: CPTII,S$GLB,, | Performed by: NURSE PRACTITIONER

## 2023-05-08 RX ORDER — METOPROLOL TARTRATE 25 MG/1
TABLET, FILM COATED ORAL
COMMUNITY
Start: 2023-05-02 | End: 2023-05-08 | Stop reason: SDUPTHER

## 2023-05-08 RX ORDER — METOPROLOL TARTRATE 25 MG/1
25 TABLET, FILM COATED ORAL DAILY
Qty: 90 TABLET | Refills: 3 | Status: SHIPPED | OUTPATIENT
Start: 2023-05-08 | End: 2023-05-17 | Stop reason: ALTCHOICE

## 2023-05-08 RX ORDER — ERGOCALCIFEROL 1.25 MG/1
50000 CAPSULE ORAL
Qty: 12 CAPSULE | Refills: 3 | Status: ON HOLD | OUTPATIENT
Start: 2023-05-08

## 2023-05-08 RX ORDER — NITROGLYCERIN 0.4 MG/1
0.4 TABLET SUBLINGUAL EVERY 5 MIN PRN
Qty: 25 TABLET | Refills: 2 | Status: ON HOLD | OUTPATIENT
Start: 2023-05-08

## 2023-05-08 RX ORDER — MIRTAZAPINE 15 MG/1
TABLET, FILM COATED ORAL
COMMUNITY
Start: 2023-05-02 | End: 2024-03-29 | Stop reason: CLARIF

## 2023-05-08 NOTE — PROGRESS NOTES
Subjective     Patient ID: Adry Owen is a 78 y.o. female.    Chief Complaint: Hospital Follow Up    Patient is here for hospital follow up. Surgery 4/13, [facility] 15-16, then  [place] 17th - 2. She is unsure if she has home health. Says she was discharged from her surgery then went right back into the hospital. Surgery scheduled for f/u Wednesday. Cardiology Dr Ahuja, follow up is in August, needs to call for sooner appointment. Lipitor was stopped due to elevated liver enzymes while in the hospital. PCP virtual follow up 05/22/23. Fentanyl, ambien, and vicodin were stopped in the hospital. She was started on metoprolol 25 for atrial flutter. She has restarted blood thinner. She is taking gabapentin tid and remeron nightly, not sleeping well.     Discharge Summary Notes    Discharge Summary by Zuhair Nunes MD at 4/19/2023  2:27 PM  Version 1 of 1  Author: Zuhair Nunes MD Service: Hospital Medicine Author Type: Physician  Filed: 4/19/2023  2:27 PM Creation Time: 4/19/2023  2:27 PM Status: Signed  : Zuhair Nunes MD (Physician)  [facility] Medicine  Discharge Summary        Patient Name: Adry Owen  MRN: [id number]  DUSTIN: [id number]  Patient Class: OP- Observation  Admission Date: 4/15/2023  Hospital Length of Stay: 1 days  Discharge Date and Time:  04/19/2023 2:27 PM  Attending Physician: Zuhair Nunes MD   Discharging Provider: Zuhair Nunes MD  Primary Care Provider: Ross Nunes MD     Primary Care Team: Networked reference to record PCT      HPI:   Patient is a poor historian      78 year old female, PMHx of leukemia, CAD, HTN, HLP, sick sinus syndrome s/p PPM implantation (Biotronik), atrial fibrillation and atrial flutter s/p RFA of CTI, presents for follow up of her atrial fibrillation.  She is actively undergoing therapy for her leukemia.who presented to the emergency department with altered mental status, history is obtained from ER note, patient has a history of  chronic neck and back pain, patient has been followed by pain management, patient had procedure done on her neck on 04/13/2023, but unknown what kind of procedure was performed, patient with [place] collar, patient lives by herself, son's tried to call the mother today but she did not answer when he went home he found the patient standing in the kitchen without close he sat the patient down on a chair he called EMS, they gave the patient 1 mg of Narcan patient mentation has improved but continued to be confused and combative for which he presented to the ER, review of patient home meds, hydrocodone, Xanax, Ambien, baclofen, gabapentin, and fentanyl patch which we did not found patch on the patient, Internal Medicine was consulted for admission and further evaluation           * No surgery found *       Hospital Course:      Discharge diagnosis: Accidental opioid overdose     Hospital Course:   Patient admitted for opioid overdose. Resolved after narcan administration by EMS. Further sedating medications were held except Norco q8 to avoid withdrawals. Patient tolerated this well and per her son is the best she has ever looked. Patient admitted to taking old pain medications she had with her. In fact, she had a bottle of opioids from 2018 with her in the hospital. I recommended the son that he clear her house of old pain medications. Given her long term drug seeking habits it was recommended she go to an IP drug rehab. She was accepted to IP [facility] psych at [facility] on 4/19.         Medication Changes:  Stop fentanyl patch  Stop baclofen, robaxin, ambien  Restart xarelto / aspirin (I reviewed [place] notes. No mention of time period to hold anti-platelets / anticoagulation. 7 days is reasonable. Restart 4/20)        Outpatient follow up needs:  PCP  Spine surgeon.         Goals of Care Treatment Preferences:  Code Status: Full Code     Health care agent: Tavo Greer  Health care agent number:  [phone]     Living Will: Yes             Consults:     Consults (From admission, onward)        Status Ordering Provider        Inpatient consult to Psychiatry  Once       Provider:  Ino Montana MD   Completed AUSTIN CORTÉS        Inpatient consult to Palliative Care  Once       Provider:  (Not yet assigned)   SHAY Pinzon           Neuro  * Encephalopathy, toxic  Due to accidental overdose on narcotics  Psych, CM / SW following  - Norco PRN to prevent withdrawals     - Difficult disposition process. IP vs OP psych. TBD.      DVT ppx: SCDs. Xarelto on hold due to recent surgery (a/w records to confirm time period of needing to be held)  Dispo: home vs IP geripsych  ROSA: pending dispo        Cardiac/Vascular  Cardiac pacemaker in situ  2/2 SSS        Atrioventricular block, complete  sick sinus syndrome s/p PPM implantation (Biotronik)              Atrial flutter  atrial flutter s/p RFA of CTI  - Hold xarelto  - Request records again from [place] to assess surgeon's recommendations        Essential hypertension  Stable, patient's BP  Min: 103/71  Max: 126/73 over the past 24 hours. Continue with monitoring BP        Coronary artery disease involving native coronary artery of native heart without angina pectoris  Will continue Aspirin, Plavix and Statin and monitor for S/Sx of angina/ACS. Last stent 4/21/22     Oncology  Chronic myeloid leukemia, BCR/ABL-positive, in remission     OUT PT FU      Orthopedic  Chronic neck pain  S/P SURGERY              Final Active Diagnoses:    Diagnosis Date Noted POA  · PRINCIPAL PROBLEM:  Encephalopathy, toxic [G92.9] 04/15/2023 Yes  · Cardiac pacemaker in situ [Z95.0] 08/16/2021 Yes  · JAYLIN treated with BiPAP [G47.33] 04/08/2020 Yes  · Atrioventricular block, complete [I44.2] 11/27/2019 Yes  · Chronic neck pain [M54.2, G89.29] 10/22/2019 Yes  · Hypothyroidism due to Hashimoto's thyroiditis [E03.8, E06.3] 07/01/2019 Yes  · Chronic myeloid leukemia,  BCR/ABL-positive, in remission [C92.11] 05/23/2018 Yes  · Atrial flutter [I48.92] 05/08/2017 Yes  · Essential hypertension [I10] 06/01/2012 Yes  · Coronary artery disease involving native coronary artery of native heart without angina pectoris [I25.10] 05/11/2012 Yes     Problems Resolved During this Admission:        Discharged Condition: good     Disposition: Psychiatric Hospital     Follow Up:        Follow-up Information       [facility]AL [facility] Follow up.   Why: someone will contact you in 24-48 hours   please answer call from   Contact information:  [address]  [address]  [facility] [address]                Ross Nunes MD. Schedule an appointment as soon as possible for a visit in 1 week(s).   Specialties: Internal Medicine, Family Medicine  Contact information:  [address]  [facility] [address] [phone]                          Patient Instructions:         Ambulatory referral/consult to Transitional Care   Standing Status: Future  Referral Priority: Routine Referral Type: Consultation   Referral Reason: Specialty Services Required   Number of Visits Requested: 1         Significant Diagnostic Studies: Labs: BMP: No results for input(s): GLU, NA, K, CL, CO2, BUN, CREATININE, CALCIUM, MG in the last 48 hours. and CBC No results for input(s): WBC, HGB, HCT, PLT in the last 48 hours.       Pending Diagnostic Studies:     None        Medications:  Reconciled Home Medications:        Medication List     CHANGE how you take these medications   HYDROcodone-acetaminophen  mg per tablet  Commonly known as: NORCO  Take 1 tablet by mouth every 8 (eight) hours as needed for Pain.  What changed: reasons to take this        CONTINUE taking these medications   alendronate 70 MG tablet  Commonly known as: FOSAMAX  Take 1 tablet (70 mg total) by mouth every 7 days.     atorvastatin 40 MG tablet  Commonly known as: LIPITOR  TAKE 1 TABLET ONE TIME DAILY     azelastine  137 mcg (0.1 %) nasal spray  Commonly known as: ASTELIN  1 spray (137 mcg total) by Nasal route 2 (two) times daily.     HEENA LOW DOSE ASPIRIN 81 MG EC tablet  Generic drug: aspirin  Take 81 mg by mouth once daily.     CombiVENT RESPIMAT  mcg/actuation inhaler  Generic drug: ipratropium-albuteroL  INHALE 1 PUFF EVERY 6 HOURS AS NEEDED FOR WHEEZING OR SHORTNESS OF BREATH (RESCUE)     COMPRESSION STOCKINGS MISC     cycloSPORINE 0.05 % ophthalmic emulsion  Commonly known as: RESTASIS  PLACE 1 DROP INTO BOTH EYES 2 TIMES DAILY.     ergocalciferol 50,000 unit Cap  Commonly known as: ERGOCALCIFEROL  Take 1 capsule (50,000 Units total) by mouth every 7 days.     fish oil-omega-3 fatty acids 300-1,000 mg capsule  Take 1 capsule by mouth once daily.     fluticasone propionate 50 mcg/actuation nasal spray  Commonly known as: FLONASE  USE 1 SPRAY IN EACH NOSTRIL EVERY DAY     gabapentin 300 MG capsule  Commonly known as: NEURONTIN  Take 2 PO Q am and 3 PO Q pm     IPRATROPIUM BROMIDE NASL  by Nasal route once as needed.     levocetirizine 5 MG tablet  Commonly known as: XYZAL  TAKE 1 TABLET EVERY EVENING     levothyroxine 75 MCG tablet  Commonly known as: SYNTHROID  Take 1 tablet (75 mcg total) by mouth once daily.     mupirocin calcium 2% nasl oint 2 % Oint  Commonly known as: BACTROBAN  by Nasal route 2 (two) times daily.     nitroGLYCERIN 0.4 MG SL tablet  Commonly known as: NITROSTAT  Place 1 tablet (0.4 mg total) under the tongue every 5 (five) minutes as needed for Chest pain.     predniSONE 10 MG tablet  Commonly known as: DELTASONE  Take 1 tablet (10 mg total) by mouth once daily.     spironolactone 25 MG tablet  Commonly known as: ALDACTONE  TAKE 1 TABLET (25 MG TOTAL) BY MOUTH ONCE DAILY.     TASIGNA 150 mg capsule  Generic drug: nilotinib  Take 2 capsules by mouth twice a day. Take whole with water. Take on an empty stomach 1 hour before or 2 hours after food.     XARELTO 20 mg Tab  Generic drug:  rivaroxaban  TAKE 1 TABLET DAILY WITH DINNER OR EVENING MEAL        STOP taking these medications   baclofen 20 MG tablet  Commonly known as: LIORESAL     fentaNYL 25 mcg/hr  Commonly known as: DURAGESIC     methocarbamoL 500 MG Tab  Commonly known as: ROBAXIN     zolpidem 12.5 MG CR tablet  Commonly known as: AMBIEN CR              Indwelling Lines/Drains at time of discharge:     Lines/Drains/Airways       Drain  Duration           Female External Urinary Catheter 04/17/23 1716 1 day                Time spent on the discharge of patient: 35 minutes           Zuhair Nunes MD  Department of Hospital Medicine  [facility]          Review of Systems   Constitutional:  Positive for fatigue. Negative for fever.   Respiratory:  Negative for cough and shortness of breath.    Cardiovascular:  Negative for chest pain and leg swelling.   Musculoskeletal:  Positive for arthralgias.   Neurological:  Positive for weakness. Negative for dizziness, numbness and memory loss.        Objective     Physical Exam  Constitutional:       General: She is not in acute distress.     Appearance: Normal appearance. She is not toxic-appearing.   HENT:      Head: Normocephalic and atraumatic.   Eyes:      Pupils: Pupils are equal, round, and reactive to light.   Cardiovascular:      Rate and Rhythm: Normal rate. Rhythm irregular.      Heart sounds: No murmur heard.  Pulmonary:      Effort: Pulmonary effort is normal. No respiratory distress.      Breath sounds: Normal breath sounds.   Abdominal:      General: Bowel sounds are normal.      Tenderness: There is no abdominal tenderness.   Neurological:      Mental Status: She is alert and oriented to person, place, and time. Mental status is at baseline.      Cranial Nerves: No cranial nerve deficit.   Psychiatric:         Attention and Perception: Attention normal.         Mood and Affect: Mood normal.         Speech: Speech normal.         Cognition and Memory: She exhibits  impaired recent memory.          Assessment and Plan     Problem List Items Addressed This Visit          Unprioritized    Essential hypertension    Atrial flutter     Other Visit Diagnoses       S/P cervical spinal fusion    -  Primary    Relevant Orders    Ambulatory referral/consult to Home Health    Vitamin D deficiency        Relevant Medications    ergocalciferol (ERGOCALCIFEROL) 50,000 unit Cap    Angina at rest        Relevant Medications    nitroGLYCERIN (NITROSTAT) 0.4 MG SL tablet    Elevated liver enzymes        Relevant Orders    Comprehensive Metabolic Panel (Completed)        PCP in 4-6 weeks.    1. Vitamin D deficiency  refill  - ergocalciferol (ERGOCALCIFEROL) 50,000 unit Cap; Take 1 capsule (50,000 Units total) by mouth every 7 days.  Dispense: 12 capsule; Refill: 3    2. S/P cervical spinal fusion  Continue surgery follow up.  - Ambulatory referral/consult to Home Health; Future    3. Essential hypertension    Stable currently, see cardiology as planned.   4. Typical atrial flutter    Rate stable, see cardiology as scheduled.   5. Angina at rest  No current chest pain, requests refill.   - nitroGLYCERIN (NITROSTAT) 0.4 MG SL tablet; Place 1 tablet (0.4 mg total) under the tongue every 5 (five) minutes as needed for Chest pain.  Dispense: 25 tablet; Refill: 2    6. Elevated liver enzymes  Recheck CMP, stay off liptior for now.   - Comprehensive Metabolic Panel; Future

## 2023-05-17 PROBLEM — I48.92 ATRIAL FLUTTER: Status: RESOLVED | Noted: 2017-05-08 | Resolved: 2023-05-17

## 2023-05-17 NOTE — PROGRESS NOTES
Patient ID: Adry Owen     Chief Complaint: Follow up for chronic pain     The patient location is: [place]   The chief complaint leading to consultation is: Follow up for chronic pain     Visit type: audiovisual    Face to Face time with patient: 15 minutes   20 minutes of total time spent on the encounter, which includes face to face time and non-face to face time preparing to see the patient (eg, review of tests), Obtaining and/or reviewing separately obtained history, Documenting clinical information in the electronic or other health record, Independently interpreting results (not separately reported) and communicating results to the patient/family/caregiver, or Care coordination (not separately reported).         Each patient to whom he or she provides medical services by telemedicine is:  (1) informed of the relationship between the physician and patient and the respective role of any other health care provider with respect to management of the patient; and (2) notified that he or she may decline to receive medical services by telemedicine and may withdraw from such care at any time.    Notes:        HPI: Follow up for Chronic pain -Controlled and needs Fentanyl refilled. She has recovered from her Urinary tract infection, but requests Urogesic blue for bladder spasms. Needs a few refills as well. Will only go out to see Dr. Jordan and myself and does have labs upcoming. Back pain resolved but then returned when she lifted up something heavy- it's improving- Monitor.     Review of Systems   Constitutional: Negative.    HENT: Negative.    Eyes: Negative.    Respiratory: Negative.    Cardiovascular: Negative.    Gastrointestinal: Negative.  Negative for abdominal pain.   Endocrine: Negative.    Genitourinary: Negative.    Musculoskeletal: Positive for back pain.   Skin: Negative.    Allergic/Immunologic: Negative.    Neurological: Negative.    Hematological: Negative.    Psychiatric/Behavioral:  Negative.           Objective:      Physical Exam   Physical Exam  Constitutional:       Appearance: Normal appearance. She is well-developed.   HENT:      Head: Normocephalic and atraumatic.   Eyes:      Extraocular Movements: Extraocular movements intact.      Conjunctiva/sclera: Conjunctivae normal.   Neck:      Musculoskeletal: Normal range of motion.   Cardiovascular:      Pulses: Normal pulses.   Pulmonary:      Effort: Pulmonary effort is normal.   Neurological:      General: No focal deficit present.      Mental Status: She is alert and oriented to person, place, and time.   Psychiatric:         Mood and Affect: Mood normal.         Behavior: Behavior normal.         Thought Content: Thought content normal.         Judgment: Judgment normal.            Vitals: There were no vitals filed for this visit.       Current Outpatient Medications:     acetaminophen (TYLENOL) 500 MG tablet, Take 1,000 mg by mouth every 6 (six) hours as needed for Pain or Temperature greater than., Disp: , Rfl:     alendronate (FOSAMAX) 70 MG tablet, Take 1 tablet (70 mg total) by mouth every 7 days., Disp: 12 tablet, Rfl: 3    ASPIRIN LOW DOSE 81 mg EC tablet, Take 81 mg by mouth once daily. , Disp: , Rfl:     atorvastatin (LIPITOR) 40 MG tablet, Take 1 tablet (40 mg total) by mouth once daily., Disp: 90 tablet, Rfl: 3    ciprofloxacin HCl (CIPRO) 500 MG tablet, Take 500 mg by mouth 2 (two) times daily. x7 days, Disp: , Rfl:     cycloSPORINE (RESTASIS) 0.05 % ophthalmic emulsion, Place 0.4 mLs (1 drop total) into both eyes 2 (two) times daily., Disp: 1 vial, Rfl: 5    doxycycline (MONODOX) 100 MG capsule, Take 1 capsule (100 mg total) by mouth 2 (two) times daily., Disp: 20 capsule, Rfl: 0    ergocalciferol (ERGOCALCIFEROL) 50,000 unit Cap, Take 1 capsule (50,000 Units total) by mouth every 7 days., Disp: 24 capsule, Rfl: 3    [START ON 12/6/2020] fentaNYL (DURAGESIC) 25 mcg/hr, Place 1 patch onto the skin every 48 hours.,  Disp: 15 patch, Rfl: 0    [START ON 11/6/2020] fentaNYL (DURAGESIC) 25 mcg/hr, Place 1 patch onto the skin every 48 hours., Disp: 15 patch, Rfl: 0    fentaNYL (DURAGESIC) 25 mcg/hr, Place 1 patch onto the skin every 48 hours., Disp: 15 patch, Rfl: 0    fish oil-omega-3 fatty acids 300-1,000 mg capsule, Take 1 capsule by mouth once daily., Disp: , Rfl:     fluticasone propionate (FLONASE) 50 mcg/actuation nasal spray, USE 1 SPRAY IN EACH NOSTRIL EVERY DAY, Disp: 32 g, Rfl: 3    furosemide (LASIX) 20 MG tablet, TAKE 1 TABLET EVERY DAY, Disp: 90 tablet, Rfl: 3    gabapentin (NEURONTIN) 300 MG capsule, Take 4 capsules (1,200 mg total) by mouth every evening. Takes all 4 capsules @ bedtime, Disp: 360 capsule, Rfl: 3    hydrocodone-acetaminophen 10-325mg (NORCO)  mg Tab, Take 1 tablet by mouth every 8 (eight) hours as needed. , Disp: , Rfl:     ipratropium-albuteroL (COMBIVENT)  mcg/actuation inhaler, Inhale 1 puff into the lungs every 6 (six) hours as needed for Wheezing or Shortness of Breath. Rescue, Disp: 3 Package, Rfl: 3    ketoconazole (NIZORAL) 2 % shampoo, Apply topically twice a week., Disp: 120 mL, Rfl: 0    ketorolac (TORADOL) 10 mg tablet, Take 1 tablet (10 mg total) by mouth every 6 (six) hours as needed for Pain., Disp: 30 tablet, Rfl: 0    levocetirizine (XYZAL) 5 MG tablet, Take 1 tablet (5 mg total) by mouth every evening., Disp: 90 tablet, Rfl: 3    levothyroxine (SYNTHROID) 75 MCG tablet, Take 1 tablet (75 mcg total) by mouth once daily., Disp: 90 tablet, Rfl: 3    methen-sod phos-meth blue-hyos (UROGESIC-BLUE) 81.6-40.8-0.12 mg Tab, Take 1 tablet by mouth 2 (two) times daily as needed (bladder spasms)., Disp: 180 tablet, Rfl: 3    MISCELLANEOUS MEDICAL SUPPLY (COMPRESSION STOCKINGS MISC), , Disp: , Rfl:     mupirocin calcium 2% nasl oint (BACTROBAN) 2 % Oint, by Nasal route 2 (two) times daily., Disp: 1 g, Rfl: 0    naproxen (NAPROSYN) 500 MG tablet, Take 1 tablet (500 mg  total) by mouth 2 (two) times daily with meals., Disp: 60 tablet, Rfl: 1    nilotinib (TASIGNA) 150 mg capsule, Take 2 capsules (300 mg total) by mouth 2 (two) times daily., Disp: 112 capsule, Rfl: 6    nitroGLYCERIN (NITROSTAT) 0.4 MG SL tablet, Place 1 tablet (0.4 mg total) under the tongue every 5 (five) minutes as needed., Disp: 25 tablet, Rfl: 2    ondansetron (ZOFRAN) 4 MG tablet, Take 1 tablet (4 mg total) by mouth every 6 (six) hours., Disp: 9 tablet, Rfl: 0    potassium chloride SA (K-DUR,KLOR-CON) 20 MEQ tablet, TAKE 1 TABLET EVERY DAY, Disp: 90 tablet, Rfl: 3    XARELTO 20 mg Tab, TAKE 1 TABLET DAILY WITH DINNER OR EVENING MEAL, Disp: 90 tablet, Rfl: 3    zolpidem (AMBIEN CR) 12.5 MG CR tablet, Take 1 tablet (12.5 mg total) by mouth every evening., Disp: 30 tablet, Rfl: 5   Assessment:       Patient Active Problem List    Diagnosis Date Noted    Urinary tract infection without hematuria 10/06/2020    Lumbosacral radiculopathy 10/06/2020    Acute leukemia not having achieved remission 05/15/2020    Vitamin D deficiency 05/15/2020    Elevated blood pressure reading without diagnosis of hypertension 05/15/2020    JAYLIN on CPAP 04/08/2020    Major depressive disorder with single episode, in partial remission 01/10/2020    Insomnia due to medical condition 01/10/2020    Atrioventricular block, complete 11/27/2019    Seizure disorder 10/22/2019    Third degree heart block 10/22/2019    Chronic neck pain 10/22/2019    Non-seasonal allergic rhinitis due to pollen 10/22/2019    Hypothyroidism due to Hashimoto's thyroiditis 07/01/2019    AV block, [place] 1 06/21/2018    Headache 06/20/2018    Digital mucous cyst of finger of right hand 06/12/2018    Chronic myeloid leukemia, BCR/ABL-positive, not having achieved remission 05/23/2018    Leukemia not having achieved remission 05/08/2018    Anemia, chronic disease 05/07/2018    Leucocytosis 05/06/2018    Other headache syndrome 05/06/2018     Acute diverticulitis 11/07/2017    Acute lower GI bleeding 11/06/2017    Leukocytosis 11/06/2017    Atrial flutter 05/08/2017    Angioedema 04/11/2017    Urticaria 04/09/2017    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    Diagnoses and all orders for this visit:    Diagnoses and all orders for this visit:    Chronic neck pain  -     fentaNYL (DURAGESIC) 25 mcg/hr; Place 1 patch onto the skin every 48 hours.  -     fentaNYL (DURAGESIC) 25 mcg/hr; Place 1 patch onto the skin every 48 hours.  -     fentaNYL (DURAGESIC) 25 mcg/hr; Place 1 patch onto the skin every 48 hours.    Non-seasonal allergic rhinitis due to pollen  -     levocetirizine (XYZAL) 5 MG tablet; Take 1 tablet (5 mg total) by mouth every evening.    Hypothyroidism due to Hashimoto's thyroiditis  Monitor     Urinary tract infection without hematuria, site unspecified  Resolved     Lumbosacral radiculopathy  Monitor     Bladder spasms  -     methen-sod phos-meth blue-hyos (UROGESIC-BLUE) 81.6-40.8-0.12 mg Tab; Take 1 tablet by mouth 2 (two) times daily as needed (bladder spasms).    Other orders  -     mupirocin calcium 2% nasl oint (BACTROBAN) 2 % Oint; by Nasal route 2 (two) times daily.                    Answers for HPI/ROS submitted by the patient on 10/6/2020   Back pain  Chronicity: chronic  Onset: more than 1 month ago  Progression since onset: gradually improving  Pain location: sacro-iliac, costovertebral angle  Pain quality:  aching  Radiates to: left knee, left thigh, right knee, right thigh  Pain - numeric: 3/10  Pain is: worse during the day  Aggravated by: bending, sitting, standing  Stiffness is present: in the morning  leg pain: Yes  Risk factors: history of cancer, history of osteoporosis, menopause  Pain severity: moderate  Treatments tried: analgesics  Improvement on treatment: moderate     98

## 2023-05-22 ENCOUNTER — OFFICE VISIT (OUTPATIENT)
Dept: FAMILY MEDICINE | Facility: CLINIC | Age: 78
End: 2023-05-22
Payer: MEDICARE

## 2023-05-22 VITALS — DIASTOLIC BLOOD PRESSURE: 65 MMHG | SYSTOLIC BLOOD PRESSURE: 96 MMHG

## 2023-05-22 DIAGNOSIS — R74.8 ELEVATED LIVER ENZYMES: ICD-10-CM

## 2023-05-22 DIAGNOSIS — Z98.1 S/P CERVICAL SPINAL FUSION: Primary | ICD-10-CM

## 2023-05-22 DIAGNOSIS — I48.3 TYPICAL ATRIAL FLUTTER: ICD-10-CM

## 2023-05-22 DIAGNOSIS — E78.49 OTHER HYPERLIPIDEMIA: ICD-10-CM

## 2023-05-22 DIAGNOSIS — I10 ESSENTIAL HYPERTENSION: ICD-10-CM

## 2023-05-22 PROCEDURE — 1159F PR MEDICATION LIST DOCUMENTED IN MEDICAL RECORD: ICD-10-PCS | Mod: CPTII,95,, | Performed by: INTERNAL MEDICINE

## 2023-05-22 PROCEDURE — 1159F MED LIST DOCD IN RCRD: CPT | Mod: CPTII,95,, | Performed by: INTERNAL MEDICINE

## 2023-05-22 PROCEDURE — 3078F PR MOST RECENT DIASTOLIC BLOOD PRESSURE < 80 MM HG: ICD-10-PCS | Mod: CPTII,95,, | Performed by: INTERNAL MEDICINE

## 2023-05-22 PROCEDURE — 3074F PR MOST RECENT SYSTOLIC BLOOD PRESSURE < 130 MM HG: ICD-10-PCS | Mod: CPTII,95,, | Performed by: INTERNAL MEDICINE

## 2023-05-22 PROCEDURE — 1157F PR ADVANCE CARE PLAN OR EQUIV PRESENT IN MEDICAL RECORD: ICD-10-PCS | Mod: CPTII,95,, | Performed by: INTERNAL MEDICINE

## 2023-05-22 PROCEDURE — 99214 PR OFFICE/OUTPT VISIT, EST, LEVL IV, 30-39 MIN: ICD-10-PCS | Mod: 95,,, | Performed by: INTERNAL MEDICINE

## 2023-05-22 PROCEDURE — 3074F SYST BP LT 130 MM HG: CPT | Mod: CPTII,95,, | Performed by: INTERNAL MEDICINE

## 2023-05-22 PROCEDURE — 99214 OFFICE O/P EST MOD 30 MIN: CPT | Mod: 95,,, | Performed by: INTERNAL MEDICINE

## 2023-05-22 PROCEDURE — 1160F RVW MEDS BY RX/DR IN RCRD: CPT | Mod: CPTII,95,, | Performed by: INTERNAL MEDICINE

## 2023-05-22 PROCEDURE — 1157F ADVNC CARE PLAN IN RCRD: CPT | Mod: CPTII,95,, | Performed by: INTERNAL MEDICINE

## 2023-05-22 PROCEDURE — 3078F DIAST BP <80 MM HG: CPT | Mod: CPTII,95,, | Performed by: INTERNAL MEDICINE

## 2023-05-22 PROCEDURE — 1160F PR REVIEW ALL MEDS BY PRESCRIBER/CLIN PHARMACIST DOCUMENTED: ICD-10-PCS | Mod: CPTII,95,, | Performed by: INTERNAL MEDICINE

## 2023-05-22 NOTE — PROGRESS NOTES
Patient ID: Adry Owen     Chief Complaint: Follow up after neck surgery     The patient location is: [place]   The chief complaint leading to consultation is: Follow up after neck surgery     Visit type: audiovisual    Face to Face time with patient: 10 mins  15 minutes of total time spent on the encounter, which includes face to face time and non-face to face time preparing to see the patient (eg, review of tests), Obtaining and/or reviewing separately obtained history, Documenting clinical information in the electronic or other health record, Independently interpreting results (not separately reported) and communicating results to the patient/family/caregiver, or Care coordination (not separately reported).         Each patient to whom he or she provides medical services by telemedicine is:  (1) informed of the relationship between the physician and patient and the respective role of any other health care provider with respect to management of the patient; and (2) notified that he or she may decline to receive medical services by telemedicine and may withdraw from such care at any time.    Notes:        HPI: Follow up after neck surgery for Cervical degenerative joint disease. 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 She is now off the Fentanyl and Hydrocodone and Methocarbamol. She wants to stop Remeron as well in favor of some all natural sleep aids and I think that's ok.  She is taking Tylenol for the pain.  She will have a follow-up with her surgeon in a few days.  In the hospital, her liver enzymes were quite elevated but have normalized.  She was switched from metoprolol succinate to metoprolol tartrate a few weeks ago.  Today her blood pressure is on the low end with a systolic 96 and she has some loose bowels and overall not feeling so great.  I wonder if these are side effects of the new version of metoprolol and invite her to contact her  cardiologist.  She has contacted him and is awaiting a return call.  Off hand I think going back to her previous version of metoprolol is the best course of action.  For now I want her to hydrate better with water and take care when ambulating.  During the video visit she is wearing her Aspen collar.    Review of Systems       Neck pain     Objective:      Physical Exam   Physical Exam       Virtual Visit  Wearing Aspen collar     Vitals:   Vitals:    05/22/23 1635   BP: 96/65          Current Outpatient Medications:     alendronate (FOSAMAX) 70 MG tablet, Take 1 tablet (70 mg total) by mouth every 7 days., Disp: 12 tablet, Rfl: 3    ASPIRIN LOW DOSE 81 mg EC tablet, Take 81 mg by mouth once daily. , Disp: , Rfl:     azelastine (ASTELIN) 137 mcg (0.1 %) nasal spray, 1 spray (137 mcg total) by Nasal route 2 (two) times daily., Disp: 30 mL, Rfl: 11    COMBIVENT RESPIMAT  mcg/actuation inhaler, INHALE 1 PUFF EVERY 6 HOURS AS NEEDED FOR WHEEZING OR SHORTNESS OF BREATH (RESCUE), Disp: 12 g, Rfl: 3    cycloSPORINE (RESTASIS) 0.05 % ophthalmic emulsion, PLACE 1 DROP INTO BOTH EYES 2 TIMES DAILY., Disp: 60 each, Rfl: 11    ergocalciferol (ERGOCALCIFEROL) 50,000 unit Cap, Take 1 capsule (50,000 Units total) by mouth every 7 days., Disp: 12 capsule, Rfl: 3    fish oil-omega-3 fatty acids 300-1,000 mg capsule, Take 1 capsule by mouth once daily., Disp: , Rfl:     fluticasone propionate (FLONASE) 50 mcg/actuation nasal spray, USE 1 SPRAY IN EACH NOSTRIL EVERY DAY, Disp: 32 g, Rfl: 3    gabapentin (NEURONTIN) 300 MG capsule, Take 2 PO Q am and 3 PO Q pm, Disp: 450 capsule, Rfl: 3    IPRATROPIUM BROMIDE NASL, by Nasal route once as needed., Disp: , Rfl:     levocetirizine (XYZAL) 5 MG tablet, TAKE 1 TABLET EVERY EVENING, Disp: 90 tablet, Rfl: 3    levothyroxine (SYNTHROID) 75 MCG tablet, Take 1 tablet (75 mcg total) by mouth once daily., Disp: 90 tablet, Rfl: 3    metoprolol succinate (TOPROL-XL) 25 MG 24 hr tablet, Take 1  tablet (25 mg total) by mouth every evening., Disp: 30 tablet, Rfl: 3    mirtazapine (REMERON) 15 MG tablet, Take by mouth., Disp: , Rfl:     MISCELLANEOUS MEDICAL SUPPLY (COMPRESSION STOCKINGS MISC), , Disp: , Rfl:     mupirocin calcium 2% nasl oint (BACTROBAN) 2 % Oint, by Nasal route 2 (two) times daily., Disp: 1 g, Rfl: 0    nilotinib (TASIGNA) 150 mg capsule, Take 2 capsules by mouth twice a day. Take whole with water. Take on an empty stomach 1 hour before or 2 hours after food., Disp: 992 capsule, Rfl: 0    nitroGLYCERIN (NITROSTAT) 0.4 MG SL tablet, Place 1 tablet (0.4 mg total) under the tongue every 5 (five) minutes as needed for Chest pain., Disp: 25 tablet, Rfl: 2    spironolactone (ALDACTONE) 25 MG tablet, TAKE 1 TABLET (25 MG TOTAL) BY MOUTH ONCE DAILY., Disp: 90 tablet, Rfl: 1    XARELTO 20 mg Tab, TAKE 1 TABLET DAILY WITH DINNER OR EVENING MEAL, Disp: 90 tablet, Rfl: 3   Assessment:       Patient Active Problem List    Diagnosis Date Noted    Encephalopathy, toxic 04/15/2023    Osteopenia of necks of both femurs 01/12/2023    Cardiac pacemaker in situ 08/16/2021    JAYLIN treated with BiPAP 04/08/2020    Atrioventricular block, complete 11/27/2019    Chronic neck pain 10/22/2019    Hypothyroidism due to Hashimoto's thyroiditis 07/01/2019    Chronic myeloid leukemia, BCR/ABL-positive, in remission 05/23/2018    Essential hypertension 06/01/2012    Coronary artery disease involving native coronary artery of native heart without angina pectoris 05/11/2012    Longstanding persistent atrial fibrillation 05/11/2012          Plan:       Adry Owen  was seen today for follow-up and may need lab work.    Diagnoses and all orders for this visit:    Diagnoses and all orders for this visit:    S/P cervical spinal fusion  Will Follow up with Dr. Stringer in 2 weeks   Only taking tylenol for pain     Essential hypertension  May be overmedicated  Please call Cards    Typical atrial flutter  Seems to be better  Controlled with metoprolol succinate   May need other meds to control Heart rate     Elevated liver enzymes  Resolved   Could have been due to medication overuse     Other hyperlipidemia  Monitor off statin now due to elevated Liver Function Tests                    Present, accurate, and signed

## 2023-05-25 ENCOUNTER — SPECIALTY PHARMACY (OUTPATIENT)
Dept: PHARMACY | Facility: CLINIC | Age: 78
End: 2023-05-25
Payer: MEDICARE

## 2023-05-25 NOTE — TELEPHONE ENCOUNTER
Outgoing call regarding Tasigna refill, pt stated she started Toprol, pt does not know her on hands. Informed pt contacting provider for refill approval. Informed pt will follow up once provider response.

## 2023-05-30 ENCOUNTER — PATIENT MESSAGE (OUTPATIENT)
Dept: FAMILY MEDICINE | Facility: CLINIC | Age: 78
End: 2023-05-30
Payer: MEDICARE

## 2023-05-30 DIAGNOSIS — M54.2 CHRONIC NECK PAIN: ICD-10-CM

## 2023-05-30 DIAGNOSIS — I10 ESSENTIAL HYPERTENSION: ICD-10-CM

## 2023-05-30 DIAGNOSIS — G89.29 CHRONIC NECK PAIN: ICD-10-CM

## 2023-05-30 NOTE — TELEPHONE ENCOUNTER
No care due was identified.  [facility] Embedded Care Due Messages. Reference number: [id number].   5/30/2023 4:53:28 PM CDT

## 2023-05-30 NOTE — TELEPHONE ENCOUNTER
Outgoing call regarding Lindsey refil, pt states she started toprol XL, pt states unplanned hospital visit, anesthesia did not clear out her system.. Was transferred from one hospital to the next. Transferred to assigned [facility]

## 2023-05-30 NOTE — TELEPHONE ENCOUNTER
Specialty Pharmacy - Refill Coordination    Specialty Medication Orders Linked to Encounter      Flowsheet Row Most Recent Value   Medication #1 nilotinib (TASIGNA) 150 mg capsule (Order#[id number], Rx#[id number])            Refill Questions - Documented Responses      Flowsheet Row Most Recent Value   Patient Availability and HIPAA Verification    Does patient want to proceed with activity? Yes   HIPAA/medical authority confirmed? Yes   Relationship to patient of person spoken to? Self   Refill Screening Questions    Changes to allergies? No   Changes to medications? Yes  [Patient is now on metoprolol succinate.]   New conditions since last clinic visit? No   Unplanned office visit, urgent care, ED, or hospital admission in the last 4 weeks? No   How does patient/caregiver feel medication is working? Good   Financial problems or insurance changes? No   How many doses of your specialty medications were missed in the last 4 weeks? 1   Why were doses missed? Away from home   Would patient like to speak to a pharmacist? No   When does the patient need to receive the medication? 06/06/23   Refill Delivery Questions    How will the patient receive the medication? MEDRx   When does the patient need to receive the medication? 06/06/23   Shipping Address Home   Address in [facility] confirmed and updated if neccessary? Yes   Expected Copay ($) 0   Is the patient able to afford the medication copay? Yes   Payment Method zero copay   Days supply of Refill 28   Supplies needed? No supplies needed   Refill activity completed? Yes   Refill activity plan Refill scheduled   Shipment/Pickup Date: 06/01/23            Current Outpatient Medications   Medication Sig    alendronate (FOSAMAX) 70 MG tablet Take 1 tablet (70 mg total) by mouth every 7 days.    ASPIRIN LOW DOSE 81 mg EC tablet Take 81 mg by mouth once daily.     azelastine (ASTELIN) 137 mcg (0.1 %) nasal spray 1 spray (137 mcg total) by Nasal route 2 (two) times  daily.    COMBIVENT RESPIMAT  mcg/actuation inhaler INHALE 1 PUFF EVERY 6 HOURS AS NEEDED FOR WHEEZING OR SHORTNESS OF BREATH (RESCUE)    cycloSPORINE (RESTASIS) 0.05 % ophthalmic emulsion PLACE 1 DROP INTO BOTH EYES 2 TIMES DAILY.    ergocalciferol (ERGOCALCIFEROL) 50,000 unit Cap Take 1 capsule (50,000 Units total) by mouth every 7 days.    fish oil-omega-3 fatty acids 300-1,000 mg capsule Take 1 capsule by mouth once daily.    fluticasone propionate (FLONASE) 50 mcg/actuation nasal spray USE 1 SPRAY IN EACH NOSTRIL EVERY DAY    gabapentin (NEURONTIN) 300 MG capsule Take 2 PO Q am and 3 PO Q pm    IPRATROPIUM BROMIDE NASL by Nasal route once as needed.    levocetirizine (XYZAL) 5 MG tablet TAKE 1 TABLET EVERY EVENING    levothyroxine (SYNTHROID) 75 MCG tablet Take 1 tablet (75 mcg total) by mouth once daily.    metoprolol succinate (TOPROL-XL) 25 MG 24 hr tablet Take 1 tablet (25 mg total) by mouth every evening.    mirtazapine (REMERON) 15 MG tablet Take by mouth.    MISCELLANEOUS MEDICAL SUPPLY (COMPRESSION STOCKINGS MISC)     mupirocin calcium 2% nasl oint (BACTROBAN) 2 % Oint by Nasal route 2 (two) times daily.    nilotinib (TASIGNA) 150 mg capsule Take 2 capsules (300 mg totaly) by mouth twice daily    nitroGLYCERIN (NITROSTAT) 0.4 MG SL tablet Place 1 tablet (0.4 mg total) under the tongue every 5 (five) minutes as needed for Chest pain.    spironolactone (ALDACTONE) 25 MG tablet TAKE 1 TABLET (25 MG TOTAL) BY MOUTH ONCE DAILY.    XARELTO 20 mg Tab TAKE 1 TABLET DAILY WITH DINNER OR EVENING MEAL   Last reviewed on 5/22/2023  4:39 PM by Ross Nunes MD    Review of patient's allergies indicates:   Allergen Reactions    Alcohol prep pads [alcohol swabs] Hives and Rash    Diazolidinyl urea Anaphylaxis    Influenza virus vaccines Hives and Swelling    Iodine Anaphylaxis     Other reaction(s): Unknown    Preservative Anaphylaxis     Many different preservatives per pt report    Thimerosal Anaphylaxis     Bactoshield chg [chlorhexidine gluconate] Rash    Feldene [piroxicam]      Syncope, muscle spasms.     Cephalosporins Rash    Lisinopril Palpitations    Penicillins Rash     Other reaction(s): Unknown    Sotalol Palpitations    Last reviewed on  5/22/2023 4:39 PM by Ross Nunes      Tasks added this encounter   No tasks added.   Tasks due within next 3 months   No tasks due.     FARZAD OLIVIER, PharmD  [facility]  [address]  [place] [facility] [address]  Phone: [phone]  Fax: [phone]

## 2023-05-30 NOTE — TELEPHONE ENCOUNTER
Patient requesting prescriptions be sent to [facility]. Medications pended.   (0) swallows foods/liquids without difficulty

## 2023-05-31 RX ORDER — GABAPENTIN 300 MG/1
CAPSULE ORAL
Qty: 450 CAPSULE | Refills: 3 | Status: SHIPPED | OUTPATIENT
Start: 2023-05-31 | End: 2023-07-24 | Stop reason: SDUPTHER

## 2023-05-31 RX ORDER — IPRATROPIUM BROMIDE AND ALBUTEROL 20; 100 UG/1; UG/1
1 SPRAY, METERED RESPIRATORY (INHALATION) EVERY 4 HOURS PRN
Qty: 12 G | Refills: 3 | Status: SHIPPED | OUTPATIENT
Start: 2023-05-31 | End: 2024-02-29 | Stop reason: SDUPTHER

## 2023-05-31 RX ORDER — SPIRONOLACTONE 25 MG/1
25 TABLET ORAL DAILY
Qty: 90 TABLET | Refills: 3 | Status: SHIPPED | OUTPATIENT
Start: 2023-05-31 | End: 2024-01-09 | Stop reason: SDUPTHER

## 2023-06-22 ENCOUNTER — PATIENT MESSAGE (OUTPATIENT)
Dept: PHARMACY | Facility: CLINIC | Age: 78
End: 2023-06-22
Payer: MEDICARE

## 2023-06-26 ENCOUNTER — PATIENT MESSAGE (OUTPATIENT)
Dept: PHARMACY | Facility: CLINIC | Age: 78
End: 2023-06-26
Payer: MEDICARE

## 2023-07-03 ENCOUNTER — PATIENT MESSAGE (OUTPATIENT)
Dept: PHARMACY | Facility: CLINIC | Age: 78
End: 2023-07-03
Payer: MEDICARE

## 2023-07-06 ENCOUNTER — SPECIALTY PHARMACY (OUTPATIENT)
Dept: PHARMACY | Facility: CLINIC | Age: 78
End: 2023-07-06
Payer: MEDICARE

## 2023-07-13 ENCOUNTER — SPECIALTY PHARMACY (OUTPATIENT)
Dept: PHARMACY | Facility: CLINIC | Age: 78
End: 2023-07-13
Payer: MEDICARE

## 2023-07-13 NOTE — TELEPHONE ENCOUNTER
Specialty Pharmacy - Refill Coordination    Specialty Medication Orders Linked to Encounter      Flowsheet Row Most Recent Value   Medication #1 nilotinib (TASIGNA) 150 mg capsule (Order#[id number], Rx#[id number])          Refill Questions - Documented Responses      Flowsheet Row Most Recent Value   Patient Availability and HIPAA Verification    Does patient want to proceed with activity? Unable to Reach          We have had multiple attempts to the patient and have been unsuccessful to reach the patient. We will stop reaching out to the patient but in the event that the patient needs the med and contacts us, we will communicate and begin dispensing for the patient. At your next visit with the patient, please review the importance of being in contact with our specialty pharmacy as a part of our care team.      FARZAD OLIVIER, PharmD  [facility]  [address] [facility] [address]  Phone: [phone]  Fax: [phone]

## 2023-07-17 ENCOUNTER — TELEPHONE (OUTPATIENT)
Dept: INFUSION THERAPY | Facility: HOSPITAL | Age: 78
End: 2023-07-17
Payer: MEDICARE

## 2023-07-17 NOTE — TELEPHONE ENCOUNTER
----- Message from Sherrie Ramos, Patient Care Assistant sent at 7/17/2023 11:38 AM CDT -----  Type: Needs Medical Advice  Who Called:  amy Hicks Call Back Number: [phone]    Additional Information: Amy would like to speak to miller about reschedule her proila injection, please call to further discuss ,thank you

## 2023-07-24 ENCOUNTER — PATIENT MESSAGE (OUTPATIENT)
Dept: FAMILY MEDICINE | Facility: CLINIC | Age: 78
End: 2023-07-24

## 2023-07-24 ENCOUNTER — OFFICE VISIT (OUTPATIENT)
Dept: FAMILY MEDICINE | Facility: CLINIC | Age: 78
End: 2023-07-24
Payer: MEDICARE

## 2023-07-24 DIAGNOSIS — E78.00 ELEVATED LDL CHOLESTEROL LEVEL: ICD-10-CM

## 2023-07-24 DIAGNOSIS — G89.29 CHRONIC NECK PAIN: Primary | ICD-10-CM

## 2023-07-24 DIAGNOSIS — J30.1 NON-SEASONAL ALLERGIC RHINITIS DUE TO POLLEN: ICD-10-CM

## 2023-07-24 DIAGNOSIS — M54.2 CHRONIC NECK PAIN: Primary | ICD-10-CM

## 2023-07-24 PROCEDURE — 1157F PR ADVANCE CARE PLAN OR EQUIV PRESENT IN MEDICAL RECORD: ICD-10-PCS | Mod: HCNC,CPTII,95, | Performed by: INTERNAL MEDICINE

## 2023-07-24 PROCEDURE — 1159F MED LIST DOCD IN RCRD: CPT | Mod: HCNC,CPTII,95, | Performed by: INTERNAL MEDICINE

## 2023-07-24 PROCEDURE — 1160F RVW MEDS BY RX/DR IN RCRD: CPT | Mod: HCNC,CPTII,95, | Performed by: INTERNAL MEDICINE

## 2023-07-24 PROCEDURE — 1160F PR REVIEW ALL MEDS BY PRESCRIBER/CLIN PHARMACIST DOCUMENTED: ICD-10-PCS | Mod: HCNC,CPTII,95, | Performed by: INTERNAL MEDICINE

## 2023-07-24 PROCEDURE — 1157F ADVNC CARE PLAN IN RCRD: CPT | Mod: HCNC,CPTII,95, | Performed by: INTERNAL MEDICINE

## 2023-07-24 PROCEDURE — 1159F PR MEDICATION LIST DOCUMENTED IN MEDICAL RECORD: ICD-10-PCS | Mod: HCNC,CPTII,95, | Performed by: INTERNAL MEDICINE

## 2023-07-24 PROCEDURE — 99213 PR OFFICE/OUTPT VISIT, EST, LEVL III, 20-29 MIN: ICD-10-PCS | Mod: HCNC,95,, | Performed by: INTERNAL MEDICINE

## 2023-07-24 PROCEDURE — 99213 OFFICE O/P EST LOW 20 MIN: CPT | Mod: HCNC,95,, | Performed by: INTERNAL MEDICINE

## 2023-07-24 RX ORDER — GABAPENTIN 300 MG/1
CAPSULE ORAL
Qty: 450 CAPSULE | Refills: 3 | Status: ON HOLD | OUTPATIENT
Start: 2023-07-24

## 2023-07-24 RX ORDER — AZELASTINE 1 MG/ML
1 SPRAY, METERED NASAL 2 TIMES DAILY
Qty: 30 ML | Refills: 11 | Status: ON HOLD | OUTPATIENT
Start: 2023-07-24

## 2023-07-24 NOTE — PROGRESS NOTES
[facility]  [facility] [place]   [address]       Patient ID: Adry Owen     Chief Complaint:     The patient location is: [place]   The chief complaint leading to consultation is: Med refills     Visit type: audiovisual    Face to Face time with patient: 10 mins   15 minutes of total time spent on the encounter, which includes face to face time and non-face to face time preparing to see the patient (eg, review of tests), Obtaining and/or reviewing separately obtained history, Documenting clinical information in the electronic or other health record, Independently interpreting results (not separately reported) and communicating results to the patient/family/caregiver, or Care coordination (not separately reported).         Each patient to whom he or she provides medical services by telemedicine is:  (1) informed of the relationship between the physician and patient and the respective role of any other health care provider with respect to management of the patient; and (2) notified that he or she may decline to receive medical services by telemedicine and may withdraw from such care at any time.    Notes:        HPI: Pain meds were stopped after surgery due to suspicion for polypharmacy abuse. Dr. Ahuja thinks her elevated Liver Function Tests were due to hypotension. That does make sense, but I am still concerned about unintentional narcotic overuse causing the hypotension. She still has significant pain in her shoulders and can get pains in her bilateral lower extremities and is losing some dexterity in her fingers. I want her to ask Dr. Stringer about Occupational Therapy. She requests a refill of Gabapentin 600 mg in the am and 900 in the pm which I have provided. Lipids were quite high 2 weeks ago, but she may have eaten shortly before the lab, so we'll Recheck them.     Review of Systems       Neck pain     Objective:      Physical Exam   Physical Exam       Virtual Visit      Vitals: There were no vitals filed for this visit.     Assessment:           Plan:       Adry Owen  was seen today for follow-up and may need lab work.    Diagnoses and all orders for this visit:    Diagnoses and all orders for this visit:    Chronic neck pain  -     gabapentin (NEURONTIN) 300 MG capsule; Take 2 PO Q am and 3 PO Q pm  May need Occupational Therapy - I defer to Dr. Stringer     Non-seasonal allergic rhinitis due to pollen  -     azelastine (ASTELIN) 137 mcg (0.1 %) nasal spray; 1 spray (137 mcg total) by Nasal route 2 (two) times daily.  Controlled with med     Elevated LDL cholesterol level  -     Lipid Panel; Future  Recheck labs         Ross Nunes MD

## 2023-08-04 ENCOUNTER — PATIENT MESSAGE (OUTPATIENT)
Dept: PHARMACY | Facility: CLINIC | Age: 78
End: 2023-08-04
Payer: MEDICARE

## 2023-08-07 ENCOUNTER — SPECIALTY PHARMACY (OUTPATIENT)
Dept: PHARMACY | Facility: CLINIC | Age: 78
End: 2023-08-07
Payer: MEDICARE

## 2023-08-07 NOTE — TELEPHONE ENCOUNTER
Specialty Pharmacy - Refill Coordination    Specialty Medication Orders Linked to Encounter      Flowsheet Row Most Recent Value   Medication #1 nilotinib (TASIGNA) 150 mg capsule (Order#[id number], Rx#[id number])            Refill Questions - Documented Responses      Flowsheet Row Most Recent Value   Patient Availability and HIPAA Verification    Does patient want to proceed with activity? Yes   HIPAA/medical authority confirmed? Yes   Relationship to patient of person spoken to? Self   Refill Screening Questions    Changes to allergies? No   Changes to medications? No   New conditions since last clinic visit? No   Unplanned office visit, urgent care, ED, or hospital admission in the last 4 weeks? No   How does patient/caregiver feel medication is working? Good   Financial problems or insurance changes? No   How many doses of your specialty medications were missed in the last 4 weeks? 0   Would patient like to speak to a pharmacist? No   When does the patient need to receive the medication? 08/14/23   Refill Delivery Questions    How will the patient receive the medication? MEDRx   When does the patient need to receive the medication? 08/14/23   Shipping Address Home   Address in [facility] confirmed and updated if neccessary? Yes   Expected Copay ($) 0   Is the patient able to afford the medication copay? Yes   Payment Method zero copay   Days supply of Refill 28   Supplies needed? No supplies needed   Refill activity completed? Yes   Refill activity plan Refill scheduled   Shipment/Pickup Date: 08/08/23            Current Outpatient Medications   Medication Sig    alendronate (FOSAMAX) 70 MG tablet Take 1 tablet (70 mg total) by mouth every 7 days.    ASPIRIN LOW DOSE 81 mg EC tablet Take 81 mg by mouth once daily.     azelastine (ASTELIN) 137 mcg (0.1 %) nasal spray 1 spray (137 mcg total) by Nasal route 2 (two) times daily.    cycloSPORINE (RESTASIS) 0.05 % ophthalmic emulsion PLACE 1 DROP INTO BOTH  EYES 2 TIMES DAILY.    ergocalciferol (ERGOCALCIFEROL) 50,000 unit Cap Take 1 capsule (50,000 Units total) by mouth every 7 days.    fish oil-omega-3 fatty acids 300-1,000 mg capsule Take 1 capsule by mouth once daily.    fluticasone propionate (FLONASE) 50 mcg/actuation nasal spray USE 1 SPRAY IN EACH NOSTRIL EVERY DAY    gabapentin (NEURONTIN) 300 MG capsule Take 2 PO Q am and 3 PO Q pm    IPRATROPIUM BROMIDE NASL by Nasal route once as needed.    ipratropium-albuteroL (COMBIVENT RESPIMAT)  mcg/actuation inhaler Inhale 1 puff into the lungs every 4 (four) hours as needed for Wheezing or Shortness of Breath. Rescue    levocetirizine (XYZAL) 5 MG tablet TAKE 1 TABLET EVERY EVENING    levothyroxine (SYNTHROID) 75 MCG tablet Take 1 tablet (75 mcg total) by mouth once daily.    metoprolol succinate (TOPROL-XL) 25 MG 24 hr tablet Take 1 tablet (25 mg total) by mouth every evening.    mirtazapine (REMERON) 15 MG tablet Take by mouth.    MISCELLANEOUS MEDICAL SUPPLY (COMPRESSION STOCKINGS MISC)     mupirocin calcium 2% nasl oint (BACTROBAN) 2 % Oint by Nasal route 2 (two) times daily.    nilotinib (TASIGNA) 150 mg capsule Take 2 capsules (300 mg totaly) by mouth twice daily    nitroGLYCERIN (NITROSTAT) 0.4 MG SL tablet Place 1 tablet (0.4 mg total) under the tongue every 5 (five) minutes as needed for Chest pain.    spironolactone (ALDACTONE) 25 MG tablet Take 1 tablet (25 mg total) by mouth once daily.    XARELTO 20 mg Tab TAKE 1 TABLET DAILY WITH DINNER OR EVENING MEAL   Last reviewed on 7/24/2023  3:15 PM by Ross Nunes MD    Review of patient's allergies indicates:   Allergen Reactions    Alcohol prep pads [alcohol swabs] Hives and Rash    Diazolidinyl urea Anaphylaxis    Influenza virus vaccines Hives and Swelling    Iodine Anaphylaxis     Other reaction(s): Unknown    Preservative Anaphylaxis     Many different preservatives per pt report    Thimerosal Anaphylaxis    Bactoshield chg [chlorhexidine  gluconate] Rash    Feldene [piroxicam]      Syncope, muscle spasms.     Cephalosporins Rash    Lisinopril Palpitations    Penicillins Rash     Other reaction(s): Unknown    Sotalol Palpitations    Last reviewed on  7/24/2023 3:15 PM by Ross Nunes      Tasks added this encounter   No tasks added.   Tasks due within next 3 months   No tasks due.     Sumit Mackenzie, PharmD  [facility]  [address] [facility] [address]  Phone: [phone]  Fax: [phone]

## 2023-09-12 DIAGNOSIS — M85.852 OSTEOPENIA OF NECKS OF BOTH FEMURS: Primary | ICD-10-CM

## 2023-09-12 DIAGNOSIS — M85.851 OSTEOPENIA OF NECKS OF BOTH FEMURS: Primary | ICD-10-CM

## 2023-09-13 ENCOUNTER — OFFICE VISIT (OUTPATIENT)
Dept: FAMILY MEDICINE | Facility: CLINIC | Age: 78
End: 2023-09-13
Payer: MEDICARE

## 2023-09-13 ENCOUNTER — INFUSION (OUTPATIENT)
Dept: INFUSION THERAPY | Facility: HOSPITAL | Age: 78
End: 2023-09-13
Attending: INTERNAL MEDICINE
Payer: MEDICARE

## 2023-09-13 VITALS
WEIGHT: 193.13 LBS | BODY MASS INDEX: 32.18 KG/M2 | SYSTOLIC BLOOD PRESSURE: 110 MMHG | HEIGHT: 65 IN | OXYGEN SATURATION: 97 % | DIASTOLIC BLOOD PRESSURE: 68 MMHG | HEART RATE: 98 BPM

## 2023-09-13 VITALS
SYSTOLIC BLOOD PRESSURE: 136 MMHG | TEMPERATURE: 98 F | DIASTOLIC BLOOD PRESSURE: 87 MMHG | RESPIRATION RATE: 16 BRPM | HEART RATE: 78 BPM

## 2023-09-13 DIAGNOSIS — M85.852 OSTEOPENIA OF NECKS OF BOTH FEMURS: Primary | ICD-10-CM

## 2023-09-13 DIAGNOSIS — R60.0 BILATERAL LOWER EXTREMITY EDEMA: Primary | ICD-10-CM

## 2023-09-13 DIAGNOSIS — I48.11 LONGSTANDING PERSISTENT ATRIAL FIBRILLATION: ICD-10-CM

## 2023-09-13 DIAGNOSIS — M85.851 OSTEOPENIA OF NECKS OF BOTH FEMURS: Primary | ICD-10-CM

## 2023-09-13 PROCEDURE — 3078F PR MOST RECENT DIASTOLIC BLOOD PRESSURE < 80 MM HG: ICD-10-PCS | Mod: HCNC,CPTII,S$GLB, | Performed by: STUDENT IN AN ORGANIZED HEALTH CARE EDUCATION/TRAINING PROGRAM

## 2023-09-13 PROCEDURE — 3074F PR MOST RECENT SYSTOLIC BLOOD PRESSURE < 130 MM HG: ICD-10-PCS | Mod: HCNC,CPTII,S$GLB, | Performed by: STUDENT IN AN ORGANIZED HEALTH CARE EDUCATION/TRAINING PROGRAM

## 2023-09-13 PROCEDURE — 3074F SYST BP LT 130 MM HG: CPT | Mod: HCNC,CPTII,S$GLB, | Performed by: STUDENT IN AN ORGANIZED HEALTH CARE EDUCATION/TRAINING PROGRAM

## 2023-09-13 PROCEDURE — 3288F PR FALLS RISK ASSESSMENT DOCUMENTED: ICD-10-PCS | Mod: HCNC,CPTII,S$GLB, | Performed by: STUDENT IN AN ORGANIZED HEALTH CARE EDUCATION/TRAINING PROGRAM

## 2023-09-13 PROCEDURE — 3078F DIAST BP <80 MM HG: CPT | Mod: HCNC,CPTII,S$GLB, | Performed by: STUDENT IN AN ORGANIZED HEALTH CARE EDUCATION/TRAINING PROGRAM

## 2023-09-13 PROCEDURE — 1101F PR PT FALLS ASSESS DOC 0-1 FALLS W/OUT INJ PAST YR: ICD-10-PCS | Mod: HCNC,CPTII,S$GLB, | Performed by: STUDENT IN AN ORGANIZED HEALTH CARE EDUCATION/TRAINING PROGRAM

## 2023-09-13 PROCEDURE — 63600175 PHARM REV CODE 636 W HCPCS: Mod: JZ,JG,HCNC,PN | Performed by: INTERNAL MEDICINE

## 2023-09-13 PROCEDURE — 99213 PR OFFICE/OUTPT VISIT, EST, LEVL III, 20-29 MIN: ICD-10-PCS | Mod: HCNC,S$GLB,, | Performed by: STUDENT IN AN ORGANIZED HEALTH CARE EDUCATION/TRAINING PROGRAM

## 2023-09-13 PROCEDURE — 99999 PR PBB SHADOW E&M-EST. PATIENT-LVL IV: ICD-10-PCS | Mod: PBBFAC,HCNC,, | Performed by: STUDENT IN AN ORGANIZED HEALTH CARE EDUCATION/TRAINING PROGRAM

## 2023-09-13 PROCEDURE — 99999 PR PBB SHADOW E&M-EST. PATIENT-LVL IV: CPT | Mod: PBBFAC,HCNC,, | Performed by: STUDENT IN AN ORGANIZED HEALTH CARE EDUCATION/TRAINING PROGRAM

## 2023-09-13 PROCEDURE — 1157F ADVNC CARE PLAN IN RCRD: CPT | Mod: HCNC,CPTII,S$GLB, | Performed by: STUDENT IN AN ORGANIZED HEALTH CARE EDUCATION/TRAINING PROGRAM

## 2023-09-13 PROCEDURE — 1125F AMNT PAIN NOTED PAIN PRSNT: CPT | Mod: HCNC,CPTII,S$GLB, | Performed by: STUDENT IN AN ORGANIZED HEALTH CARE EDUCATION/TRAINING PROGRAM

## 2023-09-13 PROCEDURE — 1159F MED LIST DOCD IN RCRD: CPT | Mod: HCNC,CPTII,S$GLB, | Performed by: STUDENT IN AN ORGANIZED HEALTH CARE EDUCATION/TRAINING PROGRAM

## 2023-09-13 PROCEDURE — 99213 OFFICE O/P EST LOW 20 MIN: CPT | Mod: HCNC,S$GLB,, | Performed by: STUDENT IN AN ORGANIZED HEALTH CARE EDUCATION/TRAINING PROGRAM

## 2023-09-13 PROCEDURE — 1159F PR MEDICATION LIST DOCUMENTED IN MEDICAL RECORD: ICD-10-PCS | Mod: HCNC,CPTII,S$GLB, | Performed by: STUDENT IN AN ORGANIZED HEALTH CARE EDUCATION/TRAINING PROGRAM

## 2023-09-13 PROCEDURE — 1101F PT FALLS ASSESS-DOCD LE1/YR: CPT | Mod: HCNC,CPTII,S$GLB, | Performed by: STUDENT IN AN ORGANIZED HEALTH CARE EDUCATION/TRAINING PROGRAM

## 2023-09-13 PROCEDURE — 1157F PR ADVANCE CARE PLAN OR EQUIV PRESENT IN MEDICAL RECORD: ICD-10-PCS | Mod: HCNC,CPTII,S$GLB, | Performed by: STUDENT IN AN ORGANIZED HEALTH CARE EDUCATION/TRAINING PROGRAM

## 2023-09-13 PROCEDURE — 3288F FALL RISK ASSESSMENT DOCD: CPT | Mod: HCNC,CPTII,S$GLB, | Performed by: STUDENT IN AN ORGANIZED HEALTH CARE EDUCATION/TRAINING PROGRAM

## 2023-09-13 PROCEDURE — 1125F PR PAIN SEVERITY QUANTIFIED, PAIN PRESENT: ICD-10-PCS | Mod: HCNC,CPTII,S$GLB, | Performed by: STUDENT IN AN ORGANIZED HEALTH CARE EDUCATION/TRAINING PROGRAM

## 2023-09-13 PROCEDURE — 96372 THER/PROPH/DIAG INJ SC/IM: CPT | Mod: HCNC,PN

## 2023-09-13 RX ORDER — FUROSEMIDE 20 MG/1
20 TABLET ORAL DAILY
Qty: 7 TABLET | Refills: 0 | Status: SHIPPED | OUTPATIENT
Start: 2023-09-13 | End: 2023-09-18 | Stop reason: SDUPTHER

## 2023-09-13 RX ADMIN — DENOSUMAB 60 MG: 60 INJECTION SUBCUTANEOUS at 12:09

## 2023-09-13 NOTE — ASSESSMENT & PLAN NOTE
A new issue. Poorly controlled. Discussed conservative measures which patient has already been doing. Will start short course of lasix. Consider chronic use if swelling recurs.     Low concern for DVT given bilateral nature and chronic Xarelto. Low concern for cellulitis. Recent TSH normal. Albumin level from July was normal. Last echo was from 3/2022. Will repeat echo at this time.

## 2023-09-13 NOTE — PLAN OF CARE
Problem: Fatigue  Goal: Improved Activity Tolerance  Outcome: Ongoing, Progressing     Problem: Adult Inpatient Plan of Care  Goal: Plan of Care Review  Outcome: Met   Tolerated injection well today  Discharge instructions given and pt d/c to home per w/c  NAD

## 2023-09-13 NOTE — Clinical Note
Health Maintenance Due   Topic Date Due   • Medicare Advantage- Medicare Wellness Visit  01/01/2023       Patient is due for topics as listed above but is not proceeding with MWV (Medicare Wellness Visit) at this time. Education provided for MWV (Medicare Wellness Visit).    Patient presents for an acute visit today.  MWV is scheduled.   Her legs are swollen. Giving her a temporary course of lasix and checking her heart. She sees you on Oct 2.

## 2023-09-13 NOTE — PROGRESS NOTES
Name: Adry Owen  MRN: [id number]  : 1945  PCP: Ross Nunes MD    HPI  Patient presents for redness of her legs and bilateral lower extremity edema. Getting an infusion today and the staff there suggested she see PCP about it. Patient noticed the redness about a week ago. Associated with a numbing pain. She had been taking an additional quarter of her spironolactone as she heard it was a diuretic.     Review of Systems   Respiratory:  Positive for shortness of breath (on exertion - believes related to weight gain).    Cardiovascular:  Positive for leg swelling. Negative for chest pain.   Musculoskeletal:  Positive for back pain.       Patient Active Problem List   Diagnosis    Coronary artery disease involving native coronary artery of native heart without angina pectoris    Longstanding persistent atrial fibrillation    Essential hypertension    Chronic myeloid leukemia, BCR/ABL-positive, in remission    Hypothyroidism due to Hashimoto's thyroiditis    Chronic neck pain    Atrioventricular block, complete    JAYLIN treated with BiPAP    Cardiac pacemaker in situ    Osteopenia of necks of both femurs    Encephalopathy, toxic    Bilateral lower extremity edema       Vitals:    23 1405   BP: 110/68   Pulse: 98       Physical Exam  Constitutional:       General: She is not in acute distress.     Appearance: Normal appearance. She is well-developed.   HENT:      Head: Normocephalic and atraumatic.      Right Ear: External ear normal.      Left Ear: External ear normal.   Eyes:      Conjunctiva/sclera: Conjunctivae normal.   Cardiovascular:      Rate and Rhythm: Normal rate and regular rhythm.      Heart sounds: No murmur heard.     No friction rub. No gallop.   Pulmonary:      Effort: Pulmonary effort is normal. No respiratory distress.      Breath sounds: No wheezing, rhonchi or rales.   Abdominal:      General: Abdomen is flat. There is no distension.   Musculoskeletal:         General: No swelling or  deformity.      Right lower leg: Tenderness present. 2+ Pitting Edema present.      Left lower leg: Tenderness present. 2+ Pitting Edema present.   Skin:     General: Skin is warm and dry.      Coloration: Skin is not jaundiced.   Neurological:      Mental Status: She is alert and oriented to person, place, and time. Mental status is at baseline.   Psychiatric:         Attention and Perception: Attention and perception normal.         Mood and Affect: Mood normal.         Speech: Speech normal.         Behavior: Behavior normal. Behavior is cooperative.         Thought Content: Thought content normal.         Cognition and Memory: Cognition normal.         Judgment: Judgment normal.         1. Bilateral lower extremity edema  Assessment & Plan:  A new issue. Poorly controlled. Discussed conservative measures which patient has already been doing. Will start short course of lasix. Consider chronic use if swelling recurs.     Low concern for DVT given bilateral nature and chronic Xarelto. Low concern for cellulitis. Recent TSH normal. Albumin level from July was normal. Last echo was from 3/2022. Will repeat echo at this time.    Orders:  -     Echo; Future  -     furosemide (LASIX) 20 MG tablet; Take 1 tablet (20 mg total) by mouth once daily. for 7 days  Dispense: 7 tablet; Refill: 0    2. Longstanding persistent atrial fibrillation  -     Echo; Future        Follow up as previously scheduled.    Dustin Orlando MD  09/13/2023

## 2023-09-13 NOTE — PATIENT INSTRUCTIONS
"For more information on insomnia and how to treat it, you can visit this web page provided by the American Academy of Sleep Medicine:  https://sleepeducation.org/sleep-disorders/insomnia/    To improve your sleep without medication, you can read "Say Good Night to Insomnia: The Six Week, Drug-Free Program Developed at [facility]" by Horacio Soliz. This book's methods are based on cognitive behavioral therapy, the first line treatment for insomnia.  https://www.NetMovies/Say-Good-Night-Insomnia-Drug-Free/dp/3757226258    If you would like a smart phone bradley that simulates cognitive behavioral therapy, you can download Proterro.  [email or web address]://[email or web address]/    "

## 2023-10-02 ENCOUNTER — PATIENT MESSAGE (OUTPATIENT)
Dept: FAMILY MEDICINE | Facility: CLINIC | Age: 78
End: 2023-10-02
Payer: MEDICARE

## 2023-10-02 DIAGNOSIS — R60.0 BILATERAL LOWER EXTREMITY EDEMA: Primary | ICD-10-CM

## 2023-10-02 DIAGNOSIS — R60.0 BILATERAL LOWER EXTREMITY EDEMA: ICD-10-CM

## 2023-10-02 NOTE — TELEPHONE ENCOUNTER
No care due was identified.  [facility] Embedded Care Due Messages. Reference number: [id number].   10/02/2023 4:43:04 PM CDT

## 2023-10-03 ENCOUNTER — PATIENT MESSAGE (OUTPATIENT)
Dept: FAMILY MEDICINE | Facility: CLINIC | Age: 78
End: 2023-10-03
Payer: MEDICARE

## 2023-10-03 DIAGNOSIS — E78.49 OTHER HYPERLIPIDEMIA: ICD-10-CM

## 2023-10-03 DIAGNOSIS — E78.00 ELEVATED LDL CHOLESTEROL LEVEL: Primary | ICD-10-CM

## 2023-10-03 DIAGNOSIS — R60.0 BILATERAL LOWER EXTREMITY EDEMA: ICD-10-CM

## 2023-10-03 RX ORDER — FUROSEMIDE 20 MG/1
40 TABLET ORAL DAILY
Qty: 60 TABLET | Refills: 3 | Status: SHIPPED | OUTPATIENT
Start: 2023-10-03 | End: 2023-11-02

## 2023-10-04 ENCOUNTER — TELEPHONE (OUTPATIENT)
Dept: FAMILY MEDICINE | Facility: CLINIC | Age: 78
End: 2023-10-04
Payer: MEDICARE

## 2023-10-04 RX ORDER — FUROSEMIDE 20 MG/1
20 TABLET ORAL 2 TIMES DAILY
Qty: 60 TABLET | Refills: 5 | Status: SHIPPED | OUTPATIENT
Start: 2023-10-04 | End: 2023-12-28

## 2023-10-04 RX ORDER — FUROSEMIDE 20 MG/1
40 TABLET ORAL DAILY
Qty: 60 TABLET | Refills: 3 | OUTPATIENT
Start: 2023-10-04 | End: 2023-11-03

## 2023-10-04 NOTE — TELEPHONE ENCOUNTER
No care due was identified.  [facility] Embedded Care Due Messages. Reference number: [id number].   10/04/2023 9:12:17 AM CDT

## 2023-10-04 NOTE — TELEPHONE ENCOUNTER
No care due was identified.  [facility] Embedded Care Due Messages. Reference number: [id number].   10/04/2023 8:51:31 AM CDT

## 2023-10-04 NOTE — TELEPHONE ENCOUNTER
----- Message from Katheryn Covington sent at 10/3/2023  6:12 PM CDT -----  Regarding: PT ADVICE  Contact: PT  Pt called regarding an appt that was supposed to be on hold for her for 10.9.23 for 10:40am. Unable to schedule pt for that date. Pt was scheduled for 11.16.23. Pt would like the appt for 10.9.23.    Please advise. Pt can be reached at [phone]

## 2023-10-06 RX ORDER — ATORVASTATIN CALCIUM 40 MG/1
40 TABLET, FILM COATED ORAL NIGHTLY
Qty: 90 TABLET | Refills: 3 | Status: SHIPPED | OUTPATIENT
Start: 2023-10-06 | End: 2023-10-09

## 2023-10-09 ENCOUNTER — OFFICE VISIT (OUTPATIENT)
Dept: FAMILY MEDICINE | Facility: CLINIC | Age: 78
End: 2023-10-09
Payer: MEDICARE

## 2023-10-09 VITALS
SYSTOLIC BLOOD PRESSURE: 112 MMHG | OXYGEN SATURATION: 95 % | HEART RATE: 97 BPM | WEIGHT: 192.88 LBS | HEIGHT: 65 IN | DIASTOLIC BLOOD PRESSURE: 66 MMHG | BODY MASS INDEX: 32.14 KG/M2

## 2023-10-09 DIAGNOSIS — I10 ESSENTIAL HYPERTENSION: Primary | ICD-10-CM

## 2023-10-09 DIAGNOSIS — E06.3 HYPOTHYROIDISM DUE TO HASHIMOTO'S THYROIDITIS: ICD-10-CM

## 2023-10-09 DIAGNOSIS — E78.49 OTHER HYPERLIPIDEMIA: ICD-10-CM

## 2023-10-09 DIAGNOSIS — E03.8 HYPOTHYROIDISM DUE TO HASHIMOTO'S THYROIDITIS: ICD-10-CM

## 2023-10-09 DIAGNOSIS — I48.0 PAROXYSMAL ATRIAL FIBRILLATION: ICD-10-CM

## 2023-10-09 DIAGNOSIS — R74.8 ELEVATED LIVER ENZYMES: ICD-10-CM

## 2023-10-09 DIAGNOSIS — G47.33 OSA TREATED WITH BIPAP: ICD-10-CM

## 2023-10-09 DIAGNOSIS — E66.09 CLASS 1 OBESITY DUE TO EXCESS CALORIES WITH SERIOUS COMORBIDITY AND BODY MASS INDEX (BMI) OF 32.0 TO 32.9 IN ADULT: ICD-10-CM

## 2023-10-09 PROBLEM — E66.811 CLASS 1 OBESITY DUE TO EXCESS CALORIES WITH SERIOUS COMORBIDITY AND BODY MASS INDEX (BMI) OF 32.0 TO 32.9 IN ADULT: Status: ACTIVE | Noted: 2023-10-09

## 2023-10-09 PROCEDURE — 1157F ADVNC CARE PLAN IN RCRD: CPT | Mod: HCNC,CPTII,S$GLB, | Performed by: INTERNAL MEDICINE

## 2023-10-09 PROCEDURE — 3078F DIAST BP <80 MM HG: CPT | Mod: HCNC,CPTII,S$GLB, | Performed by: INTERNAL MEDICINE

## 2023-10-09 PROCEDURE — 1160F PR REVIEW ALL MEDS BY PRESCRIBER/CLIN PHARMACIST DOCUMENTED: ICD-10-PCS | Mod: HCNC,CPTII,S$GLB, | Performed by: INTERNAL MEDICINE

## 2023-10-09 PROCEDURE — 1125F PR PAIN SEVERITY QUANTIFIED, PAIN PRESENT: ICD-10-PCS | Mod: HCNC,CPTII,S$GLB, | Performed by: INTERNAL MEDICINE

## 2023-10-09 PROCEDURE — 3288F FALL RISK ASSESSMENT DOCD: CPT | Mod: HCNC,CPTII,S$GLB, | Performed by: INTERNAL MEDICINE

## 2023-10-09 PROCEDURE — 1157F PR ADVANCE CARE PLAN OR EQUIV PRESENT IN MEDICAL RECORD: ICD-10-PCS | Mod: HCNC,CPTII,S$GLB, | Performed by: INTERNAL MEDICINE

## 2023-10-09 PROCEDURE — 99999 PR PBB SHADOW E&M-EST. PATIENT-LVL IV: CPT | Mod: PBBFAC,HCNC,, | Performed by: INTERNAL MEDICINE

## 2023-10-09 PROCEDURE — 1159F PR MEDICATION LIST DOCUMENTED IN MEDICAL RECORD: ICD-10-PCS | Mod: HCNC,CPTII,S$GLB, | Performed by: INTERNAL MEDICINE

## 2023-10-09 PROCEDURE — 3078F PR MOST RECENT DIASTOLIC BLOOD PRESSURE < 80 MM HG: ICD-10-PCS | Mod: HCNC,CPTII,S$GLB, | Performed by: INTERNAL MEDICINE

## 2023-10-09 PROCEDURE — 1101F PR PT FALLS ASSESS DOC 0-1 FALLS W/OUT INJ PAST YR: ICD-10-PCS | Mod: HCNC,CPTII,S$GLB, | Performed by: INTERNAL MEDICINE

## 2023-10-09 PROCEDURE — 99214 OFFICE O/P EST MOD 30 MIN: CPT | Mod: HCNC,S$GLB,, | Performed by: INTERNAL MEDICINE

## 2023-10-09 PROCEDURE — 3288F PR FALLS RISK ASSESSMENT DOCUMENTED: ICD-10-PCS | Mod: HCNC,CPTII,S$GLB, | Performed by: INTERNAL MEDICINE

## 2023-10-09 PROCEDURE — 99214 PR OFFICE/OUTPT VISIT, EST, LEVL IV, 30-39 MIN: ICD-10-PCS | Mod: HCNC,S$GLB,, | Performed by: INTERNAL MEDICINE

## 2023-10-09 PROCEDURE — 1159F MED LIST DOCD IN RCRD: CPT | Mod: HCNC,CPTII,S$GLB, | Performed by: INTERNAL MEDICINE

## 2023-10-09 PROCEDURE — 1160F RVW MEDS BY RX/DR IN RCRD: CPT | Mod: HCNC,CPTII,S$GLB, | Performed by: INTERNAL MEDICINE

## 2023-10-09 PROCEDURE — 3074F SYST BP LT 130 MM HG: CPT | Mod: HCNC,CPTII,S$GLB, | Performed by: INTERNAL MEDICINE

## 2023-10-09 PROCEDURE — 3074F PR MOST RECENT SYSTOLIC BLOOD PRESSURE < 130 MM HG: ICD-10-PCS | Mod: HCNC,CPTII,S$GLB, | Performed by: INTERNAL MEDICINE

## 2023-10-09 PROCEDURE — 1125F AMNT PAIN NOTED PAIN PRSNT: CPT | Mod: HCNC,CPTII,S$GLB, | Performed by: INTERNAL MEDICINE

## 2023-10-09 PROCEDURE — 1101F PT FALLS ASSESS-DOCD LE1/YR: CPT | Mod: HCNC,CPTII,S$GLB, | Performed by: INTERNAL MEDICINE

## 2023-10-09 PROCEDURE — 99999 PR PBB SHADOW E&M-EST. PATIENT-LVL IV: ICD-10-PCS | Mod: PBBFAC,HCNC,, | Performed by: INTERNAL MEDICINE

## 2023-10-09 RX ORDER — ROSUVASTATIN CALCIUM 10 MG/1
10 TABLET, COATED ORAL NIGHTLY
Qty: 90 TABLET | Refills: 3 | Status: ON HOLD | OUTPATIENT
Start: 2023-10-09 | End: 2024-10-08

## 2023-10-09 NOTE — PROGRESS NOTES
[facility]  Primary Care   [address]       Patient ID: Adry Owen     Chief Complaint:   Chief Complaint   Patient presents with    Follow-up        HPI:  Annual exam.  It has been awhile since I last saw her in clinic but it is good to see her.  Her vital signs do look very good.  We last checked her cholesterol a few weeks ago and it was very high, but atorvastatin was stopped back in April when her liver enzymes were elevated.  I would like to start her on rosuvastatin 10 mg at night and recheck her liver enzymes in her cholesterol in about a month.  She did see 1 of my partners for bilateral lower extremity edema.  At that time he increased her Lasix and added it back to spironolactone and edema did improve but not resolved.  Echocardiogram does show a fairly good ejection fraction but she may have some diastolic dysfunction which would explain the swelling in her legs.  I want her to continue the Lasix and spironolactone for now.  She is concerned about weight gain and has been about 30 lb over the past 3 months.  I do not think it is all due to fluid so I can only assume it is due to her low carb diet which is favoring more proteins which is keeping weight on.  I do suggest she try eating smaller portions.  She did have a severe stabbing pain in her right arm when she was putting some things are cabinet the other day.  It did go away so I can only assume is due to a pinched nerve.  She does have a history of a recent neck surgery.  She also gets some numbness in her right foot which I think is due to her known lumbar pathology.  I do not see any reason why she can not have lumbar surgery which is actually what she went to see her neurosurgeon to correct initially and then they found that her neck was much worse.    Review of Systems       Weight gain     Objective:      Physical Exam   Physical Exam       Obese   Irregularly irregular rhythm   Trace bilateral ankle edema  "      Vitals:   Vitals:    10/09/23 1527   BP: 112/66   Pulse: 97   SpO2: 95%   Weight: 87.5 kg (192 lb 14.4 oz)   Height: 5' 5" (1.651 m)        Assessment:           Plan:       Adry Owen  was seen today for follow-up and may need lab work.    Diagnoses and all orders for this visit:    Adry was seen today for follow-up.    Diagnoses and all orders for this visit:    Essential hypertension  Controlled with med but Monitor for overmedication     Hypothyroidism due to Hashimoto's thyroiditis  Thryroid Function Tests are good     JAYLIN treated with BiPAP  Stable    Other hyperlipidemia  -     rosuvastatin (CRESTOR) 10 MG tablet; Take 1 tablet (10 mg total) by mouth every evening.  -     Lipid Panel; Future  Monitor Labs in 1 month    Elevated liver enzymes  -     Comprehensive Metabolic Panel; Future  Check labs  1 month     Paroxysmal atrial fibrillation  Heart rate Controlled - continue Xarelto     Class 1 obesity due to excess calories with serious comorbidity and body mass index (BMI) of 32.0 to 32.9 in adult  Monitor as she diets          Ross Nunes MD    "

## 2023-11-17 ENCOUNTER — TELEPHONE (OUTPATIENT)
Dept: FAMILY MEDICINE | Facility: CLINIC | Age: 78
End: 2023-11-17
Payer: MEDICARE

## 2023-11-17 ENCOUNTER — PATIENT MESSAGE (OUTPATIENT)
Dept: FAMILY MEDICINE | Facility: CLINIC | Age: 78
End: 2023-11-17
Payer: MEDICARE

## 2023-11-17 NOTE — TELEPHONE ENCOUNTER
----- Message from Tressa Ortiz sent at 11/17/2023 12:33 PM CST -----  Regarding: appt access  Type:  Sooner Appointment Request    Caller is requesting a sooner appointment.  Caller declined first available appointment listed below.  Caller will not accept being placed on the waitlist and is requesting a message be sent to doctor.    Name of Caller:  pt   When is the first available appointment?  Randy   Symptoms:  pre op   Best Call Back Number:  [phone] (home)   Additional Information:  requesting a appt and call back asap  requesting appt before 12/18 please advise thank you

## 2023-11-17 NOTE — TELEPHONE ENCOUNTER
Spoke with patient in regards to scheduling an appointment for surgery clearance. Patient was scheduled for 12/4 at 10:40 am with Dr. Nunes in person.

## 2023-12-01 ENCOUNTER — TELEPHONE (OUTPATIENT)
Dept: FAMILY MEDICINE | Facility: CLINIC | Age: 78
End: 2023-12-01

## 2023-12-01 ENCOUNTER — OFFICE VISIT (OUTPATIENT)
Dept: FAMILY MEDICINE | Facility: CLINIC | Age: 78
End: 2023-12-01
Payer: MEDICARE

## 2023-12-01 ENCOUNTER — HOSPITAL ENCOUNTER (OUTPATIENT)
Dept: RADIOLOGY | Facility: HOSPITAL | Age: 78
Discharge: HOME | End: 2023-12-01
Attending: INTERNAL MEDICINE
Payer: MEDICARE

## 2023-12-01 ENCOUNTER — PATIENT MESSAGE (OUTPATIENT)
Dept: FAMILY MEDICINE | Facility: CLINIC | Age: 78
End: 2023-12-01

## 2023-12-01 VITALS
DIASTOLIC BLOOD PRESSURE: 80 MMHG | SYSTOLIC BLOOD PRESSURE: 112 MMHG | OXYGEN SATURATION: 96 % | WEIGHT: 200.38 LBS | BODY MASS INDEX: 33.38 KG/M2 | HEART RATE: 116 BPM | HEIGHT: 65 IN | TEMPERATURE: 98 F

## 2023-12-01 DIAGNOSIS — J18.9 PNEUMONIA DUE TO INFECTIOUS ORGANISM, UNSPECIFIED LATERALITY, UNSPECIFIED PART OF LUNG: Primary | ICD-10-CM

## 2023-12-01 DIAGNOSIS — Z01.818 PRE-OP EVALUATION: ICD-10-CM

## 2023-12-01 DIAGNOSIS — I48.11 LONGSTANDING PERSISTENT ATRIAL FIBRILLATION: ICD-10-CM

## 2023-12-01 DIAGNOSIS — J18.9 PNEUMONIA DUE TO INFECTIOUS ORGANISM, UNSPECIFIED LATERALITY, UNSPECIFIED PART OF LUNG: ICD-10-CM

## 2023-12-01 PROCEDURE — 3079F DIAST BP 80-89 MM HG: CPT | Mod: HCNC,CPTII,S$GLB, | Performed by: INTERNAL MEDICINE

## 2023-12-01 PROCEDURE — 99999 PR PBB SHADOW E&M-EST. PATIENT-LVL III: ICD-10-PCS | Mod: PBBFAC,HCNC,, | Performed by: INTERNAL MEDICINE

## 2023-12-01 PROCEDURE — 3074F PR MOST RECENT SYSTOLIC BLOOD PRESSURE < 130 MM HG: ICD-10-PCS | Mod: HCNC,CPTII,S$GLB, | Performed by: INTERNAL MEDICINE

## 2023-12-01 PROCEDURE — 1157F PR ADVANCE CARE PLAN OR EQUIV PRESENT IN MEDICAL RECORD: ICD-10-PCS | Mod: HCNC,CPTII,S$GLB, | Performed by: INTERNAL MEDICINE

## 2023-12-01 PROCEDURE — 99214 OFFICE O/P EST MOD 30 MIN: CPT | Mod: HCNC,S$GLB,, | Performed by: INTERNAL MEDICINE

## 2023-12-01 PROCEDURE — 1101F PR PT FALLS ASSESS DOC 0-1 FALLS W/OUT INJ PAST YR: ICD-10-PCS | Mod: HCNC,CPTII,S$GLB, | Performed by: INTERNAL MEDICINE

## 2023-12-01 PROCEDURE — 1160F PR REVIEW ALL MEDS BY PRESCRIBER/CLIN PHARMACIST DOCUMENTED: ICD-10-PCS | Mod: HCNC,CPTII,S$GLB, | Performed by: INTERNAL MEDICINE

## 2023-12-01 PROCEDURE — 3079F PR MOST RECENT DIASTOLIC BLOOD PRESSURE 80-89 MM HG: ICD-10-PCS | Mod: HCNC,CPTII,S$GLB, | Performed by: INTERNAL MEDICINE

## 2023-12-01 PROCEDURE — 1157F ADVNC CARE PLAN IN RCRD: CPT | Mod: HCNC,CPTII,S$GLB, | Performed by: INTERNAL MEDICINE

## 2023-12-01 PROCEDURE — 71046 X-RAY EXAM CHEST 2 VIEWS: CPT | Mod: 26,HCNC,, | Performed by: RADIOLOGY

## 2023-12-01 PROCEDURE — 71046 XR CHEST PA AND LATERAL: ICD-10-PCS | Mod: 26,HCNC,, | Performed by: RADIOLOGY

## 2023-12-01 PROCEDURE — 3074F SYST BP LT 130 MM HG: CPT | Mod: HCNC,CPTII,S$GLB, | Performed by: INTERNAL MEDICINE

## 2023-12-01 PROCEDURE — 1159F PR MEDICATION LIST DOCUMENTED IN MEDICAL RECORD: ICD-10-PCS | Mod: HCNC,CPTII,S$GLB, | Performed by: INTERNAL MEDICINE

## 2023-12-01 PROCEDURE — 99999 PR PBB SHADOW E&M-EST. PATIENT-LVL III: CPT | Mod: PBBFAC,HCNC,, | Performed by: INTERNAL MEDICINE

## 2023-12-01 PROCEDURE — 3288F FALL RISK ASSESSMENT DOCD: CPT | Mod: HCNC,CPTII,S$GLB, | Performed by: INTERNAL MEDICINE

## 2023-12-01 PROCEDURE — 99214 PR OFFICE/OUTPT VISIT, EST, LEVL IV, 30-39 MIN: ICD-10-PCS | Mod: HCNC,S$GLB,, | Performed by: INTERNAL MEDICINE

## 2023-12-01 PROCEDURE — 1159F MED LIST DOCD IN RCRD: CPT | Mod: HCNC,CPTII,S$GLB, | Performed by: INTERNAL MEDICINE

## 2023-12-01 PROCEDURE — 3288F PR FALLS RISK ASSESSMENT DOCUMENTED: ICD-10-PCS | Mod: HCNC,CPTII,S$GLB, | Performed by: INTERNAL MEDICINE

## 2023-12-01 PROCEDURE — 1101F PT FALLS ASSESS-DOCD LE1/YR: CPT | Mod: HCNC,CPTII,S$GLB, | Performed by: INTERNAL MEDICINE

## 2023-12-01 PROCEDURE — 1125F AMNT PAIN NOTED PAIN PRSNT: CPT | Mod: HCNC,CPTII,S$GLB, | Performed by: INTERNAL MEDICINE

## 2023-12-01 PROCEDURE — 71046 X-RAY EXAM CHEST 2 VIEWS: CPT | Mod: TC,HCNC,FY,PO

## 2023-12-01 PROCEDURE — 1125F PR PAIN SEVERITY QUANTIFIED, PAIN PRESENT: ICD-10-PCS | Mod: HCNC,CPTII,S$GLB, | Performed by: INTERNAL MEDICINE

## 2023-12-01 PROCEDURE — 1160F RVW MEDS BY RX/DR IN RCRD: CPT | Mod: HCNC,CPTII,S$GLB, | Performed by: INTERNAL MEDICINE

## 2023-12-01 RX ORDER — AZITHROMYCIN 250 MG/1
TABLET, FILM COATED ORAL
Qty: 6 TABLET | Refills: 0 | Status: SHIPPED | OUTPATIENT
Start: 2023-12-01 | End: 2023-12-06

## 2023-12-01 RX ORDER — DOXYCYCLINE 100 MG/1
100 CAPSULE ORAL 2 TIMES DAILY
Qty: 14 CAPSULE | Refills: 0 | Status: SHIPPED | OUTPATIENT
Start: 2023-12-01 | End: 2023-12-08

## 2023-12-01 RX ORDER — DOXYCYCLINE 100 MG/1
100 CAPSULE ORAL 2 TIMES DAILY
Qty: 14 CAPSULE | Refills: 0 | Status: SHIPPED | OUTPATIENT
Start: 2023-12-01 | End: 2023-12-01 | Stop reason: SDUPTHER

## 2023-12-01 RX ORDER — PROMETHAZINE HYDROCHLORIDE AND DEXTROMETHORPHAN HYDROBROMIDE 6.25; 15 MG/5ML; MG/5ML
5 SYRUP ORAL EVERY 4 HOURS PRN
Qty: 240 ML | Refills: 0 | Status: SHIPPED | OUTPATIENT
Start: 2023-12-01 | End: 2023-12-11

## 2023-12-01 RX ORDER — AZITHROMYCIN 250 MG/1
TABLET, FILM COATED ORAL
Qty: 6 TABLET | Refills: 0 | Status: SHIPPED | OUTPATIENT
Start: 2023-12-01 | End: 2023-12-01 | Stop reason: SDUPTHER

## 2023-12-01 NOTE — PROGRESS NOTES
"[facility]  Primary Care   [address]       Patient ID: Adry Owen     Chief Complaint:   Chief Complaint   Patient presents with    Pre-op Exam     M54.17        HPI:  Patient is here for preop clearance for lumbar fusion but I am concerned that she is sick today.  She says that she had the MRSA nasal swab done in the right Mot[place] on Monday and due to some issues with her nasal anatomy, is very traumatic.  She has some bleeding afterwards and immediately flare sinuses and since then she just has not been right.  Since then she is actually been wheezing, has shortness of breath, no fevers at home but she does not look well today.  I am concerned that she may have developed pneumonia some going to get a chest x-ray and treat her as such with a combination of doxycycline and azithromycin.  Also give her some Phenergan cough syrup with appropriate sedation precautions.  AFib was evaluated by Dr. Ribeiro and he did give her permission have surgery but her heart rate is little high today.  She does have Combivent that I have provided for her and she is using it every 4 hours so want her to continue that for now and we will have short-term follow-up to see if this resolves.    Review of Systems       Cough and wheeze     Objective:      Physical Exam   Physical Exam        Breath Sounds bilateral with wheezes     Vitals:   Vitals:    23 1441   BP: 112/80   BP Location: Right arm   Patient Position: Sitting   BP Method: Medium (Manual)   Pulse: (!) 116   Temp: 98 °F (36.7 °C)   TempSrc: Oral   SpO2: 96%   Weight: 90.9 kg (200 lb 6.4 oz)   Height: 5' 5" (1.651 m)        Assessment:           Plan:       Adry Owen  was seen today for follow-up and may need lab work.    Diagnoses and all orders for this visit:    Adry was seen today for pre-op exam.    Diagnoses and all orders for this visit:    Pneumonia due to infectious organism, unspecified laterality, unspecified part " of lung  -     X-Ray Chest PA And Lateral; Future  -     Discontinue: doxycycline (MONODOX) 100 MG capsule; Take 1 capsule (100 mg total) by mouth 2 (two) times daily. for 7 days  -     Discontinue: azithromycin (Z-SANDEE) 250 MG tablet; Take 2 tablets by mouth on day 1; Take 1 tablet by mouth on days 2-5  -     promethazine-dextromethorphan (PROMETHAZINE-DM) 6.25-15 mg/5 mL Syrp; Take 5 mLs by mouth every 4 (four) hours as needed (cough).  -     azithromycin (Z-SANDEE) 250 MG tablet; Take 2 tablets by mouth on day 1; Take 1 tablet by mouth on days 2-5  -     doxycycline (MONODOX) 100 MG capsule; Take 1 capsule (100 mg total) by mouth 2 (two) times daily. for 7 days  Short term Follow up     Longstanding persistent atrial fibrillation  Heart rate high- per Dr. Ahuja     Pre-op evaluation  Not cleared today due to illness - short term Follow up          Ross Nunes MD

## 2023-12-02 NOTE — TELEPHONE ENCOUNTER
----- Message from Afia Moreau sent at 12/1/2023  5:16 PM CST -----  Type:  Patient Returning Call    Who Called:Pt  Who Left Message for Patient:  Does the patient know what this is regarding?:  Would the patient rather a call back or a response via [facility]? Call  Best Call Back Number: [phone]  Additional Information: Pt states provider recommended that she be rescheduled for 5 days from today.  An appt was scheduled and is listed on [facility] for 12/4 (which would not be 5 days from today).  Pt would like to get clarification of her appt as her visit summary states her appt is scheduled for 12/6 at 3:20p.m.

## 2023-12-04 ENCOUNTER — OFFICE VISIT (OUTPATIENT)
Dept: FAMILY MEDICINE | Facility: CLINIC | Age: 78
End: 2023-12-04
Payer: MEDICARE

## 2023-12-04 VITALS
SYSTOLIC BLOOD PRESSURE: 116 MMHG | HEIGHT: 65 IN | OXYGEN SATURATION: 98 % | WEIGHT: 200.81 LBS | HEART RATE: 96 BPM | DIASTOLIC BLOOD PRESSURE: 78 MMHG | BODY MASS INDEX: 33.46 KG/M2

## 2023-12-04 DIAGNOSIS — J18.9 PNEUMONIA DUE TO INFECTIOUS ORGANISM, UNSPECIFIED LATERALITY, UNSPECIFIED PART OF LUNG: Primary | ICD-10-CM

## 2023-12-04 DIAGNOSIS — G89.29 CHRONIC BILATERAL LOW BACK PAIN WITHOUT SCIATICA: ICD-10-CM

## 2023-12-04 DIAGNOSIS — M54.50 CHRONIC BILATERAL LOW BACK PAIN WITHOUT SCIATICA: ICD-10-CM

## 2023-12-04 PROCEDURE — 3288F PR FALLS RISK ASSESSMENT DOCUMENTED: ICD-10-PCS | Mod: HCNC,CPTII,S$GLB, | Performed by: INTERNAL MEDICINE

## 2023-12-04 PROCEDURE — 3074F PR MOST RECENT SYSTOLIC BLOOD PRESSURE < 130 MM HG: ICD-10-PCS | Mod: HCNC,CPTII,S$GLB, | Performed by: INTERNAL MEDICINE

## 2023-12-04 PROCEDURE — 99213 OFFICE O/P EST LOW 20 MIN: CPT | Mod: HCNC,S$GLB,, | Performed by: INTERNAL MEDICINE

## 2023-12-04 PROCEDURE — 3078F DIAST BP <80 MM HG: CPT | Mod: HCNC,CPTII,S$GLB, | Performed by: INTERNAL MEDICINE

## 2023-12-04 PROCEDURE — 1159F MED LIST DOCD IN RCRD: CPT | Mod: HCNC,CPTII,S$GLB, | Performed by: INTERNAL MEDICINE

## 2023-12-04 PROCEDURE — 1101F PT FALLS ASSESS-DOCD LE1/YR: CPT | Mod: HCNC,CPTII,S$GLB, | Performed by: INTERNAL MEDICINE

## 2023-12-04 PROCEDURE — 1157F PR ADVANCE CARE PLAN OR EQUIV PRESENT IN MEDICAL RECORD: ICD-10-PCS | Mod: HCNC,CPTII,S$GLB, | Performed by: INTERNAL MEDICINE

## 2023-12-04 PROCEDURE — 3074F SYST BP LT 130 MM HG: CPT | Mod: HCNC,CPTII,S$GLB, | Performed by: INTERNAL MEDICINE

## 2023-12-04 PROCEDURE — 1157F ADVNC CARE PLAN IN RCRD: CPT | Mod: HCNC,CPTII,S$GLB, | Performed by: INTERNAL MEDICINE

## 2023-12-04 PROCEDURE — 1159F PR MEDICATION LIST DOCUMENTED IN MEDICAL RECORD: ICD-10-PCS | Mod: HCNC,CPTII,S$GLB, | Performed by: INTERNAL MEDICINE

## 2023-12-04 PROCEDURE — 3078F PR MOST RECENT DIASTOLIC BLOOD PRESSURE < 80 MM HG: ICD-10-PCS | Mod: HCNC,CPTII,S$GLB, | Performed by: INTERNAL MEDICINE

## 2023-12-04 PROCEDURE — 3288F FALL RISK ASSESSMENT DOCD: CPT | Mod: HCNC,CPTII,S$GLB, | Performed by: INTERNAL MEDICINE

## 2023-12-04 PROCEDURE — 99999 PR PBB SHADOW E&M-EST. PATIENT-LVL IV: ICD-10-PCS | Mod: PBBFAC,HCNC,, | Performed by: INTERNAL MEDICINE

## 2023-12-04 PROCEDURE — 1160F RVW MEDS BY RX/DR IN RCRD: CPT | Mod: HCNC,CPTII,S$GLB, | Performed by: INTERNAL MEDICINE

## 2023-12-04 PROCEDURE — 1160F PR REVIEW ALL MEDS BY PRESCRIBER/CLIN PHARMACIST DOCUMENTED: ICD-10-PCS | Mod: HCNC,CPTII,S$GLB, | Performed by: INTERNAL MEDICINE

## 2023-12-04 PROCEDURE — 99999 PR PBB SHADOW E&M-EST. PATIENT-LVL IV: CPT | Mod: PBBFAC,HCNC,, | Performed by: INTERNAL MEDICINE

## 2023-12-04 PROCEDURE — 1101F PR PT FALLS ASSESS DOC 0-1 FALLS W/OUT INJ PAST YR: ICD-10-PCS | Mod: HCNC,CPTII,S$GLB, | Performed by: INTERNAL MEDICINE

## 2023-12-04 PROCEDURE — 99213 PR OFFICE/OUTPT VISIT, EST, LEVL III, 20-29 MIN: ICD-10-PCS | Mod: HCNC,S$GLB,, | Performed by: INTERNAL MEDICINE

## 2023-12-04 NOTE — PROGRESS NOTES
"[facility]  Primary Care   [address]       Patient ID: Adry Owen     Chief Complaint:   Chief Complaint   Patient presents with    Follow-up        HPI:  Short-term follow-up for an illness that I thought was pneumonia given the fact that she looked ill when I saw her last Friday which is a proximally 3 days ago.  I treated her with a combination of doxycycline and azithromycin, had her use her Combivent inhaler, give her some Phenergan cough syrup for the cough.  She looks a lot better today.  She is not wheezing anymore and lungs sound mostly clear.  Chest x-ray was read as normal but I do see some peribronchial cuffing so at least I think she had a bronchitis.  Think she is on the road to recovery I would like her to finish the antibiotics and I have no problem clearing her for her upcoming lumbar surgery on December 18th.  I do wish her the best.    Review of Systems       Negative    Objective:      Physical Exam   Physical Exam       Good air movement   No wheezing     Vitals:   Vitals:    12/04/23 1536   BP: 116/78   BP Location: Left arm   Patient Position: Sitting   BP Method: Medium (Manual)   Pulse: 96   SpO2: 98%   Weight: 91.1 kg (200 lb 13.4 oz)   Height: 5' 5" (1.651 m)        Assessment:           Plan:       Adry Owen  was seen today for follow-up and may need lab work.    Diagnoses and all orders for this visit:    Adry was seen today for follow-up.    Diagnoses and all orders for this visit:    Pneumonia due to infectious organism, unspecified laterality, unspecified part of lung  Resolving   Finish antibiotics and continue Combivent     Chronic bilateral low back pain without sciatica  Cleared for surgery          Ross Nunes MD    "

## 2023-12-11 PROBLEM — I48.0 PAROXYSMAL ATRIAL FIBRILLATION: Status: RESOLVED | Noted: 2023-10-09 | Resolved: 2023-12-11

## 2023-12-27 DIAGNOSIS — R60.0 BILATERAL LOWER EXTREMITY EDEMA: ICD-10-CM

## 2023-12-28 RX ORDER — FUROSEMIDE 20 MG/1
20 TABLET ORAL 2 TIMES DAILY
Qty: 180 TABLET | Refills: 3 | Status: SHIPPED | OUTPATIENT
Start: 2023-12-28 | End: 2024-03-22

## 2023-12-28 NOTE — TELEPHONE ENCOUNTER
No care due was identified.  [facility] Embedded Care Due Messages. Reference number: [id number].   12/27/2023 9:11:08 PM CST

## 2023-12-28 NOTE — TELEPHONE ENCOUNTER
Refill Routing Note   Medication(s) are not appropriate for processing by [facility] for the following reason(s):        New or recently adjusted medication    [facility] action(s):  Defer               Appointments  past 12m or future 3m with PCP    Date Provider   Last Visit   12/4/2023 Ross Nunes MD   Next Visit   1/9/2024 Ross Nunes MD   ED visits in past 90 days: 0        Note composed:11:23 AM 12/28/2023

## 2024-01-09 ENCOUNTER — OFFICE VISIT (OUTPATIENT)
Dept: FAMILY MEDICINE | Facility: CLINIC | Age: 79
End: 2024-01-09
Payer: MEDICARE

## 2024-01-09 VITALS
WEIGHT: 195.56 LBS | DIASTOLIC BLOOD PRESSURE: 84 MMHG | HEIGHT: 65 IN | HEART RATE: 88 BPM | SYSTOLIC BLOOD PRESSURE: 116 MMHG | OXYGEN SATURATION: 97 % | BODY MASS INDEX: 32.58 KG/M2

## 2024-01-09 DIAGNOSIS — M54.42 CHRONIC BILATERAL LOW BACK PAIN WITH LEFT-SIDED SCIATICA: ICD-10-CM

## 2024-01-09 DIAGNOSIS — C92.11 CHRONIC MYELOID LEUKEMIA, BCR/ABL-POSITIVE, IN REMISSION: ICD-10-CM

## 2024-01-09 DIAGNOSIS — R26.2 DIFFICULTY WALKING: ICD-10-CM

## 2024-01-09 DIAGNOSIS — I50.33 DIASTOLIC DYSFUNCTION WITH ACUTE ON CHRONIC HEART FAILURE: ICD-10-CM

## 2024-01-09 DIAGNOSIS — E66.09 CLASS 1 OBESITY DUE TO EXCESS CALORIES WITH SERIOUS COMORBIDITY AND BODY MASS INDEX (BMI) OF 32.0 TO 32.9 IN ADULT: ICD-10-CM

## 2024-01-09 DIAGNOSIS — G89.29 CHRONIC BILATERAL LOW BACK PAIN WITH LEFT-SIDED SCIATICA: ICD-10-CM

## 2024-01-09 DIAGNOSIS — I10 ESSENTIAL HYPERTENSION: Primary | ICD-10-CM

## 2024-01-09 DIAGNOSIS — I44.2 ATRIOVENTRICULAR BLOCK, COMPLETE: ICD-10-CM

## 2024-01-09 PROBLEM — G92.9 ENCEPHALOPATHY, TOXIC: Status: RESOLVED | Noted: 2023-04-15 | Resolved: 2024-01-09

## 2024-01-09 PROCEDURE — 1157F ADVNC CARE PLAN IN RCRD: CPT | Mod: HCNC,CPTII,S$GLB, | Performed by: INTERNAL MEDICINE

## 2024-01-09 PROCEDURE — 99999 PR PBB SHADOW E&M-EST. PATIENT-LVL IV: CPT | Mod: PBBFAC,HCNC,, | Performed by: INTERNAL MEDICINE

## 2024-01-09 PROCEDURE — 99214 OFFICE O/P EST MOD 30 MIN: CPT | Mod: HCNC,S$GLB,, | Performed by: INTERNAL MEDICINE

## 2024-01-09 PROCEDURE — 1125F AMNT PAIN NOTED PAIN PRSNT: CPT | Mod: HCNC,CPTII,S$GLB, | Performed by: INTERNAL MEDICINE

## 2024-01-09 PROCEDURE — 3288F FALL RISK ASSESSMENT DOCD: CPT | Mod: HCNC,CPTII,S$GLB, | Performed by: INTERNAL MEDICINE

## 2024-01-09 PROCEDURE — 1101F PT FALLS ASSESS-DOCD LE1/YR: CPT | Mod: HCNC,CPTII,S$GLB, | Performed by: INTERNAL MEDICINE

## 2024-01-09 PROCEDURE — 1159F MED LIST DOCD IN RCRD: CPT | Mod: HCNC,CPTII,S$GLB, | Performed by: INTERNAL MEDICINE

## 2024-01-09 PROCEDURE — 1160F RVW MEDS BY RX/DR IN RCRD: CPT | Mod: HCNC,CPTII,S$GLB, | Performed by: INTERNAL MEDICINE

## 2024-01-09 PROCEDURE — 3074F SYST BP LT 130 MM HG: CPT | Mod: HCNC,CPTII,S$GLB, | Performed by: INTERNAL MEDICINE

## 2024-01-09 PROCEDURE — 3079F DIAST BP 80-89 MM HG: CPT | Mod: HCNC,CPTII,S$GLB, | Performed by: INTERNAL MEDICINE

## 2024-01-09 RX ORDER — SPIRONOLACTONE 25 MG/1
25 TABLET ORAL DAILY
Qty: 90 TABLET | Refills: 3 | Status: SHIPPED | OUTPATIENT
Start: 2024-01-09 | End: 2025-01-08

## 2024-01-09 NOTE — PROGRESS NOTES
"[facility]  Primary Care   [address]       Patient ID: Adry Owen     Chief Complaint:   Chief Complaint   Patient presents with    Follow-up     3 month           HPI:  Follow-up after rather involved lumbar surgery by Dr. Stringer that he did in [place] due to her having a bit of a flare of her congestive heart failure in the week prior to her surgery.  Just before that I treated her for pneumonia and thankfully she recovered well.  She has seen Dr. Mota nurse practitioner in follow-up and the staples have been removed and everything seems to be healing well.  She will see Dr. Nuvia tenorio in 21 days.  She still does have significant back pain but also has left lower extremity sciatica with constant muscle spasm in her left calf.  I can only assume these things will improve with time as the swelling resolves.  Dr. Stringer is medicating her pain with Percocet and she does have some Flexeril to take as needed.  She does have home health coming to her house and is ambulating with the aid of a rolling walker and does have a brace on and I am happy about all that.    Review of Systems       Lumbago with Left sided sciatica     Objective:      Physical Exam   Physical Exam       Ambulates with walker   In a LSO brace     Vitals:   Vitals:    01/09/24 1451   BP: 116/84   Pulse: 88   SpO2: 97%   Weight: 88.7 kg (195 lb 8.8 oz)   Height: 5' 5" (1.651 m)        Assessment:           Plan:       Adry Owen  was seen today for follow-up and may need lab work.    Diagnoses and all orders for this visit:    Adry was seen today for follow-up.    Diagnoses and all orders for this visit:    Essential hypertension  Controlled with med     Class 1 obesity due to excess calories with serious comorbidity and body mass index (BMI) of 32.0 to 32.9 in adult  Monitor     Chronic myeloid leukemia, BCR/ABL-positive, in remission  Per Dr. Jordan     Diastolic dysfunction with acute on " chronic heart failure  -     spironolactone (ALDACTONE) 25 MG tablet; Take 1 tablet (25 mg total) by mouth once daily.  Controlled with med   Continue lasix too     Atrioventricular block, complete  Stable    Chronic bilateral low back pain with left-sided sciatica  S/p surgery  Hopefully pain will Resolve with time     Difficulty walking  Ambulates with walker          Ross Nunes MD

## 2024-01-11 ENCOUNTER — TELEPHONE (OUTPATIENT)
Dept: FAMILY MEDICINE | Facility: CLINIC | Age: 79
End: 2024-01-11
Payer: MEDICARE

## 2024-01-11 DIAGNOSIS — R13.19 ESOPHAGEAL DYSPHAGIA: Primary | ICD-10-CM

## 2024-01-12 ENCOUNTER — TELEPHONE (OUTPATIENT)
Dept: GASTROENTEROLOGY | Facility: CLINIC | Age: 79
End: 2024-01-12
Payer: MEDICARE

## 2024-01-12 NOTE — TELEPHONE ENCOUNTER
Spoke to pt informing her that Dr. Nunes ordered a EGD due to her having difficulty swallowing. Pt aware that GI department will call her to schedule.

## 2024-01-12 NOTE — TELEPHONE ENCOUNTER
Patient complained of food sticking in her esophagus/dysphagia to home Health nurse who alerted me.  I have ordered an EGD.  Please let her know that is someone from the GI department's should be contacting her sometime soon.

## 2024-02-15 ENCOUNTER — TELEPHONE (OUTPATIENT)
Dept: GASTROENTEROLOGY | Facility: CLINIC | Age: 79
End: 2024-02-15
Payer: MEDICARE

## 2024-02-15 NOTE — TELEPHONE ENCOUNTER
Final attempt to schedule EGD via phone and/or online. Patient doesn't not answer phone or answer my [employer] messages. I leave pt a brief voicemail to call back to schedule the procedure. Callback number provided. Cancellation letter is sent to patient's current address. Case is closed. Will notify ordering Provider.      no

## 2024-02-22 ENCOUNTER — TELEPHONE (OUTPATIENT)
Dept: GASTROENTEROLOGY | Facility: CLINIC | Age: 79
End: 2024-02-22
Payer: MEDICARE

## 2024-02-22 NOTE — TELEPHONE ENCOUNTER
Spoke to pt, rescheduled appt due to provide not being in clinic. Pt verbalized understanding of new appt date and time.

## 2024-02-25 DIAGNOSIS — J30.1 NON-SEASONAL ALLERGIC RHINITIS DUE TO POLLEN: ICD-10-CM

## 2024-02-25 RX ORDER — LEVOCETIRIZINE DIHYDROCHLORIDE 5 MG/1
TABLET, FILM COATED ORAL
Qty: 90 TABLET | Refills: 3 | Status: SHIPPED | OUTPATIENT
Start: 2024-02-25

## 2024-02-25 NOTE — TELEPHONE ENCOUNTER
No care due was identified.  [facility] Embedded Care Due Messages. Reference number: [id number].   2/25/2024 3:27:11 AM CST

## 2024-02-25 NOTE — TELEPHONE ENCOUNTER
Refill Decision Note   Adry Lexie  is requesting a refill authorization.  Brief Assessment and Rationale for Refill:  Approve     Medication Therapy Plan:         Comments:     Note composed:4:13 AM 02/25/2024

## 2024-02-28 ENCOUNTER — TELEPHONE (OUTPATIENT)
Dept: FAMILY MEDICINE | Facility: CLINIC | Age: 79
End: 2024-02-28
Payer: MEDICARE

## 2024-02-28 NOTE — TELEPHONE ENCOUNTER
----- Message from Ailin Baxter sent at 2/28/2024  9:14 AM CST -----  Contact: pt  Type: Needs Medical Advice  Who Called:  pt  Symptoms (please be specific):   bronchitis ( pt said same issue as she has in December)  How long has patient had these symptoms:  past 4 days  Pharmacy name and phone #:      [facility] [facility] [address] [address] [address] [facility] [address]  Phone: [phone] Fax: [phone]      Best Call Back Number: [phone]  Additional Information:  pt asking for office to call her. Pt is asking to be seen

## 2024-02-28 NOTE — TELEPHONE ENCOUNTER
Called pt, scheduled appointment on 2/29/24 at 8:30 AM with Yandy Valdes PA-C. Pt verbalized understanding.

## 2024-02-29 ENCOUNTER — OFFICE VISIT (OUTPATIENT)
Dept: FAMILY MEDICINE | Facility: CLINIC | Age: 79
End: 2024-02-29
Payer: MEDICARE

## 2024-02-29 ENCOUNTER — PATIENT MESSAGE (OUTPATIENT)
Dept: FAMILY MEDICINE | Facility: CLINIC | Age: 79
End: 2024-02-29
Payer: MEDICARE

## 2024-02-29 DIAGNOSIS — J45.21 MILD INTERMITTENT ASTHMA WITH EXACERBATION: ICD-10-CM

## 2024-02-29 DIAGNOSIS — J20.8 ACUTE BRONCHITIS DUE TO OTHER SPECIFIED ORGANISMS: Primary | ICD-10-CM

## 2024-02-29 PROCEDURE — 1157F ADVNC CARE PLAN IN RCRD: CPT | Mod: HCNC,CPTII,95, | Performed by: INTERNAL MEDICINE

## 2024-02-29 PROCEDURE — 99214 OFFICE O/P EST MOD 30 MIN: CPT | Mod: HCNC,95,, | Performed by: INTERNAL MEDICINE

## 2024-02-29 RX ORDER — PROMETHAZINE HYDROCHLORIDE AND DEXTROMETHORPHAN HYDROBROMIDE 6.25; 15 MG/5ML; MG/5ML
5 SYRUP ORAL EVERY 4 HOURS PRN
Qty: 240 ML | Refills: 0 | Status: SHIPPED | OUTPATIENT
Start: 2024-02-29 | End: 2024-03-11 | Stop reason: SDUPTHER

## 2024-02-29 RX ORDER — METHYLPREDNISOLONE 4 MG/1
TABLET ORAL
Qty: 21 TABLET | Refills: 0 | Status: SHIPPED | OUTPATIENT
Start: 2024-02-29 | End: 2024-03-11

## 2024-02-29 RX ORDER — IPRATROPIUM BROMIDE AND ALBUTEROL 20; 100 UG/1; UG/1
1 SPRAY, METERED RESPIRATORY (INHALATION) EVERY 4 HOURS PRN
Qty: 12 G | Refills: 0 | Status: SHIPPED | OUTPATIENT
Start: 2024-02-29

## 2024-02-29 RX ORDER — AZITHROMYCIN 250 MG/1
TABLET, FILM COATED ORAL
Qty: 6 TABLET | Refills: 0 | Status: SHIPPED | OUTPATIENT
Start: 2024-02-29 | End: 2024-03-11

## 2024-02-29 RX ORDER — BENZONATATE 100 MG/1
CAPSULE ORAL
Qty: 45 CAPSULE | Refills: 0 | Status: SHIPPED | OUTPATIENT
Start: 2024-02-29 | End: 2024-03-11 | Stop reason: SDUPTHER

## 2024-02-29 NOTE — PROGRESS NOTES
The patient location is: [place]  The chief complaint leading to consultation is: cough  Visit type: Virtual visit with synchronous audio and video  Total time spent with patient: 12 min  Each patient to whom he or she provides medical services by telemedicine is:  (1) informed of the relationship between the physician and patient and the respective role of any other health care provider with respect to management of the patient; and (2) notified that he or she may decline to receive medical services by telemedicine and may withdraw from such care at any time.    Notes:     Patient c/o worsening cough for 1 week.  + yellow sputum.  + rhinorrhea.  No fever or chills.  No change in body aches or fatigue.      ASthma - + mild SOB and + wheezing.       Review of Systems      Physical Exam      Assessment:       1. Acute bronchitis due to other specified organisms    2. Mild intermittent asthma with exacerbation        Plan:       Acute bronchitis due to other specified organisms  -     ipratropium-albuteroL (COMBIVENT RESPIMAT)  mcg/actuation inhaler; Inhale 1 puff into the lungs every 4 (four) hours as needed for Wheezing or Shortness of Breath. Rescue  Dispense: 12 g; Refill: 0  -     benzonatate (TESSALON) 100 MG capsule; 1 - 2 po every 6 hours prn cough  Dispense: 45 capsule; Refill: 0  -     azithromycin (ZITHROMAX Z-SANDEE) 250 MG tablet; Take 2 po day 1, then 1 po day 2 - 5  Dispense: 6 tablet; Refill: 0  -     methylPREDNISolone (MEDROL DOSEPACK) 4 mg tablet; Take as directed  Dispense: 21 tablet; Refill: 0  -     promethazine-dextromethorphan (PROMETHAZINE-DM) 6.25-15 mg/5 mL Syrp; Take 5 mLs by mouth every 4 (four) hours as needed.  Dispense: 240 mL; Refill: 0    Mild intermittent asthma with exacerbation  -     ipratropium-albuteroL (COMBIVENT RESPIMAT)  mcg/actuation inhaler; Inhale 1 puff into the lungs every 4 (four) hours as needed for Wheezing or Shortness of Breath. Rescue  Dispense: 12 g; Refill:  0  -     benzonatate (TESSALON) 100 MG capsule; 1 - 2 po every 6 hours prn cough  Dispense: 45 capsule; Refill: 0  -     azithromycin (ZITHROMAX Z-SANDEE) 250 MG tablet; Take 2 po day 1, then 1 po day 2 - 5  Dispense: 6 tablet; Refill: 0  -     methylPREDNISolone (MEDROL DOSEPACK) 4 mg tablet; Take as directed  Dispense: 21 tablet; Refill: 0  -     promethazine-dextromethorphan (PROMETHAZINE-DM) 6.25-15 mg/5 mL Syrp; Take 5 mLs by mouth every 4 (four) hours as needed.  Dispense: 240 mL; Refill: 0            Medication List with Changes/Refills   New Medications    AZITHROMYCIN (ZITHROMAX Z-SANDEE) 250 MG TABLET    Take 2 po day 1, then 1 po day 2 - 5    BENZONATATE (TESSALON) 100 MG CAPSULE    1 - 2 po every 6 hours prn cough    METHYLPREDNISOLONE (MEDROL DOSEPACK) 4 MG TABLET    Take as directed    PROMETHAZINE-DEXTROMETHORPHAN (PROMETHAZINE-DM) 6.25-15 MG/5 ML SYRP    Take 5 mLs by mouth every 4 (four) hours as needed.   Current Medications    ASPIRIN LOW DOSE 81 MG EC TABLET    Take 81 mg by mouth once daily.    AZELASTINE (ASTELIN) 137 MCG (0.1 %) NASAL SPRAY    1 spray (137 mcg total) by Nasal route 2 (two) times daily.    CYCLOBENZAPRINE (FLEXERIL) 10 MG TABLET    Take 10 mg by mouth every 8 (eight) hours as needed for Muscle spasms. Indications: muscle spasm    CYCLOSPORINE (RESTASIS) 0.05 % OPHTHALMIC EMULSION    PLACE 1 DROP INTO BOTH EYES 2 TIMES DAILY.    ERGOCALCIFEROL (ERGOCALCIFEROL) 50,000 UNIT CAP    Take 1 capsule (50,000 Units total) by mouth every 7 days.    FISH OIL-OMEGA-3 FATTY ACIDS 300-1,000 MG CAPSULE    Take 1 capsule by mouth once daily.    FLUTICASONE PROPIONATE (FLONASE) 50 MCG/ACTUATION NASAL SPRAY    USE 1 SPRAY IN EACH NOSTRIL EVERY DAY    FUROSEMIDE (LASIX) 20 MG TABLET    Take 1 tablet (20 mg total) by mouth 2 (two) times a day.    GABAPENTIN (NEURONTIN) 300 MG CAPSULE    Take 2 PO Q am and 3 PO Q pm    IPRATROPIUM BROMIDE NASL    2 sprays by Nasal route once as needed.    LEVOCETIRIZINE  (XYZAL) 5 MG TABLET    TAKE 1 TABLET EVERY EVENING    LEVOTHYROXINE (SYNTHROID) 75 MCG TABLET    Take 1 tablet (75 mcg total) by mouth once daily.    METOPROLOL SUCCINATE (TOPROL-XL) 25 MG 24 HR TABLET    Take 1 tablet (25 mg total) by mouth every evening.    MIRTAZAPINE (REMERON) 15 MG TABLET    Take by mouth. Not taking    MISCELLANEOUS MEDICAL SUPPLY (COMPRESSION STOCKINGS MISC)        MUPIROCIN CALCIUM 2% NASL OINT (BACTROBAN) 2 % OINT    by Nasal route 2 (two) times daily.    NILOTINIB (TASIGNA) 150 MG CAPSULE    Take 2 capsules (300 mg total) by mouth twice daily.    NITROGLYCERIN (NITROSTAT) 0.4 MG SL TABLET    Place 1 tablet (0.4 mg total) under the tongue every 5 (five) minutes as needed for Chest pain.    OXYCODONE-ACETAMINOPHEN (PERCOCET)  MG PER TABLET    Take 1 tablet by mouth every 4 (four) hours as needed for Pain. Indications: pain    ROSUVASTATIN (CRESTOR) 10 MG TABLET    Take 1 tablet (10 mg total) by mouth every evening.    SPIRONOLACTONE (ALDACTONE) 25 MG TABLET    Take 1 tablet (25 mg total) by mouth once daily.    XARELTO 20 MG TAB    TAKE 1 TABLET EVERY DAY WITH DINNER OR EVENING MEAL   Changed and/or Refilled Medications    Modified Medication Previous Medication    IPRATROPIUM-ALBUTEROL (COMBIVENT RESPIMAT)  MCG/ACTUATION INHALER ipratropium-albuteroL (COMBIVENT RESPIMAT)  mcg/actuation inhaler       Inhale 1 puff into the lungs every 4 (four) hours as needed for Wheezing or Shortness of Breath. Rescue    Inhale 1 puff into the lungs every 4 (four) hours as needed for Wheezing or Shortness of Breath. Rescue       If symptoms worsen, please go to Emergency Room.      No follow-ups on file.    Answers submitted by the patient for this visit:  Cough Questionnaire (Submitted on 2/29/2024)  Chief Complaint: Cough  Chronicity: recurrent  Onset: in the past 7 days  Progression since onset: waxing and waning  Frequency: every few minutes  Cough characteristics: non-productive  ear  congestion: Yes  nasal congestion: Yes  Aggravated by: lying down, pollens  asthma: Yes  bronchitis: Yes  COPD: No  emphysema: No  pneumonia: Yes  Treatments tried: prescription cough suppressant, steroid inhaler  Improvement on treatment: no relief

## 2024-03-11 ENCOUNTER — OFFICE VISIT (OUTPATIENT)
Dept: FAMILY MEDICINE | Facility: CLINIC | Age: 79
End: 2024-03-11
Payer: MEDICARE

## 2024-03-11 DIAGNOSIS — J20.8 ACUTE BRONCHITIS DUE TO OTHER SPECIFIED ORGANISMS: ICD-10-CM

## 2024-03-11 DIAGNOSIS — J45.21 MILD INTERMITTENT ASTHMA WITH EXACERBATION: Primary | ICD-10-CM

## 2024-03-11 PROCEDURE — 1157F ADVNC CARE PLAN IN RCRD: CPT | Mod: HCNC,CPTII,95, | Performed by: INTERNAL MEDICINE

## 2024-03-11 PROCEDURE — 99214 OFFICE O/P EST MOD 30 MIN: CPT | Mod: HCNC,95,, | Performed by: INTERNAL MEDICINE

## 2024-03-11 PROCEDURE — 1160F RVW MEDS BY RX/DR IN RCRD: CPT | Mod: HCNC,CPTII,95, | Performed by: INTERNAL MEDICINE

## 2024-03-11 PROCEDURE — 1159F MED LIST DOCD IN RCRD: CPT | Mod: HCNC,CPTII,95, | Performed by: INTERNAL MEDICINE

## 2024-03-11 RX ORDER — PREDNISONE 20 MG/1
40 TABLET ORAL DAILY
Qty: 14 TABLET | Refills: 0 | Status: SHIPPED | OUTPATIENT
Start: 2024-03-11 | End: 2024-03-18

## 2024-03-11 RX ORDER — PROMETHAZINE HYDROCHLORIDE AND DEXTROMETHORPHAN HYDROBROMIDE 6.25; 15 MG/5ML; MG/5ML
5 SYRUP ORAL EVERY 4 HOURS PRN
Qty: 240 ML | Refills: 0 | Status: SHIPPED | OUTPATIENT
Start: 2024-03-11 | End: 2024-03-22

## 2024-03-11 RX ORDER — DOXYCYCLINE 100 MG/1
100 CAPSULE ORAL 2 TIMES DAILY
Qty: 14 CAPSULE | Refills: 0 | Status: SHIPPED | OUTPATIENT
Start: 2024-03-11 | End: 2024-03-25 | Stop reason: ALTCHOICE

## 2024-03-11 RX ORDER — BENZONATATE 100 MG/1
CAPSULE ORAL
Qty: 45 CAPSULE | Refills: 0 | Status: SHIPPED | OUTPATIENT
Start: 2024-03-11

## 2024-03-11 NOTE — PROGRESS NOTES
Subjective     Adry Owen is a 79 y.o. old, female here for f/u cough/bronchitis    Telemedicine visit  Patient's Location: home  Visit Type: Virtual Visit with synchronous audio and video  Each patient to whom he or she provides medical services by telemedicine is:  (1) informed of the relationship between the physician and patient and the respective role of any other health care provider with respect to management of the patient; and (2) notified that he or she may decline to receive medical services by telemedicine and may withdraw from such care at any time.    Patient is here with complaints of continued cough.  Recently seen by Dr. Rousseau for the same problem. Symptoms have improved only minimally.  H/o chronic lung disease, wheezing, especially at night.  Albuterol helps but is temporary.  NO fever, no recent travel, no sick contacts, no nightsweats  Answers submitted by the patient for this visit:  Review of Systems Questionnaire (Submitted on 3/11/2024)  activity change: No  unexpected weight change: Yes  rhinorrhea: No  trouble swallowing: Yes  visual disturbance: Yes  chest tightness: Yes  polyuria: No  difficulty urinating: No  menstrual problem: No  joint swelling: Yes  arthralgias: Yes  confusion: No  dysphoric mood: No  ROS  Medications     No outpatient medications have been marked as taking for the 3/11/24 encounter (Office Visit) with Mason Poe MD.     Objective   Physical Exam  Constitutional:       General: She is not in acute distress.     Appearance: Normal appearance. She is well-developed.      Comments: Frequent dry cough/wheeze   Neurological:      Mental Status: She is alert.         Assessment and Plan     Mild intermittent asthma with exacerbation  -     doxycycline (MONODOX) 100 MG capsule; Take 1 capsule (100 mg total) by mouth 2 (two) times daily.  Dispense: 14 capsule; Refill: 0  -     predniSONE (DELTASONE) 20 MG tablet; Take 2 tablets (40 mg total) by mouth once  daily. for 7 days  Dispense: 14 tablet; Refill: 0  -     benzonatate (TESSALON) 100 MG capsule; 1 - 2 po every 6 hours prn cough  Dispense: 45 capsule; Refill: 0  -     promethazine-dextromethorphan (PROMETHAZINE-DM) 6.25-15 mg/5 mL Syrp; Take 5 mLs by mouth every 4 (four) hours as needed.  Dispense: 240 mL; Refill: 0    Acute bronchitis due to other specified organisms  -     benzonatate (TESSALON) 100 MG capsule; 1 - 2 po every 6 hours prn cough  Dispense: 45 capsule; Refill: 0  -     promethazine-dextromethorphan (PROMETHAZINE-DM) 6.25-15 mg/5 mL Syrp; Take 5 mLs by mouth every 4 (four) hours as needed.  Dispense: 240 mL; Refill: 0          ___________________  Mason Poe MD  Internal Medicine and Pediatrics

## 2024-03-18 ENCOUNTER — TELEPHONE (OUTPATIENT)
Dept: INFUSION THERAPY | Facility: HOSPITAL | Age: 79
End: 2024-03-18
Payer: MEDICARE

## 2024-03-18 NOTE — TELEPHONE ENCOUNTER
----- Message from Ronda Brooks sent at 3/18/2024  3:21 PM CDT -----  Type: Needs Medical Advice  Who Called:  karen  Best Call Back Number: [phone]  Additional Information: pt is calling the office to have her appt tomorrow rescheduled as she is not feeling well. Please call back to advise. . Thanks!

## 2024-03-22 ENCOUNTER — TELEPHONE (OUTPATIENT)
Dept: FAMILY MEDICINE | Facility: CLINIC | Age: 79
End: 2024-03-22
Payer: MEDICARE

## 2024-03-22 PROBLEM — I44.2 ATRIOVENTRICULAR BLOCK, COMPLETE: Status: RESOLVED | Noted: 2019-11-27 | Resolved: 2024-03-22

## 2024-03-22 PROBLEM — Z98.890 S/P ATRIOVENTRICULAR NODAL ABLATION: Status: ACTIVE | Noted: 2024-03-22

## 2024-03-22 PROBLEM — M43.16 SPONDYLOLISTHESIS OF LUMBAR REGION: Status: ACTIVE | Noted: 2024-03-22

## 2024-03-22 PROBLEM — I51.89 DIASTOLIC DYSFUNCTION: Status: ACTIVE | Noted: 2024-01-09

## 2024-03-22 PROBLEM — M54.12 CERVICAL RADICULOPATHY: Status: ACTIVE | Noted: 2024-03-22

## 2024-03-22 NOTE — TELEPHONE ENCOUNTER
Spoke with patient in regards to scheduling an appointment for cough and wheezing. Patient was scheduled for 3/25 at 3:20 pm with Dr. Nunes in person. Time slot is held.

## 2024-03-25 ENCOUNTER — OFFICE VISIT (OUTPATIENT)
Dept: FAMILY MEDICINE | Facility: CLINIC | Age: 79
End: 2024-03-25
Payer: MEDICARE

## 2024-03-25 ENCOUNTER — LAB VISIT (OUTPATIENT)
Dept: LAB | Facility: HOSPITAL | Age: 79
End: 2024-03-25
Attending: INTERNAL MEDICINE
Payer: MEDICARE

## 2024-03-25 VITALS
WEIGHT: 197.31 LBS | HEIGHT: 65 IN | BODY MASS INDEX: 32.87 KG/M2 | HEART RATE: 87 BPM | SYSTOLIC BLOOD PRESSURE: 132 MMHG | DIASTOLIC BLOOD PRESSURE: 76 MMHG | OXYGEN SATURATION: 99 %

## 2024-03-25 DIAGNOSIS — R06.2 WHEEZING: Primary | ICD-10-CM

## 2024-03-25 DIAGNOSIS — R73.01 IMPAIRED FASTING GLUCOSE: ICD-10-CM

## 2024-03-25 DIAGNOSIS — K92.1 BLACK STOOL: ICD-10-CM

## 2024-03-25 LAB
BASOPHILS # BLD AUTO: 0.05 K/UL (ref 0–0.2)
BASOPHILS NFR BLD: 0.3 % (ref 0–1.9)
DIFFERENTIAL METHOD BLD: ABNORMAL
EOSINOPHIL # BLD AUTO: 0.1 K/UL (ref 0–0.5)
EOSINOPHIL NFR BLD: 0.6 % (ref 0–8)
ERYTHROCYTE [DISTWIDTH] IN BLOOD BY AUTOMATED COUNT: 17 % (ref 11.5–14.5)
ESTIMATED AVG GLUCOSE: 134 MG/DL (ref 68–131)
FERRITIN SERPL-MCNC: 12 NG/ML (ref 20–300)
HBA1C MFR BLD: 6.3 % (ref 4–5.6)
HCT VFR BLD AUTO: 26.9 % (ref 37–48.5)
HGB BLD-MCNC: 8.2 G/DL (ref 12–16)
IMM GRANULOCYTES # BLD AUTO: 0.1 K/UL (ref 0–0.04)
IMM GRANULOCYTES NFR BLD AUTO: 0.7 % (ref 0–0.5)
IRON SERPL-MCNC: 16 UG/DL (ref 30–160)
LYMPHOCYTES # BLD AUTO: 2.5 K/UL (ref 1–4.8)
LYMPHOCYTES NFR BLD: 16.6 % (ref 18–48)
MCH RBC QN AUTO: 24.8 PG (ref 27–31)
MCHC RBC AUTO-ENTMCNC: 30.5 G/DL (ref 32–36)
MCV RBC AUTO: 81 FL (ref 82–98)
MONOCYTES # BLD AUTO: 1.3 K/UL (ref 0.3–1)
MONOCYTES NFR BLD: 8.9 % (ref 4–15)
NEUTROPHILS # BLD AUTO: 11 K/UL (ref 1.8–7.7)
NEUTROPHILS NFR BLD: 72.9 % (ref 38–73)
NRBC BLD-RTO: 0 /100 WBC
PLATELET # BLD AUTO: 252 K/UL (ref 150–450)
PMV BLD AUTO: 11.5 FL (ref 9.2–12.9)
RBC # BLD AUTO: 3.31 M/UL (ref 4–5.4)
SATURATED IRON: 3 % (ref 20–50)
TOTAL IRON BINDING CAPACITY: 468 UG/DL (ref 250–450)
TRANSFERRIN SERPL-MCNC: 316 MG/DL (ref 200–375)
WBC # BLD AUTO: 15.04 K/UL (ref 3.9–12.7)

## 2024-03-25 PROCEDURE — 1157F ADVNC CARE PLAN IN RCRD: CPT | Mod: HCNC,CPTII,S$GLB, | Performed by: INTERNAL MEDICINE

## 2024-03-25 PROCEDURE — 1125F AMNT PAIN NOTED PAIN PRSNT: CPT | Mod: HCNC,CPTII,S$GLB, | Performed by: INTERNAL MEDICINE

## 2024-03-25 PROCEDURE — 83036 HEMOGLOBIN GLYCOSYLATED A1C: CPT | Mod: HCNC | Performed by: INTERNAL MEDICINE

## 2024-03-25 PROCEDURE — 1160F RVW MEDS BY RX/DR IN RCRD: CPT | Mod: HCNC,CPTII,S$GLB, | Performed by: INTERNAL MEDICINE

## 2024-03-25 PROCEDURE — 1101F PT FALLS ASSESS-DOCD LE1/YR: CPT | Mod: HCNC,CPTII,S$GLB, | Performed by: INTERNAL MEDICINE

## 2024-03-25 PROCEDURE — 99214 OFFICE O/P EST MOD 30 MIN: CPT | Mod: HCNC,S$GLB,, | Performed by: INTERNAL MEDICINE

## 2024-03-25 PROCEDURE — 83540 ASSAY OF IRON: CPT | Mod: HCNC | Performed by: INTERNAL MEDICINE

## 2024-03-25 PROCEDURE — 99999 PR PBB SHADOW E&M-EST. PATIENT-LVL IV: CPT | Mod: PBBFAC,HCNC,, | Performed by: INTERNAL MEDICINE

## 2024-03-25 PROCEDURE — 3288F FALL RISK ASSESSMENT DOCD: CPT | Mod: HCNC,CPTII,S$GLB, | Performed by: INTERNAL MEDICINE

## 2024-03-25 PROCEDURE — 3078F DIAST BP <80 MM HG: CPT | Mod: HCNC,CPTII,S$GLB, | Performed by: INTERNAL MEDICINE

## 2024-03-25 PROCEDURE — 1159F MED LIST DOCD IN RCRD: CPT | Mod: HCNC,CPTII,S$GLB, | Performed by: INTERNAL MEDICINE

## 2024-03-25 PROCEDURE — 36415 COLL VENOUS BLD VENIPUNCTURE: CPT | Mod: HCNC,PO | Performed by: INTERNAL MEDICINE

## 2024-03-25 PROCEDURE — 85025 COMPLETE CBC W/AUTO DIFF WBC: CPT | Mod: HCNC | Performed by: INTERNAL MEDICINE

## 2024-03-25 PROCEDURE — 3075F SYST BP GE 130 - 139MM HG: CPT | Mod: HCNC,CPTII,S$GLB, | Performed by: INTERNAL MEDICINE

## 2024-03-25 PROCEDURE — 82728 ASSAY OF FERRITIN: CPT | Mod: HCNC | Performed by: INTERNAL MEDICINE

## 2024-03-25 RX ORDER — PANTOPRAZOLE SODIUM 40 MG/1
40 TABLET, DELAYED RELEASE ORAL DAILY
Qty: 15 TABLET | Refills: 0 | Status: ON HOLD | OUTPATIENT
Start: 2024-03-25 | End: 2024-04-15 | Stop reason: HOSPADM

## 2024-03-25 RX ORDER — FLUTICASONE PROPIONATE AND SALMETEROL 250; 50 UG/1; UG/1
1 POWDER RESPIRATORY (INHALATION) 2 TIMES DAILY
Qty: 60 EACH | Refills: 2 | Status: SHIPPED | OUTPATIENT
Start: 2024-03-25 | End: 2025-03-25

## 2024-03-25 NOTE — PROGRESS NOTES
"[facility]  Primary Care   [address]       Patient ID: Adry Owen     Chief Complaint:   Chief Complaint   Patient presents with    Cough    Wheezing        HPI:  Patient has been seen by to my partners over the past 3-1/2 weeks for cough and wheezing.  She was treated with 2 different antibiotics, Z-Barber and then doxycycline, and 2 different rounds of prednisone and told to continue her Combivent inhaler.  She still has not completely resolve.  Today here fairly good air movement without wheezes in her bilateral upper lobes but I do hear the rhonchi and wheezes in bilateral lower lobes.  Chest x-ray obtained a few days ago was essentially clear.  He does have some vascular calcifications that she is concerned about but she is on Xarelto as well as a statin.  For now I want her to continue the Combivent and I am going to add Advair 250/50 1 puff twice daily with instructions to wash her mouth out afterwards.  I do not want to give her anymore systemic steroids at this time because she did have a black yet formed bowel movement.  I do wonder if the 2 rounds of steroids have caused some gastric irritation.  I am going to give her pantoprazole 40 mg each morning for 2 weeks, check some iron levels in his CBC today, it also add an A1c because her home glucoses are elevated she is going to see Endocrine in a few days.  My hope is that with time and more direct medication her breathing will improve.    Review of Systems       Wheezing   Black stool     Objective:      Physical Exam   Physical Exam       Obese   Bilateral lower lobes with wheezing and rhonchi     Vitals:   Vitals:    03/25/24 1526   BP: 132/76   Pulse: 87   SpO2: 99%   Weight: 89.5 kg (197 lb 5 oz)   Height: 5' 5" (1.651 m)        Assessment:           Plan:       Adry Owen  was seen today for follow-up and may need lab work.    Diagnoses and all orders for this visit:    Adry was seen today for cough and " wheezing.    Diagnoses and all orders for this visit:    Wheezing  -     fluticasone-salmeterol diskus inhaler 250-50 mcg; Inhale 1 puff into the lungs 2 (two) times daily. Controller  Continue Combivent and Monitor     Black stool  -     CBC Auto Differential; Future  -     Iron and TIBC; Future  -     Ferritin; Future  -     pantoprazole (PROTONIX) 40 MG tablet; Take 1 tablet (40 mg total) by mouth once daily. for 15 days  Labs today     Impaired fasting glucose  -     Hemoglobin A1C; Future  Labs today          Ross Nunes MD

## 2024-03-29 PROBLEM — D64.9 SYMPTOMATIC ANEMIA: Status: ACTIVE | Noted: 2024-03-29

## 2024-03-30 PROBLEM — R06.02 SOB (SHORTNESS OF BREATH): Status: ACTIVE | Noted: 2024-03-30

## 2024-03-30 PROBLEM — I50.9 ACUTE DECOMPENSATED HEART FAILURE: Status: ACTIVE | Noted: 2024-03-30

## 2024-03-30 PROBLEM — R06.00 DYSPNEA: Status: ACTIVE | Noted: 2024-03-30

## 2024-04-01 PROBLEM — R60.0 BILATERAL LOWER EXTREMITY EDEMA: Status: RESOLVED | Noted: 2023-09-13 | Resolved: 2024-04-01

## 2024-04-01 PROBLEM — I50.33 ACUTE ON CHRONIC DIASTOLIC HEART FAILURE: Status: ACTIVE | Noted: 2024-03-30

## 2024-04-01 PROBLEM — I50.31 ACUTE DIASTOLIC CONGESTIVE HEART FAILURE: Status: ACTIVE | Noted: 2024-03-30

## 2024-04-05 ENCOUNTER — TELEPHONE (OUTPATIENT)
Dept: FAMILY MEDICINE | Facility: CLINIC | Age: 79
End: 2024-04-05
Payer: MEDICARE

## 2024-04-05 PROBLEM — E87.6 HYPOKALEMIA: Status: ACTIVE | Noted: 2024-04-05

## 2024-04-05 NOTE — TELEPHONE ENCOUNTER
Spoke with patient in regards to her 4/8 appointment. Patient wants her appointment changed from in person to virtual. Appointment has been sent to a super user to change.

## 2024-04-05 NOTE — TELEPHONE ENCOUNTER
----- Message from Saray Fletcher sent at 4/5/2024 10:58 AM CDT -----  Contact: patient  Type:  Needs Medical Advice    Who Called: patient     Would the patient rather a call back or a response via [facility]? Call     Best Call Back Number: [phone] (home)      Additional Information: patient would like to speak with the nurse in regards to getting her appointment for Monday changed to a virtual visit.     Please call to advise

## 2024-04-12 PROBLEM — R06.00 DYSPNEA: Status: RESOLVED | Noted: 2024-03-30 | Resolved: 2024-04-12

## 2024-04-12 PROBLEM — E87.6 HYPOKALEMIA: Status: RESOLVED | Noted: 2024-04-05 | Resolved: 2024-04-12

## 2024-04-16 ENCOUNTER — PATIENT MESSAGE (OUTPATIENT)
Dept: ADMINISTRATIVE | Facility: OTHER | Age: 79
End: 2024-04-16
Payer: MEDICARE

## 2024-04-18 ENCOUNTER — OUTPATIENT CASE MANAGEMENT (OUTPATIENT)
Dept: ADMINISTRATIVE | Facility: OTHER | Age: 79
End: 2024-04-18
Payer: MEDICARE

## 2024-04-18 NOTE — PROGRESS NOTES
04/18/24 Attempt assessment with patient/caregiver. No answer. Left message requesting call back. RN OPCM third assessment attempt. Case closure.

## 2024-04-22 ENCOUNTER — PATIENT MESSAGE (OUTPATIENT)
Dept: FAMILY MEDICINE | Facility: CLINIC | Age: 79
End: 2024-04-22

## 2024-04-22 ENCOUNTER — OFFICE VISIT (OUTPATIENT)
Dept: FAMILY MEDICINE | Facility: CLINIC | Age: 79
End: 2024-04-22
Payer: MEDICARE

## 2024-04-22 VITALS
WEIGHT: 206.81 LBS | BODY MASS INDEX: 34.46 KG/M2 | OXYGEN SATURATION: 95 % | HEIGHT: 65 IN | DIASTOLIC BLOOD PRESSURE: 82 MMHG | SYSTOLIC BLOOD PRESSURE: 124 MMHG | HEART RATE: 75 BPM

## 2024-04-22 DIAGNOSIS — L03.221 CELLULITIS OF NECK: ICD-10-CM

## 2024-04-22 DIAGNOSIS — E66.09 CLASS 1 OBESITY DUE TO EXCESS CALORIES WITH SERIOUS COMORBIDITY AND BODY MASS INDEX (BMI) OF 34.0 TO 34.9 IN ADULT: ICD-10-CM

## 2024-04-22 DIAGNOSIS — D62 ACUTE BLOOD LOSS ANEMIA: ICD-10-CM

## 2024-04-22 DIAGNOSIS — I70.0 AORTIC ATHEROSCLEROSIS: ICD-10-CM

## 2024-04-22 DIAGNOSIS — R06.02 SOB (SHORTNESS OF BREATH): ICD-10-CM

## 2024-04-22 DIAGNOSIS — J43.8 OTHER EMPHYSEMA: ICD-10-CM

## 2024-04-22 DIAGNOSIS — I50.33 ACUTE ON CHRONIC DIASTOLIC HEART FAILURE: Primary | ICD-10-CM

## 2024-04-22 DIAGNOSIS — R26.2 DIFFICULTY WALKING: ICD-10-CM

## 2024-04-22 PROCEDURE — 1125F AMNT PAIN NOTED PAIN PRSNT: CPT | Mod: HCNC,CPTII,S$GLB, | Performed by: INTERNAL MEDICINE

## 2024-04-22 PROCEDURE — 3288F FALL RISK ASSESSMENT DOCD: CPT | Mod: HCNC,CPTII,S$GLB, | Performed by: INTERNAL MEDICINE

## 2024-04-22 PROCEDURE — 1101F PT FALLS ASSESS-DOCD LE1/YR: CPT | Mod: HCNC,CPTII,S$GLB, | Performed by: INTERNAL MEDICINE

## 2024-04-22 PROCEDURE — 1111F DSCHRG MED/CURRENT MED MERGE: CPT | Mod: HCNC,CPTII,S$GLB, | Performed by: INTERNAL MEDICINE

## 2024-04-22 PROCEDURE — 99999 PR PBB SHADOW E&M-EST. PATIENT-LVL III: CPT | Mod: PBBFAC,HCNC,, | Performed by: INTERNAL MEDICINE

## 2024-04-22 PROCEDURE — 99496 TRANSJ CARE MGMT HIGH F2F 7D: CPT | Mod: HCNC,S$GLB,, | Performed by: INTERNAL MEDICINE

## 2024-04-22 PROCEDURE — 1157F ADVNC CARE PLAN IN RCRD: CPT | Mod: HCNC,CPTII,S$GLB, | Performed by: INTERNAL MEDICINE

## 2024-04-22 PROCEDURE — 3079F DIAST BP 80-89 MM HG: CPT | Mod: HCNC,CPTII,S$GLB, | Performed by: INTERNAL MEDICINE

## 2024-04-22 PROCEDURE — 1160F RVW MEDS BY RX/DR IN RCRD: CPT | Mod: HCNC,CPTII,S$GLB, | Performed by: INTERNAL MEDICINE

## 2024-04-22 PROCEDURE — 1159F MED LIST DOCD IN RCRD: CPT | Mod: HCNC,CPTII,S$GLB, | Performed by: INTERNAL MEDICINE

## 2024-04-22 PROCEDURE — 3074F SYST BP LT 130 MM HG: CPT | Mod: HCNC,CPTII,S$GLB, | Performed by: INTERNAL MEDICINE

## 2024-04-22 RX ORDER — MUPIROCIN 20 MG/G
OINTMENT TOPICAL 3 TIMES DAILY
Qty: 30 G | Refills: 0 | Status: SHIPPED | OUTPATIENT
Start: 2024-04-22 | End: 2024-05-15 | Stop reason: CLARIF

## 2024-04-22 NOTE — PROGRESS NOTES
[facility]  Primary Care   [address]       Patient ID: Adry Owen     Chief Complaint:   Chief Complaint   Patient presents with    Follow-up     Hospital         Transitional Care Note    Family and/or Caretaker present at visit?  No.Patient came alone.  Diagnostic tests reviewed/disposition: I have reviewed all completed as well as pending diagnostic tests at the time of discharge.  Disease/illness education: Congestive Heart Failure   Home health/community services discussion/referrals: Patient has home health established at [facility]  .   Establishment or re-establishment of referral orders for community resources: No other necessary community resources.   Discussion with other health care providers: No discussion with other health care providers necessary     HPI:  Hospital follow-up.  She went to [facility] for dyspnea on exertion and was found to have a critically low hemoglobin at 6.4.  She would see me just prior to this and I thought she could have some GI irritation and I put her on pantoprazole twice daily.  Eventual EGD did show numerous gastric ulcers.  Plan is to continue the PPI twice daily with repeat EGD in the future.  She did eventually receive 3 units of packed red blood cells.  Currently hemoglobin is low but stable.  I do think she could benefit from IV iron, so I will place those orders hopefully in the near future.  She was kept in the hospital for an extended period of time due to a flare of her chronic diastolic congestive heart failure.  She diuresed very well with Lasix and was eventually transitioned to torsemide and discharged home.  Unfortunately though she is accumulating more fluid and today her bilateral lower extremities have 2+ edema up to her mid thighs.  She is also having some shortness a breath.  This is despite torsemide 20 mg once or twice daily with Aldactone 25 mg a day.  I sent a message to her cardiologist  "questioning the need for Bumex or adding metolazone and we will discuss that before I prescribe anything.  Something also better on the left-sided of the neck 3 days ago and it does look like a larger area of either a large local reaction or cellulitis.  She is placed topical steroids on without much improvement but I am going to give her some mupirocin ointment.  If that has not improving in in a few days I want her to reach out to me for some antibiotics.    Review of Systems       Edema     Objective:      Physical Exam   Physical Exam       Back brace in place      Vitals:   Vitals:    04/22/24 1026   BP: 124/82   Pulse: 75   SpO2: 95%   Weight: 93.8 kg (206 lb 12.7 oz)   Height: 5' 5" (1.651 m)        Assessment:           Plan:       Adry Owen  was seen today for follow-up and may need lab work.    Diagnoses and all orders for this visit:    Adry was seen today for follow-up.    Diagnoses and all orders for this visit:    Acute on chronic diastolic heart failure  Needs more or stronger diuretics to get extra fluid off.  I will confirm with the cardiologist.    Cellulitis of neck  -     mupirocin (BACTROBAN) 2 % ointment; Apply topically 3 (three) times daily.  Consider oral antibiotics if not improving in 48-72 hours    SOB (shortness of breath)  Likely due to combination of congestive heart failure and her asthma, but her lungs do sound good today.    Class 1 obesity due to excess calories with serious comorbidity and body mass index (BMI) of 34.0 to 34.9 in adult  Monitor as we diurese her    Acute blood loss anemia  Hemoglobin stable but I think she could benefit from IV iron.  I will place those orders after I get a better mix of diuretics.    Difficulty walking  Monitor     Other emphysema  Sounds stable today     Aortic atherosclerosis  On a statin          Ross Nunes MD    "

## 2024-04-30 ENCOUNTER — TELEPHONE (OUTPATIENT)
Dept: FAMILY MEDICINE | Facility: CLINIC | Age: 79
End: 2024-04-30
Payer: MEDICARE

## 2024-04-30 DIAGNOSIS — D50.0 IRON DEFICIENCY ANEMIA DUE TO CHRONIC BLOOD LOSS: Primary | ICD-10-CM

## 2024-04-30 RX ORDER — SODIUM CHLORIDE 0.9 % (FLUSH) 0.9 %
10 SYRINGE (ML) INJECTION
Status: CANCELLED | OUTPATIENT
Start: 2024-04-30

## 2024-04-30 RX ORDER — HEPARIN 100 UNIT/ML
500 SYRINGE INTRAVENOUS
Status: CANCELLED | OUTPATIENT
Start: 2024-04-30

## 2024-04-30 RX ORDER — DIPHENHYDRAMINE HYDROCHLORIDE 50 MG/ML
50 INJECTION INTRAMUSCULAR; INTRAVENOUS ONCE AS NEEDED
Status: CANCELLED | OUTPATIENT
Start: 2024-04-30

## 2024-04-30 RX ORDER — EPINEPHRINE 0.3 MG/.3ML
0.3 INJECTION SUBCUTANEOUS ONCE AS NEEDED
Status: CANCELLED | OUTPATIENT
Start: 2024-04-30

## 2024-04-30 NOTE — TELEPHONE ENCOUNTER
I ordered IV Venofer due to iron deficiency anemia. Infusion center should Schedule her infusions soon. Cardiology NP Michelle Harrell wants to give her IV Lasix, but I will leave that to her. Please Recheck labs 1 month after her last infusion.

## 2024-05-01 PROBLEM — D50.9 IRON DEFICIENCY ANEMIA: Status: ACTIVE | Noted: 2024-04-30

## 2024-05-10 ENCOUNTER — INFUSION (OUTPATIENT)
Dept: INFUSION THERAPY | Facility: HOSPITAL | Age: 79
End: 2024-05-10
Attending: INTERNAL MEDICINE
Payer: MEDICARE

## 2024-05-10 VITALS
SYSTOLIC BLOOD PRESSURE: 99 MMHG | RESPIRATION RATE: 18 BRPM | HEART RATE: 69 BPM | DIASTOLIC BLOOD PRESSURE: 62 MMHG | TEMPERATURE: 98 F

## 2024-05-10 DIAGNOSIS — M85.851 OSTEOPENIA OF NECKS OF BOTH FEMURS: ICD-10-CM

## 2024-05-10 DIAGNOSIS — M85.852 OSTEOPENIA OF NECKS OF BOTH FEMURS: ICD-10-CM

## 2024-05-10 DIAGNOSIS — D62 ACUTE BLOOD LOSS ANEMIA: Primary | ICD-10-CM

## 2024-05-10 DIAGNOSIS — G47.00 INSOMNIA, UNSPECIFIED TYPE: Primary | ICD-10-CM

## 2024-05-10 PROCEDURE — 96374 THER/PROPH/DIAG INJ IV PUSH: CPT | Mod: HCNC,PN

## 2024-05-10 PROCEDURE — 96372 THER/PROPH/DIAG INJ SC/IM: CPT | Mod: HCNC,PN,59

## 2024-05-10 PROCEDURE — 63600175 PHARM REV CODE 636 W HCPCS: Mod: JZ,JG,HCNC,PN | Performed by: INTERNAL MEDICINE

## 2024-05-10 PROCEDURE — 25000003 PHARM REV CODE 250: Mod: HCNC,PN | Performed by: INTERNAL MEDICINE

## 2024-05-10 RX ORDER — EPINEPHRINE 0.3 MG/.3ML
0.3 INJECTION SUBCUTANEOUS ONCE AS NEEDED
Status: DISCONTINUED | OUTPATIENT
Start: 2024-05-10 | End: 2024-05-10 | Stop reason: HOSPADM

## 2024-05-10 RX ORDER — EPINEPHRINE 0.3 MG/.3ML
0.3 INJECTION SUBCUTANEOUS ONCE AS NEEDED
Status: CANCELLED | OUTPATIENT
Start: 2024-05-15

## 2024-05-10 RX ORDER — DIPHENHYDRAMINE HYDROCHLORIDE 50 MG/ML
50 INJECTION INTRAMUSCULAR; INTRAVENOUS ONCE AS NEEDED
Status: DISCONTINUED | OUTPATIENT
Start: 2024-05-10 | End: 2024-05-10 | Stop reason: HOSPADM

## 2024-05-10 RX ORDER — HEPARIN 100 UNIT/ML
500 SYRINGE INTRAVENOUS
Status: CANCELLED | OUTPATIENT
Start: 2024-05-15

## 2024-05-10 RX ORDER — DIPHENHYDRAMINE HYDROCHLORIDE 50 MG/ML
50 INJECTION INTRAMUSCULAR; INTRAVENOUS ONCE AS NEEDED
Status: CANCELLED | OUTPATIENT
Start: 2024-05-15

## 2024-05-10 RX ORDER — SODIUM CHLORIDE 0.9 % (FLUSH) 0.9 %
10 SYRINGE (ML) INJECTION
Status: DISCONTINUED | OUTPATIENT
Start: 2024-05-10 | End: 2024-05-10 | Stop reason: HOSPADM

## 2024-05-10 RX ORDER — SODIUM CHLORIDE 0.9 % (FLUSH) 0.9 %
10 SYRINGE (ML) INJECTION
Status: CANCELLED | OUTPATIENT
Start: 2024-05-15

## 2024-05-10 RX ADMIN — SODIUM CHLORIDE: 9 INJECTION, SOLUTION INTRAVENOUS at 04:05

## 2024-05-10 RX ADMIN — DENOSUMAB 60 MG: 60 INJECTION SUBCUTANEOUS at 04:05

## 2024-05-10 RX ADMIN — IRON SUCROSE 200 MG: 20 INJECTION, SOLUTION INTRAVENOUS at 04:05

## 2024-05-17 ENCOUNTER — INFUSION (OUTPATIENT)
Dept: INFUSION THERAPY | Facility: HOSPITAL | Age: 79
End: 2024-05-17
Attending: INTERNAL MEDICINE
Payer: MEDICARE

## 2024-05-17 ENCOUNTER — OFFICE VISIT (OUTPATIENT)
Dept: FAMILY MEDICINE | Facility: CLINIC | Age: 79
End: 2024-05-17
Payer: MEDICARE

## 2024-05-17 VITALS
HEIGHT: 65 IN | BODY MASS INDEX: 33.24 KG/M2 | OXYGEN SATURATION: 99 % | HEART RATE: 70 BPM | DIASTOLIC BLOOD PRESSURE: 68 MMHG | SYSTOLIC BLOOD PRESSURE: 110 MMHG | WEIGHT: 199.5 LBS

## 2024-05-17 VITALS
BODY MASS INDEX: 33.24 KG/M2 | TEMPERATURE: 98 F | DIASTOLIC BLOOD PRESSURE: 70 MMHG | HEART RATE: 69 BPM | OXYGEN SATURATION: 99 % | RESPIRATION RATE: 18 BRPM | SYSTOLIC BLOOD PRESSURE: 114 MMHG | WEIGHT: 199.5 LBS | HEIGHT: 65 IN

## 2024-05-17 DIAGNOSIS — I10 ESSENTIAL HYPERTENSION: ICD-10-CM

## 2024-05-17 DIAGNOSIS — I50.33 ACUTE ON CHRONIC HEART FAILURE WITH PRESERVED EJECTION FRACTION (HFPEF): ICD-10-CM

## 2024-05-17 DIAGNOSIS — D50.0 IRON DEFICIENCY ANEMIA DUE TO CHRONIC BLOOD LOSS: Primary | ICD-10-CM

## 2024-05-17 DIAGNOSIS — D62 ACUTE BLOOD LOSS ANEMIA: Primary | ICD-10-CM

## 2024-05-17 PROCEDURE — 1160F RVW MEDS BY RX/DR IN RCRD: CPT | Mod: HCNC,CPTII,S$GLB, | Performed by: INTERNAL MEDICINE

## 2024-05-17 PROCEDURE — 1159F MED LIST DOCD IN RCRD: CPT | Mod: HCNC,CPTII,S$GLB, | Performed by: INTERNAL MEDICINE

## 2024-05-17 PROCEDURE — 3074F SYST BP LT 130 MM HG: CPT | Mod: HCNC,CPTII,S$GLB, | Performed by: INTERNAL MEDICINE

## 2024-05-17 PROCEDURE — 1157F ADVNC CARE PLAN IN RCRD: CPT | Mod: HCNC,CPTII,S$GLB, | Performed by: INTERNAL MEDICINE

## 2024-05-17 PROCEDURE — 1101F PT FALLS ASSESS-DOCD LE1/YR: CPT | Mod: HCNC,CPTII,S$GLB, | Performed by: INTERNAL MEDICINE

## 2024-05-17 PROCEDURE — 25000003 PHARM REV CODE 250: Mod: HCNC,PN | Performed by: INTERNAL MEDICINE

## 2024-05-17 PROCEDURE — G2211 COMPLEX E/M VISIT ADD ON: HCPCS | Mod: HCNC,S$GLB,, | Performed by: INTERNAL MEDICINE

## 2024-05-17 PROCEDURE — 3288F FALL RISK ASSESSMENT DOCD: CPT | Mod: HCNC,CPTII,S$GLB, | Performed by: INTERNAL MEDICINE

## 2024-05-17 PROCEDURE — 96374 THER/PROPH/DIAG INJ IV PUSH: CPT | Mod: HCNC,PN

## 2024-05-17 PROCEDURE — 99214 OFFICE O/P EST MOD 30 MIN: CPT | Mod: HCNC,S$GLB,, | Performed by: INTERNAL MEDICINE

## 2024-05-17 PROCEDURE — 1125F AMNT PAIN NOTED PAIN PRSNT: CPT | Mod: HCNC,CPTII,S$GLB, | Performed by: INTERNAL MEDICINE

## 2024-05-17 PROCEDURE — 99999 PR PBB SHADOW E&M-EST. PATIENT-LVL V: CPT | Mod: PBBFAC,HCNC,, | Performed by: INTERNAL MEDICINE

## 2024-05-17 PROCEDURE — 3078F DIAST BP <80 MM HG: CPT | Mod: HCNC,CPTII,S$GLB, | Performed by: INTERNAL MEDICINE

## 2024-05-17 PROCEDURE — 63600175 PHARM REV CODE 636 W HCPCS: Mod: HCNC,PN | Performed by: INTERNAL MEDICINE

## 2024-05-17 RX ORDER — SODIUM CHLORIDE 0.9 % (FLUSH) 0.9 %
10 SYRINGE (ML) INJECTION
Status: DISCONTINUED | OUTPATIENT
Start: 2024-05-17 | End: 2024-05-17 | Stop reason: HOSPADM

## 2024-05-17 RX ORDER — HEPARIN 100 UNIT/ML
500 SYRINGE INTRAVENOUS
Status: DISCONTINUED | OUTPATIENT
Start: 2024-05-17 | End: 2024-05-17 | Stop reason: HOSPADM

## 2024-05-17 RX ORDER — DIPHENHYDRAMINE HYDROCHLORIDE 50 MG/ML
50 INJECTION INTRAMUSCULAR; INTRAVENOUS ONCE AS NEEDED
Status: CANCELLED | OUTPATIENT
Start: 2024-05-24

## 2024-05-17 RX ORDER — HEPARIN 100 UNIT/ML
500 SYRINGE INTRAVENOUS
Status: CANCELLED | OUTPATIENT
Start: 2024-05-24

## 2024-05-17 RX ORDER — EPINEPHRINE 0.3 MG/.3ML
0.3 INJECTION SUBCUTANEOUS ONCE AS NEEDED
Status: CANCELLED | OUTPATIENT
Start: 2024-05-24

## 2024-05-17 RX ORDER — SODIUM CHLORIDE 0.9 % (FLUSH) 0.9 %
10 SYRINGE (ML) INJECTION
Status: CANCELLED | OUTPATIENT
Start: 2024-05-24

## 2024-05-17 RX ADMIN — SODIUM CHLORIDE: 9 INJECTION, SOLUTION INTRAVENOUS at 12:05

## 2024-05-17 RX ADMIN — IRON SUCROSE 200 MG: 20 INJECTION, SOLUTION INTRAVENOUS at 12:05

## 2024-05-17 NOTE — PLAN OF CARE
Tolerated venofer well.  Observed pt 30 minutes post infusion, no reactions noted.  No questions or concerns at this time.  Ambulated off unit in NAD,

## 2024-05-17 NOTE — PROGRESS NOTES
"[facility]  Primary Care   [address]       Patient ID: Adry Owen     Chief Complaint:   Chief Complaint   Patient presents with    Follow-up     4 week           HPI: Follow up for Congestive Heart Failure with exacerbation as evidenced with increasing weight and edema. Oral Torsemide didn't work to well, so Patient will get Furocix which will deliver 80 mg of Lasix SubQ over 5 hours. Dr. Ahuja will do a Left and Right Heart Cath soon. She also got at least 1 dose of Venofer for iron deficiency anemia. She will get CBC and iron studies 1 month after her last dose. She still can't sleep too well at night because she can't lay flat due to orthopnea.     Review of Systems       Orthopnea     Objective:      Physical Exam   Physical Exam       Bilateral lower extremity edema    Vitals:   Vitals:    05/17/24 1052   BP: 110/68   Pulse: 70   SpO2: 99%   Weight: 90.5 kg (199 lb 8.3 oz)   Height: 5' 5" (1.651 m)        Assessment:           Plan:       Adry Owen  was seen today for follow-up and may need lab work.    Diagnoses and all orders for this visit:    Adry was seen today for follow-up.    Diagnoses and all orders for this visit:    Iron deficiency anemia due to chronic blood loss  Recheck iron studies 1 month after last iron infusion     Acute on chronic heart failure with preserved ejection fraction (HFpEF)  To start Furocix today and I hope it works     Essential hypertension  Controlled with med      Visit today included increased complexity associated with the care of the episodic problem hypertension anemia Congestive Heart Failure  addressed and managing the longitudinal care of the patient due to the serious and/or complex managed problem(s) .      Ross Nunes MD    "

## 2024-05-19 DIAGNOSIS — M54.2 CHRONIC NECK PAIN: ICD-10-CM

## 2024-05-19 DIAGNOSIS — G89.29 CHRONIC NECK PAIN: ICD-10-CM

## 2024-05-19 NOTE — TELEPHONE ENCOUNTER
No care due was identified.  [facility] Embedded Care Due Messages. Reference number: [id number].   5/19/2024 8:02:30 AM CDT

## 2024-05-20 RX ORDER — GABAPENTIN 300 MG/1
CAPSULE ORAL
Qty: 450 CAPSULE | Refills: 3 | Status: SHIPPED | OUTPATIENT
Start: 2024-05-20

## 2024-05-21 ENCOUNTER — PATIENT MESSAGE (OUTPATIENT)
Dept: FAMILY MEDICINE | Facility: CLINIC | Age: 79
End: 2024-05-21
Payer: MEDICARE

## 2024-05-21 DIAGNOSIS — K27.9 PUD (PEPTIC ULCER DISEASE): Primary | ICD-10-CM

## 2024-05-21 NOTE — TELEPHONE ENCOUNTER
No care due was identified.  [facility] Embedded Care Due Messages. Reference number: [id number].   5/21/2024 5:03:13 PM CDT

## 2024-05-22 DIAGNOSIS — K27.9 PUD (PEPTIC ULCER DISEASE): ICD-10-CM

## 2024-05-22 RX ORDER — PANTOPRAZOLE SODIUM 40 MG/1
40 TABLET, DELAYED RELEASE ORAL 2 TIMES DAILY
Qty: 180 TABLET | Refills: 3 | Status: SHIPPED | OUTPATIENT
Start: 2024-05-22 | End: 2024-05-23

## 2024-05-22 NOTE — TELEPHONE ENCOUNTER
No care due was identified.  [facility] Embedded Care Due Messages. Reference number: [id number].   5/22/2024 2:12:27 PM CDT

## 2024-05-22 NOTE — TELEPHONE ENCOUNTER
Refill Routing Note   Medication(s) are not appropriate for processing by [facility] for the following reason(s):        Outside of protocol    [facility] action(s):  Route        Medication Therapy Plan: Receipt confirmed by pharmacy (5/22/2024  1:49 PM CDT) sulema; new pharmacy request [facility]      Appointments  past 12m or future 3m with PCP    Date Provider   Last Visit   5/17/2024 Ross Nunes MD   Next Visit   7/2/2024 Ross Nunes MD   ED visits in past 90 days: 0        Note composed:5:15 PM 05/22/2024

## 2024-05-23 RX ORDER — PANTOPRAZOLE SODIUM 40 MG/1
40 TABLET, DELAYED RELEASE ORAL DAILY
Qty: 90 TABLET | Refills: 3 | Status: SHIPPED | OUTPATIENT
Start: 2024-05-23 | End: 2025-05-23

## 2024-05-24 ENCOUNTER — OFFICE VISIT (OUTPATIENT)
Dept: PALLIATIVE MEDICINE | Facility: CLINIC | Age: 79
End: 2024-05-24
Payer: MEDICARE

## 2024-05-24 ENCOUNTER — INFUSION (OUTPATIENT)
Dept: INFUSION THERAPY | Facility: HOSPITAL | Age: 79
End: 2024-05-24
Attending: INTERNAL MEDICINE
Payer: MEDICARE

## 2024-05-24 VITALS
SYSTOLIC BLOOD PRESSURE: 90 MMHG | OXYGEN SATURATION: 98 % | DIASTOLIC BLOOD PRESSURE: 60 MMHG | RESPIRATION RATE: 16 BRPM | HEART RATE: 69 BPM | TEMPERATURE: 98 F

## 2024-05-24 DIAGNOSIS — G47.00 INSOMNIA, UNSPECIFIED TYPE: ICD-10-CM

## 2024-05-24 DIAGNOSIS — I50.9 ACUTE DECOMPENSATED HEART FAILURE: ICD-10-CM

## 2024-05-24 DIAGNOSIS — Z51.5 PALLIATIVE CARE BY SPECIALIST: Primary | ICD-10-CM

## 2024-05-24 DIAGNOSIS — D62 ACUTE BLOOD LOSS ANEMIA: Primary | ICD-10-CM

## 2024-05-24 DIAGNOSIS — C92.11 CHRONIC MYELOID LEUKEMIA, BCR/ABL-POSITIVE, IN REMISSION: ICD-10-CM

## 2024-05-24 PROCEDURE — 96374 THER/PROPH/DIAG INJ IV PUSH: CPT | Mod: HCNC,PN

## 2024-05-24 PROCEDURE — 63600175 PHARM REV CODE 636 W HCPCS: Mod: HCNC,PN | Performed by: INTERNAL MEDICINE

## 2024-05-24 PROCEDURE — 1123F ACP DISCUSS/DSCN MKR DOCD: CPT | Mod: HCNC,CPTII,95, | Performed by: STUDENT IN AN ORGANIZED HEALTH CARE EDUCATION/TRAINING PROGRAM

## 2024-05-24 PROCEDURE — 25000003 PHARM REV CODE 250: Mod: HCNC,PN | Performed by: INTERNAL MEDICINE

## 2024-05-24 PROCEDURE — 99213 OFFICE O/P EST LOW 20 MIN: CPT | Mod: HCNC,95,, | Performed by: STUDENT IN AN ORGANIZED HEALTH CARE EDUCATION/TRAINING PROGRAM

## 2024-05-24 RX ORDER — SODIUM CHLORIDE 0.9 % (FLUSH) 0.9 %
10 SYRINGE (ML) INJECTION
Status: CANCELLED | OUTPATIENT
Start: 2024-05-31

## 2024-05-24 RX ORDER — SUVOREXANT 5 MG/1
5 TABLET, FILM COATED ORAL NIGHTLY
Qty: 30 TABLET | Refills: 0 | Status: SHIPPED | OUTPATIENT
Start: 2024-05-24 | End: 2024-05-24 | Stop reason: SDUPTHER

## 2024-05-24 RX ORDER — SODIUM CHLORIDE 0.9 % (FLUSH) 0.9 %
10 SYRINGE (ML) INJECTION
Status: DISCONTINUED | OUTPATIENT
Start: 2024-05-24 | End: 2024-05-24 | Stop reason: HOSPADM

## 2024-05-24 RX ORDER — SUVOREXANT 5 MG/1
5 TABLET, FILM COATED ORAL NIGHTLY
Qty: 30 TABLET | Refills: 0 | Status: SHIPPED | OUTPATIENT
Start: 2024-05-24 | End: 2024-06-18

## 2024-05-24 RX ORDER — HEPARIN 100 UNIT/ML
500 SYRINGE INTRAVENOUS
Status: CANCELLED | OUTPATIENT
Start: 2024-05-31

## 2024-05-24 RX ORDER — HEPARIN 100 UNIT/ML
500 SYRINGE INTRAVENOUS
Status: DISCONTINUED | OUTPATIENT
Start: 2024-05-24 | End: 2024-05-24 | Stop reason: HOSPADM

## 2024-05-24 RX ORDER — EPINEPHRINE 0.3 MG/.3ML
0.3 INJECTION SUBCUTANEOUS ONCE AS NEEDED
Status: CANCELLED | OUTPATIENT
Start: 2024-05-31

## 2024-05-24 RX ORDER — DIPHENHYDRAMINE HYDROCHLORIDE 50 MG/ML
50 INJECTION INTRAMUSCULAR; INTRAVENOUS ONCE AS NEEDED
Status: CANCELLED | OUTPATIENT
Start: 2024-05-31

## 2024-05-24 RX ADMIN — IRON SUCROSE 200 MG: 20 INJECTION, SOLUTION INTRAVENOUS at 02:05

## 2024-05-24 RX ADMIN — SODIUM CHLORIDE: 9 INJECTION, SOLUTION INTRAVENOUS at 02:05

## 2024-05-24 NOTE — PLAN OF CARE
Problem: Adult Inpatient Plan of Care  Goal: Plan of Care Review  Outcome: Progressing  Flowsheets (Taken 5/24/2024 1534)  Plan of Care Reviewed With: patient     Patient tolerated Venofer infusion. Observed for 30 minutes following completion of infusion. IV flushed and removed. Bleeding stopped and secured with gauze and coban. AVS provided and reviewed. Patient ambulated per self upon discharge from infusion center. No acute distress noted.       Problem: Adult Inpatient Plan of Care  Goal: Patient-Specific Goal (Individualized)  Outcome: Progressing  Flowsheets (Taken 5/24/2024 1441)  Individualized Care Needs: recliner, pillow, conversation, orange juice  Anxieties, Fears or Concerns: insomnia, fatigue     Problem: Anemia  Goal: Anemia Symptom Improvement  Intervention: Monitor and Manage Anemia  Flowsheets (Taken 5/24/2024 1442)  Safety Promotion/Fall Prevention:   Fall Risk signage in place   medications reviewed   pulse ox  Fatigue Management: paced activity encouraged

## 2024-05-24 NOTE — PROGRESS NOTES
The patient location is: [place]    Visit type: audiovisual    Face to Face time with patient: 17  27 minutes of total time spent on the encounter, which includes face to face time and non-face to face time preparing to see the patient (eg, review of tests), Obtaining and/or reviewing separately obtained history, Documenting clinical information in the electronic or other health record, Independently interpreting results (not separately reported) and communicating results to the patient/family/caregiver, or Care coordination (not separately reported).         Each patient to whom he or she provides medical services by telemedicine is:  (1) informed of the relationship between the physician and patient and the respective role of any other health care provider with respect to management of the patient; and (2) notified that he or she may decline to receive medical services by telemedicine and may withdraw from such care at any time.    Notes:    Office Visit  [facility]      Reason for Consult: CHF      ASSESSMENT/PLAN:     Plan/Recommendations:  Diagnoses and all orders for this visit:    Palliative care by specialist    Acute decompensated heart failure    Chronic myeloid leukemia, BCR/ABL-positive, in remission    Insomnia, unspecified type    Other orders  -     suvorexant (BELSOMRA) 5 mg Tab; Take 5 mg by mouth every evening.      # Insomnia  Difficulty with sleep onset and maintenance for several years, worse with fluid overload state. Has tried multiple sleep agents including lunesta, ambien, magnesium, chamomile, melatonin.  - Start Belsomra 5mg qhs   - Sleep hygiene   - Continue goal directed medical therapy of CHF    # Diastolic HFpEF  EF 55% per last echo on 03/30/24. Improving diuresis from prior however still with dyspnea, leg swelling and resulting difficulty with ambulation.   - Continue cardiology follow up  - Continue goal directed medical therapy  - Fluid restriction < 2L per  day  - Salt restriction < 2g per day  - Continue follow up with CHF program    # Palliative care by specialist   Patient with multiple chronic medical conditions who lives alone with several weeks of inappropriate diuresis and resulting fluid overload.     # CML  In remission.  - Continue follow up with Dr. Marvin    # Advance care planning  Discussed importance of advance care planning including serious illness conversations with next of kin including interventions such as CPR and mechanical ventilation. Also discussed the importance of documentation of these wishes in a formal living will. Additional documents including LAPOST and HCPOA were also discussed briefly.     Follow up: 1 month virtual      SUBJECTIVE:     History of Present Illness:  Patient is a 79 y.o. year old female presenting with a history of CML, CAD, AFib on Xarelto, sick sinus syndrome status post pacemaker implantation, hypertension. Presents via virtual visit for follow up.       Lifetime issue with insomnia. Fluid overload has made insomnia worse. She has tried lunesta, ambien ER. He has tried natural methods with magnesium and chamomile. She also tried melatonin. She has problems with getting to sleep and staying asleep. She sleeps for 3-4 hours consistently. She gets to sleep early in the morning at 730 then she gets called at 9 am by home visiting providers.       Initial visit:   Admits to shortness of breath while sitting still or after talking a lot. Dustin Arguello visited her yesterday. Has continued leg swelling since admission. Cannot fit into her pants. Has gained more than 30 lbs prior to admission. Sees cardiology again tomorrow.     Does not eat salt at all. Did not have appropriate fluid output with lasix or spironolactone alone. Was started on torsemide in hospital. Has about 1L urine output in the past two days. Some coughing and wheezing.     Her son has POA. Has a copy of a LAPOST she needs to complete.     Treated by   "[facility] for CML on lifelong treatment. Told she is "undetectable" for 2 years.     Functionally difficult since leaving the hospital.     Has home health coming to the house. Lives alone.     Past Medical History:   Diagnosis Date    *Atrial fibrillation     *Atrial flutter     Anticoagulant long-term use     xarelto    Arthritis     back and hip pain    Asthma     Atrial fibrillation or flutter     Cancer     leukemia CML    Cataract     CHF (congestive heart failure)     Chronic neck pain     Coronary artery disease 2004    1 stent    Diabetes mellitus     Encephalopathy, toxic 04/15/2023    Encounter for blood transfusion     Fibromyalgia     Hashimoto's disease     Heart block     History of diverticulitis     History of GI bleed     Hyperlipidemia     Hypertension     Iron deficiency anemia due to chronic blood loss 04/30/2024    MI (myocardial infarction)     1992    Osteopenia of necks of both femurs 01/12/2023    Problems with swallowing     bone spur and scarring C1-C2    Sleep apnea     bipap and or breathe rite strips    Thyroid disease      Past Surgical History:   Procedure Laterality Date    ABLATION  2/8/2024    Procedure: Ablation AV Node;  Surgeon: Xavier Ahuja III, MD;  Location: [facility] CATH;  Service: Cardiology;;    ABLATION OF DYSRHYTHMIC FOCUS      ADENOIDECTOMY      APPENDECTOMY      BACK SURGERY  2015    cervical fusion    CARDIAC CATHETERIZATION  2004    1 stent    CARDIOVERSION  05/2018    CATARACT EXTRACTION W/  INTRAOCULAR LENS IMPLANT Bilateral 2015    now has scarring needs laser sgy    CATHETERIZATION OF BOTH LEFT AND RIGHT HEART  5/20/2024    Procedure: Right / Left heart cath;  Surgeon: Xavier Ahuja III, MD;  Location: [facility] CATH;  Service: Cardiology;;    CERVICAL FUSION      three surgeries last 4/13/2023 / plates screws    ESOPHAGOGASTRODUODENOSCOPY N/A 3/30/2024    Procedure: EGD (ESOPHAGOGASTRODUODENOSCOPY);  Surgeon: Victor Hugo Ovalle MD;  Location: [facility] ENDO;  Service: " Endoscopy;  Laterality: N/A;    EXCISION OF MASS OF HAND Right 06/12/2018    Procedure: EXCISION, MASS, HAND/ Long finger with osteophyte removal;  Surgeon: ORION Mendoza MD;  Location: [facility];  Service: General;  Laterality: Right;    EYE SURGERY      FRACTURE SURGERY      HAND TENDON SURGERY Right      x 2 1986 & 1996    HYSTERECTOMY  2001    bso    INSERTION OF PACEMAKER N/A 11/27/2019    Procedure: INSERTION, PACEMAKER, DUAL CHAMBER-BIOTRONIK;  Surgeon: Xavier Ahuja III, MD;  Location: [facility] CATH;  Service: Cardiology;  Laterality: N/A;    LEFT HEART CATHETERIZATION Left 04/21/2022    Procedure: Left heart cath;  Surgeon: Xavier Ahuja III, MD;  Location: [facility] CATH;  Service: Cardiology;  Laterality: Left;    loop recorder insertion      POSTERIOR FUSION LUMBAR SPINE W/ CORPECTOMY      with disc replacement x 2    REMOVAL OF IMPLANTABLE LOOP RECORDER Left 11/27/2019    Procedure: REMOVAL, IMPLANTABLE LOOP RECORDER;  Surgeon: Xavier Ahuja III, MD;  Location: [facility] CATH;  Service: Cardiology;  Laterality: Left;    SHOULDER ARTHROSCOPY W/ ROTATOR CUFF REPAIR Right 1992    SPINE SURGERY      TONSILLECTOMY       Family History   Problem Relation Name Age of Onset    Cancer Mother wolfgang coelho 73        leukemia    Hypertension Mother wolfgang coelho     Glaucoma Father kady coelho     Heart disease Father kady coelho     Cancer Father kady coelho 76        testicular    Diabetes Father kady coelho     Hearing loss Father kady coelho     Stroke Father kady coelho     Vision loss Father kady coelho     Heart disease Brother blanca coelho     Asthma Brother blanca coelho     Hearing loss Maternal Grandmother dennis bynum     Diabetes Daughter Jana EMELI Muller     Amblyopia Neg Hx      Blindness Neg Hx      Cataracts Neg Hx      Macular degeneration Neg Hx      Retinal detachment Neg Hx      Strabismus Neg Hx      Thyroid disease Neg Hx       Review of patient's allergies indicates:   Allergen Reactions    Alcohol prep pads  [alcohol swabs] Hives and Rash    Diazolidinyl urea Anaphylaxis    Influenza virus vaccines Hives and Swelling    Iodine Anaphylaxis     Other reaction(s): Unknown    Preservative Anaphylaxis     Many different preservatives per pt report    Thimerosal Anaphylaxis    Bactoshield chg [chlorhexidine gluconate] Rash    Feldene [piroxicam]      Syncope, muscle spasms.     Cephalosporins Rash    Lisinopril Palpitations    Penicillins Rash     Other reaction(s): Unknown    Sotalol Palpitations     Social Determinants of Health     Tobacco Use: Low Risk  (5/20/2024)    Patient History     Smoking Tobacco Use: Never     Smokeless Tobacco Use: Never     Passive Exposure: Not on file   Alcohol Use: Not At Risk (2/29/2024)    AUDIT-C     Frequency of Alcohol Consumption: Never     Average Number of Drinks: Patient does not drink     Frequency of Binge Drinking: Never   Financial Resource Strain: Medium Risk (2/29/2024)    Overall Financial Resource Strain (CARDIA)     Difficulty of Paying Living Expenses: Somewhat hard   Food Insecurity: No Food Insecurity (3/30/2024)    Hunger Vital Sign     Worried About Running Out of Food in the Last Year: Never true     Ran Out of Food in the Last Year: Never true   Transportation Needs: No Transportation Needs (5/21/2024)    OASIS : Transportation     Lack of Transportation (Medical): No     Lack of Transportation (Non-Medical): No     Patient Unable or Declines to Respond: No   Recent Concern: Transportation Needs - Unmet Transportation Needs (2/29/2024)    PRAPARE - Transportation     Lack of Transportation (Medical): Yes     Lack of Transportation (Non-Medical): Yes   Physical Activity: Insufficiently Active (2/29/2024)    Exercise Vital Sign     Days of Exercise per Week: 5 days     Minutes of Exercise per Session: 20 min   Stress: No Stress Concern Present (2/29/2024)    [employer] [place] [employer] - Occupational Stress Questionnaire     Feeling of Stress : Not at  all   Housing Stability: Low Risk  (3/30/2024)    Housing Stability Vital Sign     Unable to Pay for Housing in the Last Year: No     Number of Places Lived in the Last Year: 1     Unstable Housing in the Last Year: No   Depression: Low Risk  (1/9/2024)    Depression     Last PHQ-4: Flowsheet Data: 0   Utilities: Not on file   Health Literacy: Adequate Health Literacy (5/21/2024)    OASIS : Health Literacy     Frequency of needing help to read materials from doctor or pharmacy: Never   Social Isolation: Feeling Socially Integrated (4/18/2024)    OASIS : Social Isolation     Frequency of experiencing loneliness or isolation: Never          Medications:    Current Outpatient Medications:     acetaminophen (TYLENOL) 500 MG tablet, Take 500 mg by mouth 2 (two) times daily as needed for Pain., Disp: , Rfl:     ASPIRIN LOW DOSE 81 mg EC tablet, Take 81 mg by mouth once daily., Disp: , Rfl:     azelastine (ASTELIN) 137 mcg (0.1 %) nasal spray, 1 spray (137 mcg total) by Nasal route 2 (two) times daily., Disp: 30 mL, Rfl: 11    benzonatate (TESSALON) 100 MG capsule, 1 - 2 po every 6 hours prn cough, Disp: 45 capsule, Rfl: 0    CHAMOMILE FLOWERS ORAL, Take 1,100 mg by mouth once daily. Indications: supplement, Disp: , Rfl:     CHROMIUM ORAL, Take 1,000 mcg by mouth Daily. Indications: supplement, Disp: , Rfl:     cinnamon bark (CINNAMON) 500 mg capsule, Take 500 mg by mouth once daily. Indications: supplement, Disp: , Rfl:     cyclobenzaprine (FLEXERIL) 10 MG tablet, Take 10 mg by mouth every 8 (eight) hours as needed for Muscle spasms. Indications: muscle spasm, Disp: , Rfl:     cycloSPORINE (RESTASIS) 0.05 % ophthalmic emulsion, PLACE 1 DROP INTO BOTH EYES 2 TIMES DAILY., Disp: 60 each, Rfl: 11    empagliflozin (JARDIANCE) 25 mg tablet, Take 1 tablet (25 mg total) by mouth once daily., Disp: 30 tablet, Rfl: 5    ergocalciferol (ERGOCALCIFEROL) 50,000 unit Cap, Take 1 capsule (50,000 Units total) by mouth every 7  days., Disp: 12 capsule, Rfl: 3    fluticasone propionate (FLONASE) 50 mcg/actuation nasal spray, USE 1 SPRAY IN EACH NOSTRIL EVERY DAY, Disp: 32 g, Rfl: 3    fluticasone-salmeterol diskus inhaler 250-50 mcg, Inhale 1 puff into the lungs 2 (two) times daily. Controller, Disp: 60 each, Rfl: 2    furosemide (FUROSCIX) 80 mg/10 mL Kit, Inject 80 mg into the skin once., Disp: , Rfl:     gabapentin (NEURONTIN) 300 MG capsule, TAKE 2 CAPSULES BY MOUTH DAILY IN THE MORNING AND 3 CAPSULES IN THE EVENING, Disp: 450 capsule, Rfl: 3    ipratropium-albuteroL (COMBIVENT RESPIMAT)  mcg/actuation inhaler, Inhale 1 puff into the lungs every 4 (four) hours as needed for Wheezing or Shortness of Breath. Rescue, Disp: 12 g, Rfl: 0    levocetirizine (XYZAL) 5 MG tablet, TAKE 1 TABLET EVERY EVENING, Disp: 90 tablet, Rfl: 3    levothyroxine (SYNTHROID) 75 MCG tablet, Take 1 tablet (75 mcg total) by mouth once daily., Disp: 90 tablet, Rfl: 3    melatonin 10 mg Cap, Take 30 mg by mouth every evening., Disp: , Rfl:     nilotinib (TASIGNA) 150 mg capsule, Take 2 capsules (300 mg total) by mouth twice daily., Disp: 120 capsule, Rfl: 5    nilotinib (TASIGNA) 150 mg capsule, Take 2 capsules (300 mg total) by mouth twice daily, Disp: 120 capsule, Rfl: 5    nitroGLYCERIN (NITROSTAT) 0.4 MG SL tablet, Place 1 tablet (0.4 mg total) under the tongue every 5 (five) minutes as needed for Chest pain., Disp: 25 tablet, Rfl: 2    NON FORMULARY MEDICATION, Take 200 mg by mouth once daily. mAGNESIUM bISGLYCINATE Chelate  Indications: supplement, Disp: , Rfl:     NON FORMULARY MEDICATION, Take 50 mg by mouth once daily. Apigenin  Indications: supplement, Disp: , Rfl:     oxyCODONE-acetaminophen (PERCOCET)  mg per tablet, Take 0.5-1 tablets by mouth every 4 (four) hours as needed for Pain. Indications: pain, Disp: , Rfl:     pantoprazole (PROTONIX) 40 MG tablet, Take 1 tablet (40 mg total) by mouth once daily., Disp: 90 tablet, Rfl: 3     rosuvastatin (CRESTOR) 10 MG tablet, Take 1 tablet (10 mg total) by mouth every evening., Disp: 90 tablet, Rfl: 3    spironolactone (ALDACTONE) 25 MG tablet, Take 2 tablets (50 mg total) by mouth once daily., Disp: 180 tablet, Rfl: 3    suvorexant (BELSOMRA) 5 mg Tab, Take 5 mg by mouth every evening., Disp: 30 tablet, Rfl: 0    torsemide (DEMADEX) 20 MG Tab, Take 1 tablet (20 mg total) by mouth once daily. Patient can take an additional 20mg PRN for weight gain of 3lbs, SOB, or swelling., Disp: 60 tablet, Rfl: 1    TURMERIC-MARCEL-BLACK PEPPER ORAL, Take 3 capsules by mouth once daily. Indications: supplement, Disp: , Rfl:     XARELTO 20 mg Tab, TAKE 1 TABLET EVERY DAY WITH DINNER OR EVENING MEAL, Disp: 90 tablet, Rfl: 3    OBJECTIVE:       ROS:  Review of Systems   Constitutional:  Positive for activity change, fatigue and unexpected weight change. Negative for appetite change.   HENT:  Negative for mouth sores.    Eyes:  Positive for visual disturbance.   Respiratory:  Positive for shortness of breath. Negative for cough.    Cardiovascular:  Positive for leg swelling. Negative for chest pain.   Gastrointestinal:  Negative for abdominal pain, constipation, diarrhea, nausea and vomiting.   Genitourinary:  Positive for frequency.   Musculoskeletal:  Positive for back pain.   Skin:  Negative for rash.   Neurological:  Negative for headaches.   Hematological:  Negative for adenopathy.   Psychiatric/Behavioral:  Negative for dysphoric mood and sleep disturbance. The patient is not nervous/anxious.        Review of Symptoms      Symptom Assessment (ESAS 0-10 Scale)  Pain:  0  Dyspnea:  0  Anxiety:  0  Nausea:  0  Depression:  0  Anorexia:  0  Fatigue:  0  Insomnia:  0  Restlessness:  0  Agitation:  0       Anxiety:  Is not nervous/anxious  Constipation:  No constipation    Performance Status:  70    ECOG Performance Status ndGndrndanddndend:nd nd2nd Living Arrangements:  Lives alone    Psychosocial/Cultural:   See Palliative  Psychosocial Note: Yes  **Primary  to Follow**  Palliative Care  Consult: No     Time-Based Charting:  Yes  Chart Review: 10 minutes  Symptom Assessment: 17 minutes    Total Time Spent: 27 minutes      Advance Care Planning   Advance Directives:   Living Will: Yes        Copy on chart: Yes    Medical Power of : Yes      Decision Making:  Patient answered questions  Goals of Care: What is most important right now is to focus on remaining as independent as possible, improvement in condition but with limits to invasive therapies. Accordingly, we have decided that the best plan to meet the patient's goals includes continuing with treatment.              Physical Exam:  Vitals:    Physical Exam  Vitals and nursing note reviewed.   Constitutional:       General: She is not in acute distress.     Appearance: Normal appearance. She is obese. She is not ill-appearing or toxic-appearing.   Pulmonary:      Effort: Pulmonary effort is normal.   Musculoskeletal:      Cervical back: Normal range of motion.   Skin:     Coloration: Skin is not jaundiced or pale.   Neurological:      Mental Status: She is alert and oriented to person, place, and time.   Psychiatric:         Mood and Affect: Mood normal.         Behavior: Behavior normal.         Thought Content: Thought content normal.         Judgment: Judgment normal.         Labs:  CBC:   WBC   Date Value Ref Range Status   05/20/2024 5.96 3.90 - 12.70 K/uL Final     Hemoglobin   Date Value Ref Range Status   05/20/2024 11.0 (L) 12.0 - 16.0 g/dL Final     Hematocrit   Date Value Ref Range Status   05/20/2024 36.3 (L) 37.0 - 48.5 % Final     MCV   Date Value Ref Range Status   05/20/2024 78 (L) 82 - 98 fL Final     Platelets   Date Value Ref Range Status   05/20/2024 273 150 - 450 K/uL Final       LFT:   Lab Results   Component Value Date    AST 30 04/30/2024    ALKPHOS 67 04/30/2024    BILITOT 0.9 04/30/2024       Albumin:   Albumin   Date Value  Ref Range Status   04/30/2024 4.5 3.5 - 5.2 g/dL Final     Protein:   Total Protein   Date Value Ref Range Status   04/30/2024 7.6 6.0 - 8.4 g/dL Final       Radiology:None      27 minutes of total time spent on the encounter, which includes face to face time and non-face to face time preparing to see the patient (eg, review of tests), Obtaining and/or reviewing separately obtained history, Documenting clinical information in the electronic or other health record, Independently interpreting results (not separately reported) and communicating results to the patient/family/caregiver, or Care coordination (not separately reported).    Signature: Deshaun Love DO

## 2024-06-07 ENCOUNTER — INFUSION (OUTPATIENT)
Dept: INFUSION THERAPY | Facility: HOSPITAL | Age: 79
End: 2024-06-07
Attending: INTERNAL MEDICINE
Payer: MEDICARE

## 2024-06-07 VITALS
HEART RATE: 69 BPM | TEMPERATURE: 98 F | BODY MASS INDEX: 32.69 KG/M2 | SYSTOLIC BLOOD PRESSURE: 121 MMHG | HEIGHT: 65 IN | DIASTOLIC BLOOD PRESSURE: 60 MMHG | RESPIRATION RATE: 16 BRPM | OXYGEN SATURATION: 95 % | WEIGHT: 196.19 LBS

## 2024-06-07 DIAGNOSIS — D62 ACUTE BLOOD LOSS ANEMIA: Primary | ICD-10-CM

## 2024-06-07 PROCEDURE — 25000003 PHARM REV CODE 250: Mod: HCNC,PN | Performed by: INTERNAL MEDICINE

## 2024-06-07 PROCEDURE — 63600175 PHARM REV CODE 636 W HCPCS: Mod: HCNC,PN | Performed by: INTERNAL MEDICINE

## 2024-06-07 PROCEDURE — 96374 THER/PROPH/DIAG INJ IV PUSH: CPT | Mod: HCNC,PN

## 2024-06-07 RX ORDER — EPINEPHRINE 0.3 MG/.3ML
0.3 INJECTION SUBCUTANEOUS ONCE AS NEEDED
Status: CANCELLED | OUTPATIENT
Start: 2024-06-14

## 2024-06-07 RX ORDER — HEPARIN 100 UNIT/ML
500 SYRINGE INTRAVENOUS
Status: CANCELLED | OUTPATIENT
Start: 2024-06-14

## 2024-06-07 RX ORDER — DIPHENHYDRAMINE HYDROCHLORIDE 50 MG/ML
50 INJECTION INTRAMUSCULAR; INTRAVENOUS ONCE AS NEEDED
Status: CANCELLED | OUTPATIENT
Start: 2024-06-14

## 2024-06-07 RX ORDER — SODIUM CHLORIDE 0.9 % (FLUSH) 0.9 %
10 SYRINGE (ML) INJECTION
Status: CANCELLED | OUTPATIENT
Start: 2024-06-14

## 2024-06-07 RX ORDER — SODIUM CHLORIDE 0.9 % (FLUSH) 0.9 %
10 SYRINGE (ML) INJECTION
Status: DISCONTINUED | OUTPATIENT
Start: 2024-06-07 | End: 2024-06-07 | Stop reason: HOSPADM

## 2024-06-07 RX ADMIN — IRON SUCROSE 200 MG: 20 INJECTION, SOLUTION INTRAVENOUS at 02:06

## 2024-06-07 RX ADMIN — SODIUM CHLORIDE: 9 INJECTION, SOLUTION INTRAVENOUS at 02:06

## 2024-06-07 NOTE — PLAN OF CARE
Problem: Adult Inpatient Plan of Care  Goal: Plan of Care Review  Outcome: Progressing  Flowsheets (Taken 6/7/2024 5013)  Plan of Care Reviewed With: patient   Pt tolerated venofer 200mg iv push well.  No adverse reaction noted.   IV flushed with NS and D/C per protocol.  Patient left clinic in no acute distress.

## 2024-06-07 NOTE — PLAN OF CARE
Problem: Adult Inpatient Plan of Care  Goal: Patient-Specific Goal (Individualized)  Outcome: Progressing  Flowsheets (Taken 6/7/2024 1422)  Individualized Care Needs: recliner, conversation  Anxieties, Fears or Concerns: pain, not sleeping well     Problem: Fatigue  Goal: Improved Activity Tolerance  Outcome: Progressing  Intervention: Promote Improved Energy  Flowsheets (Taken 6/7/2024 1422)  Fatigue Management: frequent rest breaks encouraged  Sleep/Rest Enhancement: regular sleep/rest pattern promoted  Activity Management: Ambulated -L4  Environmental Support: rest periods encouraged

## 2024-06-14 ENCOUNTER — INFUSION (OUTPATIENT)
Dept: INFUSION THERAPY | Facility: HOSPITAL | Age: 79
End: 2024-06-14
Attending: INTERNAL MEDICINE
Payer: MEDICARE

## 2024-06-14 VITALS
WEIGHT: 196.19 LBS | DIASTOLIC BLOOD PRESSURE: 62 MMHG | BODY MASS INDEX: 32.69 KG/M2 | SYSTOLIC BLOOD PRESSURE: 97 MMHG | TEMPERATURE: 99 F | RESPIRATION RATE: 16 BRPM | HEIGHT: 65 IN | OXYGEN SATURATION: 95 % | HEART RATE: 70 BPM

## 2024-06-14 DIAGNOSIS — D62 ACUTE BLOOD LOSS ANEMIA: Primary | ICD-10-CM

## 2024-06-14 PROCEDURE — 96374 THER/PROPH/DIAG INJ IV PUSH: CPT | Mod: HCNC,PN

## 2024-06-14 PROCEDURE — 25000003 PHARM REV CODE 250: Mod: HCNC,PN | Performed by: INTERNAL MEDICINE

## 2024-06-14 PROCEDURE — 63600175 PHARM REV CODE 636 W HCPCS: Mod: HCNC,PN | Performed by: INTERNAL MEDICINE

## 2024-06-14 RX ORDER — SODIUM CHLORIDE 0.9 % (FLUSH) 0.9 %
10 SYRINGE (ML) INJECTION
Status: DISCONTINUED | OUTPATIENT
Start: 2024-06-14 | End: 2024-06-14 | Stop reason: HOSPADM

## 2024-06-14 RX ORDER — EPINEPHRINE 0.3 MG/.3ML
0.3 INJECTION SUBCUTANEOUS ONCE AS NEEDED
OUTPATIENT
Start: 2024-06-21

## 2024-06-14 RX ORDER — DIPHENHYDRAMINE HYDROCHLORIDE 50 MG/ML
50 INJECTION INTRAMUSCULAR; INTRAVENOUS ONCE AS NEEDED
OUTPATIENT
Start: 2024-06-21

## 2024-06-14 RX ORDER — SODIUM CHLORIDE 0.9 % (FLUSH) 0.9 %
10 SYRINGE (ML) INJECTION
OUTPATIENT
Start: 2024-06-21

## 2024-06-14 RX ORDER — HEPARIN 100 UNIT/ML
500 SYRINGE INTRAVENOUS
OUTPATIENT
Start: 2024-06-21

## 2024-06-14 RX ORDER — HEPARIN 100 UNIT/ML
500 SYRINGE INTRAVENOUS
Status: DISCONTINUED | OUTPATIENT
Start: 2024-06-14 | End: 2024-06-14 | Stop reason: HOSPADM

## 2024-06-14 RX ADMIN — IRON SUCROSE 200 MG: 20 INJECTION, SOLUTION INTRAVENOUS at 02:06

## 2024-06-14 RX ADMIN — SODIUM CHLORIDE: 9 INJECTION, SOLUTION INTRAVENOUS at 02:06

## 2024-06-14 NOTE — PLAN OF CARE
Pt tolerated venofer well today, monitored for 30 min post infusion. Reviewed follow-up appointments. All questions were answered, pt adequate for d/c.

## 2024-06-24 ENCOUNTER — TELEPHONE (OUTPATIENT)
Dept: FAMILY MEDICINE | Facility: CLINIC | Age: 79
End: 2024-06-24
Payer: MEDICARE

## 2024-06-24 NOTE — TELEPHONE ENCOUNTER
Spoke with patient in regards to rescheduling her 7/2 appointment. Patient was scheduled for 6/28 at 4:00 pm with Dr. Nunes in person.

## 2024-06-28 ENCOUNTER — OFFICE VISIT (OUTPATIENT)
Dept: FAMILY MEDICINE | Facility: CLINIC | Age: 79
End: 2024-06-28
Payer: MEDICARE

## 2024-06-28 VITALS
SYSTOLIC BLOOD PRESSURE: 112 MMHG | HEART RATE: 71 BPM | HEIGHT: 65 IN | BODY MASS INDEX: 32.76 KG/M2 | OXYGEN SATURATION: 95 % | DIASTOLIC BLOOD PRESSURE: 72 MMHG | WEIGHT: 196.63 LBS

## 2024-06-28 DIAGNOSIS — E06.3 HYPOTHYROIDISM DUE TO HASHIMOTO'S THYROIDITIS: ICD-10-CM

## 2024-06-28 DIAGNOSIS — E03.8 HYPOTHYROIDISM DUE TO HASHIMOTO'S THYROIDITIS: ICD-10-CM

## 2024-06-28 DIAGNOSIS — I50.33 ACUTE ON CHRONIC HEART FAILURE WITH PRESERVED EJECTION FRACTION (HFPEF): Primary | ICD-10-CM

## 2024-06-28 DIAGNOSIS — D50.0 IRON DEFICIENCY ANEMIA DUE TO CHRONIC BLOOD LOSS: ICD-10-CM

## 2024-06-28 DIAGNOSIS — G47.01 INSOMNIA DUE TO MEDICAL CONDITION: ICD-10-CM

## 2024-06-28 DIAGNOSIS — I10 ESSENTIAL HYPERTENSION: ICD-10-CM

## 2024-06-28 DIAGNOSIS — E66.09 CLASS 1 OBESITY DUE TO EXCESS CALORIES WITH SERIOUS COMORBIDITY AND BODY MASS INDEX (BMI) OF 32.0 TO 32.9 IN ADULT: ICD-10-CM

## 2024-06-28 PROBLEM — D64.9 SYMPTOMATIC ANEMIA: Status: RESOLVED | Noted: 2024-03-29 | Resolved: 2024-06-28

## 2024-06-28 PROBLEM — R74.8 ELEVATED LIVER ENZYMES: Status: RESOLVED | Noted: 2023-10-09 | Resolved: 2024-06-28

## 2024-06-28 PROCEDURE — 99999 PR PBB SHADOW E&M-EST. PATIENT-LVL V: CPT | Mod: PBBFAC,HCNC,, | Performed by: INTERNAL MEDICINE

## 2024-06-28 NOTE — PROGRESS NOTES
"[facility]  Primary Care   [address]       Patient ID: Adry Owen     Chief Complaint:   Chief Complaint   Patient presents with    Follow-up     3 month           HPI:  Patient still has a good amount of fluid in her legs.  This is despite torsemide and Aldactone.  She is very good about taking daily weights through [facility] digital congestive heart failure system.  Because her weight has not varied they have not done the Furocix in over a month.  This deliver is 80 mg of Lasix over 5 hours.  I do wonder if a stronger diuretic such as Bumex is needed for short term to get her back to her dry weight but I will defer to her cardiologist and I did send them a message.  I did look at her labs from one-month ago and they look good.  She is starting to get uncomfortable with the tightness in her legs and is actually having some tightness in her hands.  Lungs sound good today but she still can not lay flat and breathe well.    Review of Systems       Leg edema     Objective:      Physical Exam   Physical Exam       Bilateral lower extremity edema     Vitals:   Vitals:    06/28/24 1550   BP: 112/72   Pulse: 71   SpO2: 95%   Weight: 89.2 kg (196 lb 10.4 oz)   Height: 5' 5" (1.651 m)        Assessment:           Plan:       Adry Owen  was seen today for follow-up and may need lab work.    Diagnoses and all orders for this visit:    Adry was seen today for follow-up.    Diagnoses and all orders for this visit:    Acute on chronic heart failure with preserved ejection fraction (HFpEF)  Still needs some fluid off- maybe Bumex with Aldactone?     Essential hypertension  Controlled without med     Iron deficiency anemia due to chronic blood loss  Finished IV iron - will Recheck labs in a few weeks     Hypothyroidism due to Hashimoto's thyroiditis  Controlled with med     Class 1 obesity due to excess calories with serious comorbidity and body mass index (BMI) of 32.0 to 32.9 in " adult  Monitor weight   Likely more due to edema     Insomnia due to medical condition  Meds per palliative care     Visit today included increased complexity associated with the care of the episodic problem Congestive Heart Failure Anemia Obesity addressed and managing the longitudinal care of the patient due to the serious and/or complex managed problem(s) .           Ross Nunes MD

## 2024-07-09 ENCOUNTER — OFFICE VISIT (OUTPATIENT)
Dept: PALLIATIVE MEDICINE | Facility: CLINIC | Age: 79
End: 2024-07-09
Payer: MEDICARE

## 2024-07-09 DIAGNOSIS — C92.11 CHRONIC MYELOID LEUKEMIA, BCR/ABL-POSITIVE, IN REMISSION: ICD-10-CM

## 2024-07-09 DIAGNOSIS — G47.00 INSOMNIA, UNSPECIFIED TYPE: ICD-10-CM

## 2024-07-09 DIAGNOSIS — I50.9 ACUTE DECOMPENSATED HEART FAILURE: ICD-10-CM

## 2024-07-09 DIAGNOSIS — Z51.5 PALLIATIVE CARE BY SPECIALIST: Primary | ICD-10-CM

## 2024-07-09 PROCEDURE — 99214 OFFICE O/P EST MOD 30 MIN: CPT | Mod: HCNC,95,, | Performed by: STUDENT IN AN ORGANIZED HEALTH CARE EDUCATION/TRAINING PROGRAM

## 2024-07-09 PROCEDURE — 1123F ACP DISCUSS/DSCN MKR DOCD: CPT | Mod: HCNC,CPTII,95, | Performed by: STUDENT IN AN ORGANIZED HEALTH CARE EDUCATION/TRAINING PROGRAM

## 2024-07-09 NOTE — PROGRESS NOTES
The patient location is: [place]    Visit type: audiovisual    Face to Face time with patient: 15  30 minutes of total time spent on the encounter, which includes face to face time and non-face to face time preparing to see the patient (eg, review of tests), Obtaining and/or reviewing separately obtained history, Documenting clinical information in the electronic or other health record, Independently interpreting results (not separately reported) and communicating results to the patient/family/caregiver, or Care coordination (not separately reported).         Each patient to whom he or she provides medical services by telemedicine is:  (1) informed of the relationship between the physician and patient and the respective role of any other health care provider with respect to management of the patient; and (2) notified that he or she may decline to receive medical services by telemedicine and may withdraw from such care at any time.    Notes:    Office Visit  [facility]      Reason for Consult: CHF      ASSESSMENT/PLAN:     Plan/Recommendations:  Diagnoses and all orders for this visit:    Palliative care by specialist    Insomnia, unspecified type    Acute decompensated heart failure    Chronic myeloid leukemia, BCR/ABL-positive, in remission            # Insomnia  Some improvement with starting Sonata. Could not take Belsomra due to drug drug interaction with Tasigna. Difficulty with sleep onset and maintenance for several years, worse with fluid overload state. Has tried multiple sleep agents including lunesta, ambien, magnesium, chamomile, melatonin.  - Continue sonata 5mg qhs  - Sleep hygiene   - Continue goal directed medical therapy of CHF    # Diastolic HFpEF  EF 55% per last echo on 03/30/24. Improving diuresis from prior however still with dyspnea, leg swelling and resulting difficulty with ambulation.   - Continue cardiology follow up  - Continue goal directed medical therapy  - Fluid  restriction < 2L per day  - Salt restriction < 2g per day  - Continue follow up with CHF program    # Palliative care by specialist   Patient with multiple chronic medical conditions who lives alone with several weeks of inappropriate diuresis and resulting fluid overload.     # CML  In remission.  - Continue follow up with Dr. Marvin    # Advance care planning  Discussed importance of advance care planning including serious illness conversations with next of kin including interventions such as CPR and mechanical ventilation. Also discussed the importance of documentation of these wishes in a formal living will. Additional documents including LAPOST and HCPOA were also discussed briefly.     Follow up: 1 month, 8/1/24      SUBJECTIVE:     History of Present Illness:  Patient is a 79 y.o. year old female presenting with a history of CML, CAD, AFib on Xarelto, sick sinus syndrome status post pacemaker implantation, hypertension. Presents via virtual visit for follow up.      7/9:    Has been taking Sonata for three days. Sleep is a little better. Still broken. Still having daytime sleepiness because she is sleeping so late into the night. Hopeful this will improve as her schedule adjusts as she gets more sleep. Still dozes off while on the computer. She thinks it is from not getting enough sleep long term. Labs looking better but sodium is low. Tracking weight daily.     6/18:    3 out of 7 days are improved with sleep. She still falls asleep randomly. She must take the medication when she is in bed. She is having daytime somnolence because she is not sleeping well at night. It is a vicious cycle. She went out into the sun to keep herself awake. Picked blueberries. Cooked all day yesterday.     6/11:    She has started taking Belsomra. She has had continued difficulty with getting enough sleep. Feels tired. Feels no effect with initiating Belsomra. Went two days without sleeping and then sleeps hard. She is getting  "Belsomra from a specialty pharmacy. Has 20 tablets left.     5/24/24:      Lifetime issue with insomnia. Fluid overload has made insomnia worse. She has tried lunesta, ambien ER. He has tried natural methods with magnesium and chamomile. She also tried melatonin. She has problems with getting to sleep and staying asleep. She sleeps for 3-4 hours consistently. She gets to sleep early in the morning at 730 then she gets called at 9 am by home visiting providers.       Initial visit:   Admits to shortness of breath while sitting still or after talking a lot. Dustin Arguello visited her yesterday. Has continued leg swelling since admission. Cannot fit into her pants. Has gained more than 30 lbs prior to admission. Sees cardiology again tomorrow.     Does not eat salt at all. Did not have appropriate fluid output with lasix or spironolactone alone. Was started on torsemide in hospital. Has about 1L urine output in the past two days. Some coughing and wheezing.     Her son has POA. Has a copy of a LAPOST she needs to complete.     Treated by Dr. Marvin for CML on lifelong treatment. Told she is "undetectable" for 2 years.     Functionally difficult since leaving the hospital.     Has home health coming to the house. Lives alone.     Past Medical History:   Diagnosis Date    *Atrial fibrillation     *Atrial flutter     Anticoagulant long-term use     xarelto    Arthritis     back and hip pain    Asthma     Atrial fibrillation or flutter     Cancer     leukemia CML    Cataract     CHF (congestive heart failure)     Chronic neck pain     Coronary artery disease 2004    1 stent    Diabetes mellitus     Encephalopathy, toxic 04/15/2023    Encounter for blood transfusion     Fibromyalgia     GI bleed 08/26/2013    Hashimoto's disease     Heart block     History of diverticulitis     History of GI bleed     Hyperlipidemia     Hypertension     Iron deficiency anemia due to chronic blood loss 04/30/2024    MI (myocardial infarction) "     1992    Osteopenia of necks of both femurs 01/12/2023    Problems with swallowing     bone spur and scarring C1-C2    Sleep apnea     bipap and or breathe rite strips    Thyroid disease      Past Surgical History:   Procedure Laterality Date    ABLATION  2/8/2024    Procedure: Ablation AV Node;  Surgeon: Xavier Ahuja III, MD;  Location: [facility] CATH;  Service: Cardiology;;    ABLATION OF DYSRHYTHMIC FOCUS      ADENOIDECTOMY      APPENDECTOMY      BACK SURGERY  2015    cervical fusion    CARDIAC CATHETERIZATION  2004    1 stent    CARDIOVERSION  05/2018    CATARACT EXTRACTION W/  INTRAOCULAR LENS IMPLANT Bilateral 2015    now has scarring needs laser sgy    CATHETERIZATION OF BOTH LEFT AND RIGHT HEART  5/20/2024    Procedure: Right / Left heart cath;  Surgeon: Xavier Ahuja III, MD;  Location: [facility] CATH;  Service: Cardiology;;    CERVICAL FUSION      three surgeries last 4/13/2023 / plates screws    ESOPHAGOGASTRODUODENOSCOPY N/A 3/30/2024    Procedure: EGD (ESOPHAGOGASTRODUODENOSCOPY);  Surgeon: Victor Hugo Ovalle MD;  Location: [facility] ENDO;  Service: Endoscopy;  Laterality: N/A;    EXCISION OF MASS OF HAND Right 06/12/2018    Procedure: EXCISION, MASS, HAND/ Long finger with osteophyte removal;  Surgeon: ORION Mendoza MD;  Location: [facility];  Service: General;  Laterality: Right;    EYE SURGERY      FRACTURE SURGERY      HAND TENDON SURGERY Right      x 2 1986 & 1996    HYSTERECTOMY  2001    bso    INSERTION OF PACEMAKER N/A 11/27/2019    Procedure: INSERTION, PACEMAKER, DUAL CHAMBER-BIOTRONIK;  Surgeon: Xavier Ahuja III, MD;  Location: [facility] CATH;  Service: Cardiology;  Laterality: N/A;    LEFT HEART CATHETERIZATION Left 04/21/2022    Procedure: Left heart cath;  Surgeon: Xavier Ahuja III, MD;  Location: [facility] CATH;  Service: Cardiology;  Laterality: Left;    loop recorder insertion      POSTERIOR FUSION LUMBAR SPINE W/ CORPECTOMY      with disc replacement x 2    REMOVAL OF IMPLANTABLE LOOP RECORDER  Left 11/27/2019    Procedure: REMOVAL, IMPLANTABLE LOOP RECORDER;  Surgeon: Xavier Ahuja III, MD;  Location: [facility];  Service: Cardiology;  Laterality: Left;    SHOULDER ARTHROSCOPY W/ ROTATOR CUFF REPAIR Right 1992    SPINE SURGERY      TONSILLECTOMY       Family History   Problem Relation Name Age of Onset    Cancer Mother wolfgang coelho 73        leukemia    Hypertension Mother wolfgang coelho     Glaucoma Father kady coelho     Heart disease Father kady coelho     Cancer Father kady coelho 76        testicular    Diabetes Father kady coelho     Hearing loss Father kady coelho     Stroke Father kady coleho     Vision loss Father kady coelho     Heart disease Brother blanca coelho     Asthma Brother blanca coelho     Hearing loss Maternal Grandmother dennis bynum     Diabetes Daughter Jana Muller     Amblyopia Neg Hx      Blindness Neg Hx      Cataracts Neg Hx      Macular degeneration Neg Hx      Retinal detachment Neg Hx      Strabismus Neg Hx      Thyroid disease Neg Hx       Review of patient's allergies indicates:   Allergen Reactions    Alcohol prep pads [alcohol swabs] Hives and Rash    Diazolidinyl urea Anaphylaxis    Influenza virus vaccines Hives and Swelling    Iodine Anaphylaxis     Other reaction(s): Unknown    Preservative Anaphylaxis     Many different preservatives per pt report    Thimerosal Anaphylaxis    Bactoshield chg [chlorhexidine gluconate] Rash    Feldene [piroxicam]      Syncope, muscle spasms.     Cephalosporins Rash    Lisinopril Palpitations    Penicillins Rash     Other reaction(s): Unknown    Sotalol Palpitations     Social Determinants of Health     Tobacco Use: Low Risk  (6/28/2024)    Patient History     Smoking Tobacco Use: Never     Smokeless Tobacco Use: Never     Passive Exposure: Not on file   Alcohol Use: Not At Risk (2/29/2024)    AUDIT-C     Frequency of Alcohol Consumption: Never     Average Number of Drinks: Patient does not drink     Frequency of Binge Drinking: Never    Financial Resource Strain: Medium Risk (2/29/2024)    Overall Financial Resource Strain (CARDIA)     Difficulty of Paying Living Expenses: Somewhat hard   Food Insecurity: No Food Insecurity (3/30/2024)    Hunger Vital Sign     Worried About Running Out of Food in the Last Year: Never true     Ran Out of Food in the Last Year: Never true   Transportation Needs: No Transportation Needs (5/21/2024)    OASIS : Transportation     Lack of Transportation (Medical): No     Lack of Transportation (Non-Medical): No     Patient Unable or Declines to Respond: No   Recent Concern: Transportation Needs - Unmet Transportation Needs (2/29/2024)    PRAPARE - Transportation     Lack of Transportation (Medical): Yes     Lack of Transportation (Non-Medical): Yes   Physical Activity: Insufficiently Active (2/29/2024)    Exercise Vital Sign     Days of Exercise per Week: 5 days     Minutes of Exercise per Session: 20 min   Stress: No Stress Concern Present (2/29/2024)    [employer] [place] [employer] - Occupational Stress Questionnaire     Feeling of Stress : Not at all   Housing Stability: Low Risk  (3/30/2024)    Housing Stability Vital Sign     Unable to Pay for Housing in the Last Year: No     Number of Places Lived in the Last Year: 1     Unstable Housing in the Last Year: No   Depression: Low Risk  (1/9/2024)    Depression     Last PHQ-4: Flowsheet Data: 0   Utilities: Not on file   Health Literacy: Adequate Health Literacy (5/21/2024)    OASIS : Health Literacy     Frequency of needing help to read materials from doctor or pharmacy: Never   Social Isolation: Feeling Socially Integrated (4/18/2024)    OASIS : Social Isolation     Frequency of experiencing loneliness or isolation: Never          Medications:    Current Outpatient Medications:     acetaminophen (TYLENOL) 500 MG tablet, Take 500 mg by mouth 2 (two) times daily as needed for Pain., Disp: , Rfl:     ASPIRIN LOW DOSE 81 mg EC tablet, Take 81  mg by mouth once daily., Disp: , Rfl:     azelastine (ASTELIN) 137 mcg (0.1 %) nasal spray, 1 spray (137 mcg total) by Nasal route 2 (two) times daily., Disp: 30 mL, Rfl: 11    benzonatate (TESSALON) 100 MG capsule, 1 - 2 po every 6 hours prn cough, Disp: 45 capsule, Rfl: 0    bumetanide (BUMEX) 2 MG tablet, Take 1 tablet (2 mg total) by mouth once daily., Disp: 30 tablet, Rfl: 1    CHAMOMILE FLOWERS ORAL, Take 1,100 mg by mouth once daily. Indications: supplement, Disp: , Rfl:     CHROMIUM ORAL, Take 1,000 mcg by mouth Daily. Indications: supplement, Disp: , Rfl:     cinnamon bark (CINNAMON) 500 mg capsule, Take 500 mg by mouth once daily. Indications: supplement, Disp: , Rfl:     cyclobenzaprine (FLEXERIL) 10 MG tablet, Take 10 mg by mouth every 8 (eight) hours as needed for Muscle spasms. Indications: muscle spasm, Disp: , Rfl:     cycloSPORINE (RESTASIS) 0.05 % ophthalmic emulsion, PLACE 1 DROP INTO BOTH EYES 2 TIMES DAILY., Disp: 60 each, Rfl: 11    empagliflozin (JARDIANCE) 25 mg tablet, Take 1 tablet (25 mg total) by mouth once daily., Disp: 30 tablet, Rfl: 5    ergocalciferol (ERGOCALCIFEROL) 50,000 unit Cap, Take 1 capsule (50,000 Units total) by mouth every 7 days., Disp: 12 capsule, Rfl: 3    fluticasone propionate (FLONASE) 50 mcg/actuation nasal spray, USE 1 SPRAY IN EACH NOSTRIL EVERY DAY, Disp: 32 g, Rfl: 3    fluticasone-salmeterol diskus inhaler 250-50 mcg, Inhale 1 puff into the lungs 2 (two) times daily. Controller, Disp: 60 each, Rfl: 2    furosemide (FUROSCIX) 80 mg/10 mL Kit, Inject 80 mg into the skin once., Disp: , Rfl:     gabapentin (NEURONTIN) 300 MG capsule, TAKE 2 CAPSULES BY MOUTH DAILY IN THE MORNING AND 3 CAPSULES IN THE EVENING, Disp: 450 capsule, Rfl: 3    ipratropium-albuteroL (COMBIVENT RESPIMAT)  mcg/actuation inhaler, Inhale 1 puff into the lungs every 4 (four) hours as needed for Wheezing or Shortness of Breath. Rescue, Disp: 12 g, Rfl: 0    levocetirizine (XYZAL) 5 MG  tablet, TAKE 1 TABLET EVERY EVENING, Disp: 90 tablet, Rfl: 3    levothyroxine (SYNTHROID) 75 MCG tablet, Take 1 tablet (75 mcg total) by mouth once daily., Disp: 90 tablet, Rfl: 3    melatonin 10 mg Cap, Take 30 mg by mouth every evening., Disp: , Rfl:     nilotinib (TASIGNA) 150 mg capsule, Take 2 capsules (300 mg total) by mouth twice daily, Disp: 120 capsule, Rfl: 5    nitroGLYCERIN (NITROSTAT) 0.4 MG SL tablet, Place 1 tablet (0.4 mg total) under the tongue every 5 (five) minutes as needed for Chest pain., Disp: 25 tablet, Rfl: 2    NON FORMULARY MEDICATION, Take 200 mg by mouth once daily. mAGNESIUM bISGLYCINATE Chelate  Indications: supplement, Disp: , Rfl:     NON FORMULARY MEDICATION, Take 50 mg by mouth once daily. Apigenin  Indications: supplement, Disp: , Rfl:     oxyCODONE-acetaminophen (PERCOCET)  mg per tablet, Take 0.5-1 tablets by mouth every 4 (four) hours as needed for Pain. Indications: pain, Disp: , Rfl:     pantoprazole (PROTONIX) 40 MG tablet, Take 1 tablet (40 mg total) by mouth once daily., Disp: 90 tablet, Rfl: 3    rosuvastatin (CRESTOR) 10 MG tablet, Take 1 tablet (10 mg total) by mouth every evening., Disp: 90 tablet, Rfl: 3    spironolactone (ALDACTONE) 25 MG tablet, Take 2 tablets (50 mg total) by mouth once daily., Disp: 180 tablet, Rfl: 3    TURMERIC-GINGER-BLACK PEPPER ORAL, Take 3 capsules by mouth once daily. Indications: supplement, Disp: , Rfl:     XARELTO 20 mg Tab, TAKE 1 TABLET EVERY DAY WITH DINNER OR EVENING MEAL, Disp: 90 tablet, Rfl: 3    zaleplon (SONATA) 5 MG Cap, Take 1 capsule (5 mg total) by mouth every evening., Disp: 30 capsule, Rfl: 0    OBJECTIVE:       ROS:  Review of Systems   Constitutional:  Positive for activity change, fatigue and unexpected weight change. Negative for appetite change.   HENT:  Negative for mouth sores.    Eyes:  Positive for visual disturbance.   Respiratory:  Positive for shortness of breath. Negative for cough.    Cardiovascular:   Positive for leg swelling. Negative for chest pain.   Gastrointestinal:  Negative for abdominal pain, constipation, diarrhea, nausea and vomiting.   Genitourinary:  Positive for frequency.   Musculoskeletal:  Positive for back pain.   Skin:  Negative for rash.   Neurological:  Negative for headaches.   Hematological:  Negative for adenopathy.   Psychiatric/Behavioral:  Negative for dysphoric mood and sleep disturbance. The patient is not nervous/anxious.        Review of Symptoms      Symptom Assessment (ESAS 0-10 Scale)  Pain:  0  Dyspnea:  0  Anxiety:  0  Nausea:  0  Depression:  0  Anorexia:  0  Fatigue:  0  Insomnia:  0  Restlessness:  0  Agitation:  0       Anxiety:  Is not nervous/anxious  Constipation:  No constipation    Performance Status:  70    ECOG Performance Status ndGndrndanddndend:nd nd2nd Living Arrangements:  Lives alone    Psychosocial/Cultural:   See Palliative Psychosocial Note: Yes  **Primary  to Follow**  Palliative Care  Consult: No     Time-Based Charting:  Yes  Chart Review: 15 minutes  Face to Face: 15 minutes    Total Time Spent: 30 minutes      Advance Care Planning   Advance Directives:   Living Will: Yes        Copy on chart: Yes    Medical Power of : Yes      Decision Making:  Patient answered questions  Goals of Care: What is most important right now is to focus on remaining as independent as possible, improvement in condition but with limits to invasive therapies. Accordingly, we have decided that the best plan to meet the patient's goals includes continuing with treatment.              Physical Exam:  Vitals:    Physical Exam  Vitals and nursing note reviewed.   Constitutional:       General: She is not in acute distress.     Appearance: Normal appearance. She is obese. She is not ill-appearing or toxic-appearing.   Pulmonary:      Effort: Pulmonary effort is normal.   Musculoskeletal:      Cervical back: Normal range of motion.   Skin:     Coloration: Skin is  not jaundiced or pale.   Neurological:      Mental Status: She is alert and oriented to person, place, and time.   Psychiatric:         Mood and Affect: Mood normal.         Behavior: Behavior normal.         Thought Content: Thought content normal.         Judgment: Judgment normal.         Labs:  CBC:   WBC   Date Value Ref Range Status   05/20/2024 5.96 3.90 - 12.70 K/uL Final     Hemoglobin   Date Value Ref Range Status   05/20/2024 11.0 (L) 12.0 - 16.0 g/dL Final     Hematocrit   Date Value Ref Range Status   05/20/2024 36.3 (L) 37.0 - 48.5 % Final     MCV   Date Value Ref Range Status   05/20/2024 78 (L) 82 - 98 fL Final     Platelets   Date Value Ref Range Status   05/20/2024 273 150 - 450 K/uL Final       LFT:   Lab Results   Component Value Date    AST 30 04/30/2024    ALKPHOS 67 04/30/2024    BILITOT 0.9 04/30/2024       Albumin:   Albumin   Date Value Ref Range Status   04/30/2024 4.5 3.5 - 5.2 g/dL Final     Protein:   Total Protein   Date Value Ref Range Status   04/30/2024 7.6 6.0 - 8.4 g/dL Final       Radiology:None      30 minutes of total time spent on the encounter, which includes face to face time and non-face to face time preparing to see the patient (eg, review of tests), Obtaining and/or reviewing separately obtained history, Documenting clinical information in the electronic or other health record, Independently interpreting results (not separately reported) and communicating results to the patient/family/caregiver, or Care coordination (not separately reported).    Signature: Deshaun Love DO

## 2024-07-31 ENCOUNTER — PATIENT MESSAGE (OUTPATIENT)
Dept: FAMILY MEDICINE | Facility: CLINIC | Age: 79
End: 2024-07-31
Payer: MEDICARE

## 2024-07-31 DIAGNOSIS — H04.129 DRY EYE: Primary | ICD-10-CM

## 2024-07-31 DIAGNOSIS — E78.49 OTHER HYPERLIPIDEMIA: ICD-10-CM

## 2024-08-01 RX ORDER — CYCLOSPORINE 0.5 MG/ML
1 EMULSION OPHTHALMIC 2 TIMES DAILY
Qty: 60 EACH | Refills: 11 | Status: SHIPPED | OUTPATIENT
Start: 2024-08-01 | End: 2024-08-01 | Stop reason: SDUPTHER

## 2024-08-01 RX ORDER — CYCLOSPORINE 0.5 MG/ML
1 EMULSION OPHTHALMIC 2 TIMES DAILY
Qty: 60 EACH | Refills: 11 | Status: SHIPPED | OUTPATIENT
Start: 2024-08-01 | End: 2025-08-01

## 2024-08-01 RX ORDER — ROSUVASTATIN CALCIUM 10 MG/1
10 TABLET, COATED ORAL NIGHTLY
Qty: 90 TABLET | Refills: 3 | Status: SHIPPED | OUTPATIENT
Start: 2024-08-01 | End: 2025-08-01

## 2024-08-01 RX ORDER — ROSUVASTATIN CALCIUM 10 MG/1
10 TABLET, COATED ORAL NIGHTLY
Qty: 90 TABLET | Refills: 0 | Status: SHIPPED | OUTPATIENT
Start: 2024-08-01 | End: 2024-08-01 | Stop reason: SDUPTHER

## 2024-08-01 NOTE — TELEPHONE ENCOUNTER
Refill Routing Note   Medication(s) are not appropriate for processing by [facility] for the following reason(s):        Outside of protocol: Cyclosporine    [facility] action(s):  Route  Approve     Requires labs : Yes             Appointments  past 12m or future 3m with PCP    Date Provider   Last Visit   6/28/2024 Ross Nunes MD   Next Visit   10/28/2024 Ross Nunes MD   ED visits in past 90 days: 0        Note composed:10:46 AM 08/01/2024

## 2024-08-01 NOTE — TELEPHONE ENCOUNTER
Care Due:                  Date            Visit Type   Department     Provider  --------------------------------------------------------------------------------                                EP -                              [facility] FAMILY  Last Visit: 06-      CARE ([facility])   ALEX Nunes                               -                              [facility]  Next Visit: 10-      CARE ([facility])   MEDICINE       Ross Nunes                                                            Last  Test          Frequency    Reason                     Performed    Due Date  --------------------------------------------------------------------------------    Lipid Panel.  12 months..  rosuvastatin.............  09- 09-    [facility] Embedded Care Due Messages. Reference number: [id number].   8/01/2024 10:37:01 AM CDT

## 2024-08-06 PROBLEM — I50.32 CHRONIC HEART FAILURE WITH PRESERVED EJECTION FRACTION (HFPEF): Status: ACTIVE | Noted: 2024-01-09

## 2024-08-06 PROBLEM — I50.33 ACUTE ON CHRONIC HEART FAILURE WITH PRESERVED EJECTION FRACTION (HFPEF): Status: RESOLVED | Noted: 2024-03-30 | Resolved: 2024-08-06

## 2024-10-28 ENCOUNTER — LAB VISIT (OUTPATIENT)
Dept: LAB | Facility: HOSPITAL | Age: 79
End: 2024-10-28
Attending: INTERNAL MEDICINE
Payer: MEDICARE

## 2024-10-28 ENCOUNTER — OFFICE VISIT (OUTPATIENT)
Dept: FAMILY MEDICINE | Facility: CLINIC | Age: 79
End: 2024-10-28
Payer: MEDICARE

## 2024-10-28 VITALS
HEIGHT: 65 IN | DIASTOLIC BLOOD PRESSURE: 70 MMHG | BODY MASS INDEX: 29.82 KG/M2 | WEIGHT: 179 LBS | SYSTOLIC BLOOD PRESSURE: 116 MMHG | HEART RATE: 69 BPM | OXYGEN SATURATION: 98 %

## 2024-10-28 DIAGNOSIS — E78.49 OTHER HYPERLIPIDEMIA: ICD-10-CM

## 2024-10-28 DIAGNOSIS — N18.31 CHRONIC KIDNEY DISEASE, STAGE 3A: ICD-10-CM

## 2024-10-28 DIAGNOSIS — M54.12 CERVICAL RADICULOPATHY: ICD-10-CM

## 2024-10-28 DIAGNOSIS — C92.11 CHRONIC MYELOID LEUKEMIA, BCR/ABL-POSITIVE, IN REMISSION: ICD-10-CM

## 2024-10-28 DIAGNOSIS — I50.33 ACUTE ON CHRONIC HEART FAILURE WITH PRESERVED EJECTION FRACTION (HFPEF): ICD-10-CM

## 2024-10-28 DIAGNOSIS — K27.9 PUD (PEPTIC ULCER DISEASE): ICD-10-CM

## 2024-10-28 DIAGNOSIS — I10 ESSENTIAL HYPERTENSION: Primary | ICD-10-CM

## 2024-10-28 DIAGNOSIS — G89.29 CHRONIC NECK PAIN: ICD-10-CM

## 2024-10-28 DIAGNOSIS — G47.01 INSOMNIA DUE TO MEDICAL CONDITION: ICD-10-CM

## 2024-10-28 DIAGNOSIS — E06.3 HYPOTHYROIDISM DUE TO HASHIMOTO'S THYROIDITIS: ICD-10-CM

## 2024-10-28 DIAGNOSIS — M54.2 CHRONIC NECK PAIN: ICD-10-CM

## 2024-10-28 DIAGNOSIS — D50.0 IRON DEFICIENCY ANEMIA DUE TO CHRONIC BLOOD LOSS: ICD-10-CM

## 2024-10-28 DIAGNOSIS — I48.11 LONGSTANDING PERSISTENT ATRIAL FIBRILLATION: ICD-10-CM

## 2024-10-28 PROBLEM — E66.09 CLASS 1 OBESITY DUE TO EXCESS CALORIES WITH SERIOUS COMORBIDITY AND BODY MASS INDEX (BMI) OF 32.0 TO 32.9 IN ADULT: Status: RESOLVED | Noted: 2023-10-09 | Resolved: 2024-10-28

## 2024-10-28 PROBLEM — E66.811 CLASS 1 OBESITY DUE TO EXCESS CALORIES WITH SERIOUS COMORBIDITY AND BODY MASS INDEX (BMI) OF 32.0 TO 32.9 IN ADULT: Status: RESOLVED | Noted: 2023-10-09 | Resolved: 2024-10-28

## 2024-10-28 LAB
ALBUMIN SERPL BCP-MCNC: 4.5 G/DL (ref 3.5–5.2)
ALP SERPL-CCNC: 49 U/L (ref 40–150)
ALT SERPL W/O P-5'-P-CCNC: 26 U/L (ref 10–44)
ANION GAP SERPL CALC-SCNC: 14 MMOL/L (ref 8–16)
AST SERPL-CCNC: 22 U/L (ref 10–40)
BASOPHILS # BLD AUTO: 0.05 K/UL (ref 0–0.2)
BASOPHILS NFR BLD: 0.5 % (ref 0–1.9)
BILIRUB SERPL-MCNC: 0.9 MG/DL (ref 0.1–1)
BUN SERPL-MCNC: 19 MG/DL (ref 8–23)
CALCIUM SERPL-MCNC: 9.6 MG/DL (ref 8.7–10.5)
CHLORIDE SERPL-SCNC: 97 MMOL/L (ref 95–110)
CHOLEST SERPL-MCNC: 151 MG/DL (ref 120–199)
CHOLEST/HDLC SERPL: 2.6 {RATIO} (ref 2–5)
CO2 SERPL-SCNC: 26 MMOL/L (ref 23–29)
CREAT SERPL-MCNC: 1.1 MG/DL (ref 0.5–1.4)
DIFFERENTIAL METHOD BLD: ABNORMAL
EOSINOPHIL # BLD AUTO: 0.1 K/UL (ref 0–0.5)
EOSINOPHIL NFR BLD: 0.6 % (ref 0–8)
ERYTHROCYTE [DISTWIDTH] IN BLOOD BY AUTOMATED COUNT: 14.6 % (ref 11.5–14.5)
EST. GFR  (NO RACE VARIABLE): 51.1 ML/MIN/1.73 M^2
FERRITIN SERPL-MCNC: 93 NG/ML (ref 20–300)
GLUCOSE SERPL-MCNC: 109 MG/DL (ref 70–110)
HCT VFR BLD AUTO: 41 % (ref 37–48.5)
HDLC SERPL-MCNC: 58 MG/DL (ref 40–75)
HDLC SERPL: 38.4 % (ref 20–50)
HGB BLD-MCNC: 14 G/DL (ref 12–16)
IMM GRANULOCYTES # BLD AUTO: 0.04 K/UL (ref 0–0.04)
IMM GRANULOCYTES NFR BLD AUTO: 0.4 % (ref 0–0.5)
LDLC SERPL CALC-MCNC: 75.4 MG/DL (ref 63–159)
LYMPHOCYTES # BLD AUTO: 1.5 K/UL (ref 1–4.8)
LYMPHOCYTES NFR BLD: 15.6 % (ref 18–48)
MCH RBC QN AUTO: 30.2 PG (ref 27–31)
MCHC RBC AUTO-ENTMCNC: 34.1 G/DL (ref 32–36)
MCV RBC AUTO: 89 FL (ref 82–98)
MONOCYTES # BLD AUTO: 0.8 K/UL (ref 0.3–1)
MONOCYTES NFR BLD: 8.3 % (ref 4–15)
NEUTROPHILS # BLD AUTO: 7.2 K/UL (ref 1.8–7.7)
NEUTROPHILS NFR BLD: 74.6 % (ref 38–73)
NONHDLC SERPL-MCNC: 93 MG/DL
NRBC BLD-RTO: 0 /100 WBC
PLATELET # BLD AUTO: 260 K/UL (ref 150–450)
PMV BLD AUTO: 11.2 FL (ref 9.2–12.9)
POTASSIUM SERPL-SCNC: 4 MMOL/L (ref 3.5–5.1)
PROT SERPL-MCNC: 8.1 G/DL (ref 6–8.4)
RBC # BLD AUTO: 4.63 M/UL (ref 4–5.4)
SODIUM SERPL-SCNC: 137 MMOL/L (ref 136–145)
TRIGL SERPL-MCNC: 88 MG/DL (ref 30–150)
WBC # BLD AUTO: 9.68 K/UL (ref 3.9–12.7)

## 2024-10-28 PROCEDURE — 80053 COMPREHEN METABOLIC PANEL: CPT | Mod: HCNC | Performed by: INTERNAL MEDICINE

## 2024-10-28 PROCEDURE — 1125F AMNT PAIN NOTED PAIN PRSNT: CPT | Mod: HCNC,CPTII,S$GLB, | Performed by: INTERNAL MEDICINE

## 2024-10-28 PROCEDURE — 85025 COMPLETE CBC W/AUTO DIFF WBC: CPT | Mod: HCNC | Performed by: INTERNAL MEDICINE

## 2024-10-28 PROCEDURE — 80061 LIPID PANEL: CPT | Mod: HCNC | Performed by: INTERNAL MEDICINE

## 2024-10-28 PROCEDURE — 36415 COLL VENOUS BLD VENIPUNCTURE: CPT | Mod: HCNC,PO | Performed by: INTERNAL MEDICINE

## 2024-10-28 PROCEDURE — 99214 OFFICE O/P EST MOD 30 MIN: CPT | Mod: HCNC,S$GLB,, | Performed by: INTERNAL MEDICINE

## 2024-10-28 PROCEDURE — 1157F ADVNC CARE PLAN IN RCRD: CPT | Mod: HCNC,CPTII,S$GLB, | Performed by: INTERNAL MEDICINE

## 2024-10-28 PROCEDURE — 99999 PR PBB SHADOW E&M-EST. PATIENT-LVL V: CPT | Mod: PBBFAC,HCNC,, | Performed by: INTERNAL MEDICINE

## 2024-10-28 PROCEDURE — 1159F MED LIST DOCD IN RCRD: CPT | Mod: HCNC,CPTII,S$GLB, | Performed by: INTERNAL MEDICINE

## 2024-10-28 PROCEDURE — 1101F PT FALLS ASSESS-DOCD LE1/YR: CPT | Mod: HCNC,CPTII,S$GLB, | Performed by: INTERNAL MEDICINE

## 2024-10-28 PROCEDURE — 3288F FALL RISK ASSESSMENT DOCD: CPT | Mod: HCNC,CPTII,S$GLB, | Performed by: INTERNAL MEDICINE

## 2024-10-28 PROCEDURE — 3074F SYST BP LT 130 MM HG: CPT | Mod: HCNC,CPTII,S$GLB, | Performed by: INTERNAL MEDICINE

## 2024-10-28 PROCEDURE — 82728 ASSAY OF FERRITIN: CPT | Mod: HCNC | Performed by: INTERNAL MEDICINE

## 2024-10-28 PROCEDURE — 1160F RVW MEDS BY RX/DR IN RCRD: CPT | Mod: HCNC,CPTII,S$GLB, | Performed by: INTERNAL MEDICINE

## 2024-10-28 PROCEDURE — 3078F DIAST BP <80 MM HG: CPT | Mod: HCNC,CPTII,S$GLB, | Performed by: INTERNAL MEDICINE

## 2024-10-28 PROCEDURE — G2211 COMPLEX E/M VISIT ADD ON: HCPCS | Mod: HCNC,S$GLB,, | Performed by: INTERNAL MEDICINE

## 2024-10-29 ENCOUNTER — TELEPHONE (OUTPATIENT)
Dept: PSYCHIATRY | Facility: CLINIC | Age: 79
End: 2024-10-29
Payer: MEDICARE

## 2024-11-15 ENCOUNTER — INFUSION (OUTPATIENT)
Dept: INFUSION THERAPY | Facility: HOSPITAL | Age: 79
End: 2024-11-15
Attending: INTERNAL MEDICINE
Payer: MEDICARE

## 2024-11-15 VITALS
SYSTOLIC BLOOD PRESSURE: 116 MMHG | HEART RATE: 69 BPM | RESPIRATION RATE: 18 BRPM | DIASTOLIC BLOOD PRESSURE: 70 MMHG | TEMPERATURE: 98 F | OXYGEN SATURATION: 99 %

## 2024-11-15 DIAGNOSIS — M85.852 OSTEOPENIA OF NECKS OF BOTH FEMURS: Primary | ICD-10-CM

## 2024-11-15 DIAGNOSIS — M85.851 OSTEOPENIA OF NECKS OF BOTH FEMURS: Primary | ICD-10-CM

## 2024-11-15 PROCEDURE — 63600175 PHARM REV CODE 636 W HCPCS: Mod: JZ,JG,HCNC,PN | Performed by: INTERNAL MEDICINE

## 2024-11-15 RX ADMIN — DENOSUMAB 60 MG: 60 INJECTION SUBCUTANEOUS at 03:11

## 2024-12-06 DIAGNOSIS — E55.9 VITAMIN D DEFICIENCY: ICD-10-CM

## 2024-12-06 RX ORDER — ERGOCALCIFEROL 1.25 MG/1
50000 CAPSULE ORAL
Qty: 12 CAPSULE | Refills: 3 | Status: SHIPPED | OUTPATIENT
Start: 2024-12-06 | End: 2025-12-06

## 2024-12-14 DIAGNOSIS — J30.1 NON-SEASONAL ALLERGIC RHINITIS DUE TO POLLEN: ICD-10-CM

## 2024-12-14 NOTE — TELEPHONE ENCOUNTER
Care Due:                  Date            Visit Type   Department     Provider  --------------------------------------------------------------------------------                                EP -                              [facility] FAMILY  Last Visit: 10-      CARE ([facility])   ALEX Nunes                               -                              [facility]  Next Visit: 02-      CARE ([facility])   MEDICINE       Ross Nunes                                                            Last  Test          Frequency    Reason                     Performed    Due Date  --------------------------------------------------------------------------------    Vitamin D...  12 months..  ergocalciferol...........  Not Found    Overdue    [employer] Embedded Care Due Messages. Reference number: [id number].   12/14/2024 4:31:14 AM CST

## 2024-12-15 NOTE — TELEPHONE ENCOUNTER
Refill Routing Note   Medication(s) are not appropriate for processing by [facility] for the following reason(s):        Drug-drug interaction: levocetirizine and Cetirizine  Drug-disease interaction: levocetirizine and Chronic kidney disease, stage 3a     [facility] action(s):  Defer             Appointments  past 12m or future 3m with PCP    Date Provider   Last Visit   10/28/2024 Ross Nunes MD   Next Visit   2/26/2025 Ross Nunes MD   ED visits in past 90 days: 0        Note composed:10:29 AM 12/15/2024

## 2024-12-16 RX ORDER — LEVOCETIRIZINE DIHYDROCHLORIDE 5 MG/1
5 TABLET, FILM COATED ORAL NIGHTLY
Qty: 90 TABLET | Refills: 3 | Status: SHIPPED | OUTPATIENT
Start: 2024-12-16 | End: 2025-12-16

## 2025-01-27 ENCOUNTER — PATIENT MESSAGE (OUTPATIENT)
Dept: ADMINISTRATIVE | Facility: OTHER | Age: 80
End: 2025-01-27
Payer: MEDICARE

## 2025-01-30 DIAGNOSIS — Z00.00 ENCOUNTER FOR MEDICARE ANNUAL WELLNESS EXAM: ICD-10-CM

## 2025-02-12 ENCOUNTER — PATIENT MESSAGE (OUTPATIENT)
Dept: FAMILY MEDICINE | Facility: CLINIC | Age: 80
End: 2025-02-12
Payer: MEDICARE

## 2025-02-18 NOTE — PROGRESS NOTES
Caller: Self    Doctor: Dr. Murdock    Reason for call: Following up on previous message left. States since bandage was loosened, leg feels much better. Wants to know if Dr Murdock reviewed imaging and is there a need for sooner appt or is current appt for 2/21 ok to keep?    Call back#: [phone], if no answer may call cell at    Patient ID: Adry Owen     Chief Complaint: Med refills     The patient location is: [place]   The chief complaint leading to consultation is: Med refills     Visit type: audiovisual    Face to Face time with patient: 15 mins  15 minutes of total time spent on the encounter, which includes face to face time and non-face to face time preparing to see the patient (eg, review of tests), Obtaining and/or reviewing separately obtained history, Documenting clinical information in the electronic or other health record, Independently interpreting results (not separately reported) and communicating results to the patient/family/caregiver, or Care coordination (not separately reported).         Each patient to whom he or she provides medical services by telemedicine is:  (1) informed of the relationship between the physician and patient and the respective role of any other health care provider with respect to management of the patient; and (2) notified that he or she may decline to receive medical services by telemedicine and may withdraw from such care at any time.    Notes:        HPI: Patient needs refills on her Fentanyl patches that I have provided for her chronic neck pain. She sent me a urinalysis result due to her seeing some blood in her underwear (but not in her urine). She didn't have any dysuria or frequency. urinalysis does show some RBCs and Bacteria and Nitrates, but since she's asymptomatic, I would like to Monitor her for now. If she develops other Urinary tract infection symptoms, I will treat her for a Urinary tract infection. Previous workup for gross hematuria found that it was due to a Urinary tract infection. Neuropathy is flaring due to her stopping all Vitamins (even B12) because they don't know if they affect the chemo levels. As such, her neuropathy is flaring despite Gabapentin 900 mg / night, so I want her to take 300 -600 mg of Gabapentin during the day as well and Let me know how that's  working. Of note, she's still awaiting word on when she will be getting a new CPAP machine because her's has been recalled.     Review of Systems   Constitutional: Negative.  Negative for fever.   HENT: Negative.    Eyes: Negative.    Respiratory: Negative.    Cardiovascular: Negative.  Negative for chest pain.   Gastrointestinal: Negative.  Negative for abdominal pain.   Endocrine: Negative.    Genitourinary: Negative.  Negative for dysuria, hematuria and pelvic pain.   Musculoskeletal: Positive for back pain.   Skin: Negative.    Allergic/Immunologic: Negative.    Neurological: Positive for numbness and headaches. Negative for weakness.   Hematological: Negative.    Psychiatric/Behavioral: Negative.           Objective:      Physical Exam   Physical Exam  Constitutional:       Appearance: Normal appearance.   HENT:      Head: Normocephalic and atraumatic.   Pulmonary:      Effort: Pulmonary effort is normal.   Neurological:      General: No focal deficit present.      Mental Status: She is alert and oriented to person, place, and time.   Psychiatric:         Mood and Affect: Mood normal.         Thought Content: Thought content normal.            Vitals: There were no vitals filed for this visit.       Current Outpatient Medications:     alendronate (FOSAMAX) 70 MG tablet, Take 1 tablet (70 mg total) by mouth every 7 days., Disp: 12 tablet, Rfl: 3    ASPIRIN LOW DOSE 81 mg EC tablet, Take 81 mg by mouth once daily. , Disp: , Rfl:     atorvastatin (LIPITOR) 40 MG tablet, TAKE 1 TABLET (40 MG TOTAL) BY MOUTH ONCE DAILY., Disp: 90 tablet, Rfl: 3    azelastine (ASTELIN) 137 mcg (0.1 %) nasal spray, 1 spray (137 mcg total) by Nasal route 2 (two) times daily., Disp: 30 mL, Rfl: 11    COMBIVENT RESPIMAT  mcg/actuation inhaler, INHALE 1 PUFF EVERY 6 HOURS AS NEEDED FOR WHEEZING OR SHORTNESS OF BREATH (RESCUE), Disp: 12 g, Rfl: 3    cycloSPORINE (RESTASIS) 0.05 % ophthalmic emulsion, Place 1 drop into both eyes  2 (two) times daily., Disp: 60 each, Rfl: 11    ergocalciferol (ERGOCALCIFEROL) 50,000 unit Cap, TAKE 1 CAPSULE (50,000 UNITS TOTAL) BY MOUTH EVERY 7 DAYS., Disp: 12 capsule, Rfl: 3    [START ON 9/4/2022] fentaNYL (DURAGESIC) 25 mcg/hr, Place 1 patch onto the skin every 48 hours., Disp: 15 patch, Rfl: 0    [START ON 8/4/2022] fentaNYL (DURAGESIC) 25 mcg/hr, Place 1 patch onto the skin every 48 hours., Disp: 15 patch, Rfl: 0    [START ON 7/4/2022] fentaNYL (DURAGESIC) 25 mcg/hr, Place 1 patch onto the skin every 48 hours., Disp: 15 patch, Rfl: 0    fish oil-omega-3 fatty acids 300-1,000 mg capsule, Take 1 capsule by mouth once daily., Disp: , Rfl:     fluticasone propionate (FLONASE) 50 mcg/actuation nasal spray, USE 1 SPRAY IN EACH NOSTRIL EVERY DAY, Disp: 32 g, Rfl: 3    gabapentin (NEURONTIN) 300 MG capsule, TAKE 4 CAPSULES (1,200 MG TOTAL) BY MOUTH EVERY EVENING. TAKE ALL 4 CAPSULES AT BEDTIME, Disp: 360 capsule, Rfl: 3    hydrocodone-acetaminophen 10-325mg (NORCO)  mg Tab, Take 1 tablet by mouth every 8 (eight) hours as needed. , Disp: , Rfl:     IPRATROPIUM BROMIDE NASL, by Nasal route once as needed., Disp: , Rfl:     levocetirizine (XYZAL) 5 MG tablet, Take 1 tablet (5 mg total) by mouth every evening., Disp: 90 tablet, Rfl: 3    levothyroxine (SYNTHROID) 75 MCG tablet, Take 1 tablet (75 mcg total) by mouth once daily., Disp: 90 tablet, Rfl: 3    MISCELLANEOUS MEDICAL SUPPLY (COMPRESSION STOCKINGS MISC), , Disp: , Rfl:     mupirocin calcium 2% nasl oint (BACTROBAN) 2 % Oint, by Nasal route 2 (two) times daily., Disp: 1 g, Rfl: 0    nilotinib (TASIGNA) 150 mg capsule, Take 2 capsules by mouth twice a day. Take whole with water. Take on an empty stomach 1 hour before or 2 hours after food., Disp: 992 capsule, Rfl: 0    nitroGLYCERIN (NITROSTAT) 0.4 MG SL tablet, Place 1 tablet (0.4 mg total) under the tongue every 5 (five) minutes as needed for Chest pain., Disp: 25 tablet, Rfl: 2     spironolactone (ALDACTONE) 25 MG tablet, Take 1 tablet (25 mg total) by mouth once daily., Disp: 30 tablet, Rfl: 11    XARELTO 20 mg Tab, TAKE 1 TABLET DAILY WITH DINNER OR EVENING MEAL, Disp: 90 tablet, Rfl: 3    zolpidem (AMBIEN CR) 12.5 MG CR tablet, take 1 tablet by mouth daily at bedtime. **sun brand**, Disp: 30 tablet, Rfl: 5   Assessment:       Patient Active Problem List    Diagnosis Date Noted    Gross hematuria 07/01/2022    Abnormal stress test 04/21/2022    Cardiac pacemaker in situ 08/16/2021    Urinary tract infection without hematuria 10/06/2020    Lumbosacral radiculopathy 10/06/2020    Acute leukemia not having achieved remission 05/15/2020    Vitamin D deficiency 05/15/2020    Elevated blood pressure reading without diagnosis of hypertension 05/15/2020    JAYLIN treated with BiPAP 04/08/2020    Insomnia due to medical condition 01/10/2020    Atrioventricular block, complete 11/27/2019    Third degree heart block 10/22/2019    Chronic neck pain 10/22/2019    Non-seasonal allergic rhinitis due to pollen 10/22/2019    Hypothyroidism due to Hashimoto's thyroiditis 07/01/2019    AV block, Mobitz 1 06/21/2018    Headache 06/20/2018    Digital mucous cyst of finger of right hand 06/12/2018    Chronic myeloid leukemia, BCR/ABL-positive, not having achieved remission 05/23/2018    Leukemia not having achieved remission 05/08/2018    Anemia, chronic disease 05/07/2018    Leucocytosis 05/06/2018    Acute diverticulitis 11/07/2017    Acute lower GI bleeding 11/06/2017    Atrial flutter 05/08/2017    Angioedema 04/11/2017    Urticaria 04/09/2017    Hashimoto encephalopathy 08/03/2016    GI bleeding 08/26/2013    Syncope 02/22/2013    Nuclear sclerosis 07/23/2012    Posterior vitreous detachment 07/23/2012    Essential hypertension 06/01/2012    Coronary artery disease due to calcified coronary lesion 05/11/2012    Other hyperlipidemia 05/11/2012    Paroxysmal atrial  fibrillation 05/11/2012    Back pain 05/11/2012    Status post coronary artery stent placement 05/11/2012          Plan:       Adry Owen  was seen today for follow-up and may need lab work.    Diagnoses and all orders for this visit:    Diagnoses and all orders for this visit:    Chronic neck pain  -     fentaNYL (DURAGESIC) 25 mcg/hr; Place 1 patch onto the skin every 48 hours.  -     fentaNYL (DURAGESIC) 25 mcg/hr; Place 1 patch onto the skin every 48 hours.  -     fentaNYL (DURAGESIC) 25 mcg/hr; Place 1 patch onto the skin every 48 hours.  Controlled with meds    Non-seasonal allergic rhinitis due to pollen  -     azelastine (ASTELIN) 137 mcg (0.1 %) nasal spray; 1 spray (137 mcg total) by Nasal route 2 (two) times daily.  Controlled    Lumbosacral radiculopathy  Add 300-600 mg of Gabapentin to the 900 mg you take at night     JAYLIN treated with BiPAP  Still awaiting BiPAP     Gross hematuria  urinalysis likely not infected, but I want to repeat a urinalysis/ urine culture next week.                   Answers for HPI/ROS submitted by the patient on 6/28/2022  Chronicity: chronic  Onset: more than 1 month ago  Frequency: constantly  Progression since onset: rapidly worsening  Pain location: thoracic spine  Pain quality: aching, cramping  Pain - numeric: 10/10  Pain is: the same all the time  Aggravated by: lying down, sitting  Stiffness is present: all day  bladder incontinence: No  bowel incontinence: No  leg pain: Yes  paresis: No  paresthesias: Yes  perianal numbness: No  tingling: Yes  weight loss: No  genital pain: No  Risk factors: history of cancer  Pain severity: severe  Improvement on treatment: mild

## 2025-02-21 ENCOUNTER — TELEPHONE (OUTPATIENT)
Dept: FAMILY MEDICINE | Facility: CLINIC | Age: 80
End: 2025-02-21
Payer: MEDICARE

## 2025-02-21 NOTE — TELEPHONE ENCOUNTER
----- Message from Dotty sent at 2/21/2025  3:01 PM CST -----  Regarding: Appt. Request  Type:Appointment RequestCaller is requesting an appointment:Name of Caller:pt Symptoms:schedule appt 02/26 to virtual Would the patient rather a call back or a response via [facility]? Call back Best Call Back Number:[phone] Additional Information:   please call to advise, Thank You.

## 2025-02-26 ENCOUNTER — OFFICE VISIT (OUTPATIENT)
Dept: FAMILY MEDICINE | Facility: CLINIC | Age: 80
End: 2025-02-26
Payer: MEDICARE

## 2025-02-26 VITALS
SYSTOLIC BLOOD PRESSURE: 118 MMHG | HEART RATE: 68 BPM | WEIGHT: 175 LBS | DIASTOLIC BLOOD PRESSURE: 62 MMHG | TEMPERATURE: 98 F | BODY MASS INDEX: 29.12 KG/M2

## 2025-02-26 DIAGNOSIS — J30.1 NON-SEASONAL ALLERGIC RHINITIS DUE TO POLLEN: ICD-10-CM

## 2025-02-26 DIAGNOSIS — E87.1 HYPONATREMIA: ICD-10-CM

## 2025-02-26 DIAGNOSIS — J43.8 OTHER EMPHYSEMA: ICD-10-CM

## 2025-02-26 DIAGNOSIS — I48.11 LONGSTANDING PERSISTENT ATRIAL FIBRILLATION: ICD-10-CM

## 2025-02-26 DIAGNOSIS — I50.32 CHRONIC HEART FAILURE WITH PRESERVED EJECTION FRACTION (HFPEF): ICD-10-CM

## 2025-02-26 DIAGNOSIS — I25.10 CORONARY ARTERY DISEASE INVOLVING NATIVE CORONARY ARTERY OF NATIVE HEART WITHOUT ANGINA PECTORIS: ICD-10-CM

## 2025-02-26 DIAGNOSIS — C92.11 CHRONIC MYELOID LEUKEMIA, BCR/ABL-POSITIVE, IN REMISSION: ICD-10-CM

## 2025-02-26 DIAGNOSIS — M54.12 CERVICAL RADICULOPATHY: ICD-10-CM

## 2025-02-26 DIAGNOSIS — I10 ESSENTIAL HYPERTENSION: ICD-10-CM

## 2025-02-26 DIAGNOSIS — D50.0 IRON DEFICIENCY ANEMIA DUE TO CHRONIC BLOOD LOSS: Primary | ICD-10-CM

## 2025-02-26 DIAGNOSIS — E06.3 HYPOTHYROIDISM DUE TO HASHIMOTO'S THYROIDITIS: ICD-10-CM

## 2025-02-26 DIAGNOSIS — G47.01 INSOMNIA DUE TO MEDICAL CONDITION: ICD-10-CM

## 2025-02-26 PROBLEM — N18.31 CHRONIC KIDNEY DISEASE, STAGE 3A: Status: RESOLVED | Noted: 2024-10-28 | Resolved: 2025-02-26

## 2025-02-26 RX ORDER — CETIRIZINE HYDROCHLORIDE 10 MG/1
10 TABLET ORAL DAILY
Qty: 90 TABLET | Refills: 3 | Status: SHIPPED | OUTPATIENT
Start: 2025-02-26 | End: 2026-02-26

## 2025-02-26 RX ORDER — HEPARIN 100 UNIT/ML
500 SYRINGE INTRAVENOUS
OUTPATIENT
Start: 2025-02-27

## 2025-02-26 RX ORDER — SODIUM CHLORIDE 0.9 % (FLUSH) 0.9 %
10 SYRINGE (ML) INJECTION
OUTPATIENT
Start: 2025-02-27

## 2025-02-26 RX ORDER — ESOMEPRAZOLE MAGNESIUM 40 MG/1
40 CAPSULE, DELAYED RELEASE ORAL EVERY MORNING
COMMUNITY

## 2025-02-26 RX ORDER — EPINEPHRINE 0.3 MG/.3ML
0.3 INJECTION SUBCUTANEOUS ONCE AS NEEDED
OUTPATIENT
Start: 2025-02-27

## 2025-02-26 RX ORDER — DIPHENHYDRAMINE HYDROCHLORIDE 50 MG/ML
50 INJECTION INTRAMUSCULAR; INTRAVENOUS ONCE AS NEEDED
OUTPATIENT
Start: 2025-02-27

## 2025-02-26 NOTE — PROGRESS NOTES
[facility]  [facility]   [address] [facility] [address]       Patient ID: Adry Owen     Chief Complaint: Follow up for low iron     The patient location is: [place]   The chief complaint leading to consultation is: Follow up for low iron     Visit type: audiovisual    Face to Face time with patient: 16 mins  20 minutes of total time spent on the encounter, which includes face to face time and non-face to face time preparing to see the patient (eg, review of tests), Obtaining and/or reviewing separately obtained history, Documenting clinical information in the electronic or other health record, Independently interpreting results (not separately reported) and communicating results to the patient/family/caregiver, or Care coordination (not separately reported).         Each patient to whom he or she provides medical services by telemedicine is:  (1) informed of the relationship between the physician and patient and the respective role of any other health care provider with respect to management of the patient; and (2) notified that he or she may decline to receive medical services by telemedicine and may withdraw from such care at any time.    Notes:        HPI:  Follow-up for low iron that we found on our lasts all round of labs one-month ago.  Thankfully she was not anemic but I do think she would benefit from a few doses of IV iron.  She was probably oozing blood due to the Xarelto that she takes for atrial fibrillation.  I do want her to repeat iron levels one-month after last infusion and I will include a BMP because her last sodium was a bit on the hyponatremic side.  She was on 3 different diuretics and I can only assume that is what is doing it.  Thankfully though she has lost weight and lost edema so we are congestive heart failure is being managed.  Vital signs look great.  I do want her to get a Tdap from a local pharmacy as soon as she can because hoping cough is on the rise in the area.   Xyzal is not as effective in combatting her allergies so I will switch her back to Zyrtec 10 mg a day with an option to increase that to 10 mg twice daily.    Review of Systems       Negative    Objective:      Physical Exam   Physical Exam       Virtual Visit     Vitals:   Vitals:    02/26/25 1530   BP: 118/62   Pulse: 68   Temp: 98 °F (36.7 °C)   Weight: 79.4 kg (175 lb)        Assessment:           Plan:       Adry Owen  was seen today for follow-up and may need lab work.    Diagnoses and all orders for this visit:    Diagnoses and all orders for this visit:    Iron deficiency anemia due to chronic blood loss  -     Iron and TIBC; Future  -     Ferritin; Future  Monitor labs one-month after her last iron infusion    Hypothyroidism due to Hashimoto's thyroiditis  Controlled with levothyroxine    Hyponatremia  -     Basic Metabolic Panel; Future  Likely due to diuretics but monitor sodium levels    Non-seasonal allergic rhinitis due to pollen  -     cetirizine (ZYRTEC) 10 MG tablet; Take 1 tablet (10 mg total) by mouth once daily.  Xyzal not effective so we will revert to Zyrtec    Cervical radiculopathy  Much improved with over-the-counter DMSO taken as needed    Other emphysema  Controlled with inhalers    Chronic heart failure with preserved ejection fraction (HFpEF)  Seems euvolemic with Bumex and spironolactone and Jardiance    Essential hypertension  Controlled with diuretics    Coronary artery disease involving native coronary artery of native heart without angina pectoris  Stable with rosuvastatin and aspirin and Xarelto    Longstanding persistent atrial fibrillation  Heart rate is controlled and she is taking Xarelto    Chronic myeloid leukemia, BCR/ABL-positive, in remission  Controlled with the Tasigna    Insomnia due to medical condition  Controlled with sonata    Other orders  -     iron sucrose injection 200 mg  -     EPINEPHrine (EPIPEN) 0.3 mg/0.3 mL pen injection 0.3 mg  -     diphenhydrAMINE  injection 50 mg  -     hydrocortisone sodium succinate injection 100 mg  -     0.9% NaCl 250 mL flush bag  -     sodium chloride 0.9% flush 10 mL  -     heparin, porcine (PF) 100 unit/mL injection flush 500 Units  -     alteplase injection 2 mg     Visit today included increased complexity associated with the care of the episodic problem low iron hypothyroidism hyponatremia allergic rhinitis hypertension addressed and managing the longitudinal care of the patient due to the serious and/or complex managed problem(s) .        Ross Nunes MD

## 2025-03-07 ENCOUNTER — TELEPHONE (OUTPATIENT)
Dept: FAMILY MEDICINE | Facility: CLINIC | Age: 80
End: 2025-03-07
Payer: MEDICARE

## 2025-03-07 ENCOUNTER — PATIENT MESSAGE (OUTPATIENT)
Dept: INFUSION THERAPY | Facility: HOSPITAL | Age: 80
End: 2025-03-07
Payer: MEDICARE

## 2025-03-07 NOTE — TELEPHONE ENCOUNTER
Spoke with patient and she will have her labs done at [facility] which do not need to be scheduled.  And she aske to cancel her appointment in May.  Patient stated Dr. Nunes would like to see her in August.

## 2025-03-11 ENCOUNTER — TELEPHONE (OUTPATIENT)
Dept: INFUSION THERAPY | Facility: HOSPITAL | Age: 80
End: 2025-03-11
Payer: MEDICARE

## 2025-03-11 NOTE — TELEPHONE ENCOUNTER
----- Message from Deandre sent at 3/11/2025 10:12 AM CDT -----  Type: Needs Medical AdviceWho Called:  pt Symptoms (please be specific):  How long has patient had these symptoms:  Pharmacy name and phone #:  Best Call Back Number: [phone] Information: pt is requesting a call back to schedule an appt for an infusion

## 2025-03-13 ENCOUNTER — INFUSION (OUTPATIENT)
Dept: INFUSION THERAPY | Facility: HOSPITAL | Age: 80
End: 2025-03-13
Attending: INTERNAL MEDICINE
Payer: MEDICARE

## 2025-03-13 VITALS
DIASTOLIC BLOOD PRESSURE: 70 MMHG | SYSTOLIC BLOOD PRESSURE: 111 MMHG | HEART RATE: 69 BPM | RESPIRATION RATE: 16 BRPM | TEMPERATURE: 98 F | HEIGHT: 65 IN | BODY MASS INDEX: 30.23 KG/M2 | WEIGHT: 181.44 LBS | OXYGEN SATURATION: 96 %

## 2025-03-13 DIAGNOSIS — D62 ACUTE BLOOD LOSS ANEMIA: Primary | ICD-10-CM

## 2025-03-13 PROCEDURE — 25000003 PHARM REV CODE 250: Mod: HCNC,PN | Performed by: INTERNAL MEDICINE

## 2025-03-13 PROCEDURE — 63600175 PHARM REV CODE 636 W HCPCS: Mod: HCNC,PN | Performed by: INTERNAL MEDICINE

## 2025-03-13 PROCEDURE — A4216 STERILE WATER/SALINE, 10 ML: HCPCS | Mod: HCNC,PN | Performed by: INTERNAL MEDICINE

## 2025-03-13 PROCEDURE — 96374 THER/PROPH/DIAG INJ IV PUSH: CPT | Mod: HCNC,PN

## 2025-03-13 RX ORDER — DIPHENHYDRAMINE HYDROCHLORIDE 50 MG/ML
50 INJECTION, SOLUTION INTRAMUSCULAR; INTRAVENOUS ONCE AS NEEDED
OUTPATIENT
Start: 2025-03-20

## 2025-03-13 RX ORDER — EPINEPHRINE 0.3 MG/.3ML
0.3 INJECTION SUBCUTANEOUS ONCE AS NEEDED
OUTPATIENT
Start: 2025-03-20

## 2025-03-13 RX ORDER — HEPARIN 100 UNIT/ML
500 SYRINGE INTRAVENOUS
Status: DISCONTINUED | OUTPATIENT
Start: 2025-03-13 | End: 2025-03-13 | Stop reason: HOSPADM

## 2025-03-13 RX ORDER — SODIUM CHLORIDE 0.9 % (FLUSH) 0.9 %
10 SYRINGE (ML) INJECTION
OUTPATIENT
Start: 2025-03-20

## 2025-03-13 RX ORDER — SODIUM CHLORIDE 0.9 % (FLUSH) 0.9 %
10 SYRINGE (ML) INJECTION
Status: DISCONTINUED | OUTPATIENT
Start: 2025-03-13 | End: 2025-03-13 | Stop reason: HOSPADM

## 2025-03-13 RX ORDER — DIPHENHYDRAMINE HYDROCHLORIDE 50 MG/ML
50 INJECTION, SOLUTION INTRAMUSCULAR; INTRAVENOUS ONCE AS NEEDED
Status: DISCONTINUED | OUTPATIENT
Start: 2025-03-13 | End: 2025-03-13 | Stop reason: HOSPADM

## 2025-03-13 RX ORDER — HEPARIN 100 UNIT/ML
500 SYRINGE INTRAVENOUS
OUTPATIENT
Start: 2025-03-20

## 2025-03-13 RX ORDER — EPINEPHRINE 0.3 MG/.3ML
0.3 INJECTION SUBCUTANEOUS ONCE AS NEEDED
Status: DISCONTINUED | OUTPATIENT
Start: 2025-03-13 | End: 2025-03-13 | Stop reason: HOSPADM

## 2025-03-13 RX ADMIN — Medication 10 ML: at 03:03

## 2025-03-13 RX ADMIN — SODIUM CHLORIDE: 9 INJECTION, SOLUTION INTRAVENOUS at 03:03

## 2025-03-13 RX ADMIN — IRON SUCROSE 200 MG: 20 INJECTION, SOLUTION INTRAVENOUS at 02:03

## 2025-03-13 NOTE — PLAN OF CARE
Problem: Adult Inpatient Plan of Care  Goal: Plan of Care Review  Outcome: Progressing  Flowsheets (Taken 3/13/2025 1534)  Plan of Care Reviewed With: patient  Goal: Patient-Specific Goal (Individualized)  Outcome: Progressing  Flowsheets (Taken 3/13/2025 1534)  Individualized Care Needs: recliner, conversation, education  Anxieties, Fears or Concerns: none  Patient/Family-Specific Goals (Include Timeframe): no s/s of reaction with tx     Problem: Fatigue  Goal: Improved Activity Tolerance  Outcome: Progressing  Intervention: Promote Improved Energy  Flowsheets (Taken 3/13/2025 1534)  Fatigue Management: paced activity encouraged  Sleep/Rest Enhancement: natural light exposure provided  Activity Management:   Ambulated -L4   Up in chair - L3  Environmental Support:   environmental consistency promoted   distractions minimized  Pt. tolerated IVP venofer well. VS stable, no adverse reactions noted. Education r/t medication completed. 30 min post observation completed. Discussed future appointments, NAD noted. Pt. discharged to home ambulatory with no assistance needed.

## 2025-03-20 ENCOUNTER — INFUSION (OUTPATIENT)
Dept: INFUSION THERAPY | Facility: HOSPITAL | Age: 80
End: 2025-03-20
Attending: NURSE PRACTITIONER
Payer: MEDICARE

## 2025-03-20 VITALS
OXYGEN SATURATION: 97 % | HEIGHT: 65 IN | BODY MASS INDEX: 30.01 KG/M2 | RESPIRATION RATE: 18 BRPM | SYSTOLIC BLOOD PRESSURE: 109 MMHG | TEMPERATURE: 98 F | HEART RATE: 69 BPM | DIASTOLIC BLOOD PRESSURE: 65 MMHG | WEIGHT: 180.13 LBS

## 2025-03-20 DIAGNOSIS — D62 ACUTE BLOOD LOSS ANEMIA: Primary | ICD-10-CM

## 2025-03-20 PROCEDURE — 96374 THER/PROPH/DIAG INJ IV PUSH: CPT | Mod: HCNC,PN

## 2025-03-20 PROCEDURE — 63600175 PHARM REV CODE 636 W HCPCS: Mod: HCNC,PN | Performed by: INTERNAL MEDICINE

## 2025-03-20 PROCEDURE — 25000003 PHARM REV CODE 250: Mod: HCNC,PN | Performed by: INTERNAL MEDICINE

## 2025-03-20 RX ORDER — SODIUM CHLORIDE 0.9 % (FLUSH) 0.9 %
10 SYRINGE (ML) INJECTION
Status: DISCONTINUED | OUTPATIENT
Start: 2025-03-20 | End: 2025-03-20 | Stop reason: HOSPADM

## 2025-03-20 RX ORDER — DIPHENHYDRAMINE HYDROCHLORIDE 50 MG/ML
50 INJECTION, SOLUTION INTRAMUSCULAR; INTRAVENOUS ONCE AS NEEDED
Status: DISCONTINUED | OUTPATIENT
Start: 2025-03-20 | End: 2025-03-20 | Stop reason: HOSPADM

## 2025-03-20 RX ORDER — EPINEPHRINE 0.3 MG/.3ML
0.3 INJECTION SUBCUTANEOUS ONCE AS NEEDED
OUTPATIENT
Start: 2025-03-27

## 2025-03-20 RX ORDER — DIPHENHYDRAMINE HYDROCHLORIDE 50 MG/ML
50 INJECTION, SOLUTION INTRAMUSCULAR; INTRAVENOUS ONCE AS NEEDED
OUTPATIENT
Start: 2025-03-27

## 2025-03-20 RX ORDER — HEPARIN 100 UNIT/ML
500 SYRINGE INTRAVENOUS
OUTPATIENT
Start: 2025-03-27

## 2025-03-20 RX ORDER — SODIUM CHLORIDE 0.9 % (FLUSH) 0.9 %
10 SYRINGE (ML) INJECTION
OUTPATIENT
Start: 2025-03-27

## 2025-03-20 RX ADMIN — IRON SUCROSE 200 MG: 20 INJECTION, SOLUTION INTRAVENOUS at 03:03

## 2025-03-20 RX ADMIN — SODIUM CHLORIDE: 9 INJECTION, SOLUTION INTRAVENOUS at 02:03

## 2025-03-20 NOTE — PLAN OF CARE
Problem: Fatigue  Goal: Improved Activity Tolerance  Intervention: Promote Improved Energy  Flowsheets (Taken 3/20/2025 1500)  Fatigue Management:   paced activity encouraged   frequent rest breaks encouraged   activity schedule adjusted   fatigue-related activity identified  Sleep/Rest Enhancement:   relaxation techniques promoted   natural light exposure provided   regular sleep/rest pattern promoted  Activity Management:   Ambulated -L4   Ambulated to bathroom - L4   Ambulated in gloria - L4   Up in stretcher chair - L1  Environmental Support:   calm environment promoted   rest periods encouraged   personal routine supported     Problem: Adult Inpatient Plan of Care  Goal: Patient-Specific Goal (Individualized)  Outcome: Progressing  Flowsheets (Taken 3/20/2025 1500)  Individualized Care Needs: recliner, pillow, dim lights, conversation  Anxieties, Fears or Concerns: none  Patient/Family-Specific Goals (Include Timeframe): no s/s of reaction with treatment     Problem: Adult Inpatient Plan of Care  Goal: Plan of Care Review  Outcome: Progressing  Flowsheets (Taken 3/20/2025 1540)  Plan of Care Reviewed With: patient   Pt tolerated her Venofer infusion well, NAD. Pt was observed for 30 minutes post infusion with no new c/o voiced. Pt given a schedule and reviewed, pt verbalized understanding. Pt ambulated out of the clinic without difficulty.

## 2025-03-21 ENCOUNTER — TELEPHONE (OUTPATIENT)
Dept: FAMILY MEDICINE | Facility: CLINIC | Age: 80
End: 2025-03-21
Payer: MEDICARE

## 2025-03-21 DIAGNOSIS — R31.0 GROSS HEMATURIA: Primary | ICD-10-CM

## 2025-03-21 NOTE — TELEPHONE ENCOUNTER
Patient stopped by [facility] and dropped of a urinalysis due to hematuria and abdominal cramping. Will get urinalysis and urine culture.

## 2025-03-22 ENCOUNTER — ON-DEMAND VIRTUAL (OUTPATIENT)
Dept: URGENT CARE | Facility: CLINIC | Age: 80
End: 2025-03-22
Payer: MEDICARE

## 2025-03-22 ENCOUNTER — RESULTS FOLLOW-UP (OUTPATIENT)
Dept: FAMILY MEDICINE | Facility: CLINIC | Age: 80
End: 2025-03-22
Payer: MEDICARE

## 2025-03-22 ENCOUNTER — PATIENT MESSAGE (OUTPATIENT)
Dept: FAMILY MEDICINE | Facility: CLINIC | Age: 80
End: 2025-03-22
Payer: MEDICARE

## 2025-03-22 DIAGNOSIS — N30.00 ACUTE CYSTITIS WITHOUT HEMATURIA: Primary | ICD-10-CM

## 2025-03-22 RX ORDER — CIPROFLOXACIN 250 MG/1
250 TABLET, FILM COATED ORAL 2 TIMES DAILY
Qty: 10 TABLET | Refills: 0 | Status: SHIPPED | OUTPATIENT
Start: 2025-03-22 | End: 2025-03-24 | Stop reason: DRUGHIGH

## 2025-03-22 NOTE — PROGRESS NOTES
Subjective:      Patient ID: Adry Owen is a 80 y.o. female.    Vitals:  vitals were not taken for this visit.     Chief Complaint: No chief complaint on file.      Visit Type: TELE AUDIOVISUAL - This visit was conducted virtually based on  subjective information and limited objective exam    Present with the patient at the time of consultation: TELEMED PRESENT WITH PATIENT: None  LOCATION OF PATIENT [place]  Two patient identifiers used to verify patient- saying out date of birth and full name.       Past Medical History:   Diagnosis Date    *Atrial fibrillation     *Atrial flutter     Anticoagulant long-term use     xarelto    Arthritis     back and hip pain    Asthma     Atrial fibrillation or flutter     Cancer     leukemia CML    Cataract     CHF (congestive heart failure)     Chronic neck pain     Class 1 obesity due to excess calories with serious comorbidity and body mass index (BMI) of 32.0 to 32.9 in adult 10/09/2023    Coronary artery disease 2004    1 stent    Diabetes mellitus     Encephalopathy, toxic 04/15/2023    Encounter for blood transfusion     Fibromyalgia     GI bleed 08/26/2013    Hashimoto's disease     Heart block     History of diverticulitis     History of GI bleed     Hyperlipidemia     Hypertension     Iron deficiency anemia due to chronic blood loss 04/30/2024    MI (myocardial infarction)     1992    Osteopenia of necks of both femurs 01/12/2023    Problems with swallowing     bone spur and scarring C1-C2    Sleep apnea     bipap and or breathe rite strips    Thyroid disease      Past Surgical History:   Procedure Laterality Date    ABLATION  2/8/2024    Procedure: Ablation AV Node;  Surgeon: Xavier Ahuja III, MD;  Location: [facility];  Service: Cardiology;;    ABLATION OF DYSRHYTHMIC FOCUS      ADENOIDECTOMY      APPENDECTOMY      BACK SURGERY  2015    cervical fusion    CARDIAC CATHETERIZATION  2004    1 stent    CARDIOVERSION  05/2018    CATARACT EXTRACTION W/   INTRAOCULAR LENS IMPLANT Bilateral 2015    now has scarring needs laser sgy    CATHETERIZATION OF BOTH LEFT AND RIGHT HEART  5/20/2024    Procedure: Right / Left heart cath;  Surgeon: Xavier Ahuja III, MD;  Location: [facility] CATH;  Service: Cardiology;;    CERVICAL FUSION      three surgeries last 4/13/2023 / plates screws    ESOPHAGOGASTRODUODENOSCOPY N/A 3/30/2024    Procedure: EGD (ESOPHAGOGASTRODUODENOSCOPY);  Surgeon: Victor Hugo Ovalle MD;  Location: [facility] ENDO;  Service: Endoscopy;  Laterality: N/A;    EXCISION OF MASS OF HAND Right 06/12/2018    Procedure: EXCISION, MASS, HAND/ Long finger with osteophyte removal;  Surgeon: ORION Mendoza MD;  Location: [facility];  Service: General;  Laterality: Right;    EYE SURGERY      FRACTURE SURGERY      HAND TENDON SURGERY Right      x 2 1986 & 1996    HYSTERECTOMY  2001    bso    INSERTION OF PACEMAKER N/A 11/27/2019    Procedure: INSERTION, PACEMAKER, DUAL CHAMBER-BIOTRONIK;  Surgeon: Xavier Ahuja III, MD;  Location: [facility] CATH;  Service: Cardiology;  Laterality: N/A;    LEFT HEART CATHETERIZATION Left 04/21/2022    Procedure: Left heart cath;  Surgeon: Xavier Ahuja III, MD;  Location: [facility] CATH;  Service: Cardiology;  Laterality: Left;    loop recorder insertion      POSTERIOR FUSION LUMBAR SPINE W/ CORPECTOMY      with disc replacement x 2    REMOVAL OF IMPLANTABLE LOOP RECORDER Left 11/27/2019    Procedure: REMOVAL, IMPLANTABLE LOOP RECORDER;  Surgeon: Xavier Ahuja III, MD;  Location: [facility] CATH;  Service: Cardiology;  Laterality: Left;    SHOULDER ARTHROSCOPY W/ ROTATOR CUFF REPAIR Right 1992    SPINE SURGERY      TONSILLECTOMY       Review of patient's allergies indicates:   Allergen Reactions    Alcohol prep pads [alcohol swabs] Hives and Rash    Diazolidinyl urea Anaphylaxis    Influenza virus vaccines Hives and Swelling    Iodine Anaphylaxis     Other reaction(s): Unknown    Preservative Anaphylaxis     Many different preservatives per pt report     Thimerosal Anaphylaxis    Bactoshield chg [chlorhexidine gluconate] Rash    Feldene [piroxicam]      Syncope, muscle spasms.     Cephalosporins Rash    Lisinopril Palpitations    Penicillins Rash     Other reaction(s): Unknown    Sotalol Palpitations     Medications Ordered Prior to Encounter[1]  Family History   Problem Relation Name Age of Onset    Cancer Mother wolfgang coelho 73        leukemia    Hypertension Mother wolfgang coelho     Glaucoma Father kady coelho     Heart disease Father kady coelho     Cancer Father kady coelho 76        testicular    Diabetes Father kady coelho     Hearing loss Father kady coelho     Stroke Father kady coelho     Vision loss Father kady coelho     Heart disease Brother blanca coelho     Asthma Brother blanca coelho     Hearing loss Maternal Grandmother dennis bynum     Diabetes Daughter Jana Muller     Amblyopia Neg Hx      Blindness Neg Hx      Cataracts Neg Hx      Macular degeneration Neg Hx      Retinal detachment Neg Hx      Strabismus Neg Hx      Thyroid disease Neg Hx             Ohs Peq Odvv Intake    3/22/2025 11:42 AM CDT - Filed by Patient   What is your current physical address in the event of a medical emergency? [address] [place].   Are you able to take your vital signs? Yes   Systolic Blood Pressure: 104   Diastolic Blood Pressure: 66   Weight: 178   Height: 65   Pulse: 69   Temperature: 97.5   Respiration rate:    Pulse Oxygen: 95   Please attach any relevant images or files    Is your employer contracted with [facility]? No         81 yo female with c/o lower back and abdominal pain and dysuria. She states she had a urinalysis and urine culture. She states positive for hematuria more so this morning. She states did not sleep well last night due to back pain. She denies fever.         Constitution: Negative. Negative for fever.   HENT: Negative.     Neck: neck negative.   Cardiovascular: Negative.    Respiratory: Negative.     Gastrointestinal: Negative.   Negative for abdominal pain, nausea and vomiting.   Endocrine: negative.   Genitourinary:  Positive for dysuria, frequency and urgency. Negative for vaginal discharge, vaginal odor and genital sore.   Musculoskeletal: Negative.  Negative for back pain.   Skin: Negative.    Neurological: Negative.         Objective:   The physical exam was conducted virtually.    AAO x 3 ; no acute distress noted; appearance normal; mood and behavior normal; thought process normal  Head- normocephalic  Nose- appears normal, no discharge or erythema  Eyes- pupils appear normal in size, no drainage, no erythema  Ears- normal appearing; no discharge, no erythema  Mouth- appears normal  Oropharynx- no erythema, lesions  Lungs- breathing at a normal rate, no acute distress noted  Heart- no reports of tachycardia, palpitations, chest pain  Abdomen- non distended, non tender reported by patient  Skin- warm and dry, no erythema or edema noted by patient or visualized  Psych- as above; no si/hi  Results for orders placed or performed in visit on 03/21/25   Urine Culture High Risk    Collection Time: 03/21/25  5:06 PM    Specimen: Urine, Clean Catch   Result Value Ref Range    Urine Culture, Routine (A)      GRAM NEGATIVE CARMEN  >100,000 cfu/ml  Identification and susceptibility pending     Urinalysis    Collection Time: 03/21/25  5:07 PM   Result Value Ref Range    Specimen UA Urine, Clean Catch     Color, UA Yellow Yellow, Straw, Anastasia    Appearance, UA Clear Clear    pH, UA 5.5 5.0 - 8.0    Specific Gravity, UA 1.015 1.005 - 1.030    Protein, UA 2+ (A) Negative    Glucose, UA 3+ (A) Negative    Ketones, UA Trace (A) Negative    Bilirubin (UA) Negative Negative    Occult Blood UA 3+ (A) Negative    Nitrite, UA Negative Negative    Urobilinogen, UA 0.2 <2.0 EU/dL    Leukocytes, UA 1+ (A) Negative   Urinalysis Microscopic    Collection Time: 03/21/25  5:07 PM   Result Value Ref Range    RBC, UA >100 (H) 0 - 4 /hpf    WBC, UA 29 (H) 0 - 5 /hpf     Bacteria Moderate (A) Negative /hpf    Squam Epithel, UA 6 /hpf    Hyaline Casts, UA 2 (A) 0 - 1 /lpf    Microscopic Comment SEE COMMENT      *Note: Due to a large number of results and/or encounters for the requested time period, some results have not been displayed. A complete set of results can be found in Results Review.         Assessment:     No diagnosis found.    Plan:     Discussed cipro with patient and possible adverse side effects.   Discussed urine culture and to follow up since sensitivities are not available.   Will start cipro 250 mg bid x 5 days.     Thank you for choosing [facility]!    Our goal in the [facility] is to always provide outstanding medical care. You may receive a survey by mail or e-mail in the next week regarding your experience today. We would greatly appreciate you completing and returning the survey. Your feedback provides us with a way to recognize our staff who provide very good care, and it helps us learn how to improve when your experience was below our aspiration of excellence.         We appreciate you trusting us with your medical care. We hope you feel better soon. We will be happy to take care of you for all of your future medical needs.    You must understand that you've received an Urgent Care treatment only and that you may be released before all your medical problems are known or treated. You, the patient, will arrange for follow up care as instructed.    Follow up with your PCP or specialty clinic as directed in the next 1-2 weeks if not improved or as needed.  You can call [phone] to schedule an appointment with the appropriate provider.    If your condition worsens we recommend that you receive another evaluation in person, with your primary care provider, urgent care or at the emergency room immediately or contact your primary medical clinics after hours call service to discuss your concerns.         There are no  diagnoses linked to this encounter.                     [1]   Current Outpatient Medications on File Prior to Visit   Medication Sig Dispense Refill    acetaminophen (TYLENOL) 500 MG tablet Take 500 mg by mouth 2 (two) times daily as needed for Pain.      ASPIRIN LOW DOSE 81 mg EC tablet Take 81 mg by mouth once daily.      azelastine (ASTELIN) 137 mcg (0.1 %) nasal spray 1 SPRAY (137 MCG TOTAL) IN EACH NOSTRIL 2 TIMES A DAY. 90 mL 2    benzonatate (TESSALON) 100 MG capsule 1 - 2 po every 6 hours prn cough 45 capsule 0    bumetanide (BUMEX) 1 MG tablet Take 1 tablet (1 mg total) by mouth 2 (two) times a day. Can increase to 2 mg po BID PRN weight gain > 3 lbs from baseline, increased breathlessness, and/or increased swelling 180 tablet 3    cetirizine (ZYRTEC) 10 MG tablet Take 1 tablet (10 mg total) by mouth once daily. 90 tablet 3    CHAMOMILE FLOWERS ORAL Take 1,100 mg by mouth once daily. Indications: supplement      CHROMIUM ORAL Take 1,000 mcg by mouth Daily. Indications: supplement      cinnamon bark (CINNAMON) 500 mg capsule Take 500 mg by mouth once daily. Indications: supplement      cycloSPORINE (RESTASIS) 0.05 % ophthalmic emulsion Place 1 drop into both eyes 2 (two) times daily. 60 each 11    empagliflozin (JARDIANCE) 25 mg tablet Take 1 tablet (25 mg total) by mouth once daily. 90 tablet 3    ergocalciferol (ERGOCALCIFEROL) 50,000 unit Cap Take 1 capsule (50,000 Units total) by mouth every 7 days. 12 capsule 3    esomeprazole (NEXIUM) 40 MG capsule Take 40 mg by mouth every morning.      fluticasone propionate (FLONASE) 50 mcg/actuation nasal spray USE 1 SPRAY IN EACH NOSTRIL EVERY DAY 32 g 3    fluticasone-salmeterol diskus inhaler 250-50 mcg Inhale 1 puff into the lungs 2 (two) times daily. Controller 60 each 2    ipratropium-albuteroL (COMBIVENT RESPIMAT)  mcg/actuation inhaler Inhale 1 puff into the lungs every 4 (four) hours as needed for Wheezing or Shortness of Breath. Rescue 12 g 0     levothyroxine (SYNTHROID) 75 MCG tablet Take 1 tablet (75 mcg total) by mouth once daily. 90 tablet 3    melatonin 10 mg Cap Take 30 mg by mouth every evening.      methocarbamoL (ROBAXIN) 500 MG Tab TAKE 1 TABLET THREE TIMES DAILY AS NEEDED (AS NEEDED FOR MUSCULAR SPASM WITH PAIN). 90 tablet 11    nilotinib HCl (TASIGNA) 150 mg capsule Take 2 capsules (300 mg total) by mouth twice daily 120 capsule 5    nilotinib HCl (TASIGNA) 150 mg capsule Take 2 capsules (300 mg) by mouth twice daily 120 capsule 6    nitroGLYCERIN (NITROSTAT) 0.4 MG SL tablet Place 1 tablet (0.4 mg total) under the tongue every 5 (five) minutes as needed for Chest pain. 25 tablet 2    NON FORMULARY MEDICATION Take 200 mg by mouth once daily. mAGNESIUM bISGLYCINATE Chelate  Indications: supplement      NON FORMULARY MEDICATION Take 50 mg by mouth once daily. Apigenin  Indications: supplement      rosuvastatin (CRESTOR) 10 MG tablet Take 1 tablet (10 mg total) by mouth every evening. 90 tablet 3    spironolactone (ALDACTONE) 25 MG tablet Take 2 tablets (50 mg total) by mouth once daily. 180 tablet 3    TURMERIC-GINGER-BLACK PEPPER ORAL Take 3 capsules by mouth once daily. Indications: supplement      XARELTO 20 mg Tab TAKE 1 TABLET EVERY DAY WITH DINNER OR EVENING MEAL 90 tablet 3    zaleplon (SONATA) 5 MG Cap Take 1 capsule (5 mg total) by mouth nightly as needed. 30 capsule 2     No current facility-administered medications on file prior to visit.      capsule (5 mg total) by mouth nightly as needed. 30 capsule 2     No current facility-administered medications on file prior to visit.

## 2025-03-24 ENCOUNTER — TELEPHONE (OUTPATIENT)
Dept: FAMILY MEDICINE | Facility: CLINIC | Age: 80
End: 2025-03-24
Payer: MEDICARE

## 2025-03-24 DIAGNOSIS — B96.89 UTI DUE TO KLEBSIELLA SPECIES: Primary | ICD-10-CM

## 2025-03-24 DIAGNOSIS — R39.89 PNEUMATURIA: ICD-10-CM

## 2025-03-24 DIAGNOSIS — N39.0 UTI DUE TO KLEBSIELLA SPECIES: Primary | ICD-10-CM

## 2025-03-24 RX ORDER — CIPROFLOXACIN 500 MG/1
500 TABLET ORAL 2 TIMES DAILY
Qty: 10 TABLET | Refills: 0 | Status: SHIPPED | OUTPATIENT
Start: 2025-03-24 | End: 2025-03-29

## 2025-03-24 NOTE — TELEPHONE ENCOUNTER
----- Message from Ross Nunes MD sent at 3/24/2025 12:50 PM CDT -----  I think that is message is old because I already sent in ciprofloxacin for UTI.  ----- Message -----  From: Daniella Gordon MA  Sent: 3/23/2025   5:50 PM CDT  To: Ross Nunes MD      ----- Message -----  From: Jp Adkins  Sent: 3/22/2025  11:33 AM CDT  To: [facility] Clinical Staff    Type:  Needs Medical AdviceWho Called: the patientSymptoms (please be specific):Symptoms: Urination Pain, Urine - Blood InOutcome: Talk to a nurse or provider within 15 minutes.Reason: Caller denied all higher acuity questionsThe caller accepted this outcome.How long has patient had these symptoms:  Pharmacy name and phone #:  [place], [place] - [address] [facility] [address]Ol[place]: [phone] Fax: [phone] the patient rather a call back or a response via [facility]? call [facility] Call Back Number: [phone] Additional Information: Pt states she would like a  rx sent in for symptomsPlease adviseThanks

## 2025-03-25 NOTE — TELEPHONE ENCOUNTER
For some reason she was given a lower dose of ciprofloxacin so I sent in a prescription for ciprofloxacin 500 mg twice daily for 5 days.  I think she also needs to see a urologist if she is passing air in her urine stream.  I sent her a message earlier today.  Please reach out and confirm or deny that she is passing air in her urine stream and if so what urologist she would like to see.

## 2025-03-27 ENCOUNTER — INFUSION (OUTPATIENT)
Dept: INFUSION THERAPY | Facility: HOSPITAL | Age: 80
End: 2025-03-27
Attending: NURSE PRACTITIONER
Payer: MEDICARE

## 2025-03-27 VITALS
RESPIRATION RATE: 18 BRPM | HEIGHT: 65 IN | BODY MASS INDEX: 30.01 KG/M2 | DIASTOLIC BLOOD PRESSURE: 67 MMHG | TEMPERATURE: 98 F | SYSTOLIC BLOOD PRESSURE: 107 MMHG | WEIGHT: 180.13 LBS | OXYGEN SATURATION: 98 % | HEART RATE: 69 BPM

## 2025-03-27 DIAGNOSIS — D62 ACUTE BLOOD LOSS ANEMIA: Primary | ICD-10-CM

## 2025-03-27 PROCEDURE — 96374 THER/PROPH/DIAG INJ IV PUSH: CPT | Mod: HCNC,PN

## 2025-03-27 PROCEDURE — 63600175 PHARM REV CODE 636 W HCPCS: Mod: HCNC,PN | Performed by: INTERNAL MEDICINE

## 2025-03-27 RX ORDER — HEPARIN 100 UNIT/ML
500 SYRINGE INTRAVENOUS
OUTPATIENT
Start: 2025-04-03

## 2025-03-27 RX ORDER — SODIUM CHLORIDE 0.9 % (FLUSH) 0.9 %
10 SYRINGE (ML) INJECTION
OUTPATIENT
Start: 2025-04-03

## 2025-03-27 RX ORDER — DIPHENHYDRAMINE HYDROCHLORIDE 50 MG/ML
50 INJECTION, SOLUTION INTRAMUSCULAR; INTRAVENOUS ONCE AS NEEDED
OUTPATIENT
Start: 2025-04-03

## 2025-03-27 RX ORDER — SODIUM CHLORIDE 0.9 % (FLUSH) 0.9 %
10 SYRINGE (ML) INJECTION
Status: DISCONTINUED | OUTPATIENT
Start: 2025-03-27 | End: 2025-03-27 | Stop reason: HOSPADM

## 2025-03-27 RX ORDER — EPINEPHRINE 0.3 MG/.3ML
0.3 INJECTION SUBCUTANEOUS ONCE AS NEEDED
OUTPATIENT
Start: 2025-04-03

## 2025-03-27 RX ADMIN — IRON SUCROSE 200 MG: 20 INJECTION, SOLUTION INTRAVENOUS at 02:03

## 2025-03-27 NOTE — PLAN OF CARE
Pt arrived to clinic today for Venofer push and tolerated well. No changes throughout therapy. Pt aware of follow up appointments and side effects of drugs. Pt waited 30 minutes post infusion to monitor for s/s of reaction. Discharged to home. NAD.

## 2025-03-27 NOTE — TELEPHONE ENCOUNTER
Spoke with patient. Pt confirmed she was passing air in her urine stream, but not currently. Pt has no preference for urologist.

## 2025-03-28 ENCOUNTER — PATIENT MESSAGE (OUTPATIENT)
Dept: FAMILY MEDICINE | Facility: CLINIC | Age: 80
End: 2025-03-28
Payer: MEDICARE

## 2025-03-28 DIAGNOSIS — N39.0 UTI DUE TO KLEBSIELLA SPECIES: Primary | ICD-10-CM

## 2025-03-28 DIAGNOSIS — B96.89 UTI DUE TO KLEBSIELLA SPECIES: Primary | ICD-10-CM

## 2025-03-28 DIAGNOSIS — R39.89 PNEUMATURIA: ICD-10-CM

## 2025-04-03 ENCOUNTER — INFUSION (OUTPATIENT)
Dept: INFUSION THERAPY | Facility: HOSPITAL | Age: 80
End: 2025-04-03
Attending: NURSE PRACTITIONER
Payer: MEDICARE

## 2025-04-03 VITALS
WEIGHT: 177.69 LBS | DIASTOLIC BLOOD PRESSURE: 70 MMHG | SYSTOLIC BLOOD PRESSURE: 109 MMHG | RESPIRATION RATE: 18 BRPM | OXYGEN SATURATION: 99 % | HEART RATE: 69 BPM | HEIGHT: 65 IN | TEMPERATURE: 98 F | BODY MASS INDEX: 29.61 KG/M2

## 2025-04-03 DIAGNOSIS — D62 ACUTE BLOOD LOSS ANEMIA: Primary | ICD-10-CM

## 2025-04-03 PROCEDURE — 63600175 PHARM REV CODE 636 W HCPCS: Mod: HCNC,PN | Performed by: INTERNAL MEDICINE

## 2025-04-03 PROCEDURE — 96374 THER/PROPH/DIAG INJ IV PUSH: CPT | Mod: HCNC,PN

## 2025-04-03 RX ORDER — EPINEPHRINE 0.3 MG/.3ML
0.3 INJECTION SUBCUTANEOUS ONCE AS NEEDED
Status: DISCONTINUED | OUTPATIENT
Start: 2025-04-03 | End: 2025-04-03 | Stop reason: HOSPADM

## 2025-04-03 RX ORDER — HEPARIN 100 UNIT/ML
500 SYRINGE INTRAVENOUS
Status: DISCONTINUED | OUTPATIENT
Start: 2025-04-03 | End: 2025-04-03 | Stop reason: HOSPADM

## 2025-04-03 RX ORDER — SODIUM CHLORIDE 0.9 % (FLUSH) 0.9 %
10 SYRINGE (ML) INJECTION
Status: DISCONTINUED | OUTPATIENT
Start: 2025-04-03 | End: 2025-04-03 | Stop reason: HOSPADM

## 2025-04-03 RX ORDER — DIPHENHYDRAMINE HYDROCHLORIDE 50 MG/ML
50 INJECTION, SOLUTION INTRAMUSCULAR; INTRAVENOUS ONCE AS NEEDED
OUTPATIENT
Start: 2025-04-10

## 2025-04-03 RX ORDER — SODIUM CHLORIDE 0.9 % (FLUSH) 0.9 %
10 SYRINGE (ML) INJECTION
OUTPATIENT
Start: 2025-04-10

## 2025-04-03 RX ORDER — HEPARIN 100 UNIT/ML
500 SYRINGE INTRAVENOUS
OUTPATIENT
Start: 2025-04-10

## 2025-04-03 RX ORDER — EPINEPHRINE 0.3 MG/.3ML
0.3 INJECTION SUBCUTANEOUS ONCE AS NEEDED
OUTPATIENT
Start: 2025-04-10

## 2025-04-03 RX ADMIN — IRON SUCROSE 200 MG: 20 INJECTION, SOLUTION INTRAVENOUS at 03:04

## 2025-04-03 NOTE — PLAN OF CARE
Problem: Fatigue  Goal: Improved Activity Tolerance  Outcome: Progressing  Intervention: Promote Improved Energy  Flowsheets (Taken 4/3/2025 1520)  Fatigue Management:   activity assistance provided   fatigue-related activity identified   frequent rest breaks encouraged   paced activity encouraged  Sleep/Rest Enhancement:   natural light exposure provided   relaxation techniques promoted  Activity Management:   Ambulated -L4   Up in chair - L3  Environmental Support:   environmental consistency promoted   personal routine supported   rest periods encouraged     Problem: Adult Inpatient Plan of Care  Goal: Patient-Specific Goal (Individualized)  Outcome: Progressing  Flowsheets (Taken 4/3/2025 1520)  Individualized Care Needs: Recliner, conversation, education  Anxieties, Fears or Concerns: None  Patient/Family-Specific Goals (Include Timeframe): No s/s of reaction from treatment     Problem: Adult Inpatient Plan of Care  Goal: Plan of Care Review  Outcome: Progressing  Flowsheets (Taken 4/3/2025 1520)  Plan of Care Reviewed With: patient    Pt tolerated Venofer infusion well, NAD. Pt observed 30 min closely with no s/s of reaction. Pt given a schedule and reviewed, pt verbalized understanding. Pt ambulated out of the clinic without difficulty independently.

## 2025-04-10 ENCOUNTER — INFUSION (OUTPATIENT)
Dept: INFUSION THERAPY | Facility: HOSPITAL | Age: 80
End: 2025-04-10
Attending: NURSE PRACTITIONER
Payer: MEDICARE

## 2025-04-10 VITALS
TEMPERATURE: 98 F | RESPIRATION RATE: 18 BRPM | SYSTOLIC BLOOD PRESSURE: 99 MMHG | HEART RATE: 70 BPM | OXYGEN SATURATION: 99 % | HEIGHT: 65 IN | DIASTOLIC BLOOD PRESSURE: 61 MMHG | WEIGHT: 180.31 LBS | BODY MASS INDEX: 30.04 KG/M2

## 2025-04-10 DIAGNOSIS — D62 ACUTE BLOOD LOSS ANEMIA: Primary | ICD-10-CM

## 2025-04-10 PROCEDURE — 96374 THER/PROPH/DIAG INJ IV PUSH: CPT | Mod: HCNC,PN

## 2025-04-10 PROCEDURE — 25000003 PHARM REV CODE 250: Mod: HCNC,PN | Performed by: INTERNAL MEDICINE

## 2025-04-10 PROCEDURE — 63600175 PHARM REV CODE 636 W HCPCS: Mod: HCNC,PN | Performed by: INTERNAL MEDICINE

## 2025-04-10 PROCEDURE — A4216 STERILE WATER/SALINE, 10 ML: HCPCS | Mod: HCNC,PN | Performed by: INTERNAL MEDICINE

## 2025-04-10 RX ORDER — SODIUM CHLORIDE 0.9 % (FLUSH) 0.9 %
10 SYRINGE (ML) INJECTION
Status: DISCONTINUED | OUTPATIENT
Start: 2025-04-10 | End: 2025-04-10 | Stop reason: HOSPADM

## 2025-04-10 RX ORDER — EPINEPHRINE 0.3 MG/.3ML
0.3 INJECTION SUBCUTANEOUS ONCE AS NEEDED
OUTPATIENT
Start: 2025-04-10

## 2025-04-10 RX ORDER — DIPHENHYDRAMINE HYDROCHLORIDE 50 MG/ML
50 INJECTION, SOLUTION INTRAMUSCULAR; INTRAVENOUS ONCE AS NEEDED
OUTPATIENT
Start: 2025-04-10

## 2025-04-10 RX ORDER — HEPARIN 100 UNIT/ML
500 SYRINGE INTRAVENOUS
OUTPATIENT
Start: 2025-04-10

## 2025-04-10 RX ORDER — SODIUM CHLORIDE 0.9 % (FLUSH) 0.9 %
10 SYRINGE (ML) INJECTION
OUTPATIENT
Start: 2025-04-10

## 2025-04-10 RX ADMIN — SODIUM CHLORIDE: 9 INJECTION, SOLUTION INTRAVENOUS at 02:04

## 2025-04-10 RX ADMIN — IRON SUCROSE 200 MG: 20 INJECTION, SOLUTION INTRAVENOUS at 03:04

## 2025-04-10 RX ADMIN — Medication 10 ML: at 02:04

## 2025-04-10 NOTE — PLAN OF CARE
Problem: Fatigue  Goal: Improved Activity Tolerance  Intervention: Promote Improved Energy  Flowsheets (Taken 4/10/2025 1440)  Fatigue Management:   frequent rest breaks encouraged   activity schedule adjusted   paced activity encouraged   fatigue-related activity identified  Sleep/Rest Enhancement:   relaxation techniques promoted   regular sleep/rest pattern promoted   natural light exposure provided  Activity Management:   Ambulated -L4   Ambulated to bathroom - L4   Ambulated in gl[place] - L4   Up in lounge/playroom - L4   Up in stretcher chair - L1  Environmental Support:   rest periods encouraged   personal routine supported     Problem: Adult Inpatient Plan of Care  Goal: Patient-Specific Goal (Individualized)  Outcome: Progressing  Flowsheets (Taken 4/10/2025 1440)  Individualized Care Needs: recliner, warm blanket, pillow, dim lights, conversation  Anxieties, Fears or Concerns: none  Patient/Family-Specific Goals (Include Timeframe): no s/s of reaction during treatment     Problem: Adult Inpatient Plan of Care  Goal: Plan of Care Review  Outcome: Progressing  Flowsheets (Taken 4/10/2025 2705)  Plan of Care Reviewed With: patient   Pt tolerated her Venofer infusion well, NAD.  Pt was observed for 30 minutes post infusion with no new c/o voiced. Pt given a schedule and reviewed, pt verbalized understanding. Pt ambulated out of the clinic without difficulty.

## 2025-04-16 ENCOUNTER — OFFICE VISIT (OUTPATIENT)
Dept: UROLOGY | Facility: CLINIC | Age: 80
End: 2025-04-16
Payer: MEDICARE

## 2025-04-16 VITALS — BODY MASS INDEX: 29.24 KG/M2 | WEIGHT: 175.5 LBS | HEIGHT: 65 IN

## 2025-04-16 DIAGNOSIS — N39.0 UTI DUE TO KLEBSIELLA SPECIES: ICD-10-CM

## 2025-04-16 DIAGNOSIS — R39.89 PNEUMATURIA: ICD-10-CM

## 2025-04-16 DIAGNOSIS — B96.89 UTI DUE TO KLEBSIELLA SPECIES: ICD-10-CM

## 2025-04-16 LAB
BILIRUBIN, UA POC OHS: NEGATIVE
BLOOD, UA POC OHS: NEGATIVE
CLARITY, UA POC OHS: CLEAR
COLOR, UA POC OHS: YELLOW
GLUCOSE, UA POC OHS: 500
KETONES, UA POC OHS: NEGATIVE
LEUKOCYTES, UA POC OHS: NEGATIVE
NITRITE, UA POC OHS: NEGATIVE
PH, UA POC OHS: 5.5
PROTEIN, UA POC OHS: NEGATIVE
SPECIFIC GRAVITY, UA POC OHS: 1.01
UROBILINOGEN, UA POC OHS: 0.2

## 2025-04-16 PROCEDURE — 99999 PR PBB SHADOW E&M-EST. PATIENT-LVL III: CPT | Mod: PBBFAC,HCNC,,

## 2025-04-16 NOTE — PROGRESS NOTES
[facility] Note  Staff: Mireille Arevalo FNP-C    PCP: MD Des    Chief Complaint: f/u    Subjective:        HPI: Adry Owen is a 80 y.o. female NEW PATIENT presents today for follow up UTI. She states she was also experiencing hearing gas pass when urinating. Her urine culture from 3/21/2025 was positive for klebsiella. She was treated with a course of cipro. She states all symptoms resolved with abx including hearing gas with urination. We discussed updating imaging for resolution. She denies dysuria, hematuria, fever, flank pain, and difficulty urinating.     Questions asked pt during office visit today:  Urgency:No, incontinence with urgency? No;   DysuriaNo  Gross HematuriaNo  History of UTI: Yes     History of Kidney Stones?:  no    Constipation issues?:  no    REVIEW OF SYSTEMS:  Review of Systems   Constitutional: Negative.  Negative for chills and fever.   Gastrointestinal: Negative.  Negative for abdominal pain, constipation, diarrhea, nausea and vomiting.   Genitourinary: Negative.  Negative for dysuria, flank pain, frequency, hematuria and urgency.   Musculoskeletal: Negative.  Negative for back pain.       PMHx:  Past Medical History:   Diagnosis Date    *Atrial fibrillation     *Atrial flutter     Anticoagulant long-term use     xarelto    Arthritis     back and hip pain    Asthma     Atrial fibrillation or flutter     Cancer     leukemia CML    Cataract     CHF (congestive heart failure)     Chronic neck pain     Class 1 obesity due to excess calories with serious comorbidity and body mass index (BMI) of 32.0 to 32.9 in adult 10/09/2023    Coronary artery disease 2004    1 stent    Diabetes mellitus     Encephalopathy, toxic 04/15/2023    Encounter for blood transfusion     Fibromyalgia     GI bleed 08/26/2013    Hashimoto's disease     Heart block     History of diverticulitis     History of GI bleed     Hyperlipidemia     Hypertension     Iron deficiency anemia due to  chronic blood loss 04/30/2024    MI (myocardial infarction)     1992    Osteopenia of necks of both femurs 01/12/2023    Problems with swallowing     bone spur and scarring C1-C2    Sleep apnea     bipap and or breathe rite strips    Thyroid disease        PSHx:  Past Surgical History:   Procedure Laterality Date    ABLATION  2/8/2024    Procedure: Ablation AV Node;  Surgeon: Xavier Ahuja III, MD;  Location: [facility] CATH;  Service: Cardiology;;    ABLATION OF DYSRHYTHMIC FOCUS      ADENOIDECTOMY      APPENDECTOMY      BACK SURGERY  2015    cervical fusion    CARDIAC CATHETERIZATION  2004    1 stent    CARDIOVERSION  05/2018    CATARACT EXTRACTION W/  INTRAOCULAR LENS IMPLANT Bilateral 2015    now has scarring needs laser sgy    CATHETERIZATION OF BOTH LEFT AND RIGHT HEART  5/20/2024    Procedure: Right / Left heart cath;  Surgeon: Xavier Ahuja III, MD;  Location: [facility] CATH;  Service: Cardiology;;    CERVICAL FUSION      three surgeries last 4/13/2023 / plates screws    ESOPHAGOGASTRODUODENOSCOPY N/A 3/30/2024    Procedure: EGD (ESOPHAGOGASTRODUODENOSCOPY);  Surgeon: Victor Hugo Ovalle MD;  Location: [facility];  Service: Endoscopy;  Laterality: N/A;    EXCISION OF MASS OF HAND Right 06/12/2018    Procedure: EXCISION, MASS, HAND/ Long finger with osteophyte removal;  Surgeon: ORION Mendoza MD;  Location: [facility];  Service: General;  Laterality: Right;    EYE SURGERY      FRACTURE SURGERY      HAND TENDON SURGERY Right      x 2 1986 & 1996    HYSTERECTOMY  2001    bso    INSERTION OF PACEMAKER N/A 11/27/2019    Procedure: INSERTION, PACEMAKER, DUAL CHAMBER-BIOTRONIK;  Surgeon: Xavier Ahuja III, MD;  Location: [facility] CATH;  Service: Cardiology;  Laterality: N/A;    LEFT HEART CATHETERIZATION Left 04/21/2022    Procedure: Left heart cath;  Surgeon: Xavier Ahuja III, MD;  Location: [facility] CATH;  Service: Cardiology;  Laterality: Left;    loop recorder insertion      POSTERIOR FUSION LUMBAR SPINE W/ CORPECTOMY       with disc replacement x 2    REMOVAL OF IMPLANTABLE LOOP RECORDER Left 11/27/2019    Procedure: REMOVAL, IMPLANTABLE LOOP RECORDER;  Surgeon: Xavier Ahuja III, MD;  Location: [facility];  Service: Cardiology;  Laterality: Left;    SHOULDER ARTHROSCOPY W/ ROTATOR CUFF REPAIR Right 1992    SPINE SURGERY      TONSILLECTOMY         Fam Hx:   malignancies: Yes - father testicular cancer , gyn malignancies: No   kidney stones: No     Soc Hx:  [place]ives in [place]    Allergies:  Alcohol prep pads [alcohol swabs], Diazolidinyl urea, Influenza virus vaccines, Iodine, Preservative, Thimerosal, Bactoshield chg [chlorhexidine gluconate], Feldene [piroxicam], Cephalosporins, Lisinopril, Penicillins, and Sotalol    Medications: reviewed     Objective:   There were no vitals filed for this visit.    Physical Exam  Constitutional:       Appearance: Normal appearance.   Pulmonary:      Effort: Pulmonary effort is normal.   Abdominal:      General: There is no distension.      Palpations: Abdomen is soft.      Tenderness: There is no abdominal tenderness. There is no right CVA tenderness or left CVA tenderness.   Musculoskeletal:         General: Normal range of motion.      Cervical back: Normal range of motion.   Skin:     General: Skin is warm.   Neurological:      Mental Status: She is oriented to person, place, and time.   Psychiatric:         Mood and Affect: Mood normal.         Behavior: Behavior normal.         LABS REVIEW:  UA today:   Color:Clear, Yellow  Spec. Grav.  1.010  PH  5.5  Negative for leukocytes, nitrates, protein, ketones, urobili, bili, and blood.  Positive glucose- taking jardiance    Assessment:       1. UTI due to Klebsiella species    2. Pneumaturia          Plan:      Retroperitoneal US ordered and scheduled    F/u per plan    [facility]: active    GUERDA Kate

## 2025-04-17 ENCOUNTER — HOSPITAL ENCOUNTER (OUTPATIENT)
Dept: RADIOLOGY | Facility: HOSPITAL | Age: 80
Discharge: HOME | End: 2025-04-17
Payer: MEDICARE

## 2025-04-17 ENCOUNTER — RESULTS FOLLOW-UP (OUTPATIENT)
Dept: UROLOGY | Facility: CLINIC | Age: 80
End: 2025-04-17

## 2025-04-17 DIAGNOSIS — R39.89 PNEUMATURIA: ICD-10-CM

## 2025-04-17 DIAGNOSIS — B96.89 UTI DUE TO KLEBSIELLA SPECIES: ICD-10-CM

## 2025-04-17 DIAGNOSIS — N39.0 UTI DUE TO KLEBSIELLA SPECIES: ICD-10-CM

## 2025-04-17 PROCEDURE — 76770 US EXAM ABDO BACK WALL COMP: CPT | Mod: 26,HCNC,, | Performed by: RADIOLOGY

## 2025-04-17 PROCEDURE — 76770 US EXAM ABDO BACK WALL COMP: CPT | Mod: TC,HCNC,PO

## 2025-04-21 ENCOUNTER — TELEPHONE (OUTPATIENT)
Dept: UROLOGY | Facility: CLINIC | Age: 80
End: 2025-04-21
Payer: MEDICARE

## 2025-04-21 NOTE — TELEPHONE ENCOUNTER
Pt verbalized understanding     ----- Message from Mireille Arevalo NP sent at 4/17/2025  3:47 PM CDT -----  No air seen in bladder- resolved with abx  ----- Message -----  From: [facility] Rad Results In  Sent: 4/17/2025   3:33 PM CDT  To: Mireille Arevalo NP

## 2025-05-15 ENCOUNTER — INFUSION (OUTPATIENT)
Dept: INFUSION THERAPY | Facility: HOSPITAL | Age: 80
End: 2025-05-15
Attending: INTERNAL MEDICINE
Payer: MEDICARE

## 2025-05-15 ENCOUNTER — LAB VISIT (OUTPATIENT)
Dept: LAB | Facility: HOSPITAL | Age: 80
End: 2025-05-15
Attending: INTERNAL MEDICINE
Payer: MEDICARE

## 2025-05-15 VITALS — DIASTOLIC BLOOD PRESSURE: 62 MMHG | SYSTOLIC BLOOD PRESSURE: 110 MMHG

## 2025-05-15 DIAGNOSIS — M85.851 OSTEOPENIA OF NECKS OF BOTH FEMURS: Primary | ICD-10-CM

## 2025-05-15 DIAGNOSIS — D50.0 IRON DEFICIENCY ANEMIA DUE TO CHRONIC BLOOD LOSS: ICD-10-CM

## 2025-05-15 DIAGNOSIS — M85.852 OSTEOPENIA OF NECKS OF BOTH FEMURS: Primary | ICD-10-CM

## 2025-05-15 DIAGNOSIS — E87.1 HYPONATREMIA: ICD-10-CM

## 2025-05-15 LAB
ABSOLUTE EOSINOPHIL (OHS): 0.04 K/UL
ABSOLUTE MONOCYTE (OHS): 0.66 K/UL (ref 0.3–1)
ABSOLUTE NEUTROPHIL COUNT (OHS): 4.84 K/UL (ref 1.8–7.7)
ANION GAP (OHS): 9 MMOL/L (ref 8–16)
BASOPHILS # BLD AUTO: 0.04 K/UL
BASOPHILS NFR BLD AUTO: 0.6 %
BUN SERPL-MCNC: 19 MG/DL (ref 8–23)
CALCIUM SERPL-MCNC: 9.9 MG/DL (ref 8.7–10.5)
CHLORIDE SERPL-SCNC: 105 MMOL/L (ref 95–110)
CO2 SERPL-SCNC: 23 MMOL/L (ref 23–29)
CREAT SERPL-MCNC: 0.9 MG/DL (ref 0.5–1.4)
ERYTHROCYTE [DISTWIDTH] IN BLOOD BY AUTOMATED COUNT: 15 % (ref 11.5–14.5)
FERRITIN SERPL-MCNC: 215 NG/ML (ref 20–300)
GFR SERPLBLD CREATININE-BSD FMLA CKD-EPI: >60 ML/MIN/1.73/M2
GLUCOSE SERPL-MCNC: 93 MG/DL (ref 70–110)
HCT VFR BLD AUTO: 42.7 % (ref 37–48.5)
HGB BLD-MCNC: 14 GM/DL (ref 12–16)
IMM GRANULOCYTES # BLD AUTO: 0.01 K/UL (ref 0–0.04)
IMM GRANULOCYTES NFR BLD AUTO: 0.1 % (ref 0–0.5)
IRON SATN MFR SERPL: 23 % (ref 20–50)
IRON SERPL-MCNC: 78 UG/DL (ref 30–160)
LYMPHOCYTES # BLD AUTO: 1.43 K/UL (ref 1–4.8)
MCH RBC QN AUTO: 28.9 PG (ref 27–31)
MCHC RBC AUTO-ENTMCNC: 32.8 G/DL (ref 32–36)
MCV RBC AUTO: 88 FL (ref 82–98)
NUCLEATED RBC (/100WBC) (OHS): 0 /100 WBC
PLATELET # BLD AUTO: 217 K/UL (ref 150–450)
PMV BLD AUTO: 10.7 FL (ref 9.2–12.9)
POTASSIUM SERPL-SCNC: 5.2 MMOL/L (ref 3.5–5.1)
RBC # BLD AUTO: 4.85 M/UL (ref 4–5.4)
RELATIVE EOSINOPHIL (OHS): 0.6 %
RELATIVE LYMPHOCYTE (OHS): 20.4 % (ref 18–48)
RELATIVE MONOCYTE (OHS): 9.4 % (ref 4–15)
RELATIVE NEUTROPHIL (OHS): 68.9 % (ref 38–73)
SODIUM SERPL-SCNC: 137 MMOL/L (ref 136–145)
TIBC SERPL-MCNC: 334 UG/DL (ref 250–450)
TRANSFERRIN SERPL-MCNC: 226 MG/DL (ref 200–375)
WBC # BLD AUTO: 7.02 K/UL (ref 3.9–12.7)

## 2025-05-15 PROCEDURE — 36415 COLL VENOUS BLD VENIPUNCTURE: CPT | Mod: HCNC,PN

## 2025-05-15 PROCEDURE — 80048 BASIC METABOLIC PNL TOTAL CA: CPT | Mod: HCNC,PN

## 2025-05-15 PROCEDURE — 82728 ASSAY OF FERRITIN: CPT | Mod: HCNC

## 2025-05-15 PROCEDURE — 96372 THER/PROPH/DIAG INJ SC/IM: CPT | Mod: HCNC,PN

## 2025-05-15 PROCEDURE — 63600175 PHARM REV CODE 636 W HCPCS: Mod: JZ,TB,HCNC,PN | Performed by: INTERNAL MEDICINE

## 2025-05-15 PROCEDURE — 84466 ASSAY OF TRANSFERRIN: CPT | Mod: HCNC

## 2025-05-15 PROCEDURE — 85025 COMPLETE CBC W/AUTO DIFF WBC: CPT | Mod: HCNC,PN

## 2025-05-15 RX ADMIN — DENOSUMAB 60 MG: 60 INJECTION SUBCUTANEOUS at 03:05

## 2025-05-15 NOTE — PLAN OF CARE
Problem: Electrolyte Imbalance  Goal: Electrolyte Balance  Outcome: Progressing  Intervention: Monitor and Manage Electrolyte Imbalance  Flowsheets (Taken 5/15/2025 1451)  Fluid/Electrolyte Management: other (see comments)     Problem: Electrolyte Imbalance  Goal: Electrolyte Balance  Intervention: Monitor and Manage Electrolyte Imbalance  Flowsheets (Taken 5/15/2025 1451)  Fluid/Electrolyte Management: other (see comments)     Problem: Adult Inpatient Plan of Care  Goal: Plan of Care Review  Outcome: Met   Tolerated injection without diffculty D/C instructions given and pt ambulated to private vehicle per self without difficulty  NAD

## 2025-05-18 ENCOUNTER — RESULTS FOLLOW-UP (OUTPATIENT)
Dept: FAMILY MEDICINE | Facility: CLINIC | Age: 80
End: 2025-05-18

## 2025-07-09 DIAGNOSIS — Z78.0 MENOPAUSE: ICD-10-CM

## 2025-08-27 ENCOUNTER — OFFICE VISIT (OUTPATIENT)
Dept: FAMILY MEDICINE | Facility: CLINIC | Age: 80
End: 2025-08-27
Payer: MEDICARE

## 2025-08-27 VITALS
BODY MASS INDEX: 29.45 KG/M2 | HEART RATE: 70 BPM | RESPIRATION RATE: 12 BRPM | WEIGHT: 177 LBS | OXYGEN SATURATION: 98 % | SYSTOLIC BLOOD PRESSURE: 124 MMHG | DIASTOLIC BLOOD PRESSURE: 67 MMHG

## 2025-08-27 DIAGNOSIS — D50.0 IRON DEFICIENCY ANEMIA DUE TO CHRONIC BLOOD LOSS: ICD-10-CM

## 2025-08-27 DIAGNOSIS — E78.49 OTHER HYPERLIPIDEMIA: ICD-10-CM

## 2025-08-27 DIAGNOSIS — I10 ESSENTIAL HYPERTENSION: Primary | ICD-10-CM

## 2025-08-27 DIAGNOSIS — I48.11 LONGSTANDING PERSISTENT ATRIAL FIBRILLATION: ICD-10-CM

## 2025-08-27 DIAGNOSIS — C92.11 CHRONIC MYELOID LEUKEMIA, BCR/ABL-POSITIVE, IN REMISSION: ICD-10-CM

## 2025-08-27 DIAGNOSIS — E06.3 HYPOTHYROIDISM DUE TO HASHIMOTO'S THYROIDITIS: ICD-10-CM

## 2025-08-27 PROBLEM — D62 ACUTE BLOOD LOSS ANEMIA: Status: RESOLVED | Noted: 2024-04-22 | Resolved: 2025-08-27

## 2025-08-27 PROCEDURE — 1159F MED LIST DOCD IN RCRD: CPT | Mod: CPTII,HCNC,95, | Performed by: INTERNAL MEDICINE

## 2025-08-27 PROCEDURE — 3074F SYST BP LT 130 MM HG: CPT | Mod: CPTII,HCNC,95, | Performed by: INTERNAL MEDICINE

## 2025-08-27 PROCEDURE — 1157F ADVNC CARE PLAN IN RCRD: CPT | Mod: CPTII,HCNC,95, | Performed by: INTERNAL MEDICINE

## 2025-08-27 PROCEDURE — 1160F RVW MEDS BY RX/DR IN RCRD: CPT | Mod: CPTII,HCNC,95, | Performed by: INTERNAL MEDICINE

## 2025-08-27 PROCEDURE — 3078F DIAST BP <80 MM HG: CPT | Mod: CPTII,HCNC,95, | Performed by: INTERNAL MEDICINE

## 2025-08-27 PROCEDURE — 98004 SYNCH AUDIO-VIDEO EST SF 10: CPT | Mod: HCNC,95,, | Performed by: INTERNAL MEDICINE

## 2025-08-27 RX ORDER — ROSUVASTATIN CALCIUM 10 MG/1
10 TABLET, COATED ORAL NIGHTLY
Qty: 90 TABLET | Refills: 3 | Status: SHIPPED | OUTPATIENT
Start: 2025-08-27

## 2025-09-03 ENCOUNTER — TELEPHONE (OUTPATIENT)
Dept: FAMILY MEDICINE | Facility: CLINIC | Age: 80
End: 2025-09-03
Payer: MEDICARE

## 2025-09-04 ENCOUNTER — TELEPHONE (OUTPATIENT)
Dept: FAMILY MEDICINE | Facility: CLINIC | Age: 80
End: 2025-09-04
Payer: MEDICARE